# Patient Record
Sex: MALE | Race: WHITE | NOT HISPANIC OR LATINO | Employment: FULL TIME | URBAN - METROPOLITAN AREA
[De-identification: names, ages, dates, MRNs, and addresses within clinical notes are randomized per-mention and may not be internally consistent; named-entity substitution may affect disease eponyms.]

---

## 2017-01-12 ENCOUNTER — CLINICAL SUPPORT (OUTPATIENT)
Dept: CARDIOLOGY | Facility: CLINIC | Age: 72
End: 2017-01-12
Payer: MEDICARE

## 2017-01-12 DIAGNOSIS — Z95.810 ICD (IMPLANTABLE CARDIOVERTER-DEFIBRILLATOR) IN PLACE: ICD-10-CM

## 2017-01-12 DIAGNOSIS — I25.5 CARDIOMYOPATHY, ISCHEMIC: Chronic | ICD-10-CM

## 2017-01-12 PROCEDURE — 93283 PRGRMG EVAL IMPLANTABLE DFB: CPT | Mod: S$GLB,,, | Performed by: INTERNAL MEDICINE

## 2017-01-31 ENCOUNTER — OFFICE VISIT (OUTPATIENT)
Dept: CARDIOLOGY | Facility: CLINIC | Age: 72
End: 2017-01-31
Payer: MEDICARE

## 2017-01-31 ENCOUNTER — TELEPHONE (OUTPATIENT)
Dept: CARDIOLOGY | Facility: CLINIC | Age: 72
End: 2017-01-31

## 2017-01-31 ENCOUNTER — LAB VISIT (OUTPATIENT)
Dept: LAB | Facility: HOSPITAL | Age: 72
End: 2017-01-31
Attending: INTERNAL MEDICINE
Payer: MEDICARE

## 2017-01-31 VITALS
RESPIRATION RATE: 16 BRPM | DIASTOLIC BLOOD PRESSURE: 68 MMHG | BODY MASS INDEX: 30.44 KG/M2 | SYSTOLIC BLOOD PRESSURE: 112 MMHG | HEART RATE: 78 BPM | WEIGHT: 189.38 LBS | HEIGHT: 66 IN

## 2017-01-31 DIAGNOSIS — Z79.01 CHRONIC ANTICOAGULATION: ICD-10-CM

## 2017-01-31 DIAGNOSIS — Z45.02 ENCOUNTER FOR SERVICING OF IMPLANTABLE CARDIOVERTER-DEFIBRILLATOR (ICD) AT END OF BATTERY LIFE: ICD-10-CM

## 2017-01-31 DIAGNOSIS — Z92.89 HISTORY OF BLOOD TRANSFUSION: ICD-10-CM

## 2017-01-31 DIAGNOSIS — Z01.818 PRE-OP TESTING: Primary | ICD-10-CM

## 2017-01-31 DIAGNOSIS — I25.10 CORONARY ARTERY DISEASE INVOLVING NATIVE CORONARY ARTERY WITHOUT ANGINA PECTORIS, UNSPECIFIED WHETHER NATIVE OR TRANSPLANTED HEART: ICD-10-CM

## 2017-01-31 DIAGNOSIS — I25.5 CARDIOMYOPATHY, ISCHEMIC: Chronic | ICD-10-CM

## 2017-01-31 DIAGNOSIS — R06.09 DYSPNEA ON EFFORT: Primary | ICD-10-CM

## 2017-01-31 DIAGNOSIS — I10 ESSENTIAL HYPERTENSION: ICD-10-CM

## 2017-01-31 PROCEDURE — 3078F DIAST BP <80 MM HG: CPT | Mod: S$GLB,,, | Performed by: INTERNAL MEDICINE

## 2017-01-31 PROCEDURE — 3074F SYST BP LT 130 MM HG: CPT | Mod: S$GLB,,, | Performed by: INTERNAL MEDICINE

## 2017-01-31 PROCEDURE — 99499 UNLISTED E&M SERVICE: CPT | Mod: S$GLB,,, | Performed by: INTERNAL MEDICINE

## 2017-01-31 PROCEDURE — 1159F MED LIST DOCD IN RCRD: CPT | Mod: S$GLB,,, | Performed by: INTERNAL MEDICINE

## 2017-01-31 PROCEDURE — 99999 PR PBB SHADOW E&M-EST. PATIENT-LVL III: CPT | Mod: PBBFAC,,, | Performed by: INTERNAL MEDICINE

## 2017-01-31 PROCEDURE — 86803 HEPATITIS C AB TEST: CPT

## 2017-01-31 PROCEDURE — 1160F RVW MEDS BY RX/DR IN RCRD: CPT | Mod: S$GLB,,, | Performed by: INTERNAL MEDICINE

## 2017-01-31 PROCEDURE — 86705 HEP B CORE ANTIBODY IGM: CPT

## 2017-01-31 PROCEDURE — 1157F ADVNC CARE PLAN IN RCRD: CPT | Mod: S$GLB,,, | Performed by: INTERNAL MEDICINE

## 2017-01-31 PROCEDURE — 36415 COLL VENOUS BLD VENIPUNCTURE: CPT | Mod: PO

## 2017-01-31 PROCEDURE — 99214 OFFICE O/P EST MOD 30 MIN: CPT | Mod: S$GLB,,, | Performed by: INTERNAL MEDICINE

## 2017-01-31 PROCEDURE — 1126F AMNT PAIN NOTED NONE PRSNT: CPT | Mod: S$GLB,,, | Performed by: INTERNAL MEDICINE

## 2017-01-31 RX ORDER — RANOLAZINE 500 MG/1
500 TABLET, EXTENDED RELEASE ORAL 2 TIMES DAILY
Qty: 180 TABLET | Refills: 3 | Status: SHIPPED | OUTPATIENT
Start: 2017-01-31 | End: 2018-03-28 | Stop reason: SDUPTHER

## 2017-01-31 RX ORDER — CARVEDILOL 6.25 MG/1
6.25 TABLET ORAL 2 TIMES DAILY
Qty: 180 TABLET | Refills: 3 | Status: SHIPPED | OUTPATIENT
Start: 2017-01-31 | End: 2018-04-03 | Stop reason: SDUPTHER

## 2017-01-31 RX ORDER — PANTOPRAZOLE SODIUM 40 MG/1
40 TABLET, DELAYED RELEASE ORAL DAILY
Qty: 90 TABLET | Refills: 4 | Status: SHIPPED | OUTPATIENT
Start: 2017-01-31 | End: 2017-11-01 | Stop reason: SDUPTHER

## 2017-01-31 RX ORDER — POTASSIUM CHLORIDE 750 MG/1
10 TABLET, EXTENDED RELEASE ORAL DAILY
Qty: 90 TABLET | Refills: 3 | Status: SHIPPED | OUTPATIENT
Start: 2017-01-31 | End: 2018-03-13 | Stop reason: SDUPTHER

## 2017-01-31 RX ORDER — FUROSEMIDE 40 MG/1
40 TABLET ORAL DAILY
Qty: 90 TABLET | Refills: 3 | Status: SHIPPED | OUTPATIENT
Start: 2017-01-31 | End: 2018-04-27 | Stop reason: SDUPTHER

## 2017-01-31 RX ORDER — ATORVASTATIN CALCIUM 20 MG/1
20 TABLET, FILM COATED ORAL DAILY
Qty: 90 TABLET | Refills: 3 | Status: SHIPPED | OUTPATIENT
Start: 2017-01-31 | End: 2017-05-10 | Stop reason: SDUPTHER

## 2017-01-31 RX ORDER — CLOPIDOGREL BISULFATE 75 MG/1
75 TABLET ORAL NIGHTLY
Qty: 90 TABLET | Refills: 3 | Status: SHIPPED | OUTPATIENT
Start: 2017-01-31 | End: 2018-02-20 | Stop reason: SDUPTHER

## 2017-01-31 RX ORDER — EZETIMIBE 10 MG/1
10 TABLET ORAL DAILY
Qty: 90 TABLET | Refills: 3 | Status: SHIPPED | OUTPATIENT
Start: 2017-01-31 | End: 2017-02-09 | Stop reason: DRUGHIGH

## 2017-01-31 RX ORDER — ENALAPRIL MALEATE 10 MG/1
5 TABLET ORAL 2 TIMES DAILY
Qty: 90 TABLET | Refills: 3 | Status: SHIPPED | OUTPATIENT
Start: 2017-01-31 | End: 2017-02-09 | Stop reason: DRUGHIGH

## 2017-01-31 NOTE — PROGRESS NOTES
Subjective:    Patient ID:  Tristen Blanc is a 71 y.o. male who presents for follow-up of Follow-up (6 month )      HPI Comments: Had bad sinus infection since Thanksgiving.      Review of Systems   All other systems reviewed and are negative.       Objective:    Physical Exam   Constitutional: He is oriented to person, place, and time. He appears well-developed and well-nourished.   HENT:   Head: Normocephalic and atraumatic.   Eyes: Conjunctivae and EOM are normal. Pupils are equal, round, and reactive to light. Right eye exhibits no discharge. Left eye exhibits no discharge. No scleral icterus.   Neck: Normal range of motion. Neck supple. No JVD present. No tracheal deviation present. No thyromegaly present.   Cardiovascular: Normal rate, regular rhythm and intact distal pulses.  Exam reveals no gallop and no friction rub.    Murmur heard.  Pulses:       Carotid pulses are 2+ on the right side, and 2+ on the left side.       Dorsalis pedis pulses are 2+ on the right side, and 2+ on the left side.        Posterior tibial pulses are 2+ on the right side, and 2+ on the left side.   Pulmonary/Chest: Effort normal and breath sounds normal. No stridor. No respiratory distress. He has no wheezes. He has no rales. He exhibits no tenderness.   Abdominal: Soft. Bowel sounds are normal. He exhibits no distension. There is no tenderness. There is no rebound and no guarding.   Musculoskeletal: Normal range of motion. He exhibits no edema or tenderness.   Lymphadenopathy:     He has no cervical adenopathy.   Neurological: He is alert and oriented to person, place, and time.   Skin: Skin is warm and dry. No rash noted. No erythema. No pallor.   Psychiatric: He has a normal mood and affect. His behavior is normal. Judgment and thought content normal.         Assessment:       1. Dyspnea on effort    2. Essential hypertension    3. Cardiomyopathy, ischemic    4. Coronary artery disease involving native coronary artery without  angina pectoris, unspecified whether native or transplanted heart         Plan:       Dyspnea on effort    Essential hypertension    Cardiomyopathy, ischemic    Coronary artery disease involving native coronary artery without angina pectoris, unspecified whether native or transplanted heart    Will refer to Dr Loza for pacer battery replacement

## 2017-02-01 DIAGNOSIS — I10 HTN (HYPERTENSION) WITH GOAL TO BE DETERMINED: ICD-10-CM

## 2017-02-01 DIAGNOSIS — R06.00 DYSPNEA, UNSPECIFIED TYPE: ICD-10-CM

## 2017-02-01 DIAGNOSIS — I20.89 ANGINA AT REST: ICD-10-CM

## 2017-02-01 DIAGNOSIS — R07.9 CHEST PAIN, UNSPECIFIED TYPE: Primary | ICD-10-CM

## 2017-02-01 DIAGNOSIS — I25.10 CORONARY ARTERY DISEASE, ANGINA PRESENCE UNSPECIFIED, UNSPECIFIED VESSEL OR LESION TYPE, UNSPECIFIED WHETHER NATIVE OR TRANSPLANTED HEART: ICD-10-CM

## 2017-02-01 DIAGNOSIS — I42.9 CARDIOMYOPATHY, UNSPECIFIED TYPE: ICD-10-CM

## 2017-02-01 LAB
HBV CORE IGM SERPL QL IA: NEGATIVE
HCV AB SERPL QL IA: NEGATIVE

## 2017-02-01 NOTE — TELEPHONE ENCOUNTER
----- Message from Vishnu Chand MD sent at 2/1/2017  2:20 PM CST -----  Negative blood test for hepatitis B abd C. A

## 2017-02-03 ENCOUNTER — LAB VISIT (OUTPATIENT)
Dept: LAB | Facility: HOSPITAL | Age: 72
End: 2017-02-03
Attending: INTERNAL MEDICINE
Payer: MEDICARE

## 2017-02-03 DIAGNOSIS — Z79.01 CHRONIC ANTICOAGULATION: ICD-10-CM

## 2017-02-03 DIAGNOSIS — Z01.818 PRE-OP TESTING: ICD-10-CM

## 2017-02-03 LAB
ANION GAP SERPL CALC-SCNC: 5 MMOL/L
APTT BLDCRRT: 26.2 SEC
BASOPHILS # BLD AUTO: 0.03 K/UL
BASOPHILS NFR BLD: 0.4 %
BUN SERPL-MCNC: 20 MG/DL
CALCIUM SERPL-MCNC: 8.9 MG/DL
CHLORIDE SERPL-SCNC: 103 MMOL/L
CO2 SERPL-SCNC: 30 MMOL/L
CREAT SERPL-MCNC: 1.1 MG/DL
DIFFERENTIAL METHOD: ABNORMAL
EOSINOPHIL # BLD AUTO: 0.5 K/UL
EOSINOPHIL NFR BLD: 6.2 %
ERYTHROCYTE [DISTWIDTH] IN BLOOD BY AUTOMATED COUNT: 13.6 %
EST. GFR  (AFRICAN AMERICAN): >60 ML/MIN/1.73 M^2
EST. GFR  (NON AFRICAN AMERICAN): >60 ML/MIN/1.73 M^2
GLUCOSE SERPL-MCNC: 114 MG/DL
HCT VFR BLD AUTO: 44 %
HGB BLD-MCNC: 14.6 G/DL
INR PPP: 1
LYMPHOCYTES # BLD AUTO: 2.2 K/UL
LYMPHOCYTES NFR BLD: 30.6 %
MCH RBC QN AUTO: 31.4 PG
MCHC RBC AUTO-ENTMCNC: 33.2 %
MCV RBC AUTO: 95 FL
MONOCYTES # BLD AUTO: 0.5 K/UL
MONOCYTES NFR BLD: 7 %
NEUTROPHILS # BLD AUTO: 4 K/UL
NEUTROPHILS NFR BLD: 55.7 %
PLATELET # BLD AUTO: 217 K/UL
PMV BLD AUTO: 9.8 FL
POTASSIUM SERPL-SCNC: 4.8 MMOL/L
PROTHROMBIN TIME: 10.7 SEC
RBC # BLD AUTO: 4.65 M/UL
SODIUM SERPL-SCNC: 138 MMOL/L
WBC # BLD AUTO: 7.25 K/UL

## 2017-02-03 PROCEDURE — 85610 PROTHROMBIN TIME: CPT | Mod: PO

## 2017-02-03 PROCEDURE — 85025 COMPLETE CBC W/AUTO DIFF WBC: CPT

## 2017-02-03 PROCEDURE — 36415 COLL VENOUS BLD VENIPUNCTURE: CPT | Mod: PO

## 2017-02-03 PROCEDURE — 80048 BASIC METABOLIC PNL TOTAL CA: CPT

## 2017-02-03 PROCEDURE — 85730 THROMBOPLASTIN TIME PARTIAL: CPT | Mod: PO

## 2017-02-10 DIAGNOSIS — Z95.810 ICD (IMPLANTABLE CARDIOVERTER-DEFIBRILLATOR) IN PLACE: Primary | ICD-10-CM

## 2017-02-10 DIAGNOSIS — I25.5 CARDIOMYOPATHY, ISCHEMIC: Primary | ICD-10-CM

## 2017-02-10 DIAGNOSIS — I25.5 CARDIOMYOPATHY, ISCHEMIC: ICD-10-CM

## 2017-02-10 DIAGNOSIS — I25.10 CORONARY ARTERY DISEASE, ANGINA PRESENCE UNSPECIFIED, UNSPECIFIED VESSEL OR LESION TYPE, UNSPECIFIED WHETHER NATIVE OR TRANSPLANTED HEART: ICD-10-CM

## 2017-02-10 DIAGNOSIS — I10 HTN (HYPERTENSION) WITH GOAL TO BE DETERMINED: ICD-10-CM

## 2017-02-10 PROBLEM — Z45.02 ENCOUNTER FOR SERVICING OF AUTOMATIC IMPLANTABLE CARDIOVERTER-DEFIBRILLATOR (AICD) AT END OF BATTERY LIFE: Status: ACTIVE | Noted: 2017-02-10

## 2017-02-20 ENCOUNTER — CLINICAL SUPPORT (OUTPATIENT)
Dept: CARDIOLOGY | Facility: CLINIC | Age: 72
End: 2017-02-20
Payer: MEDICARE

## 2017-02-20 DIAGNOSIS — I25.5 CARDIOMYOPATHY, ISCHEMIC: ICD-10-CM

## 2017-02-20 DIAGNOSIS — Z95.810 ICD (IMPLANTABLE CARDIOVERTER-DEFIBRILLATOR) IN PLACE: ICD-10-CM

## 2017-02-20 PROCEDURE — 93283 PRGRMG EVAL IMPLANTABLE DFB: CPT | Mod: S$GLB,,, | Performed by: INTERNAL MEDICINE

## 2017-03-06 ENCOUNTER — TELEPHONE (OUTPATIENT)
Dept: CARDIOLOGY | Facility: CLINIC | Age: 72
End: 2017-03-06

## 2017-03-28 ENCOUNTER — OFFICE VISIT (OUTPATIENT)
Dept: CARDIOLOGY | Facility: CLINIC | Age: 72
End: 2017-03-28
Payer: MEDICARE

## 2017-03-28 VITALS
HEART RATE: 79 BPM | RESPIRATION RATE: 18 BRPM | DIASTOLIC BLOOD PRESSURE: 68 MMHG | HEIGHT: 66 IN | WEIGHT: 191.56 LBS | SYSTOLIC BLOOD PRESSURE: 117 MMHG | BODY MASS INDEX: 30.79 KG/M2

## 2017-03-28 DIAGNOSIS — I10 ESSENTIAL HYPERTENSION: Primary | ICD-10-CM

## 2017-03-28 DIAGNOSIS — I25.810 CORONARY ARTERY DISEASE INVOLVING CORONARY BYPASS GRAFT WITHOUT ANGINA PECTORIS, UNSPECIFIED WHETHER NATIVE OR TRANSPLANTED HEART: ICD-10-CM

## 2017-03-28 DIAGNOSIS — I25.5 CARDIOMYOPATHY, ISCHEMIC: Chronic | ICD-10-CM

## 2017-03-28 DIAGNOSIS — E78.00 HYPERCHOLESTEROLEMIA: Chronic | ICD-10-CM

## 2017-03-28 PROCEDURE — 99499 UNLISTED E&M SERVICE: CPT | Mod: S$GLB,,, | Performed by: INTERNAL MEDICINE

## 2017-03-28 PROCEDURE — 99024 POSTOP FOLLOW-UP VISIT: CPT | Mod: S$GLB,,, | Performed by: INTERNAL MEDICINE

## 2017-03-28 PROCEDURE — 1159F MED LIST DOCD IN RCRD: CPT | Mod: S$GLB,,, | Performed by: INTERNAL MEDICINE

## 2017-03-28 PROCEDURE — 1126F AMNT PAIN NOTED NONE PRSNT: CPT | Mod: S$GLB,,, | Performed by: INTERNAL MEDICINE

## 2017-03-28 PROCEDURE — 3078F DIAST BP <80 MM HG: CPT | Mod: S$GLB,,, | Performed by: INTERNAL MEDICINE

## 2017-03-28 PROCEDURE — 99999 PR PBB SHADOW E&M-EST. PATIENT-LVL III: CPT | Mod: PBBFAC,,, | Performed by: INTERNAL MEDICINE

## 2017-03-28 PROCEDURE — 3074F SYST BP LT 130 MM HG: CPT | Mod: S$GLB,,, | Performed by: INTERNAL MEDICINE

## 2017-03-28 PROCEDURE — 1157F ADVNC CARE PLAN IN RCRD: CPT | Mod: S$GLB,,, | Performed by: INTERNAL MEDICINE

## 2017-03-28 PROCEDURE — 1160F RVW MEDS BY RX/DR IN RCRD: CPT | Mod: S$GLB,,, | Performed by: INTERNAL MEDICINE

## 2017-03-28 RX ORDER — MONTELUKAST SODIUM 10 MG/1
10 TABLET ORAL DAILY PRN
Qty: 90 TABLET | Refills: 3 | Status: SHIPPED | OUTPATIENT
Start: 2017-03-28

## 2017-03-28 NOTE — PROGRESS NOTES
Subjective:    Patient ID:  Tristen Blanc is a 71 y.o. male who presents for follow-up of Follow-up (defibrillator/ pacemaker check up)      HPI Comments: Allergies only complaint. No cardiac symptoms.      Review of Systems   All other systems reviewed and are negative.       Objective:    Physical Exam   Constitutional: He is oriented to person, place, and time. He appears well-developed and well-nourished.   HENT:   Head: Normocephalic and atraumatic.   Eyes: Conjunctivae and EOM are normal. Pupils are equal, round, and reactive to light. Right eye exhibits no discharge. Left eye exhibits no discharge. No scleral icterus.   Neck: Normal range of motion. Neck supple. No JVD present. No tracheal deviation present. No thyromegaly present.   Cardiovascular: Normal rate, regular rhythm and intact distal pulses.  Exam reveals no gallop and no friction rub.    Murmur heard.  Pulses:       Carotid pulses are 2+ on the right side, and 2+ on the left side.       Dorsalis pedis pulses are 2+ on the right side, and 2+ on the left side.        Posterior tibial pulses are 2+ on the right side, and 2+ on the left side.   Pulmonary/Chest: Effort normal and breath sounds normal. No stridor. No respiratory distress. He has no wheezes. He has no rales. He exhibits no tenderness.   Abdominal: Soft. Bowel sounds are normal. He exhibits no distension. There is no tenderness. There is no rebound and no guarding.   Musculoskeletal: Normal range of motion. He exhibits no edema or tenderness.   Lymphadenopathy:     He has no cervical adenopathy.   Neurological: He is alert and oriented to person, place, and time.   Skin: Skin is warm and dry. No rash noted. No erythema. No pallor.   Psychiatric: He has a normal mood and affect. His behavior is normal. Judgment and thought content normal.         Assessment:       1. Essential hypertension    2. Cardiomyopathy, ischemic    3. Coronary artery disease involving coronary bypass graft  without angina pectoris, unspecified whether native or transplanted heart    4. Hypercholesterolemia         Plan:       Essential hypertension    Cardiomyopathy, ischemic    Coronary artery disease involving coronary bypass graft without angina pectoris, unspecified whether native or transplanted heart    Hypercholesterolemia    Other orders  -     montelukast (SINGULAIR) 10 mg tablet; Take 1 tablet (10 mg total) by mouth daily as needed.  Dispense: 90 tablet; Refill: 3

## 2017-03-30 DIAGNOSIS — I20.9 CARDIAC ANGINA: ICD-10-CM

## 2017-04-04 RX ORDER — NITROGLYCERIN 0.4 MG/1
TABLET SUBLINGUAL
Qty: 25 TABLET | Refills: 0 | Status: SHIPPED | OUTPATIENT
Start: 2017-04-04 | End: 2018-08-14 | Stop reason: SDUPTHER

## 2017-05-02 RX ORDER — ATORVASTATIN CALCIUM 20 MG/1
TABLET, FILM COATED ORAL
Qty: 90 TABLET | Refills: 3 | Status: CANCELLED | OUTPATIENT
Start: 2017-05-02

## 2017-05-10 RX ORDER — ATORVASTATIN CALCIUM 20 MG/1
20 TABLET, FILM COATED ORAL DAILY
Qty: 90 TABLET | Refills: 3 | Status: SHIPPED | OUTPATIENT
Start: 2017-05-10 | End: 2018-04-27 | Stop reason: SDUPTHER

## 2017-06-05 ENCOUNTER — CLINICAL SUPPORT (OUTPATIENT)
Dept: CARDIOLOGY | Facility: CLINIC | Age: 72
End: 2017-06-05
Payer: MEDICARE

## 2017-06-05 DIAGNOSIS — I25.5 CARDIOMYOPATHY, ISCHEMIC: ICD-10-CM

## 2017-06-05 DIAGNOSIS — Z95.810 ICD (IMPLANTABLE CARDIOVERTER-DEFIBRILLATOR) IN PLACE: Primary | ICD-10-CM

## 2017-06-05 DIAGNOSIS — Z95.810 ICD (IMPLANTABLE CARDIOVERTER-DEFIBRILLATOR) IN PLACE: ICD-10-CM

## 2017-06-05 PROCEDURE — 93296 REM INTERROG EVL PM/IDS: CPT | Mod: S$GLB,,, | Performed by: INTERNAL MEDICINE

## 2017-06-05 PROCEDURE — 93295 DEV INTERROG REMOTE 1/2/MLT: CPT | Mod: S$GLB,,, | Performed by: INTERNAL MEDICINE

## 2017-07-25 ENCOUNTER — PATIENT MESSAGE (OUTPATIENT)
Dept: CARDIOLOGY | Facility: CLINIC | Age: 72
End: 2017-07-25

## 2017-07-25 ENCOUNTER — TELEPHONE (OUTPATIENT)
Dept: CARDIOLOGY | Facility: CLINIC | Age: 72
End: 2017-07-25

## 2017-07-25 NOTE — TELEPHONE ENCOUNTER
----- Message from Kelly Iraheta sent at 7/25/2017 10:45 AM CDT -----  Contact: [phone]  Patient is returning nurse's phone call.  Please call patient back at [phone].

## 2017-07-25 NOTE — TELEPHONE ENCOUNTER
S/W pt: advised him we had to move his appt w/ Dr Chand on 9/26; resched to 9/20/17 & sent him a [facility] message; pt states he has to have an AM appt; currently is sched @ 12pm on 9/20, advised pt can resched to 8:20am on 9/20; pt would like 8:20am on 9/20, resched accordingly & mailed copy of appt slip as reminder; pt agrees & verbalizes understanding.

## 2017-09-11 ENCOUNTER — CLINICAL SUPPORT (OUTPATIENT)
Dept: CARDIOLOGY | Facility: CLINIC | Age: 72
End: 2017-09-11
Payer: MEDICARE

## 2017-09-11 DIAGNOSIS — Z95.810 ICD (IMPLANTABLE CARDIOVERTER-DEFIBRILLATOR) IN PLACE: ICD-10-CM

## 2017-09-11 DIAGNOSIS — I25.5 CARDIOMYOPATHY, ISCHEMIC: ICD-10-CM

## 2017-09-11 PROCEDURE — 93296 REM INTERROG EVL PM/IDS: CPT | Mod: S$GLB,,, | Performed by: INTERNAL MEDICINE

## 2017-09-11 PROCEDURE — 93295 DEV INTERROG REMOTE 1/2/MLT: CPT | Mod: S$GLB,,, | Performed by: INTERNAL MEDICINE

## 2017-09-20 ENCOUNTER — OFFICE VISIT (OUTPATIENT)
Dept: CARDIOLOGY | Facility: CLINIC | Age: 72
End: 2017-09-20
Payer: MEDICARE

## 2017-09-20 VITALS
DIASTOLIC BLOOD PRESSURE: 70 MMHG | HEIGHT: 66 IN | HEART RATE: 73 BPM | SYSTOLIC BLOOD PRESSURE: 120 MMHG | WEIGHT: 191.38 LBS | BODY MASS INDEX: 30.76 KG/M2

## 2017-09-20 DIAGNOSIS — E78.00 HYPERCHOLESTEROLEMIA: Chronic | ICD-10-CM

## 2017-09-20 DIAGNOSIS — I25.5 CARDIOMYOPATHY, ISCHEMIC: Primary | Chronic | ICD-10-CM

## 2017-09-20 DIAGNOSIS — Z95.810 ICD (IMPLANTABLE CARDIOVERTER-DEFIBRILLATOR) IN PLACE: ICD-10-CM

## 2017-09-20 DIAGNOSIS — I25.700 CORONARY ARTERY DISEASE INVOLVING CORONARY BYPASS GRAFT OF NATIVE HEART WITH UNSTABLE ANGINA PECTORIS: ICD-10-CM

## 2017-09-20 PROCEDURE — 3074F SYST BP LT 130 MM HG: CPT | Mod: S$GLB,,, | Performed by: INTERNAL MEDICINE

## 2017-09-20 PROCEDURE — 1126F AMNT PAIN NOTED NONE PRSNT: CPT | Mod: S$GLB,,, | Performed by: INTERNAL MEDICINE

## 2017-09-20 PROCEDURE — 3008F BODY MASS INDEX DOCD: CPT | Mod: S$GLB,,, | Performed by: INTERNAL MEDICINE

## 2017-09-20 PROCEDURE — 99999 PR PBB SHADOW E&M-EST. PATIENT-LVL II: CPT | Mod: PBBFAC,,, | Performed by: INTERNAL MEDICINE

## 2017-09-20 PROCEDURE — 99214 OFFICE O/P EST MOD 30 MIN: CPT | Mod: S$GLB,,, | Performed by: INTERNAL MEDICINE

## 2017-09-20 PROCEDURE — 3078F DIAST BP <80 MM HG: CPT | Mod: S$GLB,,, | Performed by: INTERNAL MEDICINE

## 2017-09-20 PROCEDURE — 99499 UNLISTED E&M SERVICE: CPT | Mod: S$GLB,,, | Performed by: INTERNAL MEDICINE

## 2017-09-20 PROCEDURE — 1159F MED LIST DOCD IN RCRD: CPT | Mod: S$GLB,,, | Performed by: INTERNAL MEDICINE

## 2017-09-20 RX ORDER — EZETIMIBE 10 MG/1
10 TABLET ORAL DAILY
Qty: 90 TABLET | Refills: 3 | Status: SHIPPED | OUTPATIENT
Start: 2017-09-20 | End: 2018-08-14 | Stop reason: SDUPTHER

## 2017-09-20 RX ORDER — DOXYCYCLINE 100 MG/1
CAPSULE ORAL
Refills: 0 | COMMUNITY
Start: 2017-07-06 | End: 2018-04-11

## 2017-09-20 NOTE — PROGRESS NOTES
Subjective:    Patient ID:  Tristen Blanc is a 72 y.o. male who presents for follow-up of Follow-up (follow up); Hyperlipidemia; Coronary Artery Disease; and Hypertension      Doing well voices no complains at all.        Review of Systems   Constitution: Negative.   HENT: Negative.    Eyes: Negative.    Cardiovascular: Negative.    Respiratory: Negative.    Endocrine: Negative.    Skin: Negative.    Musculoskeletal: Negative.    Gastrointestinal: Negative.    Genitourinary: Negative.    Neurological: Negative.    Psychiatric/Behavioral: Negative.    Allergic/Immunologic: Negative.         Objective:    Physical Exam   Constitutional: He is oriented to person, place, and time. He appears well-developed and well-nourished.   HENT:   Head: Normocephalic and atraumatic.   Eyes: Conjunctivae and EOM are normal. Pupils are equal, round, and reactive to light. Right eye exhibits no discharge. Left eye exhibits no discharge. No scleral icterus.   Neck: Normal range of motion. Neck supple. No JVD present. No tracheal deviation present. No thyromegaly present.   Cardiovascular: Normal rate, regular rhythm and intact distal pulses.  Exam reveals no gallop and no friction rub.    Murmur heard.  Pulses:       Carotid pulses are 2+ on the right side, and 2+ on the left side.       Dorsalis pedis pulses are 2+ on the right side, and 2+ on the left side.        Posterior tibial pulses are 2+ on the right side, and 2+ on the left side.   Pulmonary/Chest: Effort normal and breath sounds normal. No stridor. No respiratory distress. He has no wheezes. He has no rales. He exhibits no tenderness.   Abdominal: Soft. Bowel sounds are normal. He exhibits no distension. There is no tenderness. There is no rebound and no guarding.   Musculoskeletal: Normal range of motion. He exhibits no edema or tenderness.   Lymphadenopathy:     He has no cervical adenopathy.   Neurological: He is alert and oriented to person, place, and time.   Skin:  Skin is warm and dry. No rash noted. No erythema. No pallor.   Psychiatric: He has a normal mood and affect. His behavior is normal. Judgment and thought content normal.         Assessment:       1. Cardiomyopathy, ischemic    2. Hypercholesterolemia    3. Coronary artery disease involving coronary bypass graft of native heart with unstable angina pectoris    4. ICD (implantable cardioverter-defibrillator) in place         Plan:       Cardiomyopathy, ischemic  -     NM Myocardial Perfusion Spect Multi Pharmacologic; Future; Expected date: 09/20/2017  -     NM Multi Pharm Stress Cardiac Component; Future    Hypercholesterolemia    Coronary artery disease involving coronary bypass graft of native heart with unstable angina pectoris  -     NM Myocardial Perfusion Spect Multi Pharmacologic; Future; Expected date: 09/20/2017  -     NM Multi Pharm Stress Cardiac Component; Future    ICD (implantable cardioverter-defibrillator) in place    Other orders  -     ezetimibe (ZETIA) 10 mg tablet; Take 1 tablet (10 mg total) by mouth once daily.  Dispense: 90 tablet; Refill: 3    Doing really well. No cardiac symptoms at his time and he is back to playing golf. RTC in 6 months.

## 2017-11-07 RX ORDER — PANTOPRAZOLE SODIUM 40 MG/1
40 TABLET, DELAYED RELEASE ORAL DAILY
Qty: 90 TABLET | Refills: 4 | Status: SHIPPED | OUTPATIENT
Start: 2017-11-07 | End: 2018-12-18 | Stop reason: SDUPTHER

## 2018-01-02 ENCOUNTER — CLINICAL SUPPORT (OUTPATIENT)
Dept: CARDIOLOGY | Facility: CLINIC | Age: 73
End: 2018-01-02
Attending: INTERNAL MEDICINE
Payer: MEDICARE

## 2018-01-02 DIAGNOSIS — Z95.810 ICD (IMPLANTABLE CARDIOVERTER-DEFIBRILLATOR) IN PLACE: ICD-10-CM

## 2018-01-02 DIAGNOSIS — I25.5 CARDIOMYOPATHY, ISCHEMIC: ICD-10-CM

## 2018-01-02 PROCEDURE — 93295 DEV INTERROG REMOTE 1/2/MLT: CPT | Mod: S$GLB,,, | Performed by: INTERNAL MEDICINE

## 2018-01-02 PROCEDURE — 93296 REM INTERROG EVL PM/IDS: CPT | Mod: S$GLB,,, | Performed by: INTERNAL MEDICINE

## 2018-02-09 DIAGNOSIS — Z95.810 ICD (IMPLANTABLE CARDIOVERTER-DEFIBRILLATOR) IN PLACE: Primary | ICD-10-CM

## 2018-02-09 DIAGNOSIS — I25.5 CARDIOMYOPATHY, ISCHEMIC: ICD-10-CM

## 2018-02-20 DIAGNOSIS — I25.10 CORONARY ARTERY DISEASE, ANGINA PRESENCE UNSPECIFIED, UNSPECIFIED VESSEL OR LESION TYPE, UNSPECIFIED WHETHER NATIVE OR TRANSPLANTED HEART: Primary | ICD-10-CM

## 2018-02-20 RX ORDER — CLOPIDOGREL BISULFATE 75 MG/1
75 TABLET ORAL NIGHTLY
Qty: 90 TABLET | Refills: 3 | Status: SHIPPED | OUTPATIENT
Start: 2018-02-20 | End: 2018-12-18 | Stop reason: SDUPTHER

## 2018-03-02 DIAGNOSIS — I10 ESSENTIAL HYPERTENSION: Primary | ICD-10-CM

## 2018-03-06 DIAGNOSIS — N18.2 CRI (CHRONIC RENAL INSUFFICIENCY), STAGE 2 (MILD): ICD-10-CM

## 2018-03-06 DIAGNOSIS — E87.6 HYPOKALEMIA: ICD-10-CM

## 2018-03-06 DIAGNOSIS — E78.2 HYPERCHOLESTEROLEMIA WITH HYPERGLYCERIDEMIA: Primary | ICD-10-CM

## 2018-03-06 RX ORDER — ENALAPRIL MALEATE 5 MG/1
5 TABLET ORAL DAILY
Qty: 90 TABLET | Refills: 3 | Status: SHIPPED | OUTPATIENT
Start: 2018-03-06 | End: 2018-04-26 | Stop reason: SDUPTHER

## 2018-03-08 ENCOUNTER — PES CALL (OUTPATIENT)
Dept: ADMINISTRATIVE | Facility: CLINIC | Age: 73
End: 2018-03-08

## 2018-03-12 ENCOUNTER — TELEPHONE (OUTPATIENT)
Dept: CARDIOLOGY | Facility: CLINIC | Age: 73
End: 2018-03-12

## 2018-03-12 NOTE — TELEPHONE ENCOUNTER
----- Message from Janes Spangler sent at 3/12/2018  7:55 AM CDT -----  Contact: same  Patient called in and stated he needs to cancel his pacemaker appt on 3/20/18 as his daughter is having her first baby.  Message has been sent to Dr. Chand to cancel his appt also, along with his nuclear test that were scheduled for tomorrow 3/13/18.  Patient call back number is [phone]

## 2018-03-12 NOTE — TELEPHONE ENCOUNTER
----- Message from Janes ANDERSON Frisard sent at 3/12/2018  7:53 AM CDT -----  Contact: same  Patient called in and stated he needs to cancel his nuclear test scheduled for tomorrow Tuesday 3/13/18 as his daughter is having her first baby.  Patient would like a call back to reschedule at [phone].  Patient will also need to reschedule 3/20/18 Dr. Chand's appt.

## 2018-03-13 DIAGNOSIS — I42.9 CARDIOMYOPATHY, UNSPECIFIED TYPE: Primary | ICD-10-CM

## 2018-03-14 RX ORDER — POTASSIUM CHLORIDE 750 MG/1
10 TABLET, EXTENDED RELEASE ORAL DAILY
Qty: 90 TABLET | Refills: 3 | Status: SHIPPED | OUTPATIENT
Start: 2018-03-14 | End: 2018-12-18 | Stop reason: SDUPTHER

## 2018-03-28 RX ORDER — RANOLAZINE 500 MG/1
500 TABLET, EXTENDED RELEASE ORAL 2 TIMES DAILY
Qty: 180 TABLET | Refills: 3 | Status: SHIPPED | OUTPATIENT
Start: 2018-03-28 | End: 2018-05-23

## 2018-03-28 NOTE — TELEPHONE ENCOUNTER
----- Message from Lisseth Tejada sent at 3/28/2018 10:11 AM CDT -----  Contact: self  Type:  RX Refill Request    Who Called:  self  Refill or New Rx:  refill  RX Name and Strength:  ranolazine (RANEXA) 500 MG Tb12;carvedilol (COREG) 6.25 MG tabletHow is the patient currently taking it? (ex. 1XDay):  2Xday  Is this a 30 day or 90 day RX:  90 and 3 refills  Preferred Pharmacy with phone number:  [facility]  Local or Mail Order:  local  Ordering Provider:  Dr Jagruti Rae Call Back Number:  [phone]  Additional Information:  Patient needs before the weekend due to out and has a stress test that he needs them in his system. Pharmacy has sent prior request.     [facility] [address] [place] [address] [address] [facility] [address] AT [facility]  [address] [address]  Phone: [phone] Fax: [phone]

## 2018-04-03 ENCOUNTER — HOSPITAL ENCOUNTER (OUTPATIENT)
Dept: RADIOLOGY | Facility: HOSPITAL | Age: 73
Discharge: HOME | End: 2018-04-03
Attending: INTERNAL MEDICINE
Payer: MEDICARE

## 2018-04-03 ENCOUNTER — CLINICAL SUPPORT (OUTPATIENT)
Dept: CARDIOLOGY | Facility: CLINIC | Age: 73
End: 2018-04-03
Attending: INTERNAL MEDICINE
Payer: MEDICARE

## 2018-04-03 DIAGNOSIS — I25.700 CORONARY ARTERY DISEASE INVOLVING CORONARY BYPASS GRAFT OF NATIVE HEART WITH UNSTABLE ANGINA PECTORIS: ICD-10-CM

## 2018-04-03 DIAGNOSIS — I25.5 CARDIOMYOPATHY, ISCHEMIC: Chronic | ICD-10-CM

## 2018-04-03 PROCEDURE — 78452 HT MUSCLE IMAGE SPECT MULT: CPT | Mod: 26,,, | Performed by: INTERNAL MEDICINE

## 2018-04-03 PROCEDURE — 93015 CV STRESS TEST SUPVJ I&R: CPT | Mod: S$GLB,,, | Performed by: INTERNAL MEDICINE

## 2018-04-04 LAB — DIASTOLIC DYSFUNCTION: NO

## 2018-04-04 RX ORDER — CARVEDILOL 6.25 MG/1
6.25 TABLET ORAL 2 TIMES DAILY
Qty: 180 TABLET | Refills: 3 | Status: SHIPPED | OUTPATIENT
Start: 2018-04-04 | End: 2018-12-18 | Stop reason: SDUPTHER

## 2018-04-11 ENCOUNTER — CLINICAL SUPPORT (OUTPATIENT)
Dept: CARDIOLOGY | Facility: CLINIC | Age: 73
End: 2018-04-11
Attending: INTERNAL MEDICINE
Payer: MEDICARE

## 2018-04-11 ENCOUNTER — OFFICE VISIT (OUTPATIENT)
Dept: CARDIOLOGY | Facility: CLINIC | Age: 73
End: 2018-04-11
Payer: MEDICARE

## 2018-04-11 ENCOUNTER — TELEPHONE (OUTPATIENT)
Dept: CARDIOLOGY | Facility: CLINIC | Age: 73
End: 2018-04-11

## 2018-04-11 VITALS — BODY MASS INDEX: 30.23 KG/M2 | HEIGHT: 66 IN | WEIGHT: 188.13 LBS

## 2018-04-11 DIAGNOSIS — Z95.810 ICD (IMPLANTABLE CARDIOVERTER-DEFIBRILLATOR) IN PLACE: ICD-10-CM

## 2018-04-11 DIAGNOSIS — I25.5 CARDIOMYOPATHY, ISCHEMIC: Primary | Chronic | ICD-10-CM

## 2018-04-11 DIAGNOSIS — I25.5 CARDIOMYOPATHY, ISCHEMIC: ICD-10-CM

## 2018-04-11 DIAGNOSIS — E78.00 HYPERCHOLESTEROLEMIA: Chronic | ICD-10-CM

## 2018-04-11 DIAGNOSIS — G47.33 OBSTRUCTIVE SLEEP APNEA SYNDROME: Chronic | ICD-10-CM

## 2018-04-11 DIAGNOSIS — I25.10 CORONARY ARTERY DISEASE INVOLVING NATIVE CORONARY ARTERY OF NATIVE HEART WITHOUT ANGINA PECTORIS: ICD-10-CM

## 2018-04-11 PROCEDURE — 93283 PRGRMG EVAL IMPLANTABLE DFB: CPT | Mod: S$GLB,,, | Performed by: INTERNAL MEDICINE

## 2018-04-11 PROCEDURE — 99214 OFFICE O/P EST MOD 30 MIN: CPT | Mod: S$GLB,,, | Performed by: INTERNAL MEDICINE

## 2018-04-11 PROCEDURE — 99499 UNLISTED E&M SERVICE: CPT | Mod: S$GLB,,, | Performed by: INTERNAL MEDICINE

## 2018-04-11 PROCEDURE — 99999 PR PBB SHADOW E&M-EST. PATIENT-LVL II: CPT | Mod: PBBFAC,,, | Performed by: INTERNAL MEDICINE

## 2018-04-11 NOTE — PROGRESS NOTES
Subjective:    Patient ID:  Tristen Blanc is a 72 y.o. male who presents for follow-up of Coronary Artery Disease (follow up ppm check); Hyperlipidemia; Hypertension; and Cardiomyopathy      Cannot do much activity cause of his sinuses preventing him to do it.        Review of Systems   Cardiovascular: Positive for dyspnea on exertion.   All other systems reviewed and are negative.       Objective:    Physical Exam   Constitutional: He is oriented to person, place, and time. He appears well-developed and well-nourished.   HENT:   Head: Normocephalic and atraumatic.   Eyes: Conjunctivae and EOM are normal. Pupils are equal, round, and reactive to light. Right eye exhibits no discharge. Left eye exhibits no discharge. No scleral icterus.   Neck: Normal range of motion. Neck supple. No JVD present. No tracheal deviation present. No thyromegaly present.   Cardiovascular: Normal rate, regular rhythm and intact distal pulses.  Exam reveals no gallop and no friction rub.    Murmur heard.  Pulses:       Carotid pulses are 2+ on the right side, and 2+ on the left side.       Dorsalis pedis pulses are 2+ on the right side, and 2+ on the left side.        Posterior tibial pulses are 2+ on the right side, and 2+ on the left side.   Pulmonary/Chest: Effort normal and breath sounds normal. No stridor. No respiratory distress. He has no wheezes. He has no rales. He exhibits no tenderness.   Abdominal: Soft. Bowel sounds are normal. He exhibits no distension. There is no tenderness. There is no rebound and no guarding.   Musculoskeletal: Normal range of motion. He exhibits no edema or tenderness.   Lymphadenopathy:     He has no cervical adenopathy.   Neurological: He is alert and oriented to person, place, and time.   Skin: Skin is warm and dry. No rash noted. No erythema. No pallor.   Psychiatric: He has a normal mood and affect. His behavior is normal. Judgment and thought content normal.         Assessment:       1.  Cardiomyopathy, ischemic    2. Hypercholesterolemia    3. ICD (implantable cardioverter-defibrillator) in place    4. Coronary artery disease involving native coronary artery of native heart without angina pectoris    5. Obstructive sleep apnea syndrome         Plan:       Cardiomyopathy, ischemic    Hypercholesterolemia    ICD (implantable cardioverter-defibrillator) in place    Coronary artery disease involving native coronary artery of native heart without angina pectoris    Obstructive sleep apnea syndrome    Having difficulty with sinuses and that prevents him from using his CPAP machine. Gets SOB on effort in humid cold days.

## 2018-04-11 NOTE — TELEPHONE ENCOUNTER
Dr. ANDERSON referred patient to see Dr. Mike   See notes from today. Repeat echo ordered at main campus  Please call patient to schedule. Dx Cardiomyopaty

## 2018-04-16 ENCOUNTER — TELEPHONE (OUTPATIENT)
Dept: CARDIOLOGY | Facility: CLINIC | Age: 73
End: 2018-04-16

## 2018-04-16 NOTE — TELEPHONE ENCOUNTER
----- Message from Xiao Vickers sent at 4/13/2018  4:51 PM CDT -----  Contact: Self [phone]  Pt is requesting a call back from the nurse to find out who he is supposed to schedule with. Patient received a call but it was unclear who called him and when he tried the extension he couldn't get through to anyone. Patient is not sure who he was referred to.    Patient asks if the office could leave a detailed message as he will be out of town at a convention and will be unable to  the phone until later.    Patient may be reached at [phone].    Thank you.  JERI

## 2018-04-17 ENCOUNTER — TELEPHONE (OUTPATIENT)
Dept: TRANSPLANT | Facility: CLINIC | Age: 73
End: 2018-04-17

## 2018-04-17 NOTE — TELEPHONE ENCOUNTER
4/13/18 Called pt left message on voicemail to call office for we can schedule him a doctor appt with one of our HF Dr.    4/16/18 Left another message on pt voicemail to call office for we can get him schedule with one of our doctors    4/17/18 Called pt left two message one on house phone and one on cell phone to call office for we can get him schedule

## 2018-04-23 ENCOUNTER — TELEPHONE (OUTPATIENT)
Dept: CARDIOLOGY | Facility: CLINIC | Age: 73
End: 2018-04-23

## 2018-04-23 DIAGNOSIS — I50.22 CHRONIC SYSTOLIC HEART FAILURE: Primary | ICD-10-CM

## 2018-04-23 NOTE — TELEPHONE ENCOUNTER
----- Message from Lisseth Tejada sent at 4/23/2018  3:12 PM CDT -----  Contact: self  Patient states he did finally get a call from [facility] main [facility]. Please call patient at [phone] if any questions. Thanks!

## 2018-04-26 DIAGNOSIS — I10 ESSENTIAL HYPERTENSION: ICD-10-CM

## 2018-04-26 RX ORDER — ENALAPRIL MALEATE 5 MG/1
5 TABLET ORAL 2 TIMES DAILY
Qty: 180 TABLET | Refills: 1 | Status: SHIPPED | OUTPATIENT
Start: 2018-04-26 | End: 2018-05-23

## 2018-04-26 NOTE — TELEPHONE ENCOUNTER
"----- Message from Christel Iraheta sent at 4/26/2018 11:09 AM CDT -----  Contact: [phone]  Patient requesting a refill on enalapril (VASOTEC) 5 MG tablet, patient stated he take it "twice daily", 90 day supply, w/ 3 refills.  Patient complains he been trying to reach a nurse for 3 days.      Patient will be using   [facility] [address] [place] [address] [address] [facility] [address] AT [facility] [address]  [address] [address]  Phone: [phone] Fax: [phone]    Please call patient at [phone].     Thanks!   "

## 2018-04-26 NOTE — TELEPHONE ENCOUNTER
Please resend if appropriate. No other rx in system that states pt takes twice daily. Please advise.

## 2018-04-26 NOTE — TELEPHONE ENCOUNTER
Pt advised that a message has been sent to provider, but prescription was incorrect and has finished taking his bottle. Pt is prescribed 5mg twice daily with 90 day supply. Sent in as once daily. Pt has study scheduled Monday and is requesting his medication be sent to pharmacy so his test will be correct. please send.

## 2018-04-26 NOTE — TELEPHONE ENCOUNTER
----- Message from Gui Mckenzie sent at 4/26/2018  8:08 AM CDT -----  Contact: Patient  Type: Needs Medical Advice    Who Called:  Patient  Pharmacy name and phone #:  [facility] [phone]  Best Call Back Number: [phone]  Additional Information: patient called stated script needs to be refill for 2x daily enalapril (VASOTEC) 5 MG tablet script was written for 1x  Daily. Patient has taken 2x daily for many years he stated schedule to take stress take on 05/02 requesting medication and a call back.stated that [facility] pharmacy has made request several times with no response.patient is requesting a call back.

## 2018-04-27 ENCOUNTER — PATIENT MESSAGE (OUTPATIENT)
Dept: CARDIOLOGY | Facility: CLINIC | Age: 73
End: 2018-04-27

## 2018-04-27 DIAGNOSIS — E78.5 HYPERLIPIDEMIA, UNSPECIFIED HYPERLIPIDEMIA TYPE: ICD-10-CM

## 2018-04-27 DIAGNOSIS — I42.9 CARDIOMYOPATHY, UNSPECIFIED TYPE: Primary | ICD-10-CM

## 2018-05-01 RX ORDER — ATORVASTATIN CALCIUM 20 MG/1
20 TABLET, FILM COATED ORAL DAILY
Qty: 90 TABLET | Refills: 3 | Status: SHIPPED | OUTPATIENT
Start: 2018-05-01 | End: 2018-12-18 | Stop reason: SDUPTHER

## 2018-05-01 RX ORDER — FUROSEMIDE 40 MG/1
40 TABLET ORAL DAILY
Qty: 90 TABLET | Refills: 3 | Status: SHIPPED | OUTPATIENT
Start: 2018-05-01 | End: 2018-12-18 | Stop reason: SDUPTHER

## 2018-05-02 ENCOUNTER — HOSPITAL ENCOUNTER (OUTPATIENT)
Dept: CARDIOLOGY | Facility: CLINIC | Age: 73
Discharge: HOME | End: 2018-05-02
Attending: INTERNAL MEDICINE
Payer: MEDICARE

## 2018-05-02 DIAGNOSIS — I25.5 CARDIOMYOPATHY, ISCHEMIC: Chronic | ICD-10-CM

## 2018-05-02 DIAGNOSIS — I25.10 CORONARY ARTERY DISEASE INVOLVING NATIVE CORONARY ARTERY OF NATIVE HEART WITHOUT ANGINA PECTORIS: ICD-10-CM

## 2018-05-02 LAB
DIASTOLIC DYSFUNCTION: YES
ESTIMATED PA SYSTOLIC PRESSURE: 26.81
MITRAL VALVE REGURGITATION: ABNORMAL
RETIRED EF AND QEF - SEE NOTES: 25 (ref 55–65)
TRICUSPID VALVE REGURGITATION: ABNORMAL

## 2018-05-02 PROCEDURE — 93306 TTE W/DOPPLER COMPLETE: CPT | Mod: S$GLB,,, | Performed by: INTERNAL MEDICINE

## 2018-05-02 PROCEDURE — 96374 THER/PROPH/DIAG INJ IV PUSH: CPT | Mod: 59,S$GLB,, | Performed by: INTERNAL MEDICINE

## 2018-05-02 NOTE — PROGRESS NOTES
Patient identified via spelling of name and date of birth. Patient denies blood transfusion reaction. Consent obtained for use of contrast. Optison 3ml IVP vorshanae Garrison. 22 gauge saline lock started in right forearm under aseptic technique. Optison 3ml IVP used as contrast for 2 d imaging. Saline lock d/fran and pressure dressing applied. Tolerated procedure well.

## 2018-05-23 ENCOUNTER — LAB VISIT (OUTPATIENT)
Dept: LAB | Facility: HOSPITAL | Age: 73
End: 2018-05-23
Attending: INTERNAL MEDICINE
Payer: MEDICARE

## 2018-05-23 ENCOUNTER — INITIAL CONSULT (OUTPATIENT)
Dept: TRANSPLANT | Facility: CLINIC | Age: 73
End: 2018-05-23
Attending: INTERNAL MEDICINE
Payer: MEDICARE

## 2018-05-23 VITALS
DIASTOLIC BLOOD PRESSURE: 64 MMHG | SYSTOLIC BLOOD PRESSURE: 129 MMHG | HEIGHT: 66 IN | HEART RATE: 86 BPM | BODY MASS INDEX: 30.65 KG/M2 | WEIGHT: 190.69 LBS

## 2018-05-23 DIAGNOSIS — E78.5 DYSLIPIDEMIA WITH ELEVATED LOW DENSITY LIPOPROTEIN (LDL) CHOLESTEROL AND ABNORMALLY LOW HIGH DENSITY LIPOPROTEIN CHOLESTEROL: ICD-10-CM

## 2018-05-23 DIAGNOSIS — I50.42 CHRONIC COMBINED SYSTOLIC AND DIASTOLIC CHF, NYHA CLASS 2: ICD-10-CM

## 2018-05-23 DIAGNOSIS — I25.708 CORONARY ARTERY DISEASE OF BYPASS GRAFT OF NATIVE HEART WITH STABLE ANGINA PECTORIS: ICD-10-CM

## 2018-05-23 DIAGNOSIS — I25.5 CARDIOMYOPATHY, ISCHEMIC: Chronic | ICD-10-CM

## 2018-05-23 DIAGNOSIS — G47.33 OSA ON CPAP: ICD-10-CM

## 2018-05-23 DIAGNOSIS — Z95.810 ICD (IMPLANTABLE CARDIOVERTER-DEFIBRILLATOR) IN PLACE: ICD-10-CM

## 2018-05-23 DIAGNOSIS — I25.118 CORONARY ARTERY DISEASE OF NATIVE ARTERY OF NATIVE HEART WITH STABLE ANGINA PECTORIS: ICD-10-CM

## 2018-05-23 DIAGNOSIS — I50.42 CHRONIC COMBINED SYSTOLIC AND DIASTOLIC CONGESTIVE HEART FAILURE: Primary | ICD-10-CM

## 2018-05-23 DIAGNOSIS — I50.22 CHRONIC SYSTOLIC HEART FAILURE: ICD-10-CM

## 2018-05-23 LAB
ALBUMIN SERPL BCP-MCNC: 4.2 G/DL
ALP SERPL-CCNC: 75 U/L
ALT SERPL W/O P-5'-P-CCNC: 18 U/L
ANION GAP SERPL CALC-SCNC: 13 MMOL/L
AST SERPL-CCNC: 21 U/L
BILIRUB SERPL-MCNC: 0.6 MG/DL
BNP SERPL-MCNC: 122 PG/ML
BUN SERPL-MCNC: 18 MG/DL
CALCIUM SERPL-MCNC: 9.3 MG/DL
CHLORIDE SERPL-SCNC: 103 MMOL/L
CO2 SERPL-SCNC: 24 MMOL/L
CREAT SERPL-MCNC: 1.1 MG/DL
EST. GFR  (AFRICAN AMERICAN): >60 ML/MIN/1.73 M^2
EST. GFR  (NON AFRICAN AMERICAN): >60 ML/MIN/1.73 M^2
GLUCOSE SERPL-MCNC: 100 MG/DL
MAGNESIUM SERPL-MCNC: 2.4 MG/DL
POTASSIUM SERPL-SCNC: 4.3 MMOL/L
PROT SERPL-MCNC: 7.2 G/DL
SODIUM SERPL-SCNC: 140 MMOL/L

## 2018-05-23 PROCEDURE — 99499 UNLISTED E&M SERVICE: CPT | Mod: S$GLB,,, | Performed by: INTERNAL MEDICINE

## 2018-05-23 PROCEDURE — 83735 ASSAY OF MAGNESIUM: CPT

## 2018-05-23 PROCEDURE — 3078F DIAST BP <80 MM HG: CPT | Mod: CPTII,S$GLB,, | Performed by: INTERNAL MEDICINE

## 2018-05-23 PROCEDURE — 83880 ASSAY OF NATRIURETIC PEPTIDE: CPT

## 2018-05-23 PROCEDURE — 3074F SYST BP LT 130 MM HG: CPT | Mod: CPTII,S$GLB,, | Performed by: INTERNAL MEDICINE

## 2018-05-23 PROCEDURE — 99999 PR PBB SHADOW E&M-EST. PATIENT-LVL III: CPT | Mod: PBBFAC,,, | Performed by: INTERNAL MEDICINE

## 2018-05-23 PROCEDURE — 36415 COLL VENOUS BLD VENIPUNCTURE: CPT

## 2018-05-23 PROCEDURE — 99204 OFFICE O/P NEW MOD 45 MIN: CPT | Mod: S$GLB,,, | Performed by: INTERNAL MEDICINE

## 2018-05-23 PROCEDURE — 80053 COMPREHEN METABOLIC PANEL: CPT

## 2018-05-23 NOTE — PATIENT INSTRUCTIONS
Until we decide on whether entresto is affordable option increase enalapril to 10 mg twice a day (2 of the 5 mg tablets)  Stop ranexa and see if angina (chest pain) remains gone--if so can stay off  Call me if entresto is or is not an affordable option and I will complete treatment plan at that time    DO NOT TAKE ENTRESTO UNTIL YOU SPEAK WITH ME AND HAVE BEEN OFF OF ENALAPRIL FOR 18 HOURS

## 2018-05-25 ENCOUNTER — TELEPHONE (OUTPATIENT)
Dept: PHARMACY | Facility: CLINIC | Age: 73
End: 2018-05-25

## 2018-05-25 NOTE — TELEPHONE ENCOUNTER
Good Afternoon.    The prior authorization for Mr. Guanaco Kirkland has been approved through 5/25/2020.    The patient has been notified and is aware of the approval and states that he will think about considering picking up the medication.    If there are any additional questions about the approval please call the pharmacy at, [phone].    Thanks.    Kandy Mora CPhT.  Patient Care Advocate   [facility]  [email or web address]

## 2018-05-29 PROBLEM — I50.42 CHRONIC COMBINED SYSTOLIC AND DIASTOLIC CHF, NYHA CLASS 2: Status: ACTIVE | Noted: 2018-05-29

## 2018-05-30 NOTE — PROGRESS NOTES
Subjective:     Patient ID:  Tristen Blanc is a 72 y.o. male who presents for evaluation of Congestive Heart Failure    HPI:  73 yo WM referred by Dr. Antunez to make contact with [facility] should advanced therapies be required for CHF due to ischemic CM.  He has had prior cardiac arrest in 1996 but sounds like occurred with acute MI.  He has ICD placed 2010 and replaced 2017 but reports that he has never had an ICD shock.  He has never required hospitalization for CHF.  Currently plays golf, exercises at [employer] every morning for 30 min with treadmill covers 1 mile/17 min or recumbant bike, stretching and floor exercises.  He has been on same meds since January 2014 and tolerating well.  He reports rare angina generally occurs when weather very cold or very hot and humid.  LANGE if rushes but otherwise not limited.  Sleeps on 2 pillows with CPAP.  No PND.  Remote cardiac arrest 6/26/96, last coronary stent and ICD placed March 2010.  ICD replaced Feb 2017.    Last nuclear stress 4/3/18 fied defect, no ischemia; last echo 5/2/18 read as LVEDD 5.7, LVESD 4.7, LVEF 25-30%, diastolic dysfunction.    He continues to smoke 1 ppd says he has tried everything including Chantix twice, hypnosis, patches, gum, etc.  He declines offer to try clinic here.    Past Medical History:   Diagnosis Date    Allergy     Arthritis     Cardiomyopathy     ischemic    CHF (congestive heart failure)     Coronary artery disease     s/p CABG    General anesthetics causing adverse effect in therapeutic use     Hyperlipidemia     Hypertension     Sleep apnea with use of continuous positive airway pressure (CPAP)        Past Surgical History:   Procedure Laterality Date    CARDIAC CATHETERIZATION  January 2011    CARDIAC DEFIBRILLATOR PLACEMENT      COLONOSCOPY      CORONARY ANGIOPLASTY  1/3/2011    Last stent was in LMCA, Cx. Had previous stentsd    CORONARY ARTERY BYPASS GRAFT  06/1996    bypass of 2 vessels    INSERT / REPLACE /  REMOVE PACEMAKER      AICD; generator change in 2/10/17    JOINT REPLACEMENT Right 1991    total hip replacement    SINUS SURGERY         Family History   Problem Relation Age of Onset    Stroke Mother     Heart attack Father     Heart attack Paternal Uncle         2 uncles  of heart attcks    Heart attack Maternal Aunt         2 aunts one  from AMI asnd the other  od complications later on.   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    aspirin (ECOTRIN) 81 MG EC tablet Take 81 mg by mouth once daily.      atorvastatin (LIPITOR) 20 MG tablet Take 1 tablet (20 mg total) by mouth once daily.    carvedilol (COREG) 6.25 MG tablet Take 1 tablet (6.25 mg total) by mouth 2 (two) times daily.    clopidogrel (PLAVIX) 75 mg tablet Take 1 tablet (75 mg total) by mouth every evening.    coenzyme Q10 200 mg capsule Take 200 mg by mouth once daily.    ezetimibe (ZETIA) 10 mg tablet Take 1 tablet (10 mg total) by mouth once daily.    fluticasone (FLONASE) 50 mcg/actuation nasal spray 1 spray by Nasal route as needed.     FOLIC ACID/MV,FE,OTHER MIN (CENTRUM ORAL) Take 1 tablet by mouth every morning.     furosemide (LASIX) 40 MG tablet Take 1 tablet (40 mg total) by mouth once daily.    montelukast (SINGULAIR) 10 mg tablet Take 1 tablet (10 mg total) by  mouth daily as needed.    nitroGLYCERIN (NITROSTAT) 0.4 MG SL tablet PLACE 1 TAB UNDER TONGUE EVERY 5 MINUTES AS NEEDED FOR CHEST PAIN (MAX 3 DOSES /15MIN) IF PAIN DOSE NOT SUBSIDE CALL 911    Ranexa 500 mg Take 1 tablet twice a day    pantoprazole (PROTONIX) 40 MG tablet Take 1 tablet (40 mg total) by mouth once daily.    potassium chloride (KLOR-CON) 10 MEQ TbSR Take 1 tablet (10 mEq total) by mouth once daily.    vardenafil (LEVITRA) 20 MG tablet Take 20 mg by mouth daily as needed.      Enalapril 5 mg Take 1 tablet by mouth 2 (two) times daily.     Review of patient's allergies indicates:  No Known Allergies    Review of Systems   Constitution: Positive for weight gain. Negative for chills, decreased appetite, diaphoresis, fever, weakness, malaise/fatigue, night sweats and weight loss.   HENT: Positive for congestion. Negative for ear discharge, ear pain, hearing loss and hoarse voice.    Eyes: Negative for photophobia and visual disturbance.   Cardiovascular: Positive for chest pain and dyspnea on exertion. Negative for irregular heartbeat, leg swelling, near-syncope, orthopnea, palpitations, paroxysmal nocturnal dyspnea and syncope.   Respiratory: Positive for sleep disturbances due to breathing (on CPAP). Negative for cough, hemoptysis, shortness of breath, sputum production and wheezing.    Endocrine: Negative for cold intolerance, heat intolerance, polydipsia and polyuria.   Hematologic/Lymphatic: Negative for bleeding problem. Does not bruise/bleed easily.   Musculoskeletal: Positive for arthritis and joint pain (right hop previiously replaced but getting much worse and approaching need for reop; knee). Negative for back pain and stiffness.   Gastrointestinal: Positive for heartburn (and reflux). Negative for abdominal pain, anorexia, change in bowel habit, dysphagia, hematochezia and melena.        Last colonoscopy approx 10 years ago   Genitourinary: Negative for dysuria, frequency and hematuria.  "  Neurological: Negative for brief paralysis, focal weakness, headaches and seizures.   Allergic/Immunologic: Positive for environmental allergies.      Objective:   Physical Exam   Constitutional: He is oriented to person, place, and time. He appears well-developed and well-nourished.   /64 (BP Location: Right arm, Patient Position: Sitting, BP Method: Large (Automatic))   Pulse 86   Ht 5' 6" (1.676 m)   Wt 86.5 kg (190 lb 11.2 oz)   BMI 30.78 kg/m²   Overweight WM in NAD, friendly and cooperative   HENT:   Head: Normocephalic and atraumatic.   Mouth/Throat: Oropharynx is clear and moist. No oropharyngeal exudate.   Eyes: Conjunctivae are normal. Right eye exhibits no discharge. Left eye exhibits no discharge. No scleral icterus.   Neck: No JVD present. No thyromegaly present.   Cardiovascular: Normal rate, regular rhythm and normal heart sounds.  Exam reveals no gallop.    No murmur heard.  Pulmonary/Chest: Effort normal and breath sounds normal.   Abdominal: Soft. Bowel sounds are normal. He exhibits no distension and no mass. There is no tenderness. There is no rebound and no guarding.   Musculoskeletal: He exhibits no edema or tenderness.   Lymphadenopathy:     He has no cervical adenopathy.   Neurological: He is alert and oriented to person, place, and time.   Skin: Skin is warm and dry.   Psychiatric: He has a normal mood and affect. His behavior is normal. Judgment and thought content normal.      Assessment:     1. Chronic combined systolic and diastolic congestive heart failure    2. Coronary artery disease of native artery of native heart with stable angina pectoris    3. Coronary artery disease of bypass graft of native heart with stable angina pectoris    4. Cardiomyopathy, ischemic    5. Chronic combined systolic and diastolic CHF, NYHA class 2    6. Dyslipidemia with elevated low density lipoprotein (LDL) cholesterol and abnormally low high density lipoprotein cholesterol    7. JERONIMO on CPAP  "   8. ICD (implantable cardioverter-defibrillator) in place      Plan:   Until we decide on whether entresto is affordable option increase enalapril to 10 mg twice a day (2 of the 5 mg tablets)  Stop ranexa and see if angina (chest pain) remains gone--if so can stay off  Call me if entresto is or is not an affordable option and I will complete treatment plan at that time    DO NOT TAKE ENTRESTO UNTIL YOU SPEAK WITH ME AND HAVE BEEN OFF OF ENALAPRIL FOR 18 HOURS    He cannot perform CPX due to hip but describes NYHA Class 2 symptoms so no need for this at this time.    Smoking cessation and diet reviewed  Exercise reviewed and try to increase aerobic exercise 45 min, 5x/week--swimming and various exercises discussed    Will follow to transition to entresto     Thank you for allowing me to see this nice man in consultation and do not hesitate to contact me if any questions arise.    Jan Alcazar MD  [phone], extension [phone]  Cell [phone]

## 2018-06-05 ENCOUNTER — TELEPHONE (OUTPATIENT)
Dept: TRANSPLANT | Facility: CLINIC | Age: 73
End: 2018-06-05

## 2018-06-05 NOTE — TELEPHONE ENCOUNTER
I left message on voicemail that I was waiting to hear from him if he was going to  and start the Entresto.  I reminded him that he would have to be off ACE--enalapril--for 36 hrs before starting the entresto.     Wait for his return call but med list for now will show entresto and not enalapril. I will alter if he calls and says that he is going to stay on the enalapril.

## 2018-06-06 ENCOUNTER — TELEPHONE (OUTPATIENT)
Dept: TRANSPLANT | Facility: CLINIC | Age: 73
End: 2018-06-06

## 2018-06-06 NOTE — TELEPHONE ENCOUNTER
On 6/4/18 he started Entresto 49-51 mg BID dose and tolerating well.  He mentioned that on the double dose of enalapril he felt better and hopefully will get same or better effect with Entresto.  Obtain BMP in 2-3 weeks and if OK plan to increase Entresto to  mg BID dose.

## 2018-06-26 ENCOUNTER — TELEPHONE (OUTPATIENT)
Dept: TRANSPLANT | Facility: CLINIC | Age: 73
End: 2018-06-26

## 2018-06-26 DIAGNOSIS — I50.22 CHRONIC SYSTOLIC CONGESTIVE HEART FAILURE: Primary | ICD-10-CM

## 2018-06-27 ENCOUNTER — LAB VISIT (OUTPATIENT)
Dept: LAB | Facility: HOSPITAL | Age: 73
End: 2018-06-27
Attending: INTERNAL MEDICINE
Payer: MEDICARE

## 2018-06-27 DIAGNOSIS — I50.22 CHRONIC SYSTOLIC CONGESTIVE HEART FAILURE: ICD-10-CM

## 2018-06-27 LAB
ANION GAP SERPL CALC-SCNC: 8 MMOL/L
BUN SERPL-MCNC: 16 MG/DL
CALCIUM SERPL-MCNC: 9.4 MG/DL
CHLORIDE SERPL-SCNC: 105 MMOL/L
CO2 SERPL-SCNC: 29 MMOL/L
CREAT SERPL-MCNC: 1 MG/DL
EST. GFR  (AFRICAN AMERICAN): >60 ML/MIN/1.73 M^2
EST. GFR  (NON AFRICAN AMERICAN): >60 ML/MIN/1.73 M^2
GLUCOSE SERPL-MCNC: 95 MG/DL
POTASSIUM SERPL-SCNC: 4.6 MMOL/L
SODIUM SERPL-SCNC: 142 MMOL/L

## 2018-06-27 PROCEDURE — 80048 BASIC METABOLIC PNL TOTAL CA: CPT

## 2018-06-27 PROCEDURE — 36415 COLL VENOUS BLD VENIPUNCTURE: CPT

## 2018-07-13 DIAGNOSIS — I50.42 CHRONIC COMBINED SYSTOLIC AND DIASTOLIC CHF, NYHA CLASS 2: Primary | ICD-10-CM

## 2018-07-13 NOTE — PROGRESS NOTES
Cardiology (Vishnu Chand MD) Dx:  Chronic systolic congestive heart emilia...   Notes recorded by Mani Alcazar Jr., MD on 7/13/2018 at 10:56 AM CDT  On 6/4/18 he started Entresto 49-51 mg BID dose and tolerating well.This lab is on this dose  Will increase Entresto to  mg BID and obtain BMP 3-4 weeks later and phone review  He sees Dr. Werner in August 2018     It will be about 6 weeks from now before he has been on  mg BID dose for 2-4 weeks  He knows to get lab around then and see me

## 2018-07-19 ENCOUNTER — CLINICAL SUPPORT (OUTPATIENT)
Dept: CARDIOLOGY | Facility: CLINIC | Age: 73
End: 2018-07-19
Attending: INTERNAL MEDICINE
Payer: MEDICARE

## 2018-07-19 DIAGNOSIS — I25.5 CARDIOMYOPATHY, ISCHEMIC: ICD-10-CM

## 2018-07-19 DIAGNOSIS — Z95.810 ICD (IMPLANTABLE CARDIOVERTER-DEFIBRILLATOR) IN PLACE: ICD-10-CM

## 2018-07-19 DIAGNOSIS — Z95.810 ICD (IMPLANTABLE CARDIOVERTER-DEFIBRILLATOR) IN PLACE: Primary | ICD-10-CM

## 2018-07-19 PROCEDURE — 93295 DEV INTERROG REMOTE 1/2/MLT: CPT | Mod: S$GLB,,, | Performed by: INTERNAL MEDICINE

## 2018-07-19 PROCEDURE — 93296 REM INTERROG EVL PM/IDS: CPT | Mod: S$GLB,,, | Performed by: INTERNAL MEDICINE

## 2018-08-14 ENCOUNTER — OFFICE VISIT (OUTPATIENT)
Dept: CARDIOLOGY | Facility: CLINIC | Age: 73
End: 2018-08-14
Payer: MEDICARE

## 2018-08-14 ENCOUNTER — LAB VISIT (OUTPATIENT)
Dept: LAB | Facility: HOSPITAL | Age: 73
End: 2018-08-14
Attending: INTERNAL MEDICINE
Payer: MEDICARE

## 2018-08-14 ENCOUNTER — TELEPHONE (OUTPATIENT)
Dept: CARDIOLOGY | Facility: CLINIC | Age: 73
End: 2018-08-14

## 2018-08-14 VITALS
HEIGHT: 66 IN | DIASTOLIC BLOOD PRESSURE: 59 MMHG | BODY MASS INDEX: 31.36 KG/M2 | HEART RATE: 81 BPM | SYSTOLIC BLOOD PRESSURE: 114 MMHG | WEIGHT: 195.13 LBS

## 2018-08-14 DIAGNOSIS — E78.5 DYSLIPIDEMIA WITH ELEVATED LOW DENSITY LIPOPROTEIN (LDL) CHOLESTEROL AND ABNORMALLY LOW HIGH DENSITY LIPOPROTEIN CHOLESTEROL: ICD-10-CM

## 2018-08-14 DIAGNOSIS — I25.700 CORONARY ARTERY DISEASE INVOLVING CORONARY BYPASS GRAFT OF NATIVE HEART WITH UNSTABLE ANGINA PECTORIS: ICD-10-CM

## 2018-08-14 DIAGNOSIS — G47.33 OSA ON CPAP: ICD-10-CM

## 2018-08-14 DIAGNOSIS — I20.9 CARDIAC ANGINA: ICD-10-CM

## 2018-08-14 DIAGNOSIS — R79.89 LOW TESTOSTERONE IN MALE: ICD-10-CM

## 2018-08-14 DIAGNOSIS — I25.5 CARDIOMYOPATHY, ISCHEMIC: Primary | Chronic | ICD-10-CM

## 2018-08-14 PROCEDURE — 84402 ASSAY OF FREE TESTOSTERONE: CPT

## 2018-08-14 PROCEDURE — 3078F DIAST BP <80 MM HG: CPT | Mod: CPTII,S$GLB,, | Performed by: INTERNAL MEDICINE

## 2018-08-14 PROCEDURE — 99214 OFFICE O/P EST MOD 30 MIN: CPT | Mod: S$GLB,,, | Performed by: INTERNAL MEDICINE

## 2018-08-14 PROCEDURE — 99499 UNLISTED E&M SERVICE: CPT | Mod: HCNC,S$GLB,, | Performed by: INTERNAL MEDICINE

## 2018-08-14 PROCEDURE — 99999 PR PBB SHADOW E&M-EST. PATIENT-LVL III: CPT | Mod: PBBFAC,,, | Performed by: INTERNAL MEDICINE

## 2018-08-14 PROCEDURE — 3074F SYST BP LT 130 MM HG: CPT | Mod: CPTII,S$GLB,, | Performed by: INTERNAL MEDICINE

## 2018-08-14 PROCEDURE — 36415 COLL VENOUS BLD VENIPUNCTURE: CPT | Mod: PO

## 2018-08-14 RX ORDER — NITROGLYCERIN 0.4 MG/1
0.4 TABLET SUBLINGUAL EVERY 5 MIN PRN
Qty: 25 TABLET | Refills: 6 | Status: SHIPPED | OUTPATIENT
Start: 2018-08-14 | End: 2020-02-11

## 2018-08-14 RX ORDER — EZETIMIBE 10 MG/1
10 TABLET ORAL DAILY
Qty: 90 TABLET | Refills: 3 | Status: SHIPPED | OUTPATIENT
Start: 2018-08-14 | End: 2019-08-16

## 2018-08-14 RX ORDER — NITROGLYCERIN 0.4 MG/1
TABLET SUBLINGUAL
Qty: 450 TABLET | Refills: 6 | OUTPATIENT
Start: 2018-08-14

## 2018-08-14 NOTE — PROGRESS NOTES
Subjective:    Patient ID:  Tristen Blanc is a 72 y.o. male who presents for follow-up of Hypertension and Coronary Artery Disease      Doing well. Stable. Occasional SOB.        Review of Systems   All other systems reviewed and are negative.       Objective:    Heent is negative  Chest clear to auscultation. No rales ronchi or wheezing.  Abdomen soft non tender. No Bruits.  CNS is normal.   Ear nose and throat: stuffy nose almost permanently.  CPAP mask is difficult for him to use.        Assessment:       1. Cardiomyopathy, ischemic    2. Coronary artery disease involving coronary bypass graft of native heart with unstable angina pectoris    3. Dyslipidemia with elevated low density lipoprotein (LDL) cholesterol and abnormally low high density lipoprotein cholesterol    4. JERONIMO on CPAP         Plan:       Cardiomyopathy, ischemic    Coronary artery disease involving coronary bypass graft of native heart with unstable angina pectoris    Dyslipidemia with elevated low density lipoprotein (LDL) cholesterol and abnormally low high density lipoprotein cholesterol    JERONIMO on CPAP    Cardiac angina  -     nitroGLYCERIN (NITROSTAT) 0.4 MG SL tablet; Place 1 tablet (0.4 mg total) under the tongue every 5 (five) minutes as needed for Chest pain.  Dispense: 25 tablet; Refill: 6    Other orders  -     ezetimibe (ZETIA) 10 mg tablet; Take 1 tablet (10 mg total) by mouth once daily.  Dispense: 90 tablet; Refill: 3      RTC in December. Stable cardiac wise.

## 2018-08-17 LAB — TESTOST FREE SERPL-MCNC: 8.7 PG/ML

## 2018-08-21 ENCOUNTER — PATIENT MESSAGE (OUTPATIENT)
Dept: ADMINISTRATIVE | Facility: OTHER | Age: 73
End: 2018-08-21

## 2018-09-06 ENCOUNTER — PES CALL (OUTPATIENT)
Dept: ADMINISTRATIVE | Facility: CLINIC | Age: 73
End: 2018-09-06

## 2018-10-23 ENCOUNTER — CLINICAL SUPPORT (OUTPATIENT)
Dept: CARDIOLOGY | Facility: CLINIC | Age: 73
End: 2018-10-23
Attending: INTERNAL MEDICINE
Payer: MEDICARE

## 2018-10-23 DIAGNOSIS — Z95.810 ICD (IMPLANTABLE CARDIOVERTER-DEFIBRILLATOR) IN PLACE: ICD-10-CM

## 2018-10-23 DIAGNOSIS — I25.5 CARDIOMYOPATHY, ISCHEMIC: ICD-10-CM

## 2018-10-31 ENCOUNTER — PES CALL (OUTPATIENT)
Dept: ADMINISTRATIVE | Facility: CLINIC | Age: 73
End: 2018-10-31

## 2018-12-18 ENCOUNTER — OFFICE VISIT (OUTPATIENT)
Dept: CARDIOLOGY | Facility: CLINIC | Age: 73
End: 2018-12-18
Payer: MEDICARE

## 2018-12-18 VITALS
HEART RATE: 90 BPM | HEIGHT: 66 IN | DIASTOLIC BLOOD PRESSURE: 60 MMHG | WEIGHT: 194.69 LBS | BODY MASS INDEX: 31.29 KG/M2 | SYSTOLIC BLOOD PRESSURE: 105 MMHG

## 2018-12-18 DIAGNOSIS — I25.10 CORONARY ARTERY DISEASE, ANGINA PRESENCE UNSPECIFIED, UNSPECIFIED VESSEL OR LESION TYPE, UNSPECIFIED WHETHER NATIVE OR TRANSPLANTED HEART: ICD-10-CM

## 2018-12-18 DIAGNOSIS — Z95.810 ICD (IMPLANTABLE CARDIOVERTER-DEFIBRILLATOR) IN PLACE: Primary | ICD-10-CM

## 2018-12-18 DIAGNOSIS — E78.5 HYPERLIPIDEMIA, UNSPECIFIED HYPERLIPIDEMIA TYPE: ICD-10-CM

## 2018-12-18 DIAGNOSIS — I25.118 CORONARY ARTERY DISEASE OF NATIVE ARTERY OF NATIVE HEART WITH STABLE ANGINA PECTORIS: ICD-10-CM

## 2018-12-18 DIAGNOSIS — E78.5 DYSLIPIDEMIA WITH ELEVATED LOW DENSITY LIPOPROTEIN (LDL) CHOLESTEROL AND ABNORMALLY LOW HIGH DENSITY LIPOPROTEIN CHOLESTEROL: ICD-10-CM

## 2018-12-18 DIAGNOSIS — I42.9 CARDIOMYOPATHY, UNSPECIFIED TYPE: ICD-10-CM

## 2018-12-18 DIAGNOSIS — E87.5 HYPERKALEMIA: Chronic | ICD-10-CM

## 2018-12-18 PROCEDURE — 3074F SYST BP LT 130 MM HG: CPT | Mod: CPTII,HCNC,S$GLB, | Performed by: INTERNAL MEDICINE

## 2018-12-18 PROCEDURE — 3078F DIAST BP <80 MM HG: CPT | Mod: CPTII,HCNC,S$GLB, | Performed by: INTERNAL MEDICINE

## 2018-12-18 PROCEDURE — 99214 OFFICE O/P EST MOD 30 MIN: CPT | Mod: HCNC,S$GLB,, | Performed by: INTERNAL MEDICINE

## 2018-12-18 PROCEDURE — 99999 PR PBB SHADOW E&M-EST. PATIENT-LVL II: CPT | Mod: PBBFAC,HCNC,, | Performed by: INTERNAL MEDICINE

## 2018-12-18 PROCEDURE — 1101F PT FALLS ASSESS-DOCD LE1/YR: CPT | Mod: CPTII,HCNC,S$GLB, | Performed by: INTERNAL MEDICINE

## 2018-12-18 RX ORDER — CLOPIDOGREL BISULFATE 75 MG/1
75 TABLET ORAL NIGHTLY
Qty: 90 TABLET | Refills: 3 | Status: SHIPPED | OUTPATIENT
Start: 2018-12-18 | End: 2020-03-09

## 2018-12-18 RX ORDER — CARVEDILOL 6.25 MG/1
6.25 TABLET ORAL 2 TIMES DAILY
Qty: 180 TABLET | Refills: 3 | Status: SHIPPED | OUTPATIENT
Start: 2018-12-18 | End: 2019-08-16

## 2018-12-18 RX ORDER — FUROSEMIDE 40 MG/1
40 TABLET ORAL DAILY
Qty: 90 TABLET | Refills: 3 | Status: SHIPPED | OUTPATIENT
Start: 2018-12-18 | End: 2019-04-28 | Stop reason: SDUPTHER

## 2018-12-18 RX ORDER — POTASSIUM CHLORIDE 750 MG/1
10 TABLET, EXTENDED RELEASE ORAL DAILY
Qty: 90 TABLET | Refills: 3 | Status: SHIPPED | OUTPATIENT
Start: 2018-12-18 | End: 2020-03-09

## 2018-12-18 RX ORDER — ATORVASTATIN CALCIUM 20 MG/1
20 TABLET, FILM COATED ORAL DAILY
Qty: 90 TABLET | Refills: 3 | Status: SHIPPED | OUTPATIENT
Start: 2018-12-18 | End: 2019-04-28 | Stop reason: SDUPTHER

## 2018-12-18 RX ORDER — PANTOPRAZOLE SODIUM 40 MG/1
40 TABLET, DELAYED RELEASE ORAL DAILY
Qty: 90 TABLET | Refills: 4 | Status: SHIPPED | OUTPATIENT
Start: 2018-12-18 | End: 2020-01-28

## 2018-12-18 NOTE — PROGRESS NOTES
Subjective:    Patient ID:  Tristen Blanc is a 73 y.o. male who presents for follow-up of Cardiomyopathy (follow up); Hypertension; Coronary Artery Disease; and Hyperlipidemia      Doing well. Having hip problems. No cardiac symptoms.No SOB. Sinuses are also better.        Review of Systems   All other systems reviewed and are negative.       Objective:      Physical Exam      Assessment:       1. ICD (implantable cardioverter-defibrillator) in place    2. Dyslipidemia with elevated low density lipoprotein (LDL) cholesterol and abnormally low high density lipoprotein cholesterol    3. Coronary artery disease of native artery of native heart with stable angina pectoris    4. Hyperkalemia         Plan:       ICD (implantable cardioverter-defibrillator) in place    Dyslipidemia with elevated low density lipoprotein (LDL) cholesterol and abnormally low high density lipoprotein cholesterol    Coronary artery disease of native artery of native heart with stable angina pectoris    Hyperkalemia    Doing well. Refills given.RTC In 6 months.

## 2019-01-28 ENCOUNTER — CLINICAL SUPPORT (OUTPATIENT)
Dept: CARDIOLOGY | Facility: CLINIC | Age: 74
End: 2019-01-28
Attending: INTERNAL MEDICINE
Payer: MEDICARE

## 2019-01-28 ENCOUNTER — PES CALL (OUTPATIENT)
Dept: ADMINISTRATIVE | Facility: CLINIC | Age: 74
End: 2019-01-28

## 2019-01-28 DIAGNOSIS — Z95.810 ICD (IMPLANTABLE CARDIOVERTER-DEFIBRILLATOR) IN PLACE: ICD-10-CM

## 2019-01-28 DIAGNOSIS — I50.42 CHRONIC COMBINED SYSTOLIC AND DIASTOLIC CHF, NYHA CLASS 2: ICD-10-CM

## 2019-01-28 DIAGNOSIS — I25.5 CARDIOMYOPATHY, ISCHEMIC: ICD-10-CM

## 2019-01-28 PROCEDURE — 93296 REM INTERROG EVL PM/IDS: CPT | Mod: HCNC,S$GLB,, | Performed by: INTERNAL MEDICINE

## 2019-01-28 PROCEDURE — 93295 CARDIAC DEVICE CHECK CHECK - REMOTE: ICD-10-PCS | Mod: HCNC,S$GLB,, | Performed by: INTERNAL MEDICINE

## 2019-01-28 PROCEDURE — 93295 DEV INTERROG REMOTE 1/2/MLT: CPT | Mod: HCNC,S$GLB,, | Performed by: INTERNAL MEDICINE

## 2019-01-28 PROCEDURE — 93296 CARDIAC DEVICE CHECK CHECK - REMOTE: ICD-10-PCS | Mod: HCNC,S$GLB,, | Performed by: INTERNAL MEDICINE

## 2019-01-28 RX ORDER — SACUBITRIL AND VALSARTAN 97; 103 MG/1; MG/1
TABLET, FILM COATED ORAL
Qty: 60 TABLET | Refills: 0 | Status: SHIPPED | OUTPATIENT
Start: 2019-01-28 | End: 2019-02-28 | Stop reason: SDUPTHER

## 2019-01-28 NOTE — TELEPHONE ENCOUNTER
He needs to obtain BMP and return for visit with [facility] if we are prescribing therapy.  Otherwise he should obtain from Dr. Antunez.

## 2019-01-30 ENCOUNTER — LAB VISIT (OUTPATIENT)
Dept: LAB | Facility: HOSPITAL | Age: 74
End: 2019-01-30
Attending: INTERNAL MEDICINE
Payer: MEDICARE

## 2019-01-30 DIAGNOSIS — I50.42 CHRONIC COMBINED SYSTOLIC AND DIASTOLIC CONGESTIVE HEART FAILURE: ICD-10-CM

## 2019-01-30 LAB
ANION GAP SERPL CALC-SCNC: 9 MMOL/L
BUN SERPL-MCNC: 23 MG/DL
CALCIUM SERPL-MCNC: 9.8 MG/DL
CHLORIDE SERPL-SCNC: 101 MMOL/L
CO2 SERPL-SCNC: 29 MMOL/L
CREAT SERPL-MCNC: 1 MG/DL
EST. GFR  (AFRICAN AMERICAN): >60 ML/MIN/1.73 M^2
EST. GFR  (NON AFRICAN AMERICAN): >60 ML/MIN/1.73 M^2
GLUCOSE SERPL-MCNC: 106 MG/DL
POTASSIUM SERPL-SCNC: 4.6 MMOL/L
SODIUM SERPL-SCNC: 139 MMOL/L

## 2019-01-30 PROCEDURE — 80048 BASIC METABOLIC PNL TOTAL CA: CPT | Mod: HCNC

## 2019-01-30 PROCEDURE — 36415 COLL VENOUS BLD VENIPUNCTURE: CPT | Mod: HCNC

## 2019-02-02 LAB
AV DELAY - FIXED: 180 MSEC
BATTERY VOLTAGE (V): 3 V
ERI (V): 2.73 V
HV IMPEDANCE (OHM): 46 OHM
IMPEDANCE RA LEAD (NATIVE): 456 OHMS
IMPEDANCE RA LEAD: 437 OHMS
P/R-WAVE RA LEAD (NATIVE): 15.1 MV
P/R-WAVE RA LEAD: 1.9 MV
PV DELAY - FIXED: 150 MSEC
SVC IMPEDANCE (OHM): 59 OHM
THRESHOLD MS RA LEAD (NATIVE): 0.4 MS
THRESHOLD MS RA LEAD: 0.4 MS
THRESHOLD V RA LEAD (NATIVE): 1.25 V
THRESHOLD V RA LEAD: 1.12 V

## 2019-02-28 DIAGNOSIS — I50.42 CHRONIC COMBINED SYSTOLIC AND DIASTOLIC CHF, NYHA CLASS 2: ICD-10-CM

## 2019-03-01 RX ORDER — SACUBITRIL AND VALSARTAN 97; 103 MG/1; MG/1
TABLET, FILM COATED ORAL
Qty: 60 TABLET | Refills: 3 | Status: SHIPPED | OUTPATIENT
Start: 2019-03-01 | End: 2019-06-03 | Stop reason: SDUPTHER

## 2019-03-17 LAB
AV DELAY - FIXED: 180 MSEC
BATTERY VOLTAGE (V): 3.01 V
CHARGE TIME (SEC): 3.7 SEC
ERI (V): 2.73 V
HV IMPEDANCE (OHM): 48 OHM
IMPEDANCE RA LEAD (NATIVE): 456 OHMS
IMPEDANCE RA LEAD: 437 OHMS
P/R-WAVE RA LEAD (NATIVE): 15.6 MV
P/R-WAVE RA LEAD: 2 MV
PV DELAY - FIXED: 150 MSEC
SVC IMPEDANCE (OHM): 59 OHM
THRESHOLD MS RA LEAD (NATIVE): 0.4 MS
THRESHOLD MS RA LEAD: 0.4 MS
THRESHOLD V RA LEAD (NATIVE): 0.75 V
THRESHOLD V RA LEAD: 1.12 V

## 2019-03-18 ENCOUNTER — CLINICAL SUPPORT (OUTPATIENT)
Dept: CARDIOLOGY | Facility: CLINIC | Age: 74
End: 2019-03-18
Attending: INTERNAL MEDICINE
Payer: MEDICARE

## 2019-03-18 DIAGNOSIS — I25.5 CARDIOMYOPATHY, ISCHEMIC: ICD-10-CM

## 2019-03-18 DIAGNOSIS — Z95.810 ICD (IMPLANTABLE CARDIOVERTER-DEFIBRILLATOR) IN PLACE: ICD-10-CM

## 2019-03-29 ENCOUNTER — CLINICAL SUPPORT (OUTPATIENT)
Dept: CARDIOLOGY | Facility: CLINIC | Age: 74
End: 2019-03-29
Attending: INTERNAL MEDICINE
Payer: MEDICARE

## 2019-03-29 DIAGNOSIS — I25.5 CARDIOMYOPATHY, ISCHEMIC: ICD-10-CM

## 2019-03-29 DIAGNOSIS — Z95.810 ICD (IMPLANTABLE CARDIOVERTER-DEFIBRILLATOR) IN PLACE: ICD-10-CM

## 2019-03-29 DIAGNOSIS — Z95.810 ICD (IMPLANTABLE CARDIOVERTER-DEFIBRILLATOR) IN PLACE: Primary | ICD-10-CM

## 2019-04-28 DIAGNOSIS — E78.5 HYPERLIPIDEMIA, UNSPECIFIED HYPERLIPIDEMIA TYPE: ICD-10-CM

## 2019-04-28 DIAGNOSIS — I42.9 CARDIOMYOPATHY, UNSPECIFIED TYPE: ICD-10-CM

## 2019-04-29 ENCOUNTER — CLINICAL SUPPORT (OUTPATIENT)
Dept: CARDIOLOGY | Facility: CLINIC | Age: 74
End: 2019-04-29
Attending: INTERNAL MEDICINE
Payer: MEDICARE

## 2019-04-29 DIAGNOSIS — I25.5 CARDIOMYOPATHY, ISCHEMIC: ICD-10-CM

## 2019-04-29 DIAGNOSIS — Z95.810 ICD (IMPLANTABLE CARDIOVERTER-DEFIBRILLATOR) IN PLACE: ICD-10-CM

## 2019-04-30 ENCOUNTER — CLINICAL SUPPORT (OUTPATIENT)
Dept: CARDIOLOGY | Facility: CLINIC | Age: 74
End: 2019-04-30
Attending: INTERNAL MEDICINE
Payer: MEDICARE

## 2019-04-30 DIAGNOSIS — Z95.810 ICD (IMPLANTABLE CARDIOVERTER-DEFIBRILLATOR) IN PLACE: ICD-10-CM

## 2019-04-30 DIAGNOSIS — I25.5 CARDIOMYOPATHY, ISCHEMIC: ICD-10-CM

## 2019-04-30 RX ORDER — ATORVASTATIN CALCIUM 20 MG/1
TABLET, FILM COATED ORAL
Qty: 90 TABLET | Refills: 3 | Status: SHIPPED | OUTPATIENT
Start: 2019-04-30 | End: 2019-08-16 | Stop reason: SDUPTHER

## 2019-04-30 RX ORDER — FUROSEMIDE 40 MG/1
TABLET ORAL
Qty: 90 TABLET | Refills: 3 | Status: SHIPPED | OUTPATIENT
Start: 2019-04-30 | End: 2020-04-27

## 2019-05-28 ENCOUNTER — CLINICAL SUPPORT (OUTPATIENT)
Dept: CARDIOLOGY | Facility: CLINIC | Age: 74
End: 2019-05-28
Attending: INTERNAL MEDICINE
Payer: MEDICARE

## 2019-05-28 DIAGNOSIS — I25.5 CARDIOMYOPATHY, ISCHEMIC: ICD-10-CM

## 2019-05-28 DIAGNOSIS — Z95.810 ICD (IMPLANTABLE CARDIOVERTER-DEFIBRILLATOR) IN PLACE: ICD-10-CM

## 2019-06-03 DIAGNOSIS — I50.42 CHRONIC COMBINED SYSTOLIC AND DIASTOLIC CHF, NYHA CLASS 2: ICD-10-CM

## 2019-06-05 RX ORDER — SACUBITRIL AND VALSARTAN 97; 103 MG/1; MG/1
TABLET, FILM COATED ORAL
Qty: 60 TABLET | Refills: 5 | Status: SHIPPED | OUTPATIENT
Start: 2019-06-05 | End: 2019-07-25 | Stop reason: SDUPTHER

## 2019-06-25 ENCOUNTER — OFFICE VISIT (OUTPATIENT)
Dept: CARDIOLOGY | Facility: CLINIC | Age: 74
End: 2019-06-25
Payer: MEDICARE

## 2019-06-25 VITALS
WEIGHT: 197.75 LBS | BODY MASS INDEX: 31.78 KG/M2 | SYSTOLIC BLOOD PRESSURE: 116 MMHG | HEART RATE: 89 BPM | DIASTOLIC BLOOD PRESSURE: 60 MMHG | HEIGHT: 66 IN

## 2019-06-25 DIAGNOSIS — I10 ESSENTIAL HYPERTENSION: ICD-10-CM

## 2019-06-25 DIAGNOSIS — I25.5 CARDIOMYOPATHY, ISCHEMIC: Primary | Chronic | ICD-10-CM

## 2019-06-25 DIAGNOSIS — G47.33 OSA ON CPAP: ICD-10-CM

## 2019-06-25 PROCEDURE — 1100F PR PT FALLS ASSESS DOC 2+ FALLS/FALL W/INJURY/YR: ICD-10-PCS | Mod: HCNC,CPTII,S$GLB, | Performed by: INTERNAL MEDICINE

## 2019-06-25 PROCEDURE — 3288F PR FALLS RISK ASSESSMENT DOCUMENTED: ICD-10-PCS | Mod: HCNC,CPTII,S$GLB, | Performed by: INTERNAL MEDICINE

## 2019-06-25 PROCEDURE — 3288F FALL RISK ASSESSMENT DOCD: CPT | Mod: HCNC,CPTII,S$GLB, | Performed by: INTERNAL MEDICINE

## 2019-06-25 PROCEDURE — 99214 OFFICE O/P EST MOD 30 MIN: CPT | Mod: HCNC,S$GLB,, | Performed by: INTERNAL MEDICINE

## 2019-06-25 PROCEDURE — 1100F PTFALLS ASSESS-DOCD GE2>/YR: CPT | Mod: HCNC,CPTII,S$GLB, | Performed by: INTERNAL MEDICINE

## 2019-06-25 PROCEDURE — 3074F SYST BP LT 130 MM HG: CPT | Mod: HCNC,CPTII,S$GLB, | Performed by: INTERNAL MEDICINE

## 2019-06-25 PROCEDURE — 3074F PR MOST RECENT SYSTOLIC BLOOD PRESSURE < 130 MM HG: ICD-10-PCS | Mod: HCNC,CPTII,S$GLB, | Performed by: INTERNAL MEDICINE

## 2019-06-25 PROCEDURE — 99999 PR PBB SHADOW E&M-EST. PATIENT-LVL III: ICD-10-PCS | Mod: PBBFAC,HCNC,, | Performed by: INTERNAL MEDICINE

## 2019-06-25 PROCEDURE — 99499 UNLISTED E&M SERVICE: CPT | Mod: HCNC,S$GLB,, | Performed by: INTERNAL MEDICINE

## 2019-06-25 PROCEDURE — 99999 PR PBB SHADOW E&M-EST. PATIENT-LVL III: CPT | Mod: PBBFAC,HCNC,, | Performed by: INTERNAL MEDICINE

## 2019-06-25 PROCEDURE — 99499 RISK ADDL DX/OHS AUDIT: ICD-10-PCS | Mod: HCNC,S$GLB,, | Performed by: INTERNAL MEDICINE

## 2019-06-25 PROCEDURE — 3078F PR MOST RECENT DIASTOLIC BLOOD PRESSURE < 80 MM HG: ICD-10-PCS | Mod: HCNC,CPTII,S$GLB, | Performed by: INTERNAL MEDICINE

## 2019-06-25 PROCEDURE — 99214 PR OFFICE/OUTPT VISIT, EST, LEVL IV, 30-39 MIN: ICD-10-PCS | Mod: HCNC,S$GLB,, | Performed by: INTERNAL MEDICINE

## 2019-06-25 PROCEDURE — 3078F DIAST BP <80 MM HG: CPT | Mod: HCNC,CPTII,S$GLB, | Performed by: INTERNAL MEDICINE

## 2019-06-25 NOTE — PROGRESS NOTES
Subjective:    Patient ID:  Tristen Blanc is a 73 y.o. male who presents for follow-up of Cardiomyopathy      Doing well.       Review of Systems   HENT: Positive for congestion.    All other systems reviewed and are negative.       Objective:      Physical Exam   Constitutional: He is oriented to person, place, and time. He appears well-developed and well-nourished.   HENT:   Head: Normocephalic and atraumatic.   Eyes: Pupils are equal, round, and reactive to light. Conjunctivae and EOM are normal. Right eye exhibits no discharge. Left eye exhibits no discharge. No scleral icterus.   Neck: Normal range of motion. Neck supple. No JVD present. No tracheal deviation present. No thyromegaly present.   Cardiovascular: Normal rate, regular rhythm and intact distal pulses. Exam reveals no gallop and no friction rub.   Murmur heard.  Pulses:       Carotid pulses are 2+ on the right side, and 2+ on the left side.       Dorsalis pedis pulses are 2+ on the right side, and 2+ on the left side.        Posterior tibial pulses are 2+ on the right side, and 2+ on the left side.   Pulmonary/Chest: Effort normal and breath sounds normal. No stridor. No respiratory distress. He has no wheezes. He has no rales. He exhibits no tenderness.   Abdominal: Soft. Bowel sounds are normal. He exhibits no distension. There is no tenderness. There is no rebound and no guarding.   Musculoskeletal: Normal range of motion. He exhibits no edema or tenderness.   Lymphadenopathy:     He has no cervical adenopathy.   Neurological: He is alert and oriented to person, place, and time.   Skin: Skin is warm and dry. No rash noted. No erythema. No pallor.   Psychiatric: He has a normal mood and affect. His behavior is normal. Judgment and thought content normal.         Assessment:       1. Cardiomyopathy, ischemic    2. Essential hypertension    3. JERONIMO on CPAP         Plan:       Cardiomyopathy, ischemic    Essential hypertension    JERONIMO on CPAP    Doing  well cardiac wise. Sinueses is recurrent.

## 2019-06-27 ENCOUNTER — CLINICAL SUPPORT (OUTPATIENT)
Dept: CARDIOLOGY | Facility: CLINIC | Age: 74
End: 2019-06-27
Attending: INTERNAL MEDICINE
Payer: MEDICARE

## 2019-06-27 DIAGNOSIS — I25.5 CARDIOMYOPATHY, ISCHEMIC: ICD-10-CM

## 2019-06-27 DIAGNOSIS — Z95.810 ICD (IMPLANTABLE CARDIOVERTER-DEFIBRILLATOR) IN PLACE: ICD-10-CM

## 2019-07-02 ENCOUNTER — PES CALL (OUTPATIENT)
Dept: ADMINISTRATIVE | Facility: CLINIC | Age: 74
End: 2019-07-02

## 2019-07-17 ENCOUNTER — OFFICE VISIT (OUTPATIENT)
Dept: SLEEP MEDICINE | Facility: CLINIC | Age: 74
End: 2019-07-17
Payer: MEDICARE

## 2019-07-17 VITALS
SYSTOLIC BLOOD PRESSURE: 104 MMHG | WEIGHT: 199.31 LBS | HEART RATE: 71 BPM | DIASTOLIC BLOOD PRESSURE: 61 MMHG | BODY MASS INDEX: 32.03 KG/M2 | HEIGHT: 66 IN

## 2019-07-17 DIAGNOSIS — G47.33 OSA (OBSTRUCTIVE SLEEP APNEA): Primary | ICD-10-CM

## 2019-07-17 PROCEDURE — 1101F PT FALLS ASSESS-DOCD LE1/YR: CPT | Mod: HCNC,CPTII,S$GLB, | Performed by: PSYCHIATRY & NEUROLOGY

## 2019-07-17 PROCEDURE — 99999 PR PBB SHADOW E&M-EST. PATIENT-LVL III: CPT | Mod: PBBFAC,HCNC,, | Performed by: PSYCHIATRY & NEUROLOGY

## 2019-07-17 PROCEDURE — 3078F PR MOST RECENT DIASTOLIC BLOOD PRESSURE < 80 MM HG: ICD-10-PCS | Mod: HCNC,CPTII,S$GLB, | Performed by: PSYCHIATRY & NEUROLOGY

## 2019-07-17 PROCEDURE — 3078F DIAST BP <80 MM HG: CPT | Mod: HCNC,CPTII,S$GLB, | Performed by: PSYCHIATRY & NEUROLOGY

## 2019-07-17 PROCEDURE — 3074F SYST BP LT 130 MM HG: CPT | Mod: HCNC,CPTII,S$GLB, | Performed by: PSYCHIATRY & NEUROLOGY

## 2019-07-17 PROCEDURE — 3074F PR MOST RECENT SYSTOLIC BLOOD PRESSURE < 130 MM HG: ICD-10-PCS | Mod: HCNC,CPTII,S$GLB, | Performed by: PSYCHIATRY & NEUROLOGY

## 2019-07-17 PROCEDURE — 99204 PR OFFICE/OUTPT VISIT, NEW, LEVL IV, 45-59 MIN: ICD-10-PCS | Mod: HCNC,S$GLB,, | Performed by: PSYCHIATRY & NEUROLOGY

## 2019-07-17 PROCEDURE — 99204 OFFICE O/P NEW MOD 45 MIN: CPT | Mod: HCNC,S$GLB,, | Performed by: PSYCHIATRY & NEUROLOGY

## 2019-07-17 PROCEDURE — 99999 PR PBB SHADOW E&M-EST. PATIENT-LVL III: ICD-10-PCS | Mod: PBBFAC,HCNC,, | Performed by: PSYCHIATRY & NEUROLOGY

## 2019-07-17 PROCEDURE — 1101F PR PT FALLS ASSESS DOC 0-1 FALLS W/OUT INJ PAST YR: ICD-10-PCS | Mod: HCNC,CPTII,S$GLB, | Performed by: PSYCHIATRY & NEUROLOGY

## 2019-07-17 NOTE — LETTER
July 17, 2019      Vishnu Chand MD  [address] [facility] [address] [place] [address]           [facility]  [address] [facility] [address]  Phone: [phone]  Fax: [phone]          Patient: Tristen Blanc   MR Number: [id number]   YOB: 1945   Date of Visit: 7/17/2019       Dear Dr. Vishnu Chand:    Thank you for referring Tristen Blanc to me for evaluation. Attached you will find relevant portions of my assessment and plan of care.    If you have questions, please do not hesitate to call me. I look forward to following Tristen Blanc along with you.    Sincerely,    Lizette Mendez MD    Enclosure  CC:  No Recipients    If you would like to receive this communication electronically, please contact [email or web address] or [phone] to request more information on [facility] access.    For providers and/or their staff who would like to refer a patient to [facility], please contact us through our one-stop-shop provider referral line, [facility], at [phone].    If you feel you have received this communication in error or would no longer like to receive these types of communications, please e-mail [email or web address]

## 2019-07-17 NOTE — PATIENT INSTRUCTIONS
DME [facility]:  [phone] - [place].    ___________________________________________      Here is the full list of Medicare rules:    You have 90 days to meet CPAP compliance:  ( which means 30 consecutive  days where you have at least 4 hours every night).  ----------------------------------  30 days to like or dislike the mask - can not be swapped after 30 days since picking up the mask.   If you do not like your mask, plese call ASAP D[facility]:  [phone] - [place]:  or [phone] (ext [phone])- [place]    I (or one of our nurse practitioners) will see you after 30-60 days  (Medicare requirement to be seen no earlier than 31 days and no later than 90 days from the day of CPAP machine ).    Please bring bring your machine, power cord and all supplies to your next appointment.  At that point it will be too late to swap the mask.

## 2019-07-17 NOTE — PROGRESS NOTES
Tristen Blanc  was seen at the request of  Vishnu Chand for sleep evaluation.    07/17/2019 INITIAL HISTORY OF PRESENT ILLNESS:  Tristen Blanc is a 73 y.o. male is here to be evaluated for a sleep disorder.       CHIEF COMPLAINT:      The patient's complaints include excessive daytime sleepiness, excessive daytime fatigue, snoring,  witnessed breathing pauses,  gasping for air in sleep and interrupted sleep .    He was diagnosed with moderate -severe JERONIMO post Jr storm. His study was ordered by his [facility] Cardiologist -> Done in [employer] [facility].    Benefiting from CPAP use in terms of sleep continuity and daytime sleepiness.   His machine was due for replacement.     840 hrs on his FP old machine and machine set at 15 cm    AHI0.5.    Using So claen    His machine is old, malfunctioning, needs replacement.  Denied break through snoring.    ++dry mouth    No flowsheet data found.    No flowsheet data found.  No flowsheet data found.      SLEEP ROUTINE AND LIFESTYLE 07/17/2019 :  No flowsheet data found.    No flowsheet data found.       Denies symptoms concerning for parasomnia          Social History:      Occupation:retired  Bed partner: his wife  Exercise routine:        PREVIOUS SLEEP STUDIES:     PSG ? [employer] (scanned in): Significant Obstructive sleep apnea (JERONIMO) with AHI (apnea hypopnea Index) of 26 and SaO2 of 81 (weight  192 lbs).      CPAP titration in ? showed effective control of respiratory events at  15 cm H2O including supine REM sleep.      DME:       PAST MEDICAL HISTORY:    Active Ambulatory Problems     Diagnosis Date Noted    Cardiac angina 04/23/2012    CAD (coronary artery disease) of artery bypass graft 04/23/2012    Cardiomyopathy, ischemic 04/23/2012    Dyslipidemia with elevated low density lipoprotein (LDL) cholesterol and abnormally low high density lipoprotein cholesterol 04/23/2012    Episode of dizziness 05/03/2012    JERONIMO on CPAP 06/18/2012     Nasal congestion 2012    GERD (gastroesophageal reflux disease) 2012    Hyperkalemia 2012    HBP (high blood pressure) 2012    Coronary artery disease of native artery of native heart with stable angina pectoris 2012    Pain of great toe 2012    Acid reflux 2013    ICD (implantable cardioverter-defibrillator) in place 2013    Chest pain at rest 2013    Chest pain 2013    Dyspnea on effort 2014    Cough 2014    Knee pain 2014    Encounter for servicing of automatic implantable cardioverter-defibrillator (AICD) at end of battery life 02/10/2017    Chronic combined systolic and diastolic CHF, NYHA class 2 2018     Resolved Ambulatory Problems     Diagnosis Date Noted    No Resolved Ambulatory Problems     Past Medical History:   Diagnosis Date    Allergy     Arthritis     Cardiomyopathy     CHF (congestive heart failure)     Coronary artery disease     General anesthetics causing adverse effect in therapeutic use     Hyperlipidemia     Hypertension     Sleep apnea with use of continuous positive airway pressure (CPAP)                 PAST SURGICAL HISTORY:    Past Surgical History:   Procedure Laterality Date    CARDIAC CATHETERIZATION  2011    CARDIAC DEFIBRILLATOR PLACEMENT      COLONOSCOPY      CORONARY ANGIOPLASTY  1/3/2011    Last stent was in LMCA, Cx. Had previous stentsd    CORONARY ARTERY BYPASS GRAFT  1996    bypass of 2 vessels    INSERT / REPLACE / REMOVE PACEMAKER      AICD; generator change in 2/10/17    JOINT REPLACEMENT Right 1991    total hip replacement    REPLACEMENT-GENERATOR-ICD N/A 2/10/2017    Performed [facility] CATH    SINUS SURGERY           FAMILY HISTORY:                Family History   Problem Relation Age of Onset    Stroke Mother     Heart attack Father     Heart attack Paternal Uncle         2 uncles  of heart attcks     Heart attack Maternal Aunt         2 aunts one  from AMI asnd the other  od complications later on.    Heart attack Paternal Aunt         3 out of 4  at late age of AMI       SOCIAL HISTORY:          Tobacco:   Social History     Tobacco Use   Smoking Status Current Every Day Smoker    Packs/day: 1.00    Types: Cigarettes   Smokeless Tobacco Never Used   Tobacco Comment    SAYS TRIED EVERYTHING INCLUDING HYPNOSIOS AND DRUGS; NOT INTERESTED IN SMOKING PROGRAM HERE       alcohol use:    Social History     Substance and Sexual Activity   Alcohol Use Yes    Comment: social; rare                   ALLERGIES:  Review of patient's allergies indicates:  No Known Allergies    CURRENT MEDICATIONS:    Current Outpatient Medications   Medication Sig Dispense Refill    ANTIOX #11/OM3/DHA/EPA/LUT/MADELINE (OCUVITE ADULT 50+ ORAL) Take by mouth once daily.      aspirin (ECOTRIN) 81 MG EC tablet Take 81 mg by mouth once daily.        atorvastatin (LIPITOR) 20 MG tablet TAKE 1 TABLET BY MOUTH EVERY DAY 90 tablet 3    carvedilol (COREG) 6.25 MG tablet Take 1 tablet (6.25 mg total) by mouth 2 (two) times daily. 180 tablet 3    clopidogrel (PLAVIX) 75 mg tablet Take 1 tablet (75 mg total) by mouth every evening. 90 tablet 3    coenzyme Q10 200 mg capsule Take 200 mg by mouth once daily.      ENTRESTO  mg per tablet TAKE 1 TABLET BY MOUTH TWICE DAILY 60 tablet 5    ezetimibe (ZETIA) 10 mg tablet Take 1 tablet (10 mg total) by mouth once daily. 90 tablet 3    fluticasone (FLONASE) 50 mcg/actuation nasal spray 1 spray by Nasal route as needed.   6    FOLIC ACID/MV,FE,OTHER MIN (CENTRUM ORAL) Take 1 tablet by mouth every morning.       furosemide (LASIX) 40 MG tablet TAKE 1 TABLET BY MOUTH DAILY 90 tablet 3    montelukast (SINGULAIR) 10 mg tablet Take 1 tablet (10 mg total) by mouth daily as needed. 90 tablet 3    nitroGLYCERIN (NITROSTAT) 0.4 MG SL tablet Place 1 tablet (0.4 mg total) under the tongue  "every 5 (five) minutes as needed for Chest pain. 25 tablet 6    pantoprazole (PROTONIX) 40 MG tablet Take 1 tablet (40 mg total) by mouth once daily. 90 tablet 4    potassium chloride (KLOR-CON) 10 MEQ TbSR Take 1 tablet (10 mEq total) by mouth once daily. 90 tablet 3    vardenafil (LEVITRA) 20 MG tablet Take 20 mg by mouth daily as needed.         No current facility-administered medications for this visit.                           PHYSICAL EXAM:  There were no vitals taken for this visit.  GENERAL: Normal development, well groomed.  HEENT:   HEENT:  Conjunctivae are non-erythematous; Pupils equal, round, and reactive to light; Nose is symmetrical; Nasal mucosa is pink and moist; Septum is mildly deviated to the left; Inferior turbinates are hypertrophied; Nasal airflow is normal; Posterior pharynx is pink; Modified Mallampati:III-IV; Posterior palate is low; Tonsils not visualized; Uvula is wide and elongated;Tongue is enlarged; Dentition is fair; No TMJ tenderness; Jaw opening and protrusion without click and without discomfort.  NECK: Supple. Neck circumference is 16.5 inches. No thyromegaly. No palpable nodes.     SKIN: On face and neck: No abrasions, no rashes, no lesions.  No subcutaneous nodules are palpable.  RESPIRATORY: Chest is clear to auscultation.  Normal chest expansion and non-labored breathing at rest.  CARDIOVASCULAR: Normal S1, S2.  No murmurs, gallops or rubs. No carotid bruits bilaterally.  No edema. No clubbing. No cyanosis.    NEURO: Oriented to time, place and person. Normal attention span and concentration. Gait normal.    PSYCH: Affect is full. Mood is normal  MUSCULOSKELETAL: Moves 4 extremities. Gait normal.         Using [facility]:       ASSESSMENT:    1. Sleep Apnea NEC. The patient symptomatically has  excessive daytime sleepiness, snoring,  witnessed breathing pauses, excessive daytime fatigue, gasping for air in sleep, interrupted sleep and nocturia  with exam findings of "a " crowded oral airway and elevated body mass index. The patient has medical co-morbidities of CAD and elevated blood pressure,  which can be worsened by JERONIMO. This warrants further investigation for possible obstructive sleep apnea.          PLAN:      Treatment: prescription  for CPAP 14-18 cm with the mask of a patient's choice was given to the patient.    Education: During our discussion today, we talked about the etiology of obstructive sleep apnea as well as the potential ramifications of untreated sleep apnea, which could include daytime sleepiness, hypertension, heart disease and/or stroke.      We discussed potential treatment options, which could include weight loss, body positioning, continuous positive airway pressure (CPAP), or referral for surgical consideration. The patient preferred CPAP option.     Discussed purpose of PAP therapy, health benefits of CPAP, as well as the potential ramifications of untreated sleep apnea, which could include daytime sleepiness, hypertension, heart disease and/or stroke. An AHI of 15 is associated with increased risk CVD.     The patient should avoid ETOH and sedatives at night, as it tends to aggravate JERONIMO. Regular replacement of CPAP mask, tubing and filter was recommended.    Precautions: The patient was advised to abstain from driving should he feel sleepy or drowsy.    Follow up: MD/NP after 7-8 weeks month of PAP use.     Thank you for allowing me the opportunity to participate in the care of your patient.

## 2019-07-19 ENCOUNTER — PES CALL (OUTPATIENT)
Dept: ADMINISTRATIVE | Facility: CLINIC | Age: 74
End: 2019-07-19

## 2019-07-25 ENCOUNTER — TELEPHONE (OUTPATIENT)
Dept: TRANSPLANT | Facility: CLINIC | Age: 74
End: 2019-07-25

## 2019-07-25 DIAGNOSIS — I50.22 CHRONIC SYSTOLIC HEART FAILURE: Primary | ICD-10-CM

## 2019-07-25 DIAGNOSIS — I50.42 CHRONIC COMBINED SYSTOLIC AND DIASTOLIC CHF, NYHA CLASS 2: ICD-10-CM

## 2019-07-25 NOTE — TELEPHONE ENCOUNTER
1240 pm: Received notification a few days ago that pts Entresto 97/103 needed a P.A. Renewal.   When reviewing chart, also noted pt pt has not seen anyone In the HTS clinic since 5/23/18 and that was Dr Goldberg.  Left message for pt to call me.  3:50 pm:  Spoke with pt and explained reason for call.  Pt told me he is taking Entresto 97/103 (1) tablet twice daily. Said the cost went from $49 to up in the hundreds and his pharmacist was able to get him a 30 day sample. I told pt I would send a new Rx to [facility] here on [address], because they have dedicated people who can pursue this PA. Explained to pt he would be able to pick it up or have it mailed to him and could then transfer the prescription locally. Pt pleased with this plan.   I also explained to pt he has not been seen here in over a year and that its necessary; pt in agreement. Will have  MA call pt and schedule visit.    4:30 pm:  Phone in Rx to [facility] of [facility] ( see med record) and for 1 refill.  Alerting him that this will require a RE-P.A.

## 2019-07-25 NOTE — TELEPHONE ENCOUNTER
3;50 pm:  See my previous note  Returned call to pt at phone number he provided.  NA, LVM of same context asking he return my call.     ----- Message from Cecily Obrien sent at 7/25/2019  1:46 PM CDT -----  Contact: pt  Ronn Montanez,    Pt is returning your call and can be reached at [phone].    Thank you

## 2019-07-29 ENCOUNTER — CLINICAL SUPPORT (OUTPATIENT)
Dept: CARDIOLOGY | Facility: CLINIC | Age: 74
End: 2019-07-29
Attending: INTERNAL MEDICINE
Payer: MEDICARE

## 2019-07-29 DIAGNOSIS — Z95.810 ICD (IMPLANTABLE CARDIOVERTER-DEFIBRILLATOR) IN PLACE: ICD-10-CM

## 2019-07-29 DIAGNOSIS — I25.5 CARDIOMYOPATHY, ISCHEMIC: ICD-10-CM

## 2019-07-31 DIAGNOSIS — I25.5 CARDIOMYOPATHY, ISCHEMIC: ICD-10-CM

## 2019-07-31 DIAGNOSIS — Z95.810 ICD (IMPLANTABLE CARDIOVERTER-DEFIBRILLATOR) IN PLACE: Primary | ICD-10-CM

## 2019-08-16 ENCOUNTER — LAB VISIT (OUTPATIENT)
Dept: LAB | Facility: HOSPITAL | Age: 74
End: 2019-08-16
Attending: INTERNAL MEDICINE
Payer: MEDICARE

## 2019-08-16 ENCOUNTER — OFFICE VISIT (OUTPATIENT)
Dept: TRANSPLANT | Facility: CLINIC | Age: 74
End: 2019-08-16
Attending: INTERNAL MEDICINE
Payer: MEDICARE

## 2019-08-16 VITALS
SYSTOLIC BLOOD PRESSURE: 99 MMHG | WEIGHT: 196.88 LBS | BODY MASS INDEX: 31.64 KG/M2 | DIASTOLIC BLOOD PRESSURE: 50 MMHG | HEART RATE: 80 BPM | HEIGHT: 66 IN

## 2019-08-16 DIAGNOSIS — I50.22 CHRONIC SYSTOLIC HEART FAILURE: ICD-10-CM

## 2019-08-16 DIAGNOSIS — I50.42 CHRONIC COMBINED SYSTOLIC AND DIASTOLIC CHF, NYHA CLASS 2: Primary | ICD-10-CM

## 2019-08-16 DIAGNOSIS — G47.33 OSA ON CPAP: ICD-10-CM

## 2019-08-16 DIAGNOSIS — E78.5 HYPERLIPIDEMIA, UNSPECIFIED HYPERLIPIDEMIA TYPE: ICD-10-CM

## 2019-08-16 DIAGNOSIS — Z45.02 ENCOUNTER FOR SERVICING OF AUTOMATIC IMPLANTABLE CARDIOVERTER-DEFIBRILLATOR (AICD) AT END OF BATTERY LIFE: ICD-10-CM

## 2019-08-16 DIAGNOSIS — I10 ESSENTIAL HYPERTENSION: ICD-10-CM

## 2019-08-16 DIAGNOSIS — I25.118 CORONARY ARTERY DISEASE OF NATIVE ARTERY OF NATIVE HEART WITH STABLE ANGINA PECTORIS: ICD-10-CM

## 2019-08-16 DIAGNOSIS — E78.5 DYSLIPIDEMIA WITH ELEVATED LOW DENSITY LIPOPROTEIN (LDL) CHOLESTEROL AND ABNORMALLY LOW HIGH DENSITY LIPOPROTEIN CHOLESTEROL: ICD-10-CM

## 2019-08-16 DIAGNOSIS — I25.5 CARDIOMYOPATHY, ISCHEMIC: Chronic | ICD-10-CM

## 2019-08-16 LAB
ALBUMIN SERPL BCP-MCNC: 4.1 G/DL (ref 3.5–5.2)
ALP SERPL-CCNC: 73 U/L (ref 55–135)
ALT SERPL W/O P-5'-P-CCNC: 24 U/L (ref 10–44)
ANION GAP SERPL CALC-SCNC: 9 MMOL/L (ref 8–16)
AST SERPL-CCNC: 22 U/L (ref 10–40)
BILIRUB SERPL-MCNC: 0.7 MG/DL (ref 0.1–1)
BNP SERPL-MCNC: 94 PG/ML (ref 0–99)
BUN SERPL-MCNC: 22 MG/DL (ref 8–23)
CALCIUM SERPL-MCNC: 9.1 MG/DL (ref 8.7–10.5)
CHLORIDE SERPL-SCNC: 105 MMOL/L (ref 95–110)
CO2 SERPL-SCNC: 26 MMOL/L (ref 23–29)
CREAT SERPL-MCNC: 1 MG/DL (ref 0.5–1.4)
EST. GFR  (AFRICAN AMERICAN): >60 ML/MIN/1.73 M^2
EST. GFR  (NON AFRICAN AMERICAN): >60 ML/MIN/1.73 M^2
GLUCOSE SERPL-MCNC: 116 MG/DL (ref 70–110)
MAGNESIUM SERPL-MCNC: 2.2 MG/DL (ref 1.6–2.6)
POTASSIUM SERPL-SCNC: 4 MMOL/L (ref 3.5–5.1)
PROT SERPL-MCNC: 6.8 G/DL (ref 6–8.4)
SODIUM SERPL-SCNC: 140 MMOL/L (ref 136–145)

## 2019-08-16 PROCEDURE — 99499 RISK ADDL DX/OHS AUDIT: ICD-10-PCS | Mod: HCNC,S$GLB,, | Performed by: INTERNAL MEDICINE

## 2019-08-16 PROCEDURE — 1101F PR PT FALLS ASSESS DOC 0-1 FALLS W/OUT INJ PAST YR: ICD-10-PCS | Mod: HCNC,CPTII,S$GLB, | Performed by: INTERNAL MEDICINE

## 2019-08-16 PROCEDURE — 3078F DIAST BP <80 MM HG: CPT | Mod: HCNC,CPTII,S$GLB, | Performed by: INTERNAL MEDICINE

## 2019-08-16 PROCEDURE — 3078F PR MOST RECENT DIASTOLIC BLOOD PRESSURE < 80 MM HG: ICD-10-PCS | Mod: HCNC,CPTII,S$GLB, | Performed by: INTERNAL MEDICINE

## 2019-08-16 PROCEDURE — 36415 COLL VENOUS BLD VENIPUNCTURE: CPT | Mod: HCNC

## 2019-08-16 PROCEDURE — 80053 COMPREHEN METABOLIC PANEL: CPT | Mod: HCNC

## 2019-08-16 PROCEDURE — 99499 UNLISTED E&M SERVICE: CPT | Mod: HCNC,S$GLB,, | Performed by: INTERNAL MEDICINE

## 2019-08-16 PROCEDURE — 99214 OFFICE O/P EST MOD 30 MIN: CPT | Mod: HCNC,S$GLB,, | Performed by: INTERNAL MEDICINE

## 2019-08-16 PROCEDURE — 1101F PT FALLS ASSESS-DOCD LE1/YR: CPT | Mod: HCNC,CPTII,S$GLB, | Performed by: INTERNAL MEDICINE

## 2019-08-16 PROCEDURE — 99999 PR PBB SHADOW E&M-EST. PATIENT-LVL III: CPT | Mod: PBBFAC,HCNC,, | Performed by: INTERNAL MEDICINE

## 2019-08-16 PROCEDURE — 83735 ASSAY OF MAGNESIUM: CPT | Mod: HCNC

## 2019-08-16 PROCEDURE — 3074F PR MOST RECENT SYSTOLIC BLOOD PRESSURE < 130 MM HG: ICD-10-PCS | Mod: HCNC,CPTII,S$GLB, | Performed by: INTERNAL MEDICINE

## 2019-08-16 PROCEDURE — 99214 PR OFFICE/OUTPT VISIT, EST, LEVL IV, 30-39 MIN: ICD-10-PCS | Mod: HCNC,S$GLB,, | Performed by: INTERNAL MEDICINE

## 2019-08-16 PROCEDURE — 3074F SYST BP LT 130 MM HG: CPT | Mod: HCNC,CPTII,S$GLB, | Performed by: INTERNAL MEDICINE

## 2019-08-16 PROCEDURE — 99999 PR PBB SHADOW E&M-EST. PATIENT-LVL III: ICD-10-PCS | Mod: PBBFAC,HCNC,, | Performed by: INTERNAL MEDICINE

## 2019-08-16 PROCEDURE — 83880 ASSAY OF NATRIURETIC PEPTIDE: CPT | Mod: HCNC

## 2019-08-16 RX ORDER — METOPROLOL SUCCINATE 100 MG/1
100 TABLET, EXTENDED RELEASE ORAL DAILY
Qty: 90 TABLET | Refills: 5 | Status: SHIPPED | OUTPATIENT
Start: 2019-08-16 | End: 2020-08-17

## 2019-08-16 RX ORDER — FLUOROURACIL 50 MG/G
CREAM TOPICAL
Refills: 1 | COMMUNITY
Start: 2019-08-03 | End: 2022-09-28

## 2019-08-16 RX ORDER — ERGOCALCIFEROL 1.25 MG/1
2000 CAPSULE ORAL
COMMUNITY
End: 2023-07-28

## 2019-08-16 RX ORDER — ATORVASTATIN CALCIUM 80 MG/1
80 TABLET, FILM COATED ORAL NIGHTLY
Qty: 90 TABLET | Refills: 3 | Status: SHIPPED | OUTPATIENT
Start: 2019-08-16 | End: 2020-08-28

## 2019-08-16 RX ORDER — CALCIPOTRIENE 50 UG/G
CREAM TOPICAL
Refills: 1 | COMMUNITY
Start: 2019-08-05 | End: 2022-09-28

## 2019-08-16 NOTE — PROGRESS NOTES
Subjective:     Patient ID:  Tristen Blanc is a 73 y.o. male who presents for follow-up of Congestive Heart Failure; Cardiomyopathy (ISCHEMIC); and Hyperlipidemia    HPI:    72 yo WM referred by Dr. Antunez to make contact with [facility] should advanced therapies be required for CHF due to ischemic CM.  He has had prior cardiac arrest in 1996 but sounds like occurred with acute MI.  He has ICD placed 2010 and replaced 2017 but reports that he has never had an ICD shock.  He has never required hospitalization for CHF.  He was seen for initial consult on 5/23/2018 at which time planned to change to Entresto.  He did not call [facility] for update of plan of care made at that visit until 7/25/19 when having trouble with Entresto expense.     He remains active with golf.  Still smoking 1 ppd.  He got CPAP machine but is having trouble sleeping with it at night and is going to get this checked.    No tightness, pressure or heaviness in chest, neck, arms, throat, jaw or back with or without exertion.  Mild LANGE; no orthopnea, PND, palpitations, pre-syncope or syncope.     Medication expenses are an issue with doughnut hole and Entresto plus many other meds--see plan below enacted in effort to help with this issue.    Review of Systems   Constitution: Positive for weight gain. Negative for chills, decreased appetite, diaphoresis, fever, malaise/fatigue, night sweats and weight loss.   HENT: Positive for congestion. Negative for ear discharge, ear pain, hearing loss and hoarse voice.    Eyes: Negative for photophobia and visual disturbance (none off ranexa).   Cardiovascular: Positive for chest pain (none lately) and dyspnea on exertion (mild). Negative for irregular heartbeat, leg swelling, near-syncope, orthopnea, palpitations, paroxysmal nocturnal dyspnea and syncope.   Respiratory: Negative for cough, hemoptysis, shortness of breath, sleep disturbances due to breathing (on CPAP--see HPI), sputum production and wheezing.   "  Endocrine: Negative for cold intolerance, heat intolerance, polydipsia and polyuria.   Hematologic/Lymphatic: Negative for bleeding problem. Does not bruise/bleed easily.   Musculoskeletal: Positive for arthritis and joint pain (knees, hip). Negative for back pain and stiffness.   Gastrointestinal: Positive for heartburn (and reflux). Negative for abdominal pain, anorexia, change in bowel habit, dysphagia, hematochezia and melena.        Due for surveillance colonoscopy vs DNA stool screen--defer to primary   Genitourinary: Negative for dysuria, frequency and hematuria.   Neurological: Negative for brief paralysis, focal weakness, headaches, seizures and weakness.   Allergic/Immunologic: Positive for environmental allergies.     Objective:   Physical Exam   Constitutional: He is oriented to person, place, and time. He appears well-developed and well-nourished.   BP (!) 99/50 (BP Location: Right arm, Patient Position: Sitting, BP Method: Medium (Automatic))   Pulse 80   Ht 5' 6" (1.676 m)   Wt 89.3 kg (196 lb 13.9 oz)   BMI 31.78 kg/m²   Last visit wt 86.5 kg (190 lb 11.2 oz)   Overweight WM in NAD, friendly and cooperative   HENT:   Head: Normocephalic and atraumatic.   Mouth/Throat: Oropharynx is clear and moist. No oropharyngeal exudate.   Eyes: Conjunctivae are normal. Right eye exhibits no discharge. Left eye exhibits no discharge. No scleral icterus.   Neck: No JVD present. No thyromegaly present.   Cardiovascular: Normal rate, regular rhythm and normal heart sounds. Exam reveals no gallop.   No murmur heard.  Pulmonary/Chest: Effort normal and breath sounds normal.   Abdominal: Soft. Bowel sounds are normal. He exhibits no distension and no mass. There is no tenderness. There is no rebound and no guarding.   Musculoskeletal: He exhibits no edema or tenderness.   Neurological: He is alert and oriented to person, place, and time.   Skin: Skin is warm and dry.   Psychiatric: He has a normal mood and affect. " His behavior is normal. Judgment and thought content normal.      Lab Results   Component Value Date    BNP 94 08/16/2019     08/16/2019    K 4.0 08/16/2019    MG 2.2 08/16/2019     08/16/2019    CO2 26 08/16/2019    BUN 22 08/16/2019    CREATININE 1.0 08/16/2019     (H) 08/16/2019    AST 22 08/16/2019    ALT 24 08/16/2019    ALBUMIN 4.1 08/16/2019    PROT 6.8 08/16/2019    BILITOT 0.7 08/16/2019    CHOL 146 08/04/2016    HDL 34 (L) 08/04/2016    LDLCALC 80.4 08/04/2016    TRIG 158 (H) 08/04/2016     Assessment:     1. Chronic combined systolic and diastolic CHF, NYHA class 2    2. Cardiomyopathy, ischemic    3. Coronary artery disease of native artery of native heart with stable angina pectoris    4. Dyslipidemia with elevated low density lipoprotein (LDL) cholesterol and abnormally low high density lipoprotein cholesterol    5. Essential hypertension    6. JERONIMO on CPAP      Plan:   Stop smoking--he report chantix and meds failed and is not interested in other assistance at this time such as smoking cessation clinic  Weight loss  Exercise program  To lessen medication expense will stop  ezetimibe and increase atorvastatin to 80 mg bedtime; lipids are from 2016 but high intensity statin with CAD is fine regardless of level of LDL thus initiate this plan and repeat lipids in 6 months.  Ordered updated lipids in system for now but this is optional from my perspective.  Change carvedilol to metoprolol succinate 100 mg once a day (goal is to get pulse below 70 at rest without causing symptomatic hypotension.  If HR > 70 in 2 weeks call me to change to 200 mg dose  RTC 6 months with lipids, CMP

## 2019-08-16 NOTE — PATIENT INSTRUCTIONS
To lessen medication expenses:  Stop  ezetimibe and increase atorvastatin to 80 mg bedtime  Change carvedilol to metoprolol succinate 100 mg once a day (goal is to get pulse below 70 at rest and if not achieved 2 weeks later call me to change to 200 mg dose)

## 2019-09-06 ENCOUNTER — OFFICE VISIT (OUTPATIENT)
Dept: SLEEP MEDICINE | Facility: CLINIC | Age: 74
End: 2019-09-06
Payer: MEDICARE

## 2019-09-06 VITALS
BODY MASS INDEX: 31.28 KG/M2 | DIASTOLIC BLOOD PRESSURE: 57 MMHG | HEIGHT: 66 IN | HEART RATE: 88 BPM | WEIGHT: 194.63 LBS | SYSTOLIC BLOOD PRESSURE: 115 MMHG

## 2019-09-06 DIAGNOSIS — I25.5 CARDIOMYOPATHY, ISCHEMIC: Chronic | ICD-10-CM

## 2019-09-06 DIAGNOSIS — G47.33 OBSTRUCTIVE SLEEP APNEA: Primary | ICD-10-CM

## 2019-09-06 DIAGNOSIS — Z95.810 ICD (IMPLANTABLE CARDIOVERTER-DEFIBRILLATOR) IN PLACE: ICD-10-CM

## 2019-09-06 PROCEDURE — 99214 PR OFFICE/OUTPT VISIT, EST, LEVL IV, 30-39 MIN: ICD-10-PCS | Mod: HCNC,S$GLB,, | Performed by: NURSE PRACTITIONER

## 2019-09-06 PROCEDURE — 1101F PR PT FALLS ASSESS DOC 0-1 FALLS W/OUT INJ PAST YR: ICD-10-PCS | Mod: HCNC,CPTII,S$GLB, | Performed by: NURSE PRACTITIONER

## 2019-09-06 PROCEDURE — 1101F PT FALLS ASSESS-DOCD LE1/YR: CPT | Mod: HCNC,CPTII,S$GLB, | Performed by: NURSE PRACTITIONER

## 2019-09-06 PROCEDURE — 99999 PR PBB SHADOW E&M-EST. PATIENT-LVL IV: ICD-10-PCS | Mod: PBBFAC,HCNC,, | Performed by: NURSE PRACTITIONER

## 2019-09-06 PROCEDURE — 99999 PR PBB SHADOW E&M-EST. PATIENT-LVL IV: CPT | Mod: PBBFAC,HCNC,, | Performed by: NURSE PRACTITIONER

## 2019-09-06 PROCEDURE — 3074F SYST BP LT 130 MM HG: CPT | Mod: HCNC,CPTII,S$GLB, | Performed by: NURSE PRACTITIONER

## 2019-09-06 PROCEDURE — 3078F DIAST BP <80 MM HG: CPT | Mod: HCNC,CPTII,S$GLB, | Performed by: NURSE PRACTITIONER

## 2019-09-06 PROCEDURE — 3078F PR MOST RECENT DIASTOLIC BLOOD PRESSURE < 80 MM HG: ICD-10-PCS | Mod: HCNC,CPTII,S$GLB, | Performed by: NURSE PRACTITIONER

## 2019-09-06 PROCEDURE — 99214 OFFICE O/P EST MOD 30 MIN: CPT | Mod: HCNC,S$GLB,, | Performed by: NURSE PRACTITIONER

## 2019-09-06 PROCEDURE — 3074F PR MOST RECENT SYSTOLIC BLOOD PRESSURE < 130 MM HG: ICD-10-PCS | Mod: HCNC,CPTII,S$GLB, | Performed by: NURSE PRACTITIONER

## 2019-09-06 NOTE — PROGRESS NOTES
Tristen Blanc  was seen as f/u for mgt of JERONIMO, last seen MD 7/17/19    Since seen he got new apap machine 7/29/19 (14-18cm)  and is using it regularly.  Benefiting from CPAP use in terms of sleep continuity and daytime sleepiness. Having arousal when air surges, thinks it could be as high as 20cm. Using soclean. Continues to ramp initially. Using F30 mask. Inc'd oral drying. Sleeps better nights he is very tired and Flonase bedtime. Already dose flush. 5# loss. Sees cards. Has ICD interrogation kassidyien on nightstand so keeps machine lower than chest on a stool  Encore:  DATE RANGE: 8/7/2019 - 9/5/2019   4% leak, 90% tile 15.8cm  Compliance Summary  Days with Device Usage: 24 days  Percentage of Days >=4 Hours: 36.7%  Average Usage (Days Used): 3 hrs. 58 mins. 57 secs.  Average Usage (All Days): 3 hrs. 11 mins. 9 secs.  Apnea Indices  Average AHI: 5.1  Consolidated nights rev'd, 90% tile 15.8-16.5cm      HISTORY  07/17/2019 INITIAL HISTORY OF PRESENT ILLNESS:  Tristen Blanc is a 73 y.o. male is here to be evaluated for a sleep disorder.       CHIEF COMPLAINT:      The patient's complaints include excessive daytime sleepiness, excessive daytime fatigue, snoring,  witnessed breathing pauses,  gasping for air in sleep and interrupted sleep .    He was diagnosed with moderate -severe JERONIMO post Jr storm. His study was ordered by his [facility] Cardiologist -> Done in [employer].    Benefiting from CPAP use in terms of sleep continuity and daytime sleepiness.   His machine was due for replacement.     840 hrs on his FP old machine and machine set at 15 cm    AHI0.5.    Using So claen    His machine is old, malfunctioning, needs replacement.  Denied break through snoring.    ++dry mouth               PREVIOUS SLEEP STUDIES:     PSG ? [employer] (scanned in): Significant Obstructive sleep apnea (JERONIMO) with AHI (apnea hypopnea Index) of 26 and SaO2 of 81 (weight  192 lbs).    CPAP titration in ? showed  "effective control of respiratory events at  15 cm H2O including supine REM sleep.    5/2018 Echo EF 25-30%    PHYSICAL EXAM:  BP (!) 115/57   Pulse 88   Ht 5' 6" (1.676 m)   Wt 88.3 kg (194 lb 9.6 oz)   BMI 31.41 kg/m²   GENERAL: Normal development, well groomed, obese      ASSESSMENT:    1. JERONIMO, dx'd years ago, long time PAP user. Got new machine recently, benefiting in terms of resolution of snoring and consolidated sleep. Having oral drying despite higher humidity and indication of pressure intolerance  He has  medical co-morbidities of CAD, ischemic cardiomyopathy, ICD intact, obesity,  which can be worsened by JERONIMO          PLAN:    ADJUST today apap 14-16.5cm. Continue consistent qhs, disussed medicare use guidelines. TH [employer] supplies, get climate control hose today    Discussed etiology of JERONIMO and potential ramifications of untreated JERONIMO, including heart disease, hypertension, cognitive difficulties, stroke, and diabetes.    See cards as advised re: CAD/cardiomyopathy/continue meds and encouraged continued weight loss efforts for potential improvement of JERONIMO and overall health benefits  rtc 5 wks adherence      "

## 2019-09-15 ENCOUNTER — CLINICAL SUPPORT (OUTPATIENT)
Dept: CARDIOLOGY | Facility: CLINIC | Age: 74
End: 2019-09-15
Payer: MEDICARE

## 2019-09-15 PROCEDURE — 93297 CARDIAC DEVICE CHECK - REMOTE: ICD-10-PCS | Mod: ,,, | Performed by: INTERNAL MEDICINE

## 2019-09-15 PROCEDURE — 93299 CARDIAC DEVICE CHECK - REMOTE: ICD-10-PCS | Mod: ,,, | Performed by: INTERNAL MEDICINE

## 2019-09-15 PROCEDURE — 93297 REM INTERROG DEV EVAL ICPMS: CPT | Mod: ,,, | Performed by: INTERNAL MEDICINE

## 2019-09-15 PROCEDURE — 93299 CARDIAC DEVICE CHECK - REMOTE: CPT | Mod: ,,, | Performed by: INTERNAL MEDICINE

## 2019-09-26 ENCOUNTER — CLINICAL SUPPORT (OUTPATIENT)
Dept: CARDIOLOGY | Facility: CLINIC | Age: 74
End: 2019-09-26
Attending: INTERNAL MEDICINE
Payer: MEDICARE

## 2019-09-26 DIAGNOSIS — Z95.810 ICD (IMPLANTABLE CARDIOVERTER-DEFIBRILLATOR) IN PLACE: ICD-10-CM

## 2019-09-26 DIAGNOSIS — I25.5 CARDIOMYOPATHY, ISCHEMIC: ICD-10-CM

## 2019-10-25 ENCOUNTER — OFFICE VISIT (OUTPATIENT)
Dept: SLEEP MEDICINE | Facility: CLINIC | Age: 74
End: 2019-10-25
Payer: MEDICARE

## 2019-10-25 VITALS
HEIGHT: 66 IN | BODY MASS INDEX: 31.45 KG/M2 | HEART RATE: 87 BPM | DIASTOLIC BLOOD PRESSURE: 65 MMHG | SYSTOLIC BLOOD PRESSURE: 120 MMHG | WEIGHT: 195.69 LBS

## 2019-10-25 DIAGNOSIS — G47.33 OBSTRUCTIVE SLEEP APNEA: Primary | ICD-10-CM

## 2019-10-25 PROCEDURE — 3078F DIAST BP <80 MM HG: CPT | Mod: HCNC,CPTII,S$GLB, | Performed by: NURSE PRACTITIONER

## 2019-10-25 PROCEDURE — 1101F PR PT FALLS ASSESS DOC 0-1 FALLS W/OUT INJ PAST YR: ICD-10-PCS | Mod: HCNC,CPTII,S$GLB, | Performed by: NURSE PRACTITIONER

## 2019-10-25 PROCEDURE — 99213 PR OFFICE/OUTPT VISIT, EST, LEVL III, 20-29 MIN: ICD-10-PCS | Mod: HCNC,S$GLB,, | Performed by: NURSE PRACTITIONER

## 2019-10-25 PROCEDURE — 99999 PR PBB SHADOW E&M-EST. PATIENT-LVL III: CPT | Mod: PBBFAC,HCNC,, | Performed by: NURSE PRACTITIONER

## 2019-10-25 PROCEDURE — 99213 OFFICE O/P EST LOW 20 MIN: CPT | Mod: HCNC,S$GLB,, | Performed by: NURSE PRACTITIONER

## 2019-10-25 PROCEDURE — 99999 PR PBB SHADOW E&M-EST. PATIENT-LVL III: ICD-10-PCS | Mod: PBBFAC,HCNC,, | Performed by: NURSE PRACTITIONER

## 2019-10-25 PROCEDURE — 3074F PR MOST RECENT SYSTOLIC BLOOD PRESSURE < 130 MM HG: ICD-10-PCS | Mod: HCNC,CPTII,S$GLB, | Performed by: NURSE PRACTITIONER

## 2019-10-25 PROCEDURE — 3078F PR MOST RECENT DIASTOLIC BLOOD PRESSURE < 80 MM HG: ICD-10-PCS | Mod: HCNC,CPTII,S$GLB, | Performed by: NURSE PRACTITIONER

## 2019-10-25 PROCEDURE — 1101F PT FALLS ASSESS-DOCD LE1/YR: CPT | Mod: HCNC,CPTII,S$GLB, | Performed by: NURSE PRACTITIONER

## 2019-10-25 PROCEDURE — 3074F SYST BP LT 130 MM HG: CPT | Mod: HCNC,CPTII,S$GLB, | Performed by: NURSE PRACTITIONER

## 2019-10-25 NOTE — PROGRESS NOTES
Tristen Blanc  was seen as f/u for mgt of JERONIMO, last seen 9/6/19    Since seen he continues to use qhs apap 14-16.5cm  Benefiting from CPAP use in terms of sleep continuity and daytime sleepiness but its still disrupted. Never had problems with air surge when he used cpap 15cm with old machine. Having arousal when air surges and he removes his mask. Moves machine more level with chest just past 3 days.   Encore:  DATE RANGE: 9/25/2019 - 10/24/2019   0 %leak, 90%tile 16.3cm  Compliance Summary  Days with Device Usage: 20 days  Percentage of Days >=4 Hours: 33.3%  Average Usage (Days Used): 3 hrs. 55 mins. 11 secs.  Average Usage (All Days): 2 hrs. 36 mins. 47 secs.  Apnea Indices  Average AHI: 6.2      HISTORY  07/17/2019 INITIAL HISTORY OF PRESENT ILLNESS:  Tristen Blanc is a 74 y.o. male is here to be evaluated for a sleep disorder.       CHIEF COMPLAINT:      The patient's complaints include excessive daytime sleepiness, excessive daytime fatigue, snoring,  witnessed breathing pauses,  gasping for air in sleep and interrupted sleep .    He was diagnosed with moderate -severe JERONIMO post Jr storm. His study was ordered by his [facility] Cardiologist -> Done in [employer].    Benefiting from CPAP use in terms of sleep continuity and daytime sleepiness.   His machine was due for replacement.     840 hrs on his FP old machine and machine set at 15 cm    AHI0.5.    Using So claen    His machine is old, malfunctioning, needs replacement.  Denied break through snoring.    ++dry mouth    9/6/19  Since seen he got new apap machine 7/29/19 (14-18cm)  and is using it regularly.  Benefiting from CPAP use in terms of sleep continuity and daytime sleepiness. Having arousal when air surges, thinks it could be as high as 20cm. Using soclean. Continues to ramp initially. Using F30 mask. Inc'd oral drying. Sleeps better nights he is very tired and Flonase bedtime. Already dose sinus flush. 5# loss. Sees cards. Has  ICD interrogation machedwin on nightstand so keeps machine lower than chest on a stool  Encore:  DATE RANGE: 8/7/2019 - 9/5/2019   4% leak, 90% tile 15.8cm  Compliance Summary  Days with Device Usage: 24 days  Percentage of Days >=4 Hours: 36.7%  Average Usage (Days Used): 3 hrs. 58 mins. 57 secs.  Average Usage (All Days): 3 hrs. 11 mins. 9 secs.  Apnea Indices  Average AHI: 5.1  Consolidated nights rev'd, 90% tile 15.8-16.5cm       PREVIOUS SLEEP STUDIES:     PSG ? [employer] (scanned in): Significant Obstructive sleep apnea (JERONIMO) with AHI (apnea hypopnea Index) of 26 and SaO2 of 81 (weight  192 lbs).    CPAP titration in ? showed effective control of respiratory events at  15 cm H2O including supine REM sleep.    5/2018 Echo EF 25-30%    PHYSICAL EXAM:  There were no vitals taken for this visit.  GENERAL: Normal development, well groomed, obese      ASSESSMENT:    1. JERONIMO, dx'd years ago, long time PAP user. Got new machine recently, benefiting in terms of resolution of snoring and consolidated sleep. Having oral drying despite higher humidity and indication of pressure intolerance 10/25/19: Still having oral drying and having pressure intolerance, requiring high cpap  Therapies and having residual elevated ahi.   He has  medical co-morbidities of CAD, ischemic cardiomyopathy, ICD intact, obesity,  which can be worsened by JERONIMO          PLAN:  Given pressure intolerance and he's w/i 90d of using new apap machien having residual elevated AHI , switch to autobipap machine max IPAP 20cm, min EPAP 10cm and PS 2-8cm. NEEDS climate control hose    Discussed etiology of JERONIMO and potential ramifications of untreated JERONIMO, including heart disease, hypertension, cognitive difficulties, stroke, and diabetes.    See cards as advised re: CAD/cardiomyopathy/continue meds and encouraged continued weight loss efforts for potential improvement of JERONIMO and overall health benefits  rtc 4-5 wks AFTER bipap

## 2019-10-26 ENCOUNTER — CLINICAL SUPPORT (OUTPATIENT)
Dept: CARDIOLOGY | Facility: CLINIC | Age: 74
End: 2019-10-26
Payer: MEDICARE

## 2019-10-26 PROCEDURE — 93295 CARDIAC DEVICE CHECK - REMOTE: ICD-10-PCS | Mod: HCNC,S$GLB,, | Performed by: INTERNAL MEDICINE

## 2019-10-26 PROCEDURE — 93296 REM INTERROG EVL PM/IDS: CPT | Mod: HCNC,S$GLB,, | Performed by: INTERNAL MEDICINE

## 2019-10-26 PROCEDURE — 93295 DEV INTERROG REMOTE 1/2/MLT: CPT | Mod: HCNC,S$GLB,, | Performed by: INTERNAL MEDICINE

## 2019-10-26 PROCEDURE — 93296 CARDIAC DEVICE CHECK - REMOTE: ICD-10-PCS | Mod: HCNC,S$GLB,, | Performed by: INTERNAL MEDICINE

## 2019-11-14 ENCOUNTER — CLINICAL SUPPORT (OUTPATIENT)
Dept: CARDIOLOGY | Facility: CLINIC | Age: 74
End: 2019-11-14
Payer: MEDICARE

## 2019-12-03 ENCOUNTER — OFFICE VISIT (OUTPATIENT)
Dept: CARDIOLOGY | Facility: CLINIC | Age: 74
End: 2019-12-03
Payer: MEDICARE

## 2019-12-03 VITALS
HEIGHT: 66 IN | DIASTOLIC BLOOD PRESSURE: 56 MMHG | OXYGEN SATURATION: 97 % | WEIGHT: 196.44 LBS | BODY MASS INDEX: 31.57 KG/M2 | HEART RATE: 87 BPM | SYSTOLIC BLOOD PRESSURE: 106 MMHG

## 2019-12-03 DIAGNOSIS — Z95.810 ICD (IMPLANTABLE CARDIOVERTER-DEFIBRILLATOR) IN PLACE: ICD-10-CM

## 2019-12-03 DIAGNOSIS — I10 ESSENTIAL HYPERTENSION: ICD-10-CM

## 2019-12-03 DIAGNOSIS — I25.118 CORONARY ARTERY DISEASE OF NATIVE ARTERY OF NATIVE HEART WITH STABLE ANGINA PECTORIS: ICD-10-CM

## 2019-12-03 DIAGNOSIS — E78.5 DYSLIPIDEMIA WITH ELEVATED LOW DENSITY LIPOPROTEIN (LDL) CHOLESTEROL AND ABNORMALLY LOW HIGH DENSITY LIPOPROTEIN CHOLESTEROL: Primary | ICD-10-CM

## 2019-12-03 PROCEDURE — 3078F PR MOST RECENT DIASTOLIC BLOOD PRESSURE < 80 MM HG: ICD-10-PCS | Mod: HCNC,CPTII,S$GLB, | Performed by: INTERNAL MEDICINE

## 2019-12-03 PROCEDURE — 1159F PR MEDICATION LIST DOCUMENTED IN MEDICAL RECORD: ICD-10-PCS | Mod: HCNC,S$GLB,, | Performed by: INTERNAL MEDICINE

## 2019-12-03 PROCEDURE — 99499 UNLISTED E&M SERVICE: CPT | Mod: HCNC,S$GLB,, | Performed by: INTERNAL MEDICINE

## 2019-12-03 PROCEDURE — 3074F SYST BP LT 130 MM HG: CPT | Mod: HCNC,CPTII,S$GLB, | Performed by: INTERNAL MEDICINE

## 2019-12-03 PROCEDURE — 3078F DIAST BP <80 MM HG: CPT | Mod: HCNC,CPTII,S$GLB, | Performed by: INTERNAL MEDICINE

## 2019-12-03 PROCEDURE — 3074F PR MOST RECENT SYSTOLIC BLOOD PRESSURE < 130 MM HG: ICD-10-PCS | Mod: HCNC,CPTII,S$GLB, | Performed by: INTERNAL MEDICINE

## 2019-12-03 PROCEDURE — 99214 PR OFFICE/OUTPT VISIT, EST, LEVL IV, 30-39 MIN: ICD-10-PCS | Mod: HCNC,S$GLB,, | Performed by: INTERNAL MEDICINE

## 2019-12-03 PROCEDURE — 1126F PR PAIN SEVERITY QUANTIFIED, NO PAIN PRESENT: ICD-10-PCS | Mod: HCNC,S$GLB,, | Performed by: INTERNAL MEDICINE

## 2019-12-03 PROCEDURE — 1126F AMNT PAIN NOTED NONE PRSNT: CPT | Mod: HCNC,S$GLB,, | Performed by: INTERNAL MEDICINE

## 2019-12-03 PROCEDURE — 99999 PR PBB SHADOW E&M-EST. PATIENT-LVL IV: CPT | Mod: PBBFAC,HCNC,, | Performed by: INTERNAL MEDICINE

## 2019-12-03 PROCEDURE — 1101F PR PT FALLS ASSESS DOC 0-1 FALLS W/OUT INJ PAST YR: ICD-10-PCS | Mod: HCNC,CPTII,S$GLB, | Performed by: INTERNAL MEDICINE

## 2019-12-03 PROCEDURE — 99214 OFFICE O/P EST MOD 30 MIN: CPT | Mod: HCNC,S$GLB,, | Performed by: INTERNAL MEDICINE

## 2019-12-03 PROCEDURE — 1101F PT FALLS ASSESS-DOCD LE1/YR: CPT | Mod: HCNC,CPTII,S$GLB, | Performed by: INTERNAL MEDICINE

## 2019-12-03 PROCEDURE — 1159F MED LIST DOCD IN RCRD: CPT | Mod: HCNC,S$GLB,, | Performed by: INTERNAL MEDICINE

## 2019-12-03 PROCEDURE — 99499 RISK ADDL DX/OHS AUDIT: ICD-10-PCS | Mod: HCNC,S$GLB,, | Performed by: INTERNAL MEDICINE

## 2019-12-03 PROCEDURE — 99999 PR PBB SHADOW E&M-EST. PATIENT-LVL IV: ICD-10-PCS | Mod: PBBFAC,HCNC,, | Performed by: INTERNAL MEDICINE

## 2019-12-03 NOTE — PROGRESS NOTES
Subjective:    Patient ID:  Tristen Blanc is a 74 y.o. male who presents for follow-up of Cardiomyopathy      Doing well gained weight and difficult to loose it. SOB in cool humid weather, denies CP. No swelling.       Review of Systems   Cardiovascular: Positive for dyspnea on exertion.   All other systems reviewed and are negative.       Objective:      Physical Exam   Constitutional: He is oriented to person, place, and time. He appears well-developed and well-nourished.   HENT:   Head: Normocephalic and atraumatic.   Eyes: Pupils are equal, round, and reactive to light. Conjunctivae and EOM are normal. Right eye exhibits no discharge. Left eye exhibits no discharge. No scleral icterus.   Neck: Normal range of motion. Neck supple. No JVD present. No tracheal deviation present. No thyromegaly present.   Cardiovascular: Normal rate, regular rhythm and intact distal pulses. Exam reveals no gallop and no friction rub.   Murmur heard.  Pulses:       Carotid pulses are 2+ on the right side, and 2+ on the left side.       Dorsalis pedis pulses are 2+ on the right side, and 2+ on the left side.        Posterior tibial pulses are 2+ on the right side, and 2+ on the left side.   Pulmonary/Chest: Effort normal and breath sounds normal. No stridor. No respiratory distress. He has no wheezes. He has no rales. He exhibits no tenderness.   Abdominal: Soft. Bowel sounds are normal. He exhibits no distension. There is no tenderness. There is no rebound and no guarding.   Musculoskeletal: Normal range of motion. He exhibits no edema or tenderness.   Lymphadenopathy:     He has no cervical adenopathy.   Neurological: He is alert and oriented to person, place, and time.   Skin: Skin is warm and dry. No rash noted. No erythema. No pallor.   Psychiatric: He has a normal mood and affect. His behavior is normal. Judgment and thought content normal.         Assessment:       1. Dyslipidemia with elevated low density lipoprotein (LDL)  cholesterol and abnormally low high density lipoprotein cholesterol    2. Essential hypertension    3. Coronary artery disease of native artery of native heart with stable angina pectoris    4. ICD (implantable cardioverter-defibrillator) in place         Plan:       Dyslipidemia with elevated low density lipoprotein (LDL) cholesterol and abnormally low high density lipoprotein cholesterol    Essential hypertension    Coronary artery disease of native artery of native heart with stable angina pectoris    ICD (implantable cardioverter-defibrillator) in place    Blood work orders are in the computer and will give him appointment, Plan to increase cholesterol medication or switch if LDL is elevated.

## 2019-12-04 ENCOUNTER — LAB VISIT (OUTPATIENT)
Dept: LAB | Facility: HOSPITAL | Age: 74
End: 2019-12-04
Attending: INTERNAL MEDICINE
Payer: MEDICARE

## 2019-12-04 DIAGNOSIS — I25.118 CORONARY ARTERY DISEASE OF NATIVE ARTERY OF NATIVE HEART WITH STABLE ANGINA PECTORIS: ICD-10-CM

## 2019-12-04 DIAGNOSIS — I50.42 CHRONIC COMBINED SYSTOLIC AND DIASTOLIC CHF, NYHA CLASS 2: ICD-10-CM

## 2019-12-04 DIAGNOSIS — E78.5 DYSLIPIDEMIA WITH ELEVATED LOW DENSITY LIPOPROTEIN (LDL) CHOLESTEROL AND ABNORMALLY LOW HIGH DENSITY LIPOPROTEIN CHOLESTEROL: ICD-10-CM

## 2019-12-04 DIAGNOSIS — I10 ESSENTIAL HYPERTENSION: ICD-10-CM

## 2019-12-04 LAB
ALBUMIN SERPL BCP-MCNC: 3.7 G/DL (ref 3.5–5.2)
ALP SERPL-CCNC: 88 U/L (ref 55–135)
ALT SERPL W/O P-5'-P-CCNC: 35 U/L (ref 10–44)
ANION GAP SERPL CALC-SCNC: 9 MMOL/L (ref 8–16)
AST SERPL-CCNC: 29 U/L (ref 10–40)
BILIRUB SERPL-MCNC: 0.5 MG/DL (ref 0.1–1)
BUN SERPL-MCNC: 19 MG/DL (ref 8–23)
CALCIUM SERPL-MCNC: 9.4 MG/DL (ref 8.7–10.5)
CHLORIDE SERPL-SCNC: 104 MMOL/L (ref 95–110)
CHOLEST SERPL-MCNC: 109 MG/DL (ref 120–199)
CHOLEST/HDLC SERPL: 4.4 {RATIO} (ref 2–5)
CO2 SERPL-SCNC: 29 MMOL/L (ref 23–29)
CREAT SERPL-MCNC: 1.1 MG/DL (ref 0.5–1.4)
EST. GFR  (AFRICAN AMERICAN): >60 ML/MIN/1.73 M^2
EST. GFR  (NON AFRICAN AMERICAN): >60 ML/MIN/1.73 M^2
GLUCOSE SERPL-MCNC: 122 MG/DL (ref 70–110)
HDLC SERPL-MCNC: 25 MG/DL (ref 40–75)
HDLC SERPL: 22.9 % (ref 20–50)
LDLC SERPL CALC-MCNC: 68.8 MG/DL (ref 63–159)
NONHDLC SERPL-MCNC: 84 MG/DL
POTASSIUM SERPL-SCNC: 4.4 MMOL/L (ref 3.5–5.1)
PROT SERPL-MCNC: 6.9 G/DL (ref 6–8.4)
SODIUM SERPL-SCNC: 142 MMOL/L (ref 136–145)
TRIGL SERPL-MCNC: 76 MG/DL (ref 30–150)

## 2019-12-04 PROCEDURE — 80061 LIPID PANEL: CPT | Mod: HCNC

## 2019-12-04 PROCEDURE — 36415 COLL VENOUS BLD VENIPUNCTURE: CPT | Mod: HCNC

## 2019-12-04 PROCEDURE — 80053 COMPREHEN METABOLIC PANEL: CPT | Mod: HCNC

## 2019-12-05 ENCOUNTER — PES CALL (OUTPATIENT)
Dept: ADMINISTRATIVE | Facility: CLINIC | Age: 74
End: 2019-12-05

## 2019-12-14 ENCOUNTER — CLINICAL SUPPORT (OUTPATIENT)
Dept: CARDIOLOGY | Facility: CLINIC | Age: 74
End: 2019-12-14
Payer: MEDICARE

## 2019-12-14 PROCEDURE — 93297 CARDIAC DEVICE CHECK - REMOTE: ICD-10-PCS | Mod: HCNC,S$GLB,, | Performed by: INTERNAL MEDICINE

## 2019-12-14 PROCEDURE — 93297 REM INTERROG DEV EVAL ICPMS: CPT | Mod: HCNC,S$GLB,, | Performed by: INTERNAL MEDICINE

## 2019-12-14 PROCEDURE — 93299 CARDIAC DEVICE CHECK - REMOTE: ICD-10-PCS | Mod: HCNC,S$GLB,, | Performed by: INTERNAL MEDICINE

## 2019-12-14 PROCEDURE — 93299 CARDIAC DEVICE CHECK - REMOTE: CPT | Mod: HCNC,S$GLB,, | Performed by: INTERNAL MEDICINE

## 2019-12-27 ENCOUNTER — OFFICE VISIT (OUTPATIENT)
Dept: SLEEP MEDICINE | Facility: CLINIC | Age: 74
End: 2019-12-27
Payer: MEDICARE

## 2019-12-27 VITALS
HEIGHT: 66 IN | BODY MASS INDEX: 32.56 KG/M2 | DIASTOLIC BLOOD PRESSURE: 70 MMHG | WEIGHT: 202.63 LBS | SYSTOLIC BLOOD PRESSURE: 124 MMHG | HEART RATE: 85 BPM

## 2019-12-27 DIAGNOSIS — G47.33 OSA ON CPAP: Primary | ICD-10-CM

## 2019-12-27 DIAGNOSIS — I25.5 CARDIOMYOPATHY, ISCHEMIC: Chronic | ICD-10-CM

## 2019-12-27 PROCEDURE — 1126F PR PAIN SEVERITY QUANTIFIED, NO PAIN PRESENT: ICD-10-PCS | Mod: HCNC,S$GLB,, | Performed by: NURSE PRACTITIONER

## 2019-12-27 PROCEDURE — 1159F PR MEDICATION LIST DOCUMENTED IN MEDICAL RECORD: ICD-10-PCS | Mod: HCNC,S$GLB,, | Performed by: NURSE PRACTITIONER

## 2019-12-27 PROCEDURE — 3074F SYST BP LT 130 MM HG: CPT | Mod: HCNC,CPTII,S$GLB, | Performed by: NURSE PRACTITIONER

## 2019-12-27 PROCEDURE — 99214 PR OFFICE/OUTPT VISIT, EST, LEVL IV, 30-39 MIN: ICD-10-PCS | Mod: HCNC,S$GLB,, | Performed by: NURSE PRACTITIONER

## 2019-12-27 PROCEDURE — 1101F PR PT FALLS ASSESS DOC 0-1 FALLS W/OUT INJ PAST YR: ICD-10-PCS | Mod: HCNC,CPTII,S$GLB, | Performed by: NURSE PRACTITIONER

## 2019-12-27 PROCEDURE — 1159F MED LIST DOCD IN RCRD: CPT | Mod: HCNC,S$GLB,, | Performed by: NURSE PRACTITIONER

## 2019-12-27 PROCEDURE — 99999 PR PBB SHADOW E&M-EST. PATIENT-LVL IV: ICD-10-PCS | Mod: PBBFAC,HCNC,, | Performed by: NURSE PRACTITIONER

## 2019-12-27 PROCEDURE — 3078F DIAST BP <80 MM HG: CPT | Mod: HCNC,CPTII,S$GLB, | Performed by: NURSE PRACTITIONER

## 2019-12-27 PROCEDURE — 3074F PR MOST RECENT SYSTOLIC BLOOD PRESSURE < 130 MM HG: ICD-10-PCS | Mod: HCNC,CPTII,S$GLB, | Performed by: NURSE PRACTITIONER

## 2019-12-27 PROCEDURE — 1101F PT FALLS ASSESS-DOCD LE1/YR: CPT | Mod: HCNC,CPTII,S$GLB, | Performed by: NURSE PRACTITIONER

## 2019-12-27 PROCEDURE — 99999 PR PBB SHADOW E&M-EST. PATIENT-LVL IV: CPT | Mod: PBBFAC,HCNC,, | Performed by: NURSE PRACTITIONER

## 2019-12-27 PROCEDURE — 99214 OFFICE O/P EST MOD 30 MIN: CPT | Mod: HCNC,S$GLB,, | Performed by: NURSE PRACTITIONER

## 2019-12-27 PROCEDURE — 1126F AMNT PAIN NOTED NONE PRSNT: CPT | Mod: HCNC,S$GLB,, | Performed by: NURSE PRACTITIONER

## 2019-12-27 PROCEDURE — 3078F PR MOST RECENT DIASTOLIC BLOOD PRESSURE < 80 MM HG: ICD-10-PCS | Mod: HCNC,CPTII,S$GLB, | Performed by: NURSE PRACTITIONER
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
## 2020-01-22 ENCOUNTER — PES CALL (OUTPATIENT)
Dept: ADMINISTRATIVE | Facility: CLINIC | Age: 75
End: 2020-01-22

## 2020-01-28 RX ORDER — PANTOPRAZOLE SODIUM 40 MG/1
TABLET, DELAYED RELEASE ORAL
Qty: 90 TABLET | Refills: 3 | Status: SHIPPED | OUTPATIENT
Start: 2020-01-28 | End: 2021-08-23

## 2020-02-07 DIAGNOSIS — I20.9 CARDIAC ANGINA: ICD-10-CM

## 2020-02-07 DIAGNOSIS — I50.42 CHRONIC COMBINED SYSTOLIC AND DIASTOLIC CHF, NYHA CLASS 2: ICD-10-CM

## 2020-02-08 DIAGNOSIS — I50.42 CHRONIC COMBINED SYSTOLIC AND DIASTOLIC CHF, NYHA CLASS 2: ICD-10-CM

## 2020-02-10 RX ORDER — SACUBITRIL AND VALSARTAN 97; 103 MG/1; MG/1
TABLET, FILM COATED ORAL
Qty: 60 TABLET | Refills: 5 | OUTPATIENT
Start: 2020-02-10

## 2020-02-10 RX ORDER — SACUBITRIL AND VALSARTAN 97; 103 MG/1; MG/1
TABLET, FILM COATED ORAL
Qty: 60 TABLET | Refills: 5 | Status: SHIPPED | OUTPATIENT
Start: 2020-02-10 | End: 2020-08-14 | Stop reason: SDUPTHER

## 2020-02-11 RX ORDER — NITROGLYCERIN 0.4 MG/1
TABLET SUBLINGUAL
Qty: 25 TABLET | Refills: 6 | Status: SHIPPED | OUTPATIENT
Start: 2020-02-11 | End: 2023-11-13 | Stop reason: SDUPTHER

## 2020-03-03 ENCOUNTER — PES CALL (OUTPATIENT)
Dept: ADMINISTRATIVE | Facility: CLINIC | Age: 75
End: 2020-03-03

## 2020-03-05 DIAGNOSIS — I25.10 CORONARY ARTERY DISEASE, ANGINA PRESENCE UNSPECIFIED, UNSPECIFIED VESSEL OR LESION TYPE, UNSPECIFIED WHETHER NATIVE OR TRANSPLANTED HEART: ICD-10-CM

## 2020-03-09 DIAGNOSIS — I42.9 CARDIOMYOPATHY, UNSPECIFIED TYPE: ICD-10-CM

## 2020-03-09 RX ORDER — CLOPIDOGREL BISULFATE 75 MG/1
TABLET ORAL
Qty: 90 TABLET | Refills: 3 | Status: SHIPPED | OUTPATIENT
Start: 2020-03-09 | End: 2021-03-25 | Stop reason: SDUPTHER

## 2020-03-09 RX ORDER — POTASSIUM CHLORIDE 750 MG/1
TABLET, EXTENDED RELEASE ORAL
Qty: 90 TABLET | Refills: 3 | Status: SHIPPED | OUTPATIENT
Start: 2020-03-09 | End: 2021-09-20

## 2020-04-02 ENCOUNTER — PATIENT MESSAGE (OUTPATIENT)
Dept: TRANSPLANT | Facility: CLINIC | Age: 75
End: 2020-04-02

## 2020-04-02 DIAGNOSIS — I25.5 CARDIOMYOPATHY, ISCHEMIC: Primary | Chronic | ICD-10-CM

## 2020-04-26 DIAGNOSIS — I42.9 CARDIOMYOPATHY, UNSPECIFIED TYPE: ICD-10-CM

## 2020-04-27 RX ORDER — FUROSEMIDE 40 MG/1
TABLET ORAL
Qty: 90 TABLET | Refills: 3 | Status: SHIPPED | OUTPATIENT
Start: 2020-04-27 | End: 2021-05-10 | Stop reason: SDUPTHER

## 2020-05-08 ENCOUNTER — TELEPHONE (OUTPATIENT)
Dept: CARDIOLOGY | Facility: CLINIC | Age: 75
End: 2020-05-08

## 2020-05-13 ENCOUNTER — CLINICAL SUPPORT (OUTPATIENT)
Dept: CARDIOLOGY | Facility: CLINIC | Age: 75
End: 2020-05-13
Payer: MEDICARE

## 2020-05-13 DIAGNOSIS — Z95.810 PRESENCE OF AUTOMATIC (IMPLANTABLE) CARDIAC DEFIBRILLATOR: ICD-10-CM

## 2020-05-13 PROCEDURE — 93297 CARDIAC DEVICE CHECK - REMOTE: ICD-10-PCS | Mod: HCNC,S$GLB,, | Performed by: INTERNAL MEDICINE

## 2020-05-13 PROCEDURE — 93297 REM INTERROG DEV EVAL ICPMS: CPT | Mod: HCNC,S$GLB,, | Performed by: INTERNAL MEDICINE

## 2020-05-18 ENCOUNTER — TELEPHONE (OUTPATIENT)
Dept: CARDIOLOGY | Facility: CLINIC | Age: 75
End: 2020-05-18

## 2020-06-17 ENCOUNTER — CLINICAL SUPPORT (OUTPATIENT)
Dept: CARDIOLOGY | Facility: CLINIC | Age: 75
End: 2020-06-17
Payer: MEDICARE

## 2020-06-29 ENCOUNTER — LAB VISIT (OUTPATIENT)
Dept: LAB | Facility: HOSPITAL | Age: 75
End: 2020-06-29
Attending: INTERNAL MEDICINE
Payer: MEDICARE

## 2020-06-29 ENCOUNTER — OFFICE VISIT (OUTPATIENT)
Dept: TRANSPLANT | Facility: CLINIC | Age: 75
End: 2020-06-29
Attending: INTERNAL MEDICINE
Payer: MEDICARE

## 2020-06-29 VITALS
WEIGHT: 186.5 LBS | HEART RATE: 63 BPM | DIASTOLIC BLOOD PRESSURE: 60 MMHG | HEIGHT: 66 IN | SYSTOLIC BLOOD PRESSURE: 124 MMHG | BODY MASS INDEX: 29.97 KG/M2

## 2020-06-29 DIAGNOSIS — E78.5 DYSLIPIDEMIA WITH ELEVATED LOW DENSITY LIPOPROTEIN (LDL) CHOLESTEROL AND ABNORMALLY LOW HIGH DENSITY LIPOPROTEIN CHOLESTEROL: ICD-10-CM

## 2020-06-29 DIAGNOSIS — I25.5 CARDIOMYOPATHY, ISCHEMIC: Chronic | ICD-10-CM

## 2020-06-29 DIAGNOSIS — Z95.810 ICD (IMPLANTABLE CARDIOVERTER-DEFIBRILLATOR) IN PLACE: ICD-10-CM

## 2020-06-29 DIAGNOSIS — I25.118 CORONARY ARTERY DISEASE OF NATIVE ARTERY OF NATIVE HEART WITH STABLE ANGINA PECTORIS: ICD-10-CM

## 2020-06-29 DIAGNOSIS — E78.5 HYPERLIPIDEMIA, UNSPECIFIED HYPERLIPIDEMIA TYPE: ICD-10-CM

## 2020-06-29 DIAGNOSIS — I50.42 CHRONIC COMBINED SYSTOLIC AND DIASTOLIC CHF, NYHA CLASS 2: Primary | ICD-10-CM

## 2020-06-29 DIAGNOSIS — I10 ESSENTIAL HYPERTENSION: ICD-10-CM

## 2020-06-29 DIAGNOSIS — Z72.0 TOBACCO ABUSE: ICD-10-CM

## 2020-06-29 LAB
ALBUMIN SERPL BCP-MCNC: 4 G/DL (ref 3.5–5.2)
ALP SERPL-CCNC: 85 U/L (ref 55–135)
ALT SERPL W/O P-5'-P-CCNC: 26 U/L (ref 10–44)
ANION GAP SERPL CALC-SCNC: 6 MMOL/L (ref 8–16)
AST SERPL-CCNC: 23 U/L (ref 10–40)
BILIRUB SERPL-MCNC: 0.6 MG/DL (ref 0.1–1)
BUN SERPL-MCNC: 17 MG/DL (ref 8–23)
CALCIUM SERPL-MCNC: 8.7 MG/DL (ref 8.7–10.5)
CHLORIDE SERPL-SCNC: 108 MMOL/L (ref 95–110)
CHOLEST SERPL-MCNC: 107 MG/DL (ref 120–199)
CHOLEST/HDLC SERPL: 3.6 {RATIO} (ref 2–5)
CO2 SERPL-SCNC: 28 MMOL/L (ref 23–29)
CREAT SERPL-MCNC: 1 MG/DL (ref 0.5–1.4)
EST. GFR  (AFRICAN AMERICAN): >60 ML/MIN/1.73 M^2
EST. GFR  (NON AFRICAN AMERICAN): >60 ML/MIN/1.73 M^2
GLUCOSE SERPL-MCNC: 109 MG/DL (ref 70–110)
HDLC SERPL-MCNC: 30 MG/DL (ref 40–75)
HDLC SERPL: 28 % (ref 20–50)
LDLC SERPL CALC-MCNC: 59.6 MG/DL (ref 63–159)
NONHDLC SERPL-MCNC: 77 MG/DL
POTASSIUM SERPL-SCNC: 4.8 MMOL/L (ref 3.5–5.1)
PROT SERPL-MCNC: 6.5 G/DL (ref 6–8.4)
SODIUM SERPL-SCNC: 142 MMOL/L (ref 136–145)
TRIGL SERPL-MCNC: 87 MG/DL (ref 30–150)

## 2020-06-29 PROCEDURE — 99999 PR PBB SHADOW E&M-EST. PATIENT-LVL V: ICD-10-PCS | Mod: PBBFAC,HCNC,, | Performed by: INTERNAL MEDICINE

## 2020-06-29 PROCEDURE — 99499 UNLISTED E&M SERVICE: CPT | Mod: HCNC,S$GLB,, | Performed by: INTERNAL MEDICINE

## 2020-06-29 PROCEDURE — 3074F PR MOST RECENT SYSTOLIC BLOOD PRESSURE < 130 MM HG: ICD-10-PCS | Mod: HCNC,CPTII,S$GLB, | Performed by: INTERNAL MEDICINE

## 2020-06-29 PROCEDURE — 3074F SYST BP LT 130 MM HG: CPT | Mod: HCNC,CPTII,S$GLB, | Performed by: INTERNAL MEDICINE

## 2020-06-29 PROCEDURE — 80053 COMPREHEN METABOLIC PANEL: CPT | Mod: HCNC

## 2020-06-29 PROCEDURE — 1101F PR PT FALLS ASSESS DOC 0-1 FALLS W/OUT INJ PAST YR: ICD-10-PCS | Mod: HCNC,CPTII,S$GLB, | Performed by: INTERNAL MEDICINE

## 2020-06-29 PROCEDURE — 80061 LIPID PANEL: CPT | Mod: HCNC

## 2020-06-29 PROCEDURE — 1125F PR PAIN SEVERITY QUANTIFIED, PAIN PRESENT: ICD-10-PCS | Mod: HCNC,S$GLB,, | Performed by: INTERNAL MEDICINE

## 2020-06-29 PROCEDURE — 3008F BODY MASS INDEX DOCD: CPT | Mod: HCNC,CPTII,S$GLB, | Performed by: INTERNAL MEDICINE

## 2020-06-29 PROCEDURE — 1101F PT FALLS ASSESS-DOCD LE1/YR: CPT | Mod: HCNC,CPTII,S$GLB, | Performed by: INTERNAL MEDICINE

## 2020-06-29 PROCEDURE — 1159F PR MEDICATION LIST DOCUMENTED IN MEDICAL RECORD: ICD-10-PCS | Mod: HCNC,S$GLB,, | Performed by: INTERNAL MEDICINE

## 2020-06-29 PROCEDURE — 99999 PR PBB SHADOW E&M-EST. PATIENT-LVL V: CPT | Mod: PBBFAC,HCNC,, | Performed by: INTERNAL MEDICINE

## 2020-06-29 PROCEDURE — 99214 PR OFFICE/OUTPT VISIT, EST, LEVL IV, 30-39 MIN: ICD-10-PCS | Mod: HCNC,S$GLB,, | Performed by: INTERNAL MEDICINE

## 2020-06-29 PROCEDURE — 99214 OFFICE O/P EST MOD 30 MIN: CPT | Mod: HCNC,S$GLB,, | Performed by: INTERNAL MEDICINE

## 2020-06-29 PROCEDURE — 3008F PR BODY MASS INDEX (BMI) DOCUMENTED: ICD-10-PCS | Mod: HCNC,CPTII,S$GLB, | Performed by: INTERNAL MEDICINE

## 2020-06-29 PROCEDURE — 36415 COLL VENOUS BLD VENIPUNCTURE: CPT | Mod: HCNC

## 2020-06-29 PROCEDURE — 3078F DIAST BP <80 MM HG: CPT | Mod: HCNC,CPTII,S$GLB, | Performed by: INTERNAL MEDICINE

## 2020-06-29 PROCEDURE — 99499 RISK ADDL DX/OHS AUDIT: ICD-10-PCS | Mod: HCNC,S$GLB,, | Performed by: INTERNAL MEDICINE

## 2020-06-29 PROCEDURE — 3078F PR MOST RECENT DIASTOLIC BLOOD PRESSURE < 80 MM HG: ICD-10-PCS | Mod: HCNC,CPTII,S$GLB, | Performed by: INTERNAL MEDICINE

## 2020-06-29 PROCEDURE — 1159F MED LIST DOCD IN RCRD: CPT | Mod: HCNC,S$GLB,, | Performed by: INTERNAL MEDICINE

## 2020-06-29 PROCEDURE — 1125F AMNT PAIN NOTED PAIN PRSNT: CPT | Mod: HCNC,S$GLB,, | Performed by: INTERNAL MEDICINE

## 2020-06-29 RX ORDER — CEPHALEXIN 500 MG/1
500 CAPSULE ORAL
COMMUNITY
Start: 2014-06-02 | End: 2022-09-28
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
## 2020-07-07 ENCOUNTER — CLINICAL SUPPORT (OUTPATIENT)
Dept: CARDIOLOGY | Facility: CLINIC | Age: 75
End: 2020-07-07
Attending: INTERNAL MEDICINE
Payer: MEDICARE

## 2020-07-07 ENCOUNTER — DOCUMENTATION ONLY (OUTPATIENT)
Dept: CARDIOLOGY | Facility: CLINIC | Age: 75
End: 2020-07-07

## 2020-07-07 DIAGNOSIS — Z95.810 ICD (IMPLANTABLE CARDIOVERTER-DEFIBRILLATOR) IN PLACE: ICD-10-CM

## 2020-07-07 DIAGNOSIS — I25.5 CARDIOMYOPATHY, ISCHEMIC: ICD-10-CM

## 2020-07-07 NOTE — PROGRESS NOTES
Pt stated that he is going to be using Dr. Alcazar on the [facility].  Messaged his office to see what needs to be done to have his clinic checks over there.

## 2020-07-08 ENCOUNTER — DOCUMENTATION ONLY (OUTPATIENT)
Dept: CARDIOLOGY | Facility: CLINIC | Age: 75
End: 2020-07-08

## 2020-07-08 NOTE — PROGRESS NOTES
Sent message to Dr. Cortés's staff to see if pt can see him or for a referral on the [facility]

## 2020-07-15 ENCOUNTER — HOSPITAL ENCOUNTER (OUTPATIENT)
Dept: CARDIOLOGY | Facility: HOSPITAL | Age: 75
Discharge: HOME | End: 2020-07-15
Attending: INTERNAL MEDICINE
Payer: MEDICARE

## 2020-07-15 VITALS
HEIGHT: 66 IN | BODY MASS INDEX: 29.89 KG/M2 | SYSTOLIC BLOOD PRESSURE: 124 MMHG | HEART RATE: 65 BPM | DIASTOLIC BLOOD PRESSURE: 60 MMHG | WEIGHT: 186 LBS

## 2020-07-15 DIAGNOSIS — I25.5 CARDIOMYOPATHY, ISCHEMIC: ICD-10-CM

## 2020-07-15 DIAGNOSIS — I25.118 CORONARY ARTERY DISEASE OF NATIVE ARTERY OF NATIVE HEART WITH STABLE ANGINA PECTORIS: ICD-10-CM

## 2020-07-15 DIAGNOSIS — I50.42 CHRONIC COMBINED SYSTOLIC AND DIASTOLIC CHF, NYHA CLASS 2: ICD-10-CM

## 2020-07-15 LAB
ASCENDING AORTA: 3.53 CM
AV INDEX (PROSTH): 0.64
AV MEAN GRADIENT: 3 MMHG
AV PEAK GRADIENT: 6 MMHG
AV VALVE AREA: 2.27 CM2
AV VELOCITY RATIO: 0.64
BSA FOR ECHO PROCEDURE: 1.98 M2
CV ECHO LV RWT: 0.31 CM
DOP CALC AO PEAK VEL: 1.22 M/S
DOP CALC AO VTI: 25.63 CM
DOP CALC LVOT AREA: 3.5 CM2
DOP CALC LVOT DIAMETER: 2.12 CM
DOP CALC LVOT PEAK VEL: 0.78 M/S
DOP CALC LVOT STROKE VOLUME: 58.21 CM3
DOP CALCLVOT PEAK VEL VTI: 16.5 CM
E WAVE DECELERATION TIME: 200 MSEC
E/A RATIO: 0.94
E/E' RATIO: 10.33 M/S
ECHO LV POSTERIOR WALL: 0.92 CM (ref 0.6–1.1)
FRACTIONAL SHORTENING: 13 % (ref 28–44)
INTERVENTRICULAR SEPTUM: 0.65 CM (ref 0.6–1.1)
IVRT: 111.32 MSEC
LA MAJOR: 5.07 CM
LA MINOR: 5.04 CM
LA WIDTH: 3.97 CM
LEFT ATRIUM SIZE: 4.68 CM
LEFT ATRIUM VOLUME INDEX MOD: 35.6 ML/M2
LEFT ATRIUM VOLUME INDEX: 41.2 ML/M2
LEFT ATRIUM VOLUME MOD: 69 CM3
LEFT ATRIUM VOLUME: 79.83 CM3
LEFT INTERNAL DIMENSION IN SYSTOLE: 5.15 CM (ref 2.1–4)
LEFT VENTRICLE DIASTOLIC VOLUME INDEX: 89.8 ML/M2
LEFT VENTRICLE DIASTOLIC VOLUME: 174.15 ML
LEFT VENTRICLE MASS INDEX: 91 G/M2
LEFT VENTRICLE SYSTOLIC VOLUME INDEX: 65.3 ML/M2
LEFT VENTRICLE SYSTOLIC VOLUME: 126.58 ML
LEFT VENTRICULAR INTERNAL DIMENSION IN DIASTOLE: 5.91 CM (ref 3.5–6)
LEFT VENTRICULAR MASS: 177.05 G
LV LATERAL E/E' RATIO: 8.86 M/S
LV SEPTAL E/E' RATIO: 12.4 M/S
MV PEAK A VEL: 0.66 M/S
MV PEAK E VEL: 0.62 M/S
MV STENOSIS PRESSURE HALF TIME: 45.54 MS
MV VALVE AREA P 1/2 METHOD: 4.83 CM2
PISA TR MAX VEL: 2.44 M/S
PULM VEIN S/D RATIO: 0.58
PV PEAK D VEL: 0.67 M/S
PV PEAK S VEL: 0.39 M/S
RA MAJOR: 4.1 CM
RA PRESSURE: 3 MMHG
RA WIDTH: 3.39 CM
RIGHT VENTRICULAR END-DIASTOLIC DIMENSION: 3.68 CM
RV TISSUE DOPPLER FREE WALL SYSTOLIC VELOCITY 1 (APICAL 4 CHAMBER VIEW): 7.94 CM/S
SINUS: 3.46 CM
STJ: 3 CM
TDI LATERAL: 0.07 M/S
TDI SEPTAL: 0.05 M/S
TDI: 0.06 M/S
TR MAX PG: 24 MMHG
TRICUSPID ANNULAR PLANE SYSTOLIC EXCURSION: 2.02 CM
TV REST PULMONARY ARTERY PRESSURE: 27 MMHG

## 2020-07-15 PROCEDURE — 63600175 PHARM REV CODE 636 W HCPCS: Mod: HCNC | Performed by: INTERNAL MEDICINE

## 2020-07-15 PROCEDURE — 93306 ECHO (CUPID ONLY): ICD-10-PCS | Mod: 26,HCNC,, | Performed by: INTERNAL MEDICINE

## 2020-07-15 PROCEDURE — C8929 TTE W OR WO FOL WCON,DOPPLER: HCPCS | Mod: HCNC

## 2020-07-15 PROCEDURE — 93306 TTE W/DOPPLER COMPLETE: CPT | Mod: 26,HCNC,, | Performed by: INTERNAL MEDICINE

## 2020-07-15 RX ADMIN — HUMAN ALBUMIN MICROSPHERES AND PERFLUTREN 0.66 MG: 10; .22 INJECTION, SOLUTION INTRAVENOUS at 09:07

## 2020-07-19 DIAGNOSIS — I50.42 CHRONIC COMBINED SYSTOLIC AND DIASTOLIC CHF, NYHA CLASS 2: Primary | ICD-10-CM

## 2020-07-19 DIAGNOSIS — I25.5 CARDIOMYOPATHY, ISCHEMIC: Chronic | ICD-10-CM

## 2020-07-19 DIAGNOSIS — I25.118 CORONARY ARTERY DISEASE OF NATIVE ARTERY OF NATIVE HEART WITH STABLE ANGINA PECTORIS: ICD-10-CM

## 2020-07-19 RX ORDER — SPIRONOLACTONE 25 MG/1
25 TABLET ORAL DAILY
Qty: 90 TABLET | Refills: 3 | Status: SHIPPED | OUTPATIENT
Start: 2020-07-19 | End: 2020-10-13 | Stop reason: SDUPTHER

## 2020-07-19 NOTE — Clinical Note
I don't know when [employer] will get in the meds for him but first BMP is 3 days after he starts and next one 2 weeks later.  Please call and schedule with him [facility] lab

## 2020-07-22 ENCOUNTER — CLINICAL SUPPORT (OUTPATIENT)
Dept: CARDIOLOGY | Facility: CLINIC | Age: 75
End: 2020-07-22
Payer: MEDICARE

## 2020-07-22 DIAGNOSIS — Z95.810 PRESENCE OF AUTOMATIC (IMPLANTABLE) CARDIAC DEFIBRILLATOR: ICD-10-CM

## 2020-07-22 PROCEDURE — 93297 CARDIAC DEVICE CHECK - REMOTE: ICD-10-PCS | Mod: HCNC,S$GLB,, | Performed by: INTERNAL MEDICINE

## 2020-07-22 PROCEDURE — 93297 REM INTERROG DEV EVAL ICPMS: CPT | Mod: HCNC,S$GLB,, | Performed by: INTERNAL MEDICINE

## 2020-07-27 ENCOUNTER — TELEPHONE (OUTPATIENT)
Dept: ELECTROPHYSIOLOGY | Facility: CLINIC | Age: 75
End: 2020-07-27

## 2020-07-27 ENCOUNTER — LAB VISIT (OUTPATIENT)
Dept: PRIMARY CARE CLINIC | Facility: OTHER | Age: 75
End: 2020-07-27
Attending: INTERNAL MEDICINE
Payer: MEDICARE

## 2020-07-27 DIAGNOSIS — I49.8 OTHER SPECIFIED CARDIAC ARRHYTHMIAS: Primary | ICD-10-CM

## 2020-07-27 DIAGNOSIS — Z11.59 SCREENING FOR VIRAL DISEASE: ICD-10-CM

## 2020-07-27 PROCEDURE — U0003 INFECTIOUS AGENT DETECTION BY NUCLEIC ACID (DNA OR RNA); SEVERE ACUTE RESPIRATORY SYNDROME CORONAVIRUS 2 (SARS-COV-2) (CORONAVIRUS DISEASE [COVID-19]), AMPLIFIED PROBE TECHNIQUE, MAKING USE OF HIGH THROUGHPUT TECHNOLOGIES AS DESCRIBED BY CMS-2020-01-R: HCPCS | Mod: HCNC

## 2020-07-27 NOTE — TELEPHONE ENCOUNTER
Moni regarding upcoming appointment with  this Friday 07/31. Pt stated in the appointment note that his doctor is retired .

## 2020-07-28 ENCOUNTER — LAB VISIT (OUTPATIENT)
Dept: LAB | Facility: HOSPITAL | Age: 75
End: 2020-07-28
Attending: INTERNAL MEDICINE
Payer: MEDICARE

## 2020-07-28 DIAGNOSIS — I50.42 CHRONIC COMBINED SYSTOLIC AND DIASTOLIC CHF, NYHA CLASS 2: ICD-10-CM

## 2020-07-28 DIAGNOSIS — I25.5 CARDIOMYOPATHY, ISCHEMIC: Chronic | ICD-10-CM

## 2020-07-28 DIAGNOSIS — I25.118 CORONARY ARTERY DISEASE OF NATIVE ARTERY OF NATIVE HEART WITH STABLE ANGINA PECTORIS: ICD-10-CM

## 2020-07-28 LAB
ANION GAP SERPL CALC-SCNC: 8 MMOL/L (ref 8–16)
BUN SERPL-MCNC: 15 MG/DL (ref 8–23)
CALCIUM SERPL-MCNC: 8.6 MG/DL (ref 8.7–10.5)
CHLORIDE SERPL-SCNC: 108 MMOL/L (ref 95–110)
CO2 SERPL-SCNC: 25 MMOL/L (ref 23–29)
CREAT SERPL-MCNC: 0.9 MG/DL (ref 0.5–1.4)
EST. GFR  (AFRICAN AMERICAN): >60 ML/MIN/1.73 M^2
EST. GFR  (NON AFRICAN AMERICAN): >60 ML/MIN/1.73 M^2
GLUCOSE SERPL-MCNC: 89 MG/DL (ref 70–110)
POTASSIUM SERPL-SCNC: 4.2 MMOL/L (ref 3.5–5.1)
SODIUM SERPL-SCNC: 141 MMOL/L (ref 136–145)

## 2020-07-28 PROCEDURE — 36415 COLL VENOUS BLD VENIPUNCTURE: CPT | Mod: HCNC

## 2020-07-28 PROCEDURE — 80048 BASIC METABOLIC PNL TOTAL CA: CPT | Mod: HCNC

## 2020-07-28 NOTE — TELEPHONE ENCOUNTER
LVM for informing him of normal potassium and need to get labs as scheduled on 8/11 @ 8:30. Left call back number for any questions.

## 2020-07-30 LAB — SARS-COV-2 RNA RESP QL NAA+PROBE: NEGATIVE

## 2020-07-31 ENCOUNTER — INITIAL CONSULT (OUTPATIENT)
Dept: CARDIOLOGY | Facility: CLINIC | Age: 75
End: 2020-07-31
Payer: MEDICARE

## 2020-07-31 VITALS
SYSTOLIC BLOOD PRESSURE: 96 MMHG | BODY MASS INDEX: 29.58 KG/M2 | DIASTOLIC BLOOD PRESSURE: 58 MMHG | WEIGHT: 184.06 LBS | HEIGHT: 66 IN | HEART RATE: 75 BPM

## 2020-07-31 DIAGNOSIS — I50.42 CHRONIC COMBINED SYSTOLIC AND DIASTOLIC CHF, NYHA CLASS 2: ICD-10-CM

## 2020-07-31 DIAGNOSIS — G47.33 OSA ON CPAP: ICD-10-CM

## 2020-07-31 DIAGNOSIS — Z95.810 ICD (IMPLANTABLE CARDIOVERTER-DEFIBRILLATOR) IN PLACE: Primary | ICD-10-CM

## 2020-07-31 DIAGNOSIS — I25.5 CARDIOMYOPATHY, ISCHEMIC: Chronic | ICD-10-CM

## 2020-07-31 DIAGNOSIS — I49.8 OTHER SPECIFIED CARDIAC ARRHYTHMIAS: ICD-10-CM

## 2020-07-31 PROCEDURE — 93005 ELECTROCARDIOGRAM TRACING: CPT | Mod: HCNC,S$GLB,, | Performed by: INTERNAL MEDICINE

## 2020-07-31 PROCEDURE — 99999 PR PBB SHADOW E&M-EST. PATIENT-LVL III: ICD-10-PCS | Mod: PBBFAC,HCNC,, | Performed by: INTERNAL MEDICINE

## 2020-07-31 PROCEDURE — 99204 OFFICE O/P NEW MOD 45 MIN: CPT | Mod: HCNC,S$GLB,, | Performed by: INTERNAL MEDICINE

## 2020-07-31 PROCEDURE — 1159F PR MEDICATION LIST DOCUMENTED IN MEDICAL RECORD: ICD-10-PCS | Mod: HCNC,S$GLB,, | Performed by: INTERNAL MEDICINE

## 2020-07-31 PROCEDURE — 1101F PR PT FALLS ASSESS DOC 0-1 FALLS W/OUT INJ PAST YR: ICD-10-PCS | Mod: HCNC,CPTII,S$GLB, | Performed by: INTERNAL MEDICINE

## 2020-07-31 PROCEDURE — 1159F MED LIST DOCD IN RCRD: CPT | Mod: HCNC,S$GLB,, | Performed by: INTERNAL MEDICINE

## 2020-07-31 PROCEDURE — 3074F SYST BP LT 130 MM HG: CPT | Mod: HCNC,CPTII,S$GLB, | Performed by: INTERNAL MEDICINE

## 2020-07-31 PROCEDURE — 93005 RHYTHM STRIP: ICD-10-PCS | Mod: HCNC,S$GLB,, | Performed by: INTERNAL MEDICINE

## 2020-07-31 PROCEDURE — 3078F DIAST BP <80 MM HG: CPT | Mod: HCNC,CPTII,S$GLB, | Performed by: INTERNAL MEDICINE

## 2020-07-31 PROCEDURE — 1126F PR PAIN SEVERITY QUANTIFIED, NO PAIN PRESENT: ICD-10-PCS | Mod: HCNC,S$GLB,, | Performed by: INTERNAL MEDICINE

## 2020-07-31 PROCEDURE — 93010 RHYTHM STRIP: ICD-10-PCS | Mod: HCNC,S$GLB,, | Performed by: INTERNAL MEDICINE

## 2020-07-31 PROCEDURE — 99204 PR OFFICE/OUTPT VISIT, NEW, LEVL IV, 45-59 MIN: ICD-10-PCS | Mod: HCNC,S$GLB,, | Performed by: INTERNAL MEDICINE

## 2020-07-31 PROCEDURE — 99499 RISK ADDL DX/OHS AUDIT: ICD-10-PCS | Mod: HCNC,S$GLB,, | Performed by: INTERNAL MEDICINE

## 2020-07-31 PROCEDURE — 3078F PR MOST RECENT DIASTOLIC BLOOD PRESSURE < 80 MM HG: ICD-10-PCS | Mod: HCNC,CPTII,S$GLB, | Performed by: INTERNAL MEDICINE

## 2020-07-31 PROCEDURE — 3008F BODY MASS INDEX DOCD: CPT | Mod: HCNC,CPTII,S$GLB, | Performed by: INTERNAL MEDICINE

## 2020-07-31 PROCEDURE — 99499 UNLISTED E&M SERVICE: CPT | Mod: HCNC,S$GLB,, | Performed by: INTERNAL MEDICINE

## 2020-07-31 PROCEDURE — 1126F AMNT PAIN NOTED NONE PRSNT: CPT | Mod: HCNC,S$GLB,, | Performed by: INTERNAL MEDICINE

## 2020-07-31 PROCEDURE — 1101F PT FALLS ASSESS-DOCD LE1/YR: CPT | Mod: HCNC,CPTII,S$GLB, | Performed by: INTERNAL MEDICINE

## 2020-07-31 PROCEDURE — 99999 PR PBB SHADOW E&M-EST. PATIENT-LVL III: CPT | Mod: PBBFAC,HCNC,, | Performed by: INTERNAL MEDICINE

## 2020-07-31 PROCEDURE — 3074F PR MOST RECENT SYSTOLIC BLOOD PRESSURE < 130 MM HG: ICD-10-PCS | Mod: HCNC,CPTII,S$GLB, | Performed by: INTERNAL MEDICINE

## 2020-07-31 PROCEDURE — 3008F PR BODY MASS INDEX (BMI) DOCUMENTED: ICD-10-PCS | Mod: HCNC,CPTII,S$GLB, | Performed by: INTERNAL MEDICINE

## 2020-07-31 PROCEDURE — 93010 ELECTROCARDIOGRAM REPORT: CPT | Mod: HCNC,S$GLB,, | Performed by: INTERNAL MEDICINE

## 2020-07-31 NOTE — PROGRESS NOTES
Subjective:    Patient ID:  Tristen Blanc is a 74 y.o. male who presents for evaluation of ICD check    Primary Cardiologist: Jan Alcazar MD    HPI  I had the pleasure of seeing Mr. Blanc in our electrophysiology clinic in consultation for ICD care. As you are aware he is a pleasant 74 year-old man with chronic ischemic cardiomyopathy with acute MI in 1996 complicated by cardiac arrest with persistent systolic heart failure with a LVEF of 20-25% and dual chamber ICD implantation in 2010 with a generator change in 2017 by Dr. Loza. He presents to establish care of his ICD. He reports no history of ICD shocks. Recent remote interrogation notes stable lead parameters (auto-capture is ON for both), 46% RA and 0% RV pacing and no arrhythmias. Estimated battery longevity of 7 years.    Review of ECGs in Epic note sinus rhythm with IVCD of 140ms.      Review of Systems   Constitution: Negative for fever and malaise/fatigue.   HENT: Negative for congestion and sore throat.    Eyes: Negative for blurred vision and visual disturbance.   Cardiovascular: Negative for chest pain, dyspnea on exertion, irregular heartbeat, near-syncope, palpitations and syncope.   Respiratory: Negative for cough and shortness of breath.    Hematologic/Lymphatic: Negative for bleeding problem. Does not bruise/bleed easily.   Skin: Negative.    Musculoskeletal: Negative.    Gastrointestinal: Negative for bloating, abdominal pain, hematochezia and melena.   Neurological: Negative for focal weakness and weakness.   Psychiatric/Behavioral: Negative.         Objective:    Physical Exam   Constitutional: He is oriented to person, place, and time. He appears well-developed and well-nourished. No distress.   HENT:   Head: Normocephalic and atraumatic.   Eyes: Conjunctivae are normal. Right eye exhibits no discharge. Left eye exhibits no discharge.   Neck: Neck supple.   Cardiovascular: Normal rate and regular rhythm. Exam reveals no gallop and  no friction rub.   No murmur heard.  Pulmonary/Chest: Effort normal and breath sounds normal. No respiratory distress. He has no wheezes. He has no rales.   Abdominal: Soft. Bowel sounds are normal. He exhibits no distension. There is no abdominal tenderness.   Musculoskeletal:         General: No edema.   Neurological: He is alert and oriented to person, place, and time.   Skin: Skin is warm and dry. He is not diaphoretic.   Psychiatric: He has a normal mood and affect. His behavior is normal. Judgment and thought content normal.   Vitals reviewed.        Assessment:       1. ICD (implantable cardioverter-defibrillator) in place    2. Chronic combined systolic and diastolic CHF, NYHA class 2    3. Cardiomyopathy, ischemic    4. JERONIMO on CPAP    5. Other specified cardiac arrhythmias         Plan:       In summary, Mr. Blanc is a pleasant 74 year-old man with chronic ischemic cardiomyopathy with acute MI in 1996 complicated by cardiac arrest with persistent systolic heart failure with a LVEF of 20-25% and dual chamber ICD implantation in 2010 with a generator change in 2017 by Dr. Loza. Device is working normally per recent remote interrogation. Will enroll in our device clinic. RTC in 1 year, sooner if needed.    Thank you for allowing me to participate in the care of this patient. Please do not hesitate to call me with any questions or concerns.    Salazar Tucker MD, PhD  Cardiac Electrophysiology

## 2020-08-11 ENCOUNTER — LAB VISIT (OUTPATIENT)
Dept: LAB | Facility: HOSPITAL | Age: 75
End: 2020-08-11
Attending: INTERNAL MEDICINE
Payer: MEDICARE

## 2020-08-11 DIAGNOSIS — I50.42 CHRONIC COMBINED SYSTOLIC AND DIASTOLIC CHF, NYHA CLASS 2: ICD-10-CM

## 2020-08-11 DIAGNOSIS — I25.5 CARDIOMYOPATHY, ISCHEMIC: Chronic | ICD-10-CM

## 2020-08-11 DIAGNOSIS — I25.118 CORONARY ARTERY DISEASE OF NATIVE ARTERY OF NATIVE HEART WITH STABLE ANGINA PECTORIS: ICD-10-CM

## 2020-08-11 LAB
ANION GAP SERPL CALC-SCNC: 8 MMOL/L (ref 8–16)
BUN SERPL-MCNC: 19 MG/DL (ref 8–23)
CALCIUM SERPL-MCNC: 9 MG/DL (ref 8.7–10.5)
CHLORIDE SERPL-SCNC: 103 MMOL/L (ref 95–110)
CO2 SERPL-SCNC: 27 MMOL/L (ref 23–29)
CREAT SERPL-MCNC: 1 MG/DL (ref 0.5–1.4)
EST. GFR  (AFRICAN AMERICAN): >60 ML/MIN/1.73 M^2
EST. GFR  (NON AFRICAN AMERICAN): >60 ML/MIN/1.73 M^2
GLUCOSE SERPL-MCNC: 133 MG/DL (ref 70–110)
POTASSIUM SERPL-SCNC: 4.4 MMOL/L (ref 3.5–5.1)
SODIUM SERPL-SCNC: 138 MMOL/L (ref 136–145)

## 2020-08-11 PROCEDURE — 36415 COLL VENOUS BLD VENIPUNCTURE: CPT | Mod: HCNC

## 2020-08-11 PROCEDURE — 80048 BASIC METABOLIC PNL TOTAL CA: CPT | Mod: HCNC

## 2020-08-14 DIAGNOSIS — I50.42 CHRONIC COMBINED SYSTOLIC AND DIASTOLIC CHF, NYHA CLASS 2: ICD-10-CM

## 2020-08-14 RX ORDER — SACUBITRIL AND VALSARTAN 97; 103 MG/1; MG/1
1 TABLET, FILM COATED ORAL 2 TIMES DAILY
Qty: 60 TABLET | Refills: 5 | Status: SHIPPED | OUTPATIENT
Start: 2020-08-14 | End: 2021-02-08 | Stop reason: SDUPTHER

## 2020-08-14 NOTE — TELEPHONE ENCOUNTER
----- Message from Saskia Snow MA sent at 8/14/2020  3:16 PM CDT -----  The patient need refill oh his medication Entresto   mg please call  [facility] at [phone] Thank you

## 2020-10-13 DIAGNOSIS — I25.5 CARDIOMYOPATHY, ISCHEMIC: Chronic | ICD-10-CM

## 2020-10-13 DIAGNOSIS — I50.42 CHRONIC COMBINED SYSTOLIC AND DIASTOLIC CHF, NYHA CLASS 2: ICD-10-CM

## 2020-10-13 DIAGNOSIS — I25.118 CORONARY ARTERY DISEASE OF NATIVE ARTERY OF NATIVE HEART WITH STABLE ANGINA PECTORIS: ICD-10-CM

## 2020-10-13 NOTE — TELEPHONE ENCOUNTER
----- Message from Cecily Obrien sent at 10/13/2020 11:41 AM CDT -----  Regarding: refill  Requesting an RX refill or new RX.  Is this a refill or new RX: refill  RX name and strength: spironolactone (ALDACTONE) 25 MG tablet  Is this a 30 day or 90 day RX:  90  Pharmacy name and phone #    [id number] - [place] - [address] AT [facility] [place] [place] [address] [phone] (Phone)  [phone] (Fax)    Thank you

## 2020-10-14 RX ORDER — SPIRONOLACTONE 25 MG/1
25 TABLET ORAL DAILY
Qty: 90 TABLET | Refills: 3 | Status: SHIPPED | OUTPATIENT
Start: 2020-10-14 | End: 2021-10-20

## 2020-11-06 ENCOUNTER — PES CALL (OUTPATIENT)
Dept: ADMINISTRATIVE | Facility: CLINIC | Age: 75
End: 2020-11-06

## 2020-11-13 ENCOUNTER — IMMUNIZATION (OUTPATIENT)
Dept: PHARMACY | Facility: CLINIC | Age: 75
End: 2020-11-13
Payer: MEDICARE

## 2020-11-22 ENCOUNTER — CLINICAL SUPPORT (OUTPATIENT)
Dept: CARDIOLOGY | Facility: HOSPITAL | Age: 75
End: 2020-11-22
Payer: MEDICARE

## 2020-11-22 DIAGNOSIS — Z95.810 PRESENCE OF AUTOMATIC (IMPLANTABLE) CARDIAC DEFIBRILLATOR: ICD-10-CM

## 2020-11-22 PROCEDURE — 93295 CARDIAC DEVICE CHECK - REMOTE: ICD-10-PCS | Mod: HCNC,,, | Performed by: INTERNAL MEDICINE

## 2020-11-22 PROCEDURE — 93296 REM INTERROG EVL PM/IDS: CPT | Mod: HCNC | Performed by: INTERNAL MEDICINE

## 2020-11-22 PROCEDURE — 93295 DEV INTERROG REMOTE 1/2/MLT: CPT | Mod: HCNC,,, | Performed by: INTERNAL MEDICINE

## 2020-12-03 ENCOUNTER — OFFICE VISIT (OUTPATIENT)
Dept: TRANSPLANT | Facility: CLINIC | Age: 75
End: 2020-12-03
Attending: INTERNAL MEDICINE
Payer: MEDICARE

## 2020-12-03 ENCOUNTER — PES CALL (OUTPATIENT)
Dept: ADMINISTRATIVE | Facility: CLINIC | Age: 75
End: 2020-12-03

## 2020-12-03 ENCOUNTER — LAB VISIT (OUTPATIENT)
Dept: LAB | Facility: HOSPITAL | Age: 75
End: 2020-12-03
Attending: INTERNAL MEDICINE
Payer: MEDICARE

## 2020-12-03 VITALS
SYSTOLIC BLOOD PRESSURE: 106 MMHG | BODY MASS INDEX: 30.47 KG/M2 | DIASTOLIC BLOOD PRESSURE: 51 MMHG | HEIGHT: 66 IN | HEART RATE: 66 BPM | WEIGHT: 189.63 LBS

## 2020-12-03 DIAGNOSIS — I50.42 CHRONIC COMBINED SYSTOLIC AND DIASTOLIC CHF, NYHA CLASS 2: Primary | ICD-10-CM

## 2020-12-03 DIAGNOSIS — I25.5 CARDIOMYOPATHY, ISCHEMIC: Chronic | ICD-10-CM

## 2020-12-03 DIAGNOSIS — E78.5 DYSLIPIDEMIA WITH ELEVATED LOW DENSITY LIPOPROTEIN (LDL) CHOLESTEROL AND ABNORMALLY LOW HIGH DENSITY LIPOPROTEIN CHOLESTEROL: ICD-10-CM

## 2020-12-03 DIAGNOSIS — I10 ESSENTIAL HYPERTENSION: ICD-10-CM

## 2020-12-03 DIAGNOSIS — G47.33 OSA ON CPAP: ICD-10-CM

## 2020-12-03 DIAGNOSIS — I25.118 CORONARY ARTERY DISEASE OF NATIVE ARTERY OF NATIVE HEART WITH STABLE ANGINA PECTORIS: ICD-10-CM

## 2020-12-03 DIAGNOSIS — I50.42 CHRONIC COMBINED SYSTOLIC AND DIASTOLIC CHF, NYHA CLASS 2: ICD-10-CM

## 2020-12-03 LAB
ANION GAP SERPL CALC-SCNC: 8 MMOL/L (ref 8–16)
BUN SERPL-MCNC: 17 MG/DL (ref 8–23)
CALCIUM SERPL-MCNC: 9 MG/DL (ref 8.7–10.5)
CHLORIDE SERPL-SCNC: 104 MMOL/L (ref 95–110)
CO2 SERPL-SCNC: 26 MMOL/L (ref 23–29)
CREAT SERPL-MCNC: 1.1 MG/DL (ref 0.5–1.4)
EST. GFR  (AFRICAN AMERICAN): >60 ML/MIN/1.73 M^2
EST. GFR  (NON AFRICAN AMERICAN): >60 ML/MIN/1.73 M^2
GLUCOSE SERPL-MCNC: 123 MG/DL (ref 70–110)
MAGNESIUM SERPL-MCNC: 1.9 MG/DL (ref 1.6–2.6)
POTASSIUM SERPL-SCNC: 4.4 MMOL/L (ref 3.5–5.1)
SODIUM SERPL-SCNC: 138 MMOL/L (ref 136–145)

## 2020-12-03 PROCEDURE — 80048 BASIC METABOLIC PNL TOTAL CA: CPT | Mod: HCNC

## 2020-12-03 PROCEDURE — 3078F DIAST BP <80 MM HG: CPT | Mod: HCNC,CPTII,S$GLB, | Performed by: INTERNAL MEDICINE

## 2020-12-03 PROCEDURE — 1159F MED LIST DOCD IN RCRD: CPT | Mod: HCNC,S$GLB,, | Performed by: INTERNAL MEDICINE

## 2020-12-03 PROCEDURE — 3008F BODY MASS INDEX DOCD: CPT | Mod: HCNC,CPTII,S$GLB, | Performed by: INTERNAL MEDICINE

## 2020-12-03 PROCEDURE — 1101F PR PT FALLS ASSESS DOC 0-1 FALLS W/OUT INJ PAST YR: ICD-10-PCS | Mod: HCNC,CPTII,S$GLB, | Performed by: INTERNAL MEDICINE

## 2020-12-03 PROCEDURE — 99499 RISK ADDL DX/OHS AUDIT: ICD-10-PCS | Mod: S$GLB,,, | Performed by: INTERNAL MEDICINE

## 2020-12-03 PROCEDURE — 36415 COLL VENOUS BLD VENIPUNCTURE: CPT | Mod: HCNC

## 2020-12-03 PROCEDURE — 3074F SYST BP LT 130 MM HG: CPT | Mod: HCNC,CPTII,S$GLB, | Performed by: INTERNAL MEDICINE

## 2020-12-03 PROCEDURE — 99999 PR PBB SHADOW E&M-EST. PATIENT-LVL IV: CPT | Mod: PBBFAC,HCNC,, | Performed by: INTERNAL MEDICINE

## 2020-12-03 PROCEDURE — 3288F FALL RISK ASSESSMENT DOCD: CPT | Mod: HCNC,CPTII,S$GLB, | Performed by: INTERNAL MEDICINE

## 2020-12-03 PROCEDURE — 99999 PR PBB SHADOW E&M-EST. PATIENT-LVL IV: ICD-10-PCS | Mod: PBBFAC,HCNC,, | Performed by: INTERNAL MEDICINE

## 2020-12-03 PROCEDURE — 1159F PR MEDICATION LIST DOCUMENTED IN MEDICAL RECORD: ICD-10-PCS | Mod: HCNC,S$GLB,, | Performed by: INTERNAL MEDICINE

## 2020-12-03 PROCEDURE — 99499 UNLISTED E&M SERVICE: CPT | Mod: S$GLB,,, | Performed by: INTERNAL MEDICINE

## 2020-12-03 PROCEDURE — 1101F PT FALLS ASSESS-DOCD LE1/YR: CPT | Mod: HCNC,CPTII,S$GLB, | Performed by: INTERNAL MEDICINE

## 2020-12-03 PROCEDURE — 83735 ASSAY OF MAGNESIUM: CPT | Mod: HCNC

## 2020-12-03 PROCEDURE — 99214 OFFICE O/P EST MOD 30 MIN: CPT | Mod: HCNC,S$GLB,, | Performed by: INTERNAL MEDICINE

## 2020-12-03 PROCEDURE — 1126F AMNT PAIN NOTED NONE PRSNT: CPT | Mod: HCNC,S$GLB,, | Performed by: INTERNAL MEDICINE

## 2020-12-03 PROCEDURE — 3078F PR MOST RECENT DIASTOLIC BLOOD PRESSURE < 80 MM HG: ICD-10-PCS | Mod: HCNC,CPTII,S$GLB, | Performed by: INTERNAL MEDICINE

## 2020-12-03 PROCEDURE — 1126F PR PAIN SEVERITY QUANTIFIED, NO PAIN PRESENT: ICD-10-PCS | Mod: HCNC,S$GLB,, | Performed by: INTERNAL MEDICINE

## 2020-12-03 PROCEDURE — 3288F PR FALLS RISK ASSESSMENT DOCUMENTED: ICD-10-PCS | Mod: HCNC,CPTII,S$GLB, | Performed by: INTERNAL MEDICINE

## 2020-12-03 PROCEDURE — 99214 PR OFFICE/OUTPT VISIT, EST, LEVL IV, 30-39 MIN: ICD-10-PCS | Mod: HCNC,S$GLB,, | Performed by: INTERNAL MEDICINE

## 2020-12-03 PROCEDURE — 3074F PR MOST RECENT SYSTOLIC BLOOD PRESSURE < 130 MM HG: ICD-10-PCS | Mod: HCNC,CPTII,S$GLB, | Performed by: INTERNAL MEDICINE

## 2020-12-03 PROCEDURE — 3008F PR BODY MASS INDEX (BMI) DOCUMENTED: ICD-10-PCS | Mod: HCNC,CPTII,S$GLB, | Performed by: INTERNAL MEDICINE
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
## 2020-12-07 ENCOUNTER — PES CALL (OUTPATIENT)
Dept: ADMINISTRATIVE | Facility: CLINIC | Age: 75
End: 2020-12-07

## 2021-01-14 ENCOUNTER — IMMUNIZATION (OUTPATIENT)
Dept: INTERNAL MEDICINE | Facility: CLINIC | Age: 76
End: 2021-01-14
Payer: MEDICARE

## 2021-01-14 DIAGNOSIS — Z23 NEED FOR VACCINATION: ICD-10-CM

## 2021-01-14 PROCEDURE — 91300 COVID-19, MRNA, LNP-S, PF, 30 MCG/0.3 ML DOSE VACCINE: CPT | Mod: PBBFAC | Performed by: INTERNAL MEDICINE

## 2021-02-04 ENCOUNTER — IMMUNIZATION (OUTPATIENT)
Dept: INTERNAL MEDICINE | Facility: CLINIC | Age: 76
End: 2021-02-04
Payer: MEDICARE

## 2021-02-04 DIAGNOSIS — Z23 NEED FOR VACCINATION: Primary | ICD-10-CM

## 2021-02-04 PROCEDURE — 91300 PR SARS-COV- 2 COVID-19 VACCINE, NO PRSV, 30MCG/0.3ML, IM: CPT | Mod: PBBFAC | Performed by: INTERNAL MEDICINE

## 2021-02-04 PROCEDURE — 0002A PR IMMUNIZ ADMIN, SARS-COV-2 COVID-19 VACC, 30MCG/0.3ML, 2ND DOSE: CPT | Mod: PBBFAC | Performed by: INTERNAL MEDICINE

## 2021-02-04 RX ADMIN — RNA INGREDIENT BNT-162B2 0.3 ML: 0.23 INJECTION, SUSPENSION INTRAMUSCULAR at 01:02

## 2021-02-08 DIAGNOSIS — I50.42 CHRONIC COMBINED SYSTOLIC AND DIASTOLIC CHF, NYHA CLASS 2: ICD-10-CM

## 2021-02-09 RX ORDER — SACUBITRIL AND VALSARTAN 97; 103 MG/1; MG/1
1 TABLET, FILM COATED ORAL 2 TIMES DAILY
Qty: 60 TABLET | Refills: 5 | Status: SHIPPED | OUTPATIENT
Start: 2021-02-09 | End: 2021-08-12

## 2021-02-20 ENCOUNTER — CLINICAL SUPPORT (OUTPATIENT)
Dept: CARDIOLOGY | Facility: HOSPITAL | Age: 76
End: 2021-02-20
Payer: MEDICARE

## 2021-02-20 DIAGNOSIS — Z95.810 PRESENCE OF AUTOMATIC (IMPLANTABLE) CARDIAC DEFIBRILLATOR: ICD-10-CM

## 2021-02-20 DIAGNOSIS — I25.5 ISCHEMIC CARDIOMYOPATHY: ICD-10-CM

## 2021-02-20 DIAGNOSIS — I50.42 CHRONIC COMBINED SYSTOLIC (CONGESTIVE) AND DIASTOLIC (CONGESTIVE) HEART FAILURE: ICD-10-CM

## 2021-02-20 DIAGNOSIS — I47.20 VENTRICULAR TACHYCARDIA: ICD-10-CM

## 2021-02-20 PROCEDURE — 93296 REM INTERROG EVL PM/IDS: CPT | Performed by: INTERNAL MEDICINE

## 2021-02-20 PROCEDURE — 93295 DEV INTERROG REMOTE 1/2/MLT: CPT | Mod: ,,, | Performed by: INTERNAL MEDICINE

## 2021-02-20 PROCEDURE — 93295 CARDIAC DEVICE CHECK - REMOTE: ICD-10-PCS | Mod: ,,, | Performed by: INTERNAL MEDICINE

## 2021-02-24 ENCOUNTER — PES CALL (OUTPATIENT)
Dept: ADMINISTRATIVE | Facility: CLINIC | Age: 76
End: 2021-02-24

## 2021-03-25 DIAGNOSIS — I25.10 CORONARY ARTERY DISEASE, ANGINA PRESENCE UNSPECIFIED, UNSPECIFIED VESSEL OR LESION TYPE, UNSPECIFIED WHETHER NATIVE OR TRANSPLANTED HEART: ICD-10-CM

## 2021-03-25 RX ORDER — CLOPIDOGREL BISULFATE 75 MG/1
75 TABLET ORAL NIGHTLY
Qty: 90 TABLET | Refills: 3 | Status: SHIPPED | OUTPATIENT
Start: 2021-03-25 | End: 2022-04-19 | Stop reason: SDUPTHER

## 2021-03-26 ENCOUNTER — TELEPHONE (OUTPATIENT)
Dept: CARDIOLOGY | Facility: HOSPITAL | Age: 76
End: 2021-03-26

## 2021-03-30 ENCOUNTER — TELEPHONE (OUTPATIENT)
Dept: ELECTROPHYSIOLOGY | Facility: CLINIC | Age: 76
End: 2021-03-30

## 2021-05-10 DIAGNOSIS — I42.9 CARDIOMYOPATHY, UNSPECIFIED TYPE: Primary | ICD-10-CM

## 2021-05-10 RX ORDER — FUROSEMIDE 40 MG/1
40 TABLET ORAL DAILY
Qty: 90 TABLET | Refills: 3 | Status: SHIPPED | OUTPATIENT
Start: 2021-05-10 | End: 2021-09-20 | Stop reason: SDUPTHER

## 2021-05-21 ENCOUNTER — CLINICAL SUPPORT (OUTPATIENT)
Dept: CARDIOLOGY | Facility: HOSPITAL | Age: 76
End: 2021-05-21
Payer: MEDICARE

## 2021-05-21 DIAGNOSIS — Z95.810 PRESENCE OF AUTOMATIC (IMPLANTABLE) CARDIAC DEFIBRILLATOR: ICD-10-CM

## 2021-05-21 PROCEDURE — 93296 REM INTERROG EVL PM/IDS: CPT | Performed by: INTERNAL MEDICINE

## 2021-05-21 PROCEDURE — 93295 DEV INTERROG REMOTE 1/2/MLT: CPT | Mod: ,,, | Performed by: INTERNAL MEDICINE

## 2021-05-21 PROCEDURE — 93295 CARDIAC DEVICE CHECK - REMOTE: ICD-10-PCS | Mod: ,,, | Performed by: INTERNAL MEDICINE

## 2021-07-23 DIAGNOSIS — I25.10 CORONARY ARTERY DISEASE, ANGINA PRESENCE UNSPECIFIED, UNSPECIFIED VESSEL OR LESION TYPE, UNSPECIFIED WHETHER NATIVE OR TRANSPLANTED HEART: Primary | ICD-10-CM

## 2021-08-19 ENCOUNTER — CLINICAL SUPPORT (OUTPATIENT)
Dept: CARDIOLOGY | Facility: HOSPITAL | Age: 76
End: 2021-08-19
Payer: MEDICARE

## 2021-08-19 DIAGNOSIS — Z95.810 PRESENCE OF AUTOMATIC (IMPLANTABLE) CARDIAC DEFIBRILLATOR: ICD-10-CM

## 2021-08-19 DIAGNOSIS — I25.5 ISCHEMIC CARDIOMYOPATHY: ICD-10-CM

## 2021-08-19 PROCEDURE — 93296 REM INTERROG EVL PM/IDS: CPT | Performed by: INTERNAL MEDICINE

## 2021-08-19 PROCEDURE — 93295 DEV INTERROG REMOTE 1/2/MLT: CPT | Mod: ,,, | Performed by: INTERNAL MEDICINE

## 2021-08-19 PROCEDURE — 93295 CARDIAC DEVICE CHECK - REMOTE: ICD-10-PCS | Mod: ,,, | Performed by: INTERNAL MEDICINE

## 2021-08-24 ENCOUNTER — LAB VISIT (OUTPATIENT)
Dept: LAB | Facility: HOSPITAL | Age: 76
End: 2021-08-24
Attending: INTERNAL MEDICINE
Payer: MEDICARE

## 2021-08-24 DIAGNOSIS — Z12.5 PROSTATE CANCER SCREENING: ICD-10-CM

## 2021-08-24 DIAGNOSIS — I25.118 CORONARY ARTERY DISEASE OF NATIVE ARTERY OF NATIVE HEART WITH STABLE ANGINA PECTORIS: ICD-10-CM

## 2021-08-24 DIAGNOSIS — I50.42 CHRONIC COMBINED SYSTOLIC AND DIASTOLIC CHF, NYHA CLASS 2: ICD-10-CM

## 2021-08-24 DIAGNOSIS — R41.0 DISORIENTED: ICD-10-CM

## 2021-08-24 LAB
ALBUMIN SERPL BCP-MCNC: 4 G/DL (ref 3.5–5.2)
ALP SERPL-CCNC: 80 U/L (ref 55–135)
ALT SERPL W/O P-5'-P-CCNC: 24 U/L (ref 10–44)
ANION GAP SERPL CALC-SCNC: 12 MMOL/L (ref 8–16)
AST SERPL-CCNC: 24 U/L (ref 10–40)
BASOPHILS # BLD AUTO: 0.07 K/UL (ref 0–0.2)
BASOPHILS NFR BLD: 0.8 % (ref 0–1.9)
BILIRUB SERPL-MCNC: 0.7 MG/DL (ref 0.1–1)
BUN SERPL-MCNC: 21 MG/DL (ref 8–23)
CALCIUM SERPL-MCNC: 9.5 MG/DL (ref 8.7–10.5)
CHLORIDE SERPL-SCNC: 103 MMOL/L (ref 95–110)
CHOLEST SERPL-MCNC: 136 MG/DL (ref 120–199)
CHOLEST/HDLC SERPL: 4.4 {RATIO} (ref 2–5)
CO2 SERPL-SCNC: 24 MMOL/L (ref 23–29)
COMPLEXED PSA SERPL-MCNC: 0.64 NG/ML (ref 0–4)
CREAT SERPL-MCNC: 1.1 MG/DL (ref 0.5–1.4)
DIFFERENTIAL METHOD: ABNORMAL
EOSINOPHIL # BLD AUTO: 0.5 K/UL (ref 0–0.5)
EOSINOPHIL NFR BLD: 5.7 % (ref 0–8)
ERYTHROCYTE [DISTWIDTH] IN BLOOD BY AUTOMATED COUNT: 12.5 % (ref 11.5–14.5)
EST. GFR  (AFRICAN AMERICAN): >60 ML/MIN/1.73 M^2
EST. GFR  (NON AFRICAN AMERICAN): >60 ML/MIN/1.73 M^2
GLUCOSE SERPL-MCNC: 117 MG/DL (ref 70–110)
HCT VFR BLD AUTO: 43.5 % (ref 40–54)
HDLC SERPL-MCNC: 31 MG/DL (ref 40–75)
HDLC SERPL: 22.8 % (ref 20–50)
HGB BLD-MCNC: 14.5 G/DL (ref 14–18)
IMM GRANULOCYTES # BLD AUTO: 0.02 K/UL (ref 0–0.04)
IMM GRANULOCYTES NFR BLD AUTO: 0.2 % (ref 0–0.5)
LDLC SERPL CALC-MCNC: 76.8 MG/DL (ref 63–159)
LYMPHOCYTES # BLD AUTO: 2.4 K/UL (ref 1–4.8)
LYMPHOCYTES NFR BLD: 25.9 % (ref 18–48)
MCH RBC QN AUTO: 31.7 PG (ref 27–31)
MCHC RBC AUTO-ENTMCNC: 33.3 G/DL (ref 32–36)
MCV RBC AUTO: 95 FL (ref 82–98)
MONOCYTES # BLD AUTO: 0.9 K/UL (ref 0.3–1)
MONOCYTES NFR BLD: 9.7 % (ref 4–15)
NEUTROPHILS # BLD AUTO: 5.3 K/UL (ref 1.8–7.7)
NEUTROPHILS NFR BLD: 57.7 % (ref 38–73)
NONHDLC SERPL-MCNC: 105 MG/DL
NRBC BLD-RTO: 0 /100 WBC
PLATELET # BLD AUTO: 233 K/UL (ref 150–450)
PMV BLD AUTO: 9.2 FL (ref 9.2–12.9)
POTASSIUM SERPL-SCNC: 5.1 MMOL/L (ref 3.5–5.1)
PROT SERPL-MCNC: 7 G/DL (ref 6–8.4)
RBC # BLD AUTO: 4.58 M/UL (ref 4.6–6.2)
SODIUM SERPL-SCNC: 139 MMOL/L (ref 136–145)
TRIGL SERPL-MCNC: 141 MG/DL (ref 30–150)
WBC # BLD AUTO: 9.1 K/UL (ref 3.9–12.7)

## 2021-08-24 PROCEDURE — 84153 ASSAY OF PSA TOTAL: CPT | Performed by: INTERNAL MEDICINE

## 2021-08-24 PROCEDURE — 85025 COMPLETE CBC W/AUTO DIFF WBC: CPT | Performed by: INTERNAL MEDICINE

## 2021-08-24 PROCEDURE — 80053 COMPREHEN METABOLIC PANEL: CPT | Performed by: INTERNAL MEDICINE

## 2021-08-24 PROCEDURE — 36415 COLL VENOUS BLD VENIPUNCTURE: CPT | Performed by: INTERNAL MEDICINE

## 2021-08-24 PROCEDURE — 80061 LIPID PANEL: CPT | Performed by: INTERNAL MEDICINE

## 2021-08-26 ENCOUNTER — OFFICE VISIT (OUTPATIENT)
Dept: CARDIOLOGY | Facility: CLINIC | Age: 76
End: 2021-08-26
Payer: MEDICARE

## 2021-08-26 ENCOUNTER — HOSPITAL ENCOUNTER (OUTPATIENT)
Dept: RADIOLOGY | Facility: HOSPITAL | Age: 76
Discharge: HOME | End: 2021-08-26
Attending: INTERNAL MEDICINE
Payer: MEDICARE

## 2021-08-26 VITALS
BODY MASS INDEX: 29.71 KG/M2 | RESPIRATION RATE: 20 BRPM | HEIGHT: 66 IN | WEIGHT: 184.88 LBS | DIASTOLIC BLOOD PRESSURE: 57 MMHG | SYSTOLIC BLOOD PRESSURE: 116 MMHG | HEART RATE: 68 BPM

## 2021-08-26 DIAGNOSIS — E78.49 OTHER HYPERLIPIDEMIA: ICD-10-CM

## 2021-08-26 DIAGNOSIS — R42 DIZZINESS AND GIDDINESS: ICD-10-CM

## 2021-08-26 DIAGNOSIS — Z95.810 ICD (IMPLANTABLE CARDIOVERTER-DEFIBRILLATOR) IN PLACE: ICD-10-CM

## 2021-08-26 DIAGNOSIS — I10 ESSENTIAL HYPERTENSION: ICD-10-CM

## 2021-08-26 DIAGNOSIS — I25.10 ATHEROSCLEROSIS OF NATIVE CORONARY ARTERY OF NATIVE HEART WITHOUT ANGINA PECTORIS: ICD-10-CM

## 2021-08-26 DIAGNOSIS — R42 EPISODE OF DIZZINESS: ICD-10-CM

## 2021-08-26 DIAGNOSIS — I25.5 CARDIOMYOPATHY, ISCHEMIC: Primary | Chronic | ICD-10-CM

## 2021-08-26 PROCEDURE — 99205 PR OFFICE/OUTPT VISIT, NEW, LEVL V, 60-74 MIN: ICD-10-PCS | Mod: S$GLB,,, | Performed by: INTERNAL MEDICINE

## 2021-08-26 PROCEDURE — 3288F FALL RISK ASSESSMENT DOCD: CPT | Mod: CPTII,S$GLB,, | Performed by: INTERNAL MEDICINE

## 2021-08-26 PROCEDURE — 1160F RVW MEDS BY RX/DR IN RCRD: CPT | Mod: CPTII,S$GLB,, | Performed by: INTERNAL MEDICINE

## 2021-08-26 PROCEDURE — 93000 EKG 12-LEAD: ICD-10-PCS | Mod: S$GLB,,, | Performed by: INTERNAL MEDICINE

## 2021-08-26 PROCEDURE — 3078F PR MOST RECENT DIASTOLIC BLOOD PRESSURE < 80 MM HG: ICD-10-PCS | Mod: CPTII,S$GLB,, | Performed by: INTERNAL MEDICINE

## 2021-08-26 PROCEDURE — 99205 OFFICE O/P NEW HI 60 MIN: CPT | Mod: S$GLB,,, | Performed by: INTERNAL MEDICINE

## 2021-08-26 PROCEDURE — 70450 CT HEAD/BRAIN W/O DYE: CPT | Mod: 26,,, | Performed by: RADIOLOGY

## 2021-08-26 PROCEDURE — 99999 PR PBB SHADOW E&M-EST. PATIENT-LVL V: ICD-10-PCS | Mod: PBBFAC,,, | Performed by: INTERNAL MEDICINE

## 2021-08-26 PROCEDURE — 3078F DIAST BP <80 MM HG: CPT | Mod: CPTII,S$GLB,, | Performed by: INTERNAL MEDICINE

## 2021-08-26 PROCEDURE — 3288F PR FALLS RISK ASSESSMENT DOCUMENTED: ICD-10-PCS | Mod: CPTII,S$GLB,, | Performed by: INTERNAL MEDICINE

## 2021-08-26 PROCEDURE — 3074F PR MOST RECENT SYSTOLIC BLOOD PRESSURE < 130 MM HG: ICD-10-PCS | Mod: CPTII,S$GLB,, | Performed by: INTERNAL MEDICINE

## 2021-08-26 PROCEDURE — 1101F PR PT FALLS ASSESS DOC 0-1 FALLS W/OUT INJ PAST YR: ICD-10-PCS | Mod: CPTII,S$GLB,, | Performed by: INTERNAL MEDICINE

## 2021-08-26 PROCEDURE — 1159F PR MEDICATION LIST DOCUMENTED IN MEDICAL RECORD: ICD-10-PCS | Mod: CPTII,S$GLB,, | Performed by: INTERNAL MEDICINE

## 2021-08-26 PROCEDURE — 93000 ELECTROCARDIOGRAM COMPLETE: CPT | Mod: S$GLB,,, | Performed by: INTERNAL MEDICINE

## 2021-08-26 PROCEDURE — 1126F AMNT PAIN NOTED NONE PRSNT: CPT | Mod: CPTII,S$GLB,, | Performed by: INTERNAL MEDICINE

## 2021-08-26 PROCEDURE — 1159F MED LIST DOCD IN RCRD: CPT | Mod: CPTII,S$GLB,, | Performed by: INTERNAL MEDICINE

## 2021-08-26 PROCEDURE — 1101F PT FALLS ASSESS-DOCD LE1/YR: CPT | Mod: CPTII,S$GLB,, | Performed by: INTERNAL MEDICINE

## 2021-08-26 PROCEDURE — 70450 CT HEAD/BRAIN W/O DYE: CPT | Mod: TC

## 2021-08-26 PROCEDURE — 70450 CT HEAD WITHOUT CONTRAST: ICD-10-PCS | Mod: 26,,, | Performed by: RADIOLOGY

## 2021-08-26 PROCEDURE — 3008F PR BODY MASS INDEX (BMI) DOCUMENTED: ICD-10-PCS | Mod: CPTII,S$GLB,, | Performed by: INTERNAL MEDICINE

## 2021-08-26 PROCEDURE — 99999 PR PBB SHADOW E&M-EST. PATIENT-LVL V: CPT | Mod: PBBFAC,,, | Performed by: INTERNAL MEDICINE

## 2021-08-26 PROCEDURE — 3008F BODY MASS INDEX DOCD: CPT | Mod: CPTII,S$GLB,, | Performed by: INTERNAL MEDICINE

## 2021-08-26 PROCEDURE — 1160F PR REVIEW ALL MEDS BY PRESCRIBER/CLIN PHARMACIST DOCUMENTED: ICD-10-PCS | Mod: CPTII,S$GLB,, | Performed by: INTERNAL MEDICINE

## 2021-08-26 PROCEDURE — 1126F PR PAIN SEVERITY QUANTIFIED, NO PAIN PRESENT: ICD-10-PCS | Mod: CPTII,S$GLB,, | Performed by: INTERNAL MEDICINE

## 2021-08-26 PROCEDURE — 99499 UNLISTED E&M SERVICE: CPT | Mod: S$GLB,,, | Performed by: INTERNAL MEDICINE

## 2021-08-26 PROCEDURE — 3074F SYST BP LT 130 MM HG: CPT | Mod: CPTII,S$GLB,, | Performed by: INTERNAL MEDICINE

## 2021-08-26 PROCEDURE — 99499 RISK ADDL DX/OHS AUDIT: ICD-10-PCS | Mod: S$GLB,,, | Performed by: INTERNAL MEDICINE

## 2021-09-08 ENCOUNTER — PATIENT MESSAGE (OUTPATIENT)
Dept: CARDIOLOGY | Facility: CLINIC | Age: 76
End: 2021-09-08

## 2021-09-16 ENCOUNTER — HOSPITAL ENCOUNTER (OUTPATIENT)
Dept: CARDIOLOGY | Facility: HOSPITAL | Age: 76
Discharge: HOME | End: 2021-09-16
Attending: INTERNAL MEDICINE
Payer: MEDICARE

## 2021-09-16 DIAGNOSIS — R42 DIZZINESS AND GIDDINESS: ICD-10-CM

## 2021-09-16 DIAGNOSIS — I25.10 ATHEROSCLEROSIS OF NATIVE CORONARY ARTERY OF NATIVE HEART WITHOUT ANGINA PECTORIS: ICD-10-CM

## 2021-09-16 LAB
LEFT ARM DIASTOLIC BLOOD PRESSURE: 62 MMHG
LEFT ARM SYSTOLIC BLOOD PRESSURE: 110 MMHG
LEFT CBA DIAS: 16 CM/S
LEFT CBA SYS: 76 CM/S
LEFT CCA DIST DIAS: 16 CM/S
LEFT CCA DIST SYS: 80 CM/S
LEFT CCA MID DIAS: 18 CM/S
LEFT CCA MID SYS: 86 CM/S
LEFT CCA PROX DIAS: 16 CM/S
LEFT CCA PROX SYS: 83 CM/S
LEFT ECA DIAS: 11 CM/S
LEFT ECA SYS: 102 CM/S
LEFT ICA DIST DIAS: 26 CM/S
LEFT ICA DIST SYS: 88 CM/S
LEFT ICA MID DIAS: 35 CM/S
LEFT ICA MID SYS: 86 CM/S
LEFT ICA PROX DIAS: 26 CM/S
LEFT ICA PROX SYS: 93 CM/S
LEFT VERTEBRAL DIAS: 10 CM/S
LEFT VERTEBRAL SYS: 39 CM/S
OHS CV CAROTID RIGHT ICA EDV HIGHEST: 35
OHS CV CAROTID ULTRASOUND LEFT ICA/CCA RATIO: 1.16
OHS CV CAROTID ULTRASOUND RIGHT ICA/CCA RATIO: 1.18
OHS CV PV CAROTID LEFT HIGHEST CCA: 86
OHS CV PV CAROTID LEFT HIGHEST ICA: 93
OHS CV PV CAROTID RIGHT HIGHEST CCA: 83
OHS CV PV CAROTID RIGHT HIGHEST ICA: 98
OHS CV US CAROTID LEFT HIGHEST EDV: 35
RIGHT ARM DIASTOLIC BLOOD PRESSURE: 67 MMHG
RIGHT ARM SYSTOLIC BLOOD PRESSURE: 98 MMHG
RIGHT CBA DIAS: 11 CM/S
RIGHT CBA SYS: 52 CM/S
RIGHT CCA DIST DIAS: 14 CM/S
RIGHT CCA DIST SYS: 83 CM/S
RIGHT CCA MID DIAS: 10 CM/S
RIGHT CCA MID SYS: 75 CM/S
RIGHT CCA PROX DIAS: 14 CM/S
RIGHT CCA PROX SYS: 70 CM/S
RIGHT ECA DIAS: 0 CM/S
RIGHT ECA SYS: 103 CM/S
RIGHT ICA DIST DIAS: 19 CM/S
RIGHT ICA DIST SYS: 55 CM/S
RIGHT ICA MID DIAS: 35 CM/S
RIGHT ICA MID SYS: 98 CM/S
RIGHT ICA PROX DIAS: 26 CM/S
RIGHT ICA PROX SYS: 87 CM/S
RIGHT VERTEBRAL DIAS: 13 CM/S
RIGHT VERTEBRAL SYS: 49 CM/S

## 2021-09-16 PROCEDURE — 93880 EXTRACRANIAL BILAT STUDY: CPT | Mod: 26,,, | Performed by: INTERNAL MEDICINE

## 2021-09-16 PROCEDURE — 93880 EXTRACRANIAL BILAT STUDY: CPT

## 2021-09-16 PROCEDURE — 93880 CV US DOPPLER CAROTID (CUPID ONLY): ICD-10-PCS | Mod: 26,,, | Performed by: INTERNAL MEDICINE

## 2021-09-20 ENCOUNTER — OFFICE VISIT (OUTPATIENT)
Dept: CARDIOLOGY | Facility: CLINIC | Age: 76
End: 2021-09-20
Payer: MEDICARE

## 2021-09-20 VITALS
BODY MASS INDEX: 27.67 KG/M2 | WEIGHT: 172.19 LBS | HEART RATE: 83 BPM | SYSTOLIC BLOOD PRESSURE: 105 MMHG | DIASTOLIC BLOOD PRESSURE: 62 MMHG | HEIGHT: 66 IN

## 2021-09-20 DIAGNOSIS — I42.9 CARDIOMYOPATHY, UNSPECIFIED TYPE: ICD-10-CM

## 2021-09-20 DIAGNOSIS — E78.49 OTHER HYPERLIPIDEMIA: ICD-10-CM

## 2021-09-20 DIAGNOSIS — I25.5 CARDIOMYOPATHY, ISCHEMIC: Primary | Chronic | ICD-10-CM

## 2021-09-20 DIAGNOSIS — R41.3 MEMORY CHANGE: ICD-10-CM

## 2021-09-20 DIAGNOSIS — I25.10 CORONARY ARTERY DISEASE INVOLVING NATIVE CORONARY ARTERY OF NATIVE HEART WITHOUT ANGINA PECTORIS: ICD-10-CM

## 2021-09-20 DIAGNOSIS — I10 ESSENTIAL HYPERTENSION: ICD-10-CM

## 2021-09-20 PROCEDURE — 1160F PR REVIEW ALL MEDS BY PRESCRIBER/CLIN PHARMACIST DOCUMENTED: ICD-10-PCS | Mod: CPTII,S$GLB,, | Performed by: INTERNAL MEDICINE

## 2021-09-20 PROCEDURE — 1159F PR MEDICATION LIST DOCUMENTED IN MEDICAL RECORD: ICD-10-PCS | Mod: CPTII,S$GLB,, | Performed by: INTERNAL MEDICINE

## 2021-09-20 PROCEDURE — 4010F ACE/ARB THERAPY RXD/TAKEN: CPT | Mod: CPTII,S$GLB,, | Performed by: INTERNAL MEDICINE

## 2021-09-20 PROCEDURE — 1101F PR PT FALLS ASSESS DOC 0-1 FALLS W/OUT INJ PAST YR: ICD-10-PCS | Mod: CPTII,S$GLB,, | Performed by: INTERNAL MEDICINE

## 2021-09-20 PROCEDURE — 99214 PR OFFICE/OUTPT VISIT, EST, LEVL IV, 30-39 MIN: ICD-10-PCS | Mod: S$GLB,,, | Performed by: INTERNAL MEDICINE

## 2021-09-20 PROCEDURE — 3288F PR FALLS RISK ASSESSMENT DOCUMENTED: ICD-10-PCS | Mod: CPTII,S$GLB,, | Performed by: INTERNAL MEDICINE

## 2021-09-20 PROCEDURE — 1160F RVW MEDS BY RX/DR IN RCRD: CPT | Mod: CPTII,S$GLB,, | Performed by: INTERNAL MEDICINE

## 2021-09-20 PROCEDURE — 99499 RISK ADDL DX/OHS AUDIT: ICD-10-PCS | Mod: S$GLB,,, | Performed by: INTERNAL MEDICINE

## 2021-09-20 PROCEDURE — 3078F DIAST BP <80 MM HG: CPT | Mod: CPTII,S$GLB,, | Performed by: INTERNAL MEDICINE

## 2021-09-20 PROCEDURE — 99999 PR PBB SHADOW E&M-EST. PATIENT-LVL III: ICD-10-PCS | Mod: PBBFAC,,, | Performed by: INTERNAL MEDICINE

## 2021-09-20 PROCEDURE — 1101F PT FALLS ASSESS-DOCD LE1/YR: CPT | Mod: CPTII,S$GLB,, | Performed by: INTERNAL MEDICINE

## 2021-09-20 PROCEDURE — 3288F FALL RISK ASSESSMENT DOCD: CPT | Mod: CPTII,S$GLB,, | Performed by: INTERNAL MEDICINE

## 2021-09-20 PROCEDURE — 1159F MED LIST DOCD IN RCRD: CPT | Mod: CPTII,S$GLB,, | Performed by: INTERNAL MEDICINE

## 2021-09-20 PROCEDURE — 3074F PR MOST RECENT SYSTOLIC BLOOD PRESSURE < 130 MM HG: ICD-10-PCS | Mod: CPTII,S$GLB,, | Performed by: INTERNAL MEDICINE

## 2021-09-20 PROCEDURE — 4010F PR ACE/ARB THEARPY RXD/TAKEN: ICD-10-PCS | Mod: CPTII,S$GLB,, | Performed by: INTERNAL MEDICINE

## 2021-09-20 PROCEDURE — 99999 PR PBB SHADOW E&M-EST. PATIENT-LVL III: CPT | Mod: PBBFAC,,, | Performed by: INTERNAL MEDICINE

## 2021-09-20 PROCEDURE — 99214 OFFICE O/P EST MOD 30 MIN: CPT | Mod: S$GLB,,, | Performed by: INTERNAL MEDICINE

## 2021-09-20 PROCEDURE — 3078F PR MOST RECENT DIASTOLIC BLOOD PRESSURE < 80 MM HG: ICD-10-PCS | Mod: CPTII,S$GLB,, | Performed by: INTERNAL MEDICINE

## 2021-09-20 PROCEDURE — 99499 UNLISTED E&M SERVICE: CPT | Mod: S$GLB,,, | Performed by: INTERNAL MEDICINE

## 2021-09-20 PROCEDURE — 3074F SYST BP LT 130 MM HG: CPT | Mod: CPTII,S$GLB,, | Performed by: INTERNAL MEDICINE

## 2021-09-20 RX ORDER — AMOXICILLIN AND CLAVULANATE POTASSIUM 875; 125 MG/1; MG/1
1 TABLET, FILM COATED ORAL EVERY 12 HOURS
COMMUNITY
Start: 2021-09-10 | End: 2021-10-20

## 2021-09-20 RX ORDER — FUROSEMIDE 20 MG/1
20 TABLET ORAL DAILY
Qty: 30 TABLET | Refills: 6 | Status: SHIPPED | OUTPATIENT
Start: 2021-09-20 | End: 2022-04-22 | Stop reason: SDUPTHER

## 2021-09-30 DIAGNOSIS — E78.5 HYPERLIPIDEMIA, UNSPECIFIED HYPERLIPIDEMIA TYPE: ICD-10-CM

## 2021-10-01 RX ORDER — ATORVASTATIN CALCIUM 80 MG/1
80 TABLET, FILM COATED ORAL NIGHTLY
Qty: 90 TABLET | Refills: 3 | OUTPATIENT
Start: 2021-10-01

## 2021-10-20 ENCOUNTER — OFFICE VISIT (OUTPATIENT)
Dept: NEUROLOGY | Facility: CLINIC | Age: 76
End: 2021-10-20
Payer: MEDICARE

## 2021-10-20 VITALS
BODY MASS INDEX: 27.7 KG/M2 | HEART RATE: 79 BPM | RESPIRATION RATE: 17 BRPM | SYSTOLIC BLOOD PRESSURE: 109 MMHG | TEMPERATURE: 97 F | DIASTOLIC BLOOD PRESSURE: 56 MMHG | WEIGHT: 172.38 LBS | HEIGHT: 66 IN

## 2021-10-20 DIAGNOSIS — Z72.0 TOBACCO ABUSE: ICD-10-CM

## 2021-10-20 DIAGNOSIS — G45.4 TRANSIENT GLOBAL AMNESIA: Primary | ICD-10-CM

## 2021-10-20 DIAGNOSIS — R41.3 MEMORY CHANGE: ICD-10-CM

## 2021-10-20 PROCEDURE — 4010F ACE/ARB THERAPY RXD/TAKEN: CPT | Mod: CPTII,S$GLB,, | Performed by: NURSE PRACTITIONER

## 2021-10-20 PROCEDURE — 1159F MED LIST DOCD IN RCRD: CPT | Mod: CPTII,S$GLB,, | Performed by: NURSE PRACTITIONER

## 2021-10-20 PROCEDURE — 1160F RVW MEDS BY RX/DR IN RCRD: CPT | Mod: CPTII,S$GLB,, | Performed by: NURSE PRACTITIONER

## 2021-10-20 PROCEDURE — 99215 OFFICE O/P EST HI 40 MIN: CPT | Mod: S$GLB,,, | Performed by: NURSE PRACTITIONER

## 2021-10-20 PROCEDURE — 99417 PR PROLONGED SVC, OUTPT, W/WO DIRECT PT CONTACT,  EA ADDTL 15 MIN: ICD-10-PCS | Mod: S$GLB,,, | Performed by: NURSE PRACTITIONER

## 2021-10-20 PROCEDURE — 1126F AMNT PAIN NOTED NONE PRSNT: CPT | Mod: CPTII,S$GLB,, | Performed by: NURSE PRACTITIONER

## 2021-10-20 PROCEDURE — 99999 PR PBB SHADOW E&M-EST. PATIENT-LVL V: ICD-10-PCS | Mod: PBBFAC,,, | Performed by: NURSE PRACTITIONER

## 2021-10-20 PROCEDURE — 3078F PR MOST RECENT DIASTOLIC BLOOD PRESSURE < 80 MM HG: ICD-10-PCS | Mod: CPTII,S$GLB,, | Performed by: NURSE PRACTITIONER

## 2021-10-20 PROCEDURE — 1160F PR REVIEW ALL MEDS BY PRESCRIBER/CLIN PHARMACIST DOCUMENTED: ICD-10-PCS | Mod: CPTII,S$GLB,, | Performed by: NURSE PRACTITIONER

## 2021-10-20 PROCEDURE — 99999 PR PBB SHADOW E&M-EST. PATIENT-LVL V: CPT | Mod: PBBFAC,,, | Performed by: NURSE PRACTITIONER

## 2021-10-20 PROCEDURE — 1126F PR PAIN SEVERITY QUANTIFIED, NO PAIN PRESENT: ICD-10-PCS | Mod: CPTII,S$GLB,, | Performed by: NURSE PRACTITIONER

## 2021-10-20 PROCEDURE — 99215 PR OFFICE/OUTPT VISIT, EST, LEVL V, 40-54 MIN: ICD-10-PCS | Mod: S$GLB,,, | Performed by: NURSE PRACTITIONER

## 2021-10-20 PROCEDURE — 1159F PR MEDICATION LIST DOCUMENTED IN MEDICAL RECORD: ICD-10-PCS | Mod: CPTII,S$GLB,, | Performed by: NURSE PRACTITIONER

## 2021-10-20 PROCEDURE — 3074F SYST BP LT 130 MM HG: CPT | Mod: CPTII,S$GLB,, | Performed by: NURSE PRACTITIONER

## 2021-10-20 PROCEDURE — 99417 PROLNG OP E/M EACH 15 MIN: CPT | Mod: S$GLB,,, | Performed by: NURSE PRACTITIONER

## 2021-10-20 PROCEDURE — 3078F DIAST BP <80 MM HG: CPT | Mod: CPTII,S$GLB,, | Performed by: NURSE PRACTITIONER

## 2021-10-20 PROCEDURE — 4010F PR ACE/ARB THEARPY RXD/TAKEN: ICD-10-PCS | Mod: CPTII,S$GLB,, | Performed by: NURSE PRACTITIONER

## 2021-10-20 PROCEDURE — 3074F PR MOST RECENT SYSTOLIC BLOOD PRESSURE < 130 MM HG: ICD-10-PCS | Mod: CPTII,S$GLB,, | Performed by: NURSE PRACTITIONER

## 2021-10-21 ENCOUNTER — TELEPHONE (OUTPATIENT)
Dept: NEUROLOGY | Facility: CLINIC | Age: 76
End: 2021-10-21

## 2021-10-27 ENCOUNTER — LAB VISIT (OUTPATIENT)
Dept: LAB | Facility: HOSPITAL | Age: 76
End: 2021-10-27
Attending: NURSE PRACTITIONER
Payer: MEDICARE

## 2021-10-27 DIAGNOSIS — G45.4 TRANSIENT GLOBAL AMNESIA: ICD-10-CM

## 2021-10-27 LAB
CREAT SERPL-MCNC: 1 MG/DL (ref 0.5–1.4)
EST. GFR  (AFRICAN AMERICAN): >60 ML/MIN/1.73 M^2
EST. GFR  (NON AFRICAN AMERICAN): >60 ML/MIN/1.73 M^2

## 2021-10-27 PROCEDURE — 36415 COLL VENOUS BLD VENIPUNCTURE: CPT | Performed by: NURSE PRACTITIONER

## 2021-10-27 PROCEDURE — 82565 ASSAY OF CREATININE: CPT | Performed by: NURSE PRACTITIONER

## 2021-11-03 ENCOUNTER — TELEPHONE (OUTPATIENT)
Dept: NEUROLOGY | Facility: CLINIC | Age: 76
End: 2021-11-03

## 2021-11-03 ENCOUNTER — PATIENT MESSAGE (OUTPATIENT)
Dept: NEUROLOGY | Facility: CLINIC | Age: 76
End: 2021-11-03
Payer: MEDICARE

## 2021-11-03 ENCOUNTER — HOSPITAL ENCOUNTER (OUTPATIENT)
Dept: RADIOLOGY | Facility: HOSPITAL | Age: 76
Discharge: HOME | End: 2021-11-03
Attending: NURSE PRACTITIONER
Payer: MEDICARE

## 2021-11-03 ENCOUNTER — PATIENT MESSAGE (OUTPATIENT)
Dept: NEUROSURGERY | Facility: CLINIC | Age: 76
End: 2021-11-03
Payer: MEDICARE

## 2021-11-03 DIAGNOSIS — G45.4 TRANSIENT GLOBAL AMNESIA: ICD-10-CM

## 2021-11-03 DIAGNOSIS — I67.1 ANEURYSM OF CAVERNOUS PORTION OF RIGHT INTERNAL CAROTID ARTERY: Primary | ICD-10-CM

## 2021-11-03 PROCEDURE — 25500020 PHARM REV CODE 255: Mod: PO | Performed by: NURSE PRACTITIONER

## 2021-11-03 PROCEDURE — 70498 CTA HEAD AND NECK (XPD): ICD-10-PCS | Mod: 26,,, | Performed by: RADIOLOGY

## 2021-11-03 PROCEDURE — 70496 CTA HEAD AND NECK (XPD): ICD-10-PCS | Mod: 26,,, | Performed by: RADIOLOGY

## 2021-11-03 PROCEDURE — 70496 CT ANGIOGRAPHY HEAD: CPT | Mod: 26,,, | Performed by: RADIOLOGY

## 2021-11-03 PROCEDURE — 70498 CT ANGIOGRAPHY NECK: CPT | Mod: 26,,, | Performed by: RADIOLOGY

## 2021-11-03 PROCEDURE — 70496 CT ANGIOGRAPHY HEAD: CPT | Mod: TC,PO

## 2021-11-03 RX ADMIN — IOHEXOL 100 ML: 350 INJECTION, SOLUTION INTRAVENOUS at 10:11

## 2021-11-03 NOTE — TELEPHONE ENCOUNTER
Left message for  ep to call back to discuss recent CTA results and treatment plan per Mitali Varela.

## 2021-11-03 NOTE — TELEPHONE ENCOUNTER
----- Message from DEVON Fernandez sent at 11/3/2021 12:43 PM CDT -----  CTA reports a small stroke to the left side of the brain that was not picked up on via CT scan. I would like to get MRI brain to evaluate further. Patient was to check and see if his pacer is MRI compatible. If it is, then I can order the MRI.   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 I would like for him to see [facility] for eval regarding this. Referral placed

## 2021-11-04 ENCOUNTER — PATIENT MESSAGE (OUTPATIENT)
Dept: NEUROLOGY | Facility: CLINIC | Age: 76
End: 2021-11-04
Payer: MEDICARE

## 2021-11-04 DIAGNOSIS — G45.9 TRANSIENT CEREBRAL ISCHEMIA, UNSPECIFIED TYPE: ICD-10-CM

## 2021-11-04 NOTE — TELEPHONE ENCOUNTER
----- Message from Orville Machuca sent at 11/4/2021  4:22 PM CDT -----  Regarding: ret call  Type:  Patient Returning Call    Who Called:  wife // bonnie    Who Left Message for Patient:  Olga    Does the patient know what this is regarding?:  yes    Best Call Back Number:  [phone]     Additional Information:

## 2021-11-04 NOTE — TELEPHONE ENCOUNTER
Spoke with the pt and the wife, reported the results from CTA, the need for MRI and pacemaker ID card so we can schedule MRI at [facility]

## 2021-11-10 ENCOUNTER — OFFICE VISIT (OUTPATIENT)
Dept: NEUROSURGERY | Facility: CLINIC | Age: 76
End: 2021-11-10
Payer: MEDICARE

## 2021-11-10 ENCOUNTER — TELEPHONE (OUTPATIENT)
Dept: NEUROSURGERY | Facility: CLINIC | Age: 76
End: 2021-11-10
Payer: MEDICARE

## 2021-11-10 VITALS
WEIGHT: 172.38 LBS | BODY MASS INDEX: 27.7 KG/M2 | HEIGHT: 66 IN | SYSTOLIC BLOOD PRESSURE: 98 MMHG | HEART RATE: 67 BPM | RESPIRATION RATE: 17 BRPM | DIASTOLIC BLOOD PRESSURE: 57 MMHG

## 2021-11-10 DIAGNOSIS — I67.1 ANEURYSM OF CAVERNOUS PORTION OF RIGHT INTERNAL CAROTID ARTERY: ICD-10-CM

## 2021-11-10 PROCEDURE — 99999 PR PBB SHADOW E&M-EST. PATIENT-LVL IV: CPT | Mod: PBBFAC,,, | Performed by: NEUROLOGICAL SURGERY

## 2021-11-10 PROCEDURE — 99999 PR PBB SHADOW E&M-EST. PATIENT-LVL IV: ICD-10-PCS | Mod: PBBFAC,,, | Performed by: NEUROLOGICAL SURGERY

## 2021-11-10 PROCEDURE — 4010F PR ACE/ARB THEARPY RXD/TAKEN: ICD-10-PCS | Mod: CPTII,S$GLB,, | Performed by: NEUROLOGICAL SURGERY

## 2021-11-10 PROCEDURE — 4010F ACE/ARB THERAPY RXD/TAKEN: CPT | Mod: CPTII,S$GLB,, | Performed by: NEUROLOGICAL SURGERY

## 2021-11-10 PROCEDURE — 99204 OFFICE O/P NEW MOD 45 MIN: CPT | Mod: S$GLB,,, | Performed by: NEUROLOGICAL SURGERY

## 2021-11-10 PROCEDURE — 99204 PR OFFICE/OUTPT VISIT, NEW, LEVL IV, 45-59 MIN: ICD-10-PCS | Mod: S$GLB,,, | Performed by: NEUROLOGICAL SURGERY

## 2021-11-17 ENCOUNTER — TELEPHONE (OUTPATIENT)
Dept: NEUROLOGY | Facility: CLINIC | Age: 76
End: 2021-11-17
Payer: MEDICARE

## 2021-11-17 ENCOUNTER — CLINICAL SUPPORT (OUTPATIENT)
Dept: CARDIOLOGY | Facility: HOSPITAL | Age: 76
End: 2021-11-17
Payer: MEDICARE

## 2021-11-17 DIAGNOSIS — Z95.810 PRESENCE OF AUTOMATIC (IMPLANTABLE) CARDIAC DEFIBRILLATOR: ICD-10-CM

## 2021-11-17 PROCEDURE — 93295 DEV INTERROG REMOTE 1/2/MLT: CPT | Mod: ,,, | Performed by: INTERNAL MEDICINE

## 2021-11-17 PROCEDURE — 93295 CARDIAC DEVICE CHECK - REMOTE: ICD-10-PCS | Mod: ,,, | Performed by: INTERNAL MEDICINE

## 2021-11-17 PROCEDURE — 93296 REM INTERROG EVL PM/IDS: CPT | Performed by: INTERNAL MEDICINE

## 2021-12-01 ENCOUNTER — TELEPHONE (OUTPATIENT)
Dept: NEUROLOGY | Facility: CLINIC | Age: 76
End: 2021-12-01
Payer: MEDICARE

## 2021-12-01 ENCOUNTER — OFFICE VISIT (OUTPATIENT)
Dept: NEUROLOGY | Facility: CLINIC | Age: 76
End: 2021-12-01
Payer: MEDICARE

## 2021-12-01 VITALS
RESPIRATION RATE: 18 BRPM | SYSTOLIC BLOOD PRESSURE: 104 MMHG | WEIGHT: 172.06 LBS | BODY MASS INDEX: 27.65 KG/M2 | HEIGHT: 66 IN | DIASTOLIC BLOOD PRESSURE: 62 MMHG | HEART RATE: 82 BPM

## 2021-12-01 DIAGNOSIS — R41.3 MEMORY CHANGE: Primary | ICD-10-CM

## 2021-12-01 DIAGNOSIS — Z86.73 H/O: STROKE: ICD-10-CM

## 2021-12-01 DIAGNOSIS — Z72.0 TOBACCO ABUSE: ICD-10-CM

## 2021-12-01 DIAGNOSIS — G47.33 OSA ON CPAP: ICD-10-CM

## 2021-12-01 DIAGNOSIS — I72.0 ANEURYSM OF CAROTID ARTERY: ICD-10-CM

## 2021-12-01 DIAGNOSIS — E78.49 OTHER HYPERLIPIDEMIA: ICD-10-CM

## 2021-12-01 PROCEDURE — 99999 PR PBB SHADOW E&M-EST. PATIENT-LVL V: CPT | Mod: PBBFAC,,, | Performed by: NURSE PRACTITIONER

## 2021-12-01 PROCEDURE — 4010F ACE/ARB THERAPY RXD/TAKEN: CPT | Mod: CPTII,S$GLB,, | Performed by: NURSE PRACTITIONER

## 2021-12-01 PROCEDURE — 4010F PR ACE/ARB THEARPY RXD/TAKEN: ICD-10-PCS | Mod: CPTII,S$GLB,, | Performed by: NURSE PRACTITIONER

## 2021-12-01 PROCEDURE — 99215 OFFICE O/P EST HI 40 MIN: CPT | Mod: S$GLB,,, | Performed by: NURSE PRACTITIONER

## 2021-12-01 PROCEDURE — 99999 PR PBB SHADOW E&M-EST. PATIENT-LVL V: ICD-10-PCS | Mod: PBBFAC,,, | Performed by: NURSE PRACTITIONER

## 2021-12-01 PROCEDURE — 99215 PR OFFICE/OUTPT VISIT, EST, LEVL V, 40-54 MIN: ICD-10-PCS | Mod: S$GLB,,, | Performed by: NURSE PRACTITIONER

## 2021-12-02 PROBLEM — Z86.73 H/O: STROKE: Status: ACTIVE | Noted: 2021-12-02

## 2021-12-02 PROBLEM — I72.0 ANEURYSM OF CAROTID ARTERY: Status: ACTIVE | Noted: 2021-12-02

## 2021-12-03 ENCOUNTER — OFFICE VISIT (OUTPATIENT)
Dept: SLEEP MEDICINE | Facility: CLINIC | Age: 76
End: 2021-12-03
Payer: MEDICARE

## 2021-12-03 ENCOUNTER — TELEPHONE (OUTPATIENT)
Dept: SLEEP MEDICINE | Facility: CLINIC | Age: 76
End: 2021-12-03
Payer: MEDICARE

## 2021-12-03 VITALS
SYSTOLIC BLOOD PRESSURE: 82 MMHG | HEART RATE: 66 BPM | DIASTOLIC BLOOD PRESSURE: 51 MMHG | HEIGHT: 66 IN | WEIGHT: 172.38 LBS | BODY MASS INDEX: 27.7 KG/M2

## 2021-12-03 DIAGNOSIS — I25.10 CORONARY ARTERY DISEASE INVOLVING NATIVE CORONARY ARTERY OF NATIVE HEART WITHOUT ANGINA PECTORIS: ICD-10-CM

## 2021-12-03 DIAGNOSIS — G47.33 OSA ON CPAP: ICD-10-CM

## 2021-12-03 DIAGNOSIS — G45.4 TRANSIENT GLOBAL AMNESIA: ICD-10-CM

## 2021-12-03 DIAGNOSIS — Z86.73 H/O: STROKE: Primary | ICD-10-CM

## 2021-12-03 DIAGNOSIS — I25.5 CARDIOMYOPATHY, ISCHEMIC: Chronic | ICD-10-CM

## 2021-12-03 PROCEDURE — 99999 PR PBB SHADOW E&M-EST. PATIENT-LVL IV: ICD-10-PCS | Mod: PBBFAC,,, | Performed by: NURSE PRACTITIONER

## 2021-12-03 PROCEDURE — 4010F PR ACE/ARB THEARPY RXD/TAKEN: ICD-10-PCS | Mod: CPTII,S$GLB,, | Performed by: NURSE PRACTITIONER

## 2021-12-03 PROCEDURE — 99214 OFFICE O/P EST MOD 30 MIN: CPT | Mod: S$GLB,,, | Performed by: NURSE PRACTITIONER

## 2021-12-03 PROCEDURE — 4010F ACE/ARB THERAPY RXD/TAKEN: CPT | Mod: CPTII,S$GLB,, | Performed by: NURSE PRACTITIONER

## 2021-12-03 PROCEDURE — 99999 PR PBB SHADOW E&M-EST. PATIENT-LVL IV: CPT | Mod: PBBFAC,,, | Performed by: NURSE PRACTITIONER

## 2021-12-03 PROCEDURE — 99214 PR OFFICE/OUTPT VISIT, EST, LEVL IV, 30-39 MIN: ICD-10-PCS | Mod: S$GLB,,, | Performed by: NURSE PRACTITIONER

## 2021-12-20 ENCOUNTER — OFFICE VISIT (OUTPATIENT)
Dept: CARDIOLOGY | Facility: CLINIC | Age: 76
End: 2021-12-20
Payer: MEDICARE

## 2021-12-20 VITALS
SYSTOLIC BLOOD PRESSURE: 119 MMHG | DIASTOLIC BLOOD PRESSURE: 62 MMHG | HEART RATE: 87 BPM | BODY MASS INDEX: 27.86 KG/M2 | HEIGHT: 66 IN | WEIGHT: 173.38 LBS | RESPIRATION RATE: 20 BRPM

## 2021-12-20 DIAGNOSIS — I10 PRIMARY HYPERTENSION: ICD-10-CM

## 2021-12-20 DIAGNOSIS — E78.49 OTHER HYPERLIPIDEMIA: ICD-10-CM

## 2021-12-20 DIAGNOSIS — I25.5 CARDIOMYOPATHY, ISCHEMIC: Primary | Chronic | ICD-10-CM

## 2021-12-20 DIAGNOSIS — I25.700 CORONARY ARTERY DISEASE INVOLVING CORONARY BYPASS GRAFT OF NATIVE HEART WITH UNSTABLE ANGINA PECTORIS: ICD-10-CM

## 2021-12-20 PROCEDURE — 4010F PR ACE/ARB THEARPY RXD/TAKEN: ICD-10-PCS | Mod: CPTII,S$GLB,, | Performed by: INTERNAL MEDICINE

## 2021-12-20 PROCEDURE — 99999 PR PBB SHADOW E&M-EST. PATIENT-LVL IV: CPT | Mod: PBBFAC,,, | Performed by: INTERNAL MEDICINE

## 2021-12-20 PROCEDURE — 99214 PR OFFICE/OUTPT VISIT, EST, LEVL IV, 30-39 MIN: ICD-10-PCS | Mod: S$GLB,,, | Performed by: INTERNAL MEDICINE

## 2021-12-20 PROCEDURE — 99214 OFFICE O/P EST MOD 30 MIN: CPT | Mod: S$GLB,,, | Performed by: INTERNAL MEDICINE

## 2021-12-20 PROCEDURE — 4010F ACE/ARB THERAPY RXD/TAKEN: CPT | Mod: CPTII,S$GLB,, | Performed by: INTERNAL MEDICINE

## 2021-12-20 PROCEDURE — 99999 PR PBB SHADOW E&M-EST. PATIENT-LVL IV: ICD-10-PCS | Mod: PBBFAC,,, | Performed by: INTERNAL MEDICINE

## 2021-12-27 DIAGNOSIS — I49.8 OTHER SPECIFIED CARDIAC ARRHYTHMIAS: Primary | ICD-10-CM

## 2022-02-15 ENCOUNTER — CLINICAL SUPPORT (OUTPATIENT)
Dept: CARDIOLOGY | Facility: HOSPITAL | Age: 77
End: 2022-02-15
Payer: MEDICARE

## 2022-02-15 DIAGNOSIS — I50.42 CHRONIC COMBINED SYSTOLIC (CONGESTIVE) AND DIASTOLIC (CONGESTIVE) HEART FAILURE: ICD-10-CM

## 2022-02-15 DIAGNOSIS — I47.20 VENTRICULAR TACHYCARDIA: ICD-10-CM

## 2022-02-15 DIAGNOSIS — I25.5 ISCHEMIC CARDIOMYOPATHY: ICD-10-CM

## 2022-02-15 DIAGNOSIS — Z95.810 PRESENCE OF AUTOMATIC (IMPLANTABLE) CARDIAC DEFIBRILLATOR: ICD-10-CM

## 2022-02-15 PROCEDURE — 93296 REM INTERROG EVL PM/IDS: CPT | Performed by: INTERNAL MEDICINE

## 2022-02-19 ENCOUNTER — PATIENT MESSAGE (OUTPATIENT)
Dept: CARDIOLOGY | Facility: CLINIC | Age: 77
End: 2022-02-19
Payer: MEDICARE

## 2022-02-21 DIAGNOSIS — I50.42 CHRONIC COMBINED SYSTOLIC AND DIASTOLIC CHF, NYHA CLASS 2: ICD-10-CM

## 2022-02-21 RX ORDER — SACUBITRIL AND VALSARTAN 97; 103 MG/1; MG/1
1 TABLET, FILM COATED ORAL 2 TIMES DAILY
Qty: 60 TABLET | Refills: 11 | Status: SHIPPED | OUTPATIENT
Start: 2022-02-21 | End: 2023-03-09

## 2022-03-09 ENCOUNTER — TELEPHONE (OUTPATIENT)
Dept: NEUROLOGY | Facility: CLINIC | Age: 77
End: 2022-03-09
Payer: MEDICARE

## 2022-03-09 NOTE — TELEPHONE ENCOUNTER
Call placed to patient's spouse to obtain further clarification for appointment on 3/10/22.  Patient's spouse stated that the patient is doing good and not concerned as much with his memory like at last visit;  would like to hold off on Neuropsych testing.  Patient's spouse okay with following up in six (6) months to re evaluate memory.  Patient followed up with sleep medicine and currently using old CPAP until new machine comes in.  Canceled appointment on 03/10/22.

## 2022-04-12 ENCOUNTER — PATIENT MESSAGE (OUTPATIENT)
Dept: CARDIOLOGY | Facility: CLINIC | Age: 77
End: 2022-04-12
Payer: MEDICARE

## 2022-04-12 DIAGNOSIS — I50.42 CHRONIC COMBINED SYSTOLIC AND DIASTOLIC CHF, NYHA CLASS 2: ICD-10-CM

## 2022-04-12 DIAGNOSIS — I25.5 CARDIOMYOPATHY, ISCHEMIC: Primary | ICD-10-CM

## 2022-04-12 DIAGNOSIS — I25.700 CORONARY ARTERY DISEASE INVOLVING CORONARY BYPASS GRAFT OF NATIVE HEART WITH UNSTABLE ANGINA PECTORIS: ICD-10-CM

## 2022-04-12 RX ORDER — ACETAMINOPHEN 160 MG/5ML
200 SUSPENSION, ORAL (FINAL DOSE FORM) ORAL DAILY
Qty: 90 CAPSULE | Refills: 3 | Status: SHIPPED | OUTPATIENT
Start: 2022-04-12

## 2022-04-14 ENCOUNTER — TELEPHONE (OUTPATIENT)
Dept: ELECTROPHYSIOLOGY | Facility: CLINIC | Age: 77
End: 2022-04-14
Payer: MEDICARE

## 2022-04-18 ENCOUNTER — TELEPHONE (OUTPATIENT)
Dept: ELECTROPHYSIOLOGY | Facility: CLINIC | Age: 77
End: 2022-04-18
Payer: MEDICARE

## 2022-04-19 ENCOUNTER — OFFICE VISIT (OUTPATIENT)
Dept: ELECTROPHYSIOLOGY | Facility: CLINIC | Age: 77
End: 2022-04-19
Payer: MEDICARE

## 2022-04-19 ENCOUNTER — CLINICAL SUPPORT (OUTPATIENT)
Dept: CARDIOLOGY | Facility: HOSPITAL | Age: 77
End: 2022-04-19
Attending: INTERNAL MEDICINE
Payer: MEDICARE

## 2022-04-19 ENCOUNTER — HOSPITAL ENCOUNTER (OUTPATIENT)
Dept: CARDIOLOGY | Facility: CLINIC | Age: 77
Discharge: HOME | End: 2022-04-19
Payer: MEDICARE

## 2022-04-19 VITALS
WEIGHT: 163.38 LBS | HEART RATE: 82 BPM | SYSTOLIC BLOOD PRESSURE: 108 MMHG | BODY MASS INDEX: 26.26 KG/M2 | DIASTOLIC BLOOD PRESSURE: 58 MMHG | HEIGHT: 66 IN

## 2022-04-19 DIAGNOSIS — I25.5 CARDIOMYOPATHY, ISCHEMIC: Primary | Chronic | ICD-10-CM

## 2022-04-19 DIAGNOSIS — G47.33 OSA ON CPAP: ICD-10-CM

## 2022-04-19 DIAGNOSIS — I49.8 OTHER SPECIFIED CARDIAC ARRHYTHMIAS: ICD-10-CM

## 2022-04-19 DIAGNOSIS — Z95.810 ICD (IMPLANTABLE CARDIOVERTER-DEFIBRILLATOR) IN PLACE: ICD-10-CM

## 2022-04-19 DIAGNOSIS — I25.10 CORONARY ARTERY DISEASE: ICD-10-CM

## 2022-04-19 DIAGNOSIS — I25.700 CORONARY ARTERY DISEASE INVOLVING CORONARY BYPASS GRAFT OF NATIVE HEART WITH UNSTABLE ANGINA PECTORIS: ICD-10-CM

## 2022-04-19 PROCEDURE — 93005 RHYTHM STRIP: ICD-10-PCS | Mod: S$GLB,,, | Performed by: INTERNAL MEDICINE

## 2022-04-19 PROCEDURE — 3078F PR MOST RECENT DIASTOLIC BLOOD PRESSURE < 80 MM HG: ICD-10-PCS | Mod: CPTII,S$GLB,, | Performed by: INTERNAL MEDICINE

## 2022-04-19 PROCEDURE — 1160F RVW MEDS BY RX/DR IN RCRD: CPT | Mod: CPTII,S$GLB,, | Performed by: INTERNAL MEDICINE

## 2022-04-19 PROCEDURE — 99999 PR PBB SHADOW E&M-EST. PATIENT-LVL IV: ICD-10-PCS | Mod: PBBFAC,,, | Performed by: INTERNAL MEDICINE

## 2022-04-19 PROCEDURE — 99214 PR OFFICE/OUTPT VISIT, EST, LEVL IV, 30-39 MIN: ICD-10-PCS | Mod: S$GLB,,, | Performed by: INTERNAL MEDICINE

## 2022-04-19 PROCEDURE — 93283 PRGRMG EVAL IMPLANTABLE DFB: CPT

## 2022-04-19 PROCEDURE — 1126F PR PAIN SEVERITY QUANTIFIED, NO PAIN PRESENT: ICD-10-PCS | Mod: CPTII,S$GLB,, | Performed by: INTERNAL MEDICINE

## 2022-04-19 PROCEDURE — 3078F DIAST BP <80 MM HG: CPT | Mod: CPTII,S$GLB,, | Performed by: INTERNAL MEDICINE

## 2022-04-19 PROCEDURE — 99214 OFFICE O/P EST MOD 30 MIN: CPT | Mod: S$GLB,,, | Performed by: INTERNAL MEDICINE

## 2022-04-19 PROCEDURE — 1126F AMNT PAIN NOTED NONE PRSNT: CPT | Mod: CPTII,S$GLB,, | Performed by: INTERNAL MEDICINE

## 2022-04-19 PROCEDURE — 1159F PR MEDICATION LIST DOCUMENTED IN MEDICAL RECORD: ICD-10-PCS | Mod: CPTII,S$GLB,, | Performed by: INTERNAL MEDICINE

## 2022-04-19 PROCEDURE — 1159F MED LIST DOCD IN RCRD: CPT | Mod: CPTII,S$GLB,, | Performed by: INTERNAL MEDICINE

## 2022-04-19 PROCEDURE — 93010 ELECTROCARDIOGRAM REPORT: CPT | Mod: S$GLB,,, | Performed by: INTERNAL MEDICINE

## 2022-04-19 PROCEDURE — 93283 PRGRMG EVAL IMPLANTABLE DFB: CPT | Mod: 26,,, | Performed by: INTERNAL MEDICINE

## 2022-04-19 PROCEDURE — 93283 CARDIAC DEVICE CHECK - IN CLINIC & HOSPITAL: ICD-10-PCS | Mod: 26,,, | Performed by: INTERNAL MEDICINE

## 2022-04-19 PROCEDURE — 93010 RHYTHM STRIP: ICD-10-PCS | Mod: S$GLB,,, | Performed by: INTERNAL MEDICINE

## 2022-04-19 PROCEDURE — 3074F SYST BP LT 130 MM HG: CPT | Mod: CPTII,S$GLB,, | Performed by: INTERNAL MEDICINE

## 2022-04-19 PROCEDURE — 3074F PR MOST RECENT SYSTOLIC BLOOD PRESSURE < 130 MM HG: ICD-10-PCS | Mod: CPTII,S$GLB,, | Performed by: INTERNAL MEDICINE

## 2022-04-19 PROCEDURE — 1160F PR REVIEW ALL MEDS BY PRESCRIBER/CLIN PHARMACIST DOCUMENTED: ICD-10-PCS | Mod: CPTII,S$GLB,, | Performed by: INTERNAL MEDICINE

## 2022-04-19 PROCEDURE — 93005 ELECTROCARDIOGRAM TRACING: CPT | Mod: S$GLB,,, | Performed by: INTERNAL MEDICINE

## 2022-04-19 PROCEDURE — 99999 PR PBB SHADOW E&M-EST. PATIENT-LVL IV: CPT | Mod: PBBFAC,,, | Performed by: INTERNAL MEDICINE

## 2022-04-19 RX ORDER — CLOPIDOGREL BISULFATE 75 MG/1
75 TABLET ORAL NIGHTLY
Qty: 90 TABLET | Refills: 3 | Status: SHIPPED | OUTPATIENT
Start: 2022-04-19 | End: 2023-05-08

## 2022-04-19 NOTE — PROGRESS NOTES
Subjective:    Patient ID:  Tristen Blanc is a 76 y.o. male who presents for evaluation of ICD check    Primary Cardiologist: Lonnie Heller MD    HPI  I had the pleasure of seeing Mr. Blanc in our electrophysiology clinic in consultation for ICD care. As you are aware he is a pleasant 76 year-old man with chronic ischemic cardiomyopathy with acute MI in 1996 complicated by cardiac arrest with persistent systolic heart failure with a LVEF of 20-25% and dual chamber ICD implantation in 2010 with a generator change in 2017 by Dr. Loza. He reports no history of ICD shocks. He presented to establish care of his ICD in 7/2020.     He presents today for yearly follow-up. He had surgery on his right hand yesterday to remove squamous cell cancer. In-clinic device check notes stable lead parameters with 27% RA and 0% RV pacing and no arrhythmias. Estimated battery longevity of 3.8 years.    Review of ECGs in Epic note sinus rhythm with IVCD of 126ms.      Review of Systems   Constitutional: Negative for fever and malaise/fatigue.   HENT: Negative for congestion and sore throat.    Eyes: Negative for blurred vision and visual disturbance.   Cardiovascular: Negative for chest pain, dyspnea on exertion, irregular heartbeat, near-syncope, palpitations and syncope.   Respiratory: Negative for cough and shortness of breath.    Hematologic/Lymphatic: Negative for bleeding problem. Does not bruise/bleed easily.   Skin: Negative.    Musculoskeletal: Negative.    Gastrointestinal: Negative for bloating, abdominal pain, hematochezia and melena.   Neurological: Negative for focal weakness and weakness.   Psychiatric/Behavioral: Negative.         Objective:    Physical Exam  Vitals reviewed.   Constitutional:       General: He is not in acute distress.     Appearance: He is well-developed. He is not diaphoretic.   HENT:      Head: Normocephalic and atraumatic.   Eyes:      General:         Right eye: No discharge.         Left  eye: No discharge.      Conjunctiva/sclera: Conjunctivae normal.   Cardiovascular:      Rate and Rhythm: Normal rate and regular rhythm.      Heart sounds: No murmur heard.    No friction rub. No gallop.   Pulmonary:      Effort: Pulmonary effort is normal. No respiratory distress.      Breath sounds: Normal breath sounds. No wheezing or rales.   Abdominal:      General: Bowel sounds are normal. There is no distension.      Palpations: Abdomen is soft.      Tenderness: There is no abdominal tenderness.   Musculoskeletal:      Cervical back: Neck supple.   Skin:     General: Skin is warm and dry.   Neurological:      Mental Status: He is alert and oriented to person, place, and time.   Psychiatric:         Behavior: Behavior normal.         Thought Content: Thought content normal.         Judgment: Judgment normal.           Assessment:       1. Cardiomyopathy, ischemic    2. Coronary artery disease involving coronary bypass graft of native heart with unstable angina pectoris    3. ICD (implantable cardioverter-defibrillator) in place    4. JERONIMO on CPAP         Plan:       In summary, Mr. Blanc is a pleasant 76 year-old man with chronic ischemic cardiomyopathy with acute MI in 1996 complicated by cardiac arrest with persistent systolic heart failure with a LVEF of 20-25% and dual chamber ICD implantation in 2010 with a generator change in 2017 by Dr. Loza. Device is working normally per recent remote interrogation. Continue remote monitoring. RTC in 1 year, sooner if needed.    Thank you for allowing me to participate in the care of this patient. Please do not hesitate to call me with any questions or concerns.    Salazar Tucker MD, PhD  Cardiac Electrophysiology

## 2022-04-22 DIAGNOSIS — I42.9 CARDIOMYOPATHY, UNSPECIFIED TYPE: ICD-10-CM

## 2022-04-22 RX ORDER — FUROSEMIDE 20 MG/1
20 TABLET ORAL DAILY
Qty: 30 TABLET | Refills: 6 | Status: CANCELLED | OUTPATIENT
Start: 2022-04-22

## 2022-04-22 RX ORDER — FUROSEMIDE 20 MG/1
20 TABLET ORAL DAILY
Qty: 30 TABLET | Refills: 6 | Status: SHIPPED | OUTPATIENT
Start: 2022-04-22 | End: 2022-11-22

## 2022-05-16 ENCOUNTER — CLINICAL SUPPORT (OUTPATIENT)
Dept: CARDIOLOGY | Facility: HOSPITAL | Age: 77
End: 2022-05-16
Payer: MEDICARE

## 2022-05-16 DIAGNOSIS — Z95.810 PRESENCE OF AUTOMATIC (IMPLANTABLE) CARDIAC DEFIBRILLATOR: ICD-10-CM

## 2022-05-16 PROCEDURE — 93296 REM INTERROG EVL PM/IDS: CPT | Performed by: INTERNAL MEDICINE

## 2022-05-16 PROCEDURE — 93295 CARDIAC DEVICE CHECK - REMOTE: ICD-10-PCS | Mod: ,,, | Performed by: INTERNAL MEDICINE

## 2022-05-16 PROCEDURE — 93295 DEV INTERROG REMOTE 1/2/MLT: CPT | Mod: ,,, | Performed by: INTERNAL MEDICINE

## 2022-06-08 ENCOUNTER — PATIENT MESSAGE (OUTPATIENT)
Dept: SMOKING CESSATION | Facility: CLINIC | Age: 77
End: 2022-06-08
Payer: MEDICARE

## 2022-06-17 ENCOUNTER — OFFICE VISIT (OUTPATIENT)
Dept: CARDIOLOGY | Facility: CLINIC | Age: 77
End: 2022-06-17
Payer: MEDICARE

## 2022-06-17 VITALS
BODY MASS INDEX: 25.37 KG/M2 | WEIGHT: 157.88 LBS | DIASTOLIC BLOOD PRESSURE: 50 MMHG | HEART RATE: 83 BPM | HEIGHT: 66 IN | RESPIRATION RATE: 20 BRPM | SYSTOLIC BLOOD PRESSURE: 96 MMHG

## 2022-06-17 DIAGNOSIS — I25.5 CARDIOMYOPATHY, ISCHEMIC: Chronic | ICD-10-CM

## 2022-06-17 DIAGNOSIS — E78.49 OTHER HYPERLIPIDEMIA: ICD-10-CM

## 2022-06-17 DIAGNOSIS — I25.10 CORONARY ARTERY DISEASE INVOLVING NATIVE CORONARY ARTERY OF NATIVE HEART WITHOUT ANGINA PECTORIS: ICD-10-CM

## 2022-06-17 PROCEDURE — 1126F PR PAIN SEVERITY QUANTIFIED, NO PAIN PRESENT: ICD-10-PCS | Mod: CPTII,S$GLB,, | Performed by: INTERNAL MEDICINE

## 2022-06-17 PROCEDURE — 3078F PR MOST RECENT DIASTOLIC BLOOD PRESSURE < 80 MM HG: ICD-10-PCS | Mod: CPTII,S$GLB,, | Performed by: INTERNAL MEDICINE

## 2022-06-17 PROCEDURE — 3074F SYST BP LT 130 MM HG: CPT | Mod: CPTII,S$GLB,, | Performed by: INTERNAL MEDICINE

## 2022-06-17 PROCEDURE — 1160F PR REVIEW ALL MEDS BY PRESCRIBER/CLIN PHARMACIST DOCUMENTED: ICD-10-PCS | Mod: CPTII,S$GLB,, | Performed by: INTERNAL MEDICINE

## 2022-06-17 PROCEDURE — 99999 PR PBB SHADOW E&M-EST. PATIENT-LVL IV: ICD-10-PCS | Mod: PBBFAC,,, | Performed by: INTERNAL MEDICINE

## 2022-06-17 PROCEDURE — 3288F FALL RISK ASSESSMENT DOCD: CPT | Mod: CPTII,S$GLB,, | Performed by: INTERNAL MEDICINE

## 2022-06-17 PROCEDURE — 1101F PT FALLS ASSESS-DOCD LE1/YR: CPT | Mod: CPTII,S$GLB,, | Performed by: INTERNAL MEDICINE

## 2022-06-17 PROCEDURE — 99999 PR PBB SHADOW E&M-EST. PATIENT-LVL IV: CPT | Mod: PBBFAC,,, | Performed by: INTERNAL MEDICINE

## 2022-06-17 PROCEDURE — 3288F PR FALLS RISK ASSESSMENT DOCUMENTED: ICD-10-PCS | Mod: CPTII,S$GLB,, | Performed by: INTERNAL MEDICINE

## 2022-06-17 PROCEDURE — 99214 PR OFFICE/OUTPT VISIT, EST, LEVL IV, 30-39 MIN: ICD-10-PCS | Mod: S$GLB,,, | Performed by: INTERNAL MEDICINE

## 2022-06-17 PROCEDURE — 3074F PR MOST RECENT SYSTOLIC BLOOD PRESSURE < 130 MM HG: ICD-10-PCS | Mod: CPTII,S$GLB,, | Performed by: INTERNAL MEDICINE

## 2022-06-17 PROCEDURE — 99214 OFFICE O/P EST MOD 30 MIN: CPT | Mod: S$GLB,,, | Performed by: INTERNAL MEDICINE

## 2022-06-17 PROCEDURE — 1126F AMNT PAIN NOTED NONE PRSNT: CPT | Mod: CPTII,S$GLB,, | Performed by: INTERNAL MEDICINE

## 2022-06-17 PROCEDURE — 1160F RVW MEDS BY RX/DR IN RCRD: CPT | Mod: CPTII,S$GLB,, | Performed by: INTERNAL MEDICINE

## 2022-06-17 PROCEDURE — 1159F PR MEDICATION LIST DOCUMENTED IN MEDICAL RECORD: ICD-10-PCS | Mod: CPTII,S$GLB,, | Performed by: INTERNAL MEDICINE

## 2022-06-17 PROCEDURE — 1159F MED LIST DOCD IN RCRD: CPT | Mod: CPTII,S$GLB,, | Performed by: INTERNAL MEDICINE

## 2022-06-17 PROCEDURE — 3078F DIAST BP <80 MM HG: CPT | Mod: CPTII,S$GLB,, | Performed by: INTERNAL MEDICINE

## 2022-06-17 PROCEDURE — 1101F PR PT FALLS ASSESS DOC 0-1 FALLS W/OUT INJ PAST YR: ICD-10-PCS | Mod: CPTII,S$GLB,, | Performed by: INTERNAL MEDICINE

## 2022-06-17 RX ORDER — LEVOFLOXACIN 500 MG/1
500 TABLET, FILM COATED ORAL DAILY
COMMUNITY
End: 2022-09-28

## 2022-06-17 NOTE — ASSESSMENT & PLAN NOTE
The patient has a long history of ischemic cardiomyopathy with severely depressed LVEF status post AICD. He is NYHA 1 at baseline.  He is on an aggressive medical regimen for his cardiomyopathy and has done well with this.  Will continue current regimen including metoprolol succinate 50 mg daily, Entresto  mg, and spironolactone 25 mg daily.  Patient has down well on furosemide 20x1 down from 40x1. Weight is stable. Recommend reintroducing an exercise regimen and reengaging his golf hobby.

## 2022-06-17 NOTE — ASSESSMENT & PLAN NOTE
Stable, chronic ischemic heart disease. Aspirin/clopidogrel (the patient prefers to stay on DAPT) and high dose statin with aggressive RF modification.

## 2022-06-17 NOTE — PROGRESS NOTES
Chief Complaint   Patient presents with    Cardiomyopathy     HPI:  This is a pleasant 76-year-old male with a history of anterior wall myocardial infarction in 1996 status post emergent CABG x3 at that time followed by multiple PCIs the last of which was in 2010, AICD placement 2010 with generator exchange in 2017, CVA 2021, hypertension, hyperlipidemia, and obstructive sleep apnea presenting for follow-up evaluation.    The patient was previously followed by Dr. Chand, but has since transitioned to the [facility] system due to his primary cardiologist's intermediate.  He has been followed by the Heart Transplant Team intermittently, but has never required Advanced Heart failure interventions.      From a cardiovascular perspective the patient has been in very good shape over the last decade.  He has no issues with chest pain, shortness of breath, or dyspnea on exertion.  His functional class appears to be NYHA 1.  He generally does all the activities he wants and functions at a higher level than his peers. Has not been exercising recently by choice.    He has tolerated his current regimen of aspirin, plavix, atorvastatin 80 mg, metoprolol succinate (reduced in mid summer 2021 from 100 mg to 50 mg daily), Entresto  mg, spironolactone 25 mg, and furosemide 20 mg daily.      Hasn't been checking his blood pressures at home.      PHYSICAL EXAM:  Vitals:    06/17/22 1027   BP: (!) 96/50   Pulse: 83   Resp: 20       Physical Exam  Constitutional:       Appearance: Normal appearance.   Neck:      Vascular: No carotid bruit or JVD.   Cardiovascular:      Rate and Rhythm: Normal rate and regular rhythm.      Pulses: Normal pulses.           Carotid pulses are 2+ on the right side and 2+ on the left side.       Radial pulses are 2+ on the right side and 2+ on the left side.        Dorsalis pedis pulses are 2+ on the right side and 2+ on the left side.        Posterior tibial pulses are 2+ on the right side and 2+  on the left side.      Heart sounds: S1 normal and S2 normal. No murmur heard.    No S3 sounds.   Pulmonary:      Effort: Pulmonary effort is normal.      Breath sounds: Normal breath sounds. No rales.   Feet:      Right foot:      Skin integrity: Skin integrity normal.      Left foot:      Skin integrity: Skin integrity normal.   Skin:     General: Skin is warm and dry.      Findings: No lesion.   Neurological:      Mental Status: He is alert and oriented to person, place, and time.      Motor: Motor function is intact.      Gait: Gait is intact.         LABS/CARDIAC TESTS:  August 2021 CBC and CMP are unremarkable.  LDL is 76 and HDL 31. Triglycerides are 141.   EKG August 2021 is a paced with a nonspecific intraventricular block that is old.  Anterior septal cues are present.  No ST changes.  PVC noted.  AICD interrogation June 2021 demonstrates normal function with 44% RA pacing and 0.1% RV pacing.  No tachy arrhythmias.  AICD interrogation August 2021 by me notes no evidence of tachyarrhythmias or shocks.  Patient remains a paced and V sensed 45 % of the time and a sensed/V sensed 54% of the time.  TTE July 2020 with moderate LV enlargement and a severely decreased ejection fraction at 20-25%.  Global HK with apical akinesis.  Normal RV size and function.  No significant valve disease.  IVC pressure 3. Similar to 2018 echo.  Nuclear stress test 2018 demonstrates no LVEF 23% with no evidence of ischemia.  There is a fixed defect in the anterior septum, anterior, apical, and inferoapical walls.  Carotid September 2021 demonstrates no significant stenosis.  CT head August 2021 demonstrates no acute abnormalities.  Chronic microvascular disease noted.  CTA Head September 2021 demonstrates remote left thalamic infarction. Right cavernous carotid aneuyrysm measuring 1.5cm. No significant carotid stenosis.     ASSESSMENT & PLAN:    Cardiomyopathy, ischemic  The patient has a long history of ischemic cardiomyopathy  with severely depressed LVEF status post AICD. He is NYHA 1 at baseline.  He is on an aggressive medical regimen for his cardiomyopathy and has done well with this.  Will continue current regimen including metoprolol succinate 50 mg daily, Entresto  mg, and spironolactone 25 mg daily.  Patient has down well on furosemide 20x1 down from 40x1. Weight is stable. Recommend reintroducing an exercise regimen and reengaging his golf hobby.     Coronary artery disease involving native coronary artery of native heart without angina pectoris  Stable, chronic ischemic heart disease. Aspirin/clopidogrel (the patient prefers to stay on DAPT) and high dose statin with aggressive RF modification.        Other hyperlipidemia  LDL near goal on atorvastatin 80 mg.       Cardiomyopathy, ischemic    Coronary artery disease involving native coronary artery of native heart without angina pectoris    Other hyperlipidemia        Lonnie Heller MD

## 2022-08-14 ENCOUNTER — CLINICAL SUPPORT (OUTPATIENT)
Dept: CARDIOLOGY | Facility: HOSPITAL | Age: 77
End: 2022-08-14
Payer: MEDICARE

## 2022-08-14 DIAGNOSIS — I42.9 CARDIOMYOPATHY, UNSPECIFIED: ICD-10-CM

## 2022-08-14 DIAGNOSIS — Z95.810 PRESENCE OF AUTOMATIC (IMPLANTABLE) CARDIAC DEFIBRILLATOR: ICD-10-CM

## 2022-08-14 DIAGNOSIS — I50.9 HEART FAILURE, UNSPECIFIED: ICD-10-CM

## 2022-08-14 DIAGNOSIS — I47.20 VENTRICULAR TACHYCARDIA: ICD-10-CM

## 2022-08-14 PROCEDURE — 93296 REM INTERROG EVL PM/IDS: CPT | Performed by: INTERNAL MEDICINE

## 2022-09-28 PROBLEM — D84.821 DRUG-INDUCED IMMUNODEFICIENCY: Status: ACTIVE | Noted: 2022-09-28

## 2022-09-28 PROBLEM — Z79.899 DRUG-INDUCED IMMUNODEFICIENCY: Status: ACTIVE | Noted: 2022-09-28

## 2022-09-30 ENCOUNTER — LAB VISIT (OUTPATIENT)
Dept: LAB | Facility: HOSPITAL | Age: 77
End: 2022-09-30
Attending: INTERNAL MEDICINE
Payer: MEDICARE

## 2022-09-30 DIAGNOSIS — I25.5 CARDIOMYOPATHY, ISCHEMIC: ICD-10-CM

## 2022-09-30 DIAGNOSIS — Z12.5 PROSTATE CANCER SCREENING: ICD-10-CM

## 2022-09-30 DIAGNOSIS — I25.118 CORONARY ARTERY DISEASE OF NATIVE ARTERY OF NATIVE HEART WITH STABLE ANGINA PECTORIS: ICD-10-CM

## 2022-09-30 LAB
ALBUMIN SERPL BCP-MCNC: 3.9 G/DL (ref 3.5–5.2)
ALP SERPL-CCNC: 76 U/L (ref 55–135)
ALT SERPL W/O P-5'-P-CCNC: 23 U/L (ref 10–44)
ANION GAP SERPL CALC-SCNC: 10 MMOL/L (ref 8–16)
AST SERPL-CCNC: 21 U/L (ref 10–40)
BASOPHILS # BLD AUTO: 0.04 K/UL (ref 0–0.2)
BASOPHILS NFR BLD: 0.7 % (ref 0–1.9)
BILIRUB SERPL-MCNC: 0.5 MG/DL (ref 0.1–1)
BUN SERPL-MCNC: 21 MG/DL (ref 8–23)
CALCIUM SERPL-MCNC: 9.1 MG/DL (ref 8.7–10.5)
CHLORIDE SERPL-SCNC: 107 MMOL/L (ref 95–110)
CHOLEST SERPL-MCNC: 116 MG/DL (ref 120–199)
CHOLEST/HDLC SERPL: 2.9 {RATIO} (ref 2–5)
CO2 SERPL-SCNC: 24 MMOL/L (ref 23–29)
COMPLEXED PSA SERPL-MCNC: 0.89 NG/ML (ref 0–4)
CREAT SERPL-MCNC: 0.9 MG/DL (ref 0.5–1.4)
DIFFERENTIAL METHOD: ABNORMAL
EOSINOPHIL # BLD AUTO: 0.5 K/UL (ref 0–0.5)
EOSINOPHIL NFR BLD: 8 % (ref 0–8)
ERYTHROCYTE [DISTWIDTH] IN BLOOD BY AUTOMATED COUNT: 12.9 % (ref 11.5–14.5)
EST. GFR  (NO RACE VARIABLE): >60 ML/MIN/1.73 M^2
GLUCOSE SERPL-MCNC: 99 MG/DL (ref 70–110)
HCT VFR BLD AUTO: 40.5 % (ref 40–54)
HDLC SERPL-MCNC: 40 MG/DL (ref 40–75)
HDLC SERPL: 34.5 % (ref 20–50)
HGB BLD-MCNC: 13.3 G/DL (ref 14–18)
IMM GRANULOCYTES # BLD AUTO: 0.01 K/UL (ref 0–0.04)
IMM GRANULOCYTES NFR BLD AUTO: 0.2 % (ref 0–0.5)
LDLC SERPL CALC-MCNC: 63.4 MG/DL (ref 63–159)
LYMPHOCYTES # BLD AUTO: 1.9 K/UL (ref 1–4.8)
LYMPHOCYTES NFR BLD: 33.6 % (ref 18–48)
MCH RBC QN AUTO: 31.5 PG (ref 27–31)
MCHC RBC AUTO-ENTMCNC: 32.8 G/DL (ref 32–36)
MCV RBC AUTO: 96 FL (ref 82–98)
MONOCYTES # BLD AUTO: 0.5 K/UL (ref 0.3–1)
MONOCYTES NFR BLD: 9.1 % (ref 4–15)
NEUTROPHILS # BLD AUTO: 2.7 K/UL (ref 1.8–7.7)
NEUTROPHILS NFR BLD: 48.4 % (ref 38–73)
NONHDLC SERPL-MCNC: 76 MG/DL
NRBC BLD-RTO: 0 /100 WBC
PLATELET # BLD AUTO: 218 K/UL (ref 150–450)
PMV BLD AUTO: 9.3 FL (ref 9.2–12.9)
POTASSIUM SERPL-SCNC: 4.8 MMOL/L (ref 3.5–5.1)
PROT SERPL-MCNC: 6.5 G/DL (ref 6–8.4)
RBC # BLD AUTO: 4.22 M/UL (ref 4.6–6.2)
SODIUM SERPL-SCNC: 141 MMOL/L (ref 136–145)
TRIGL SERPL-MCNC: 63 MG/DL (ref 30–150)
WBC # BLD AUTO: 5.6 K/UL (ref 3.9–12.7)

## 2022-09-30 PROCEDURE — 85025 COMPLETE CBC W/AUTO DIFF WBC: CPT | Performed by: INTERNAL MEDICINE

## 2022-09-30 PROCEDURE — 80061 LIPID PANEL: CPT | Performed by: INTERNAL MEDICINE

## 2022-09-30 PROCEDURE — 84153 ASSAY OF PSA TOTAL: CPT | Performed by: INTERNAL MEDICINE

## 2022-09-30 PROCEDURE — 80053 COMPREHEN METABOLIC PANEL: CPT | Performed by: INTERNAL MEDICINE

## 2022-09-30 PROCEDURE — 36415 COLL VENOUS BLD VENIPUNCTURE: CPT | Performed by: INTERNAL MEDICINE

## 2022-10-10 ENCOUNTER — TELEPHONE (OUTPATIENT)
Dept: NEUROSURGERY | Facility: CLINIC | Age: 77
End: 2022-10-10
Payer: MEDICARE

## 2022-10-10 DIAGNOSIS — I67.1 ANEURYSM OF CAVERNOUS PORTION OF RIGHT INTERNAL CAROTID ARTERY: Primary | ICD-10-CM

## 2022-11-12 ENCOUNTER — CLINICAL SUPPORT (OUTPATIENT)
Dept: CARDIOLOGY | Facility: HOSPITAL | Age: 77
End: 2022-11-12
Payer: MEDICARE

## 2022-11-12 DIAGNOSIS — I47.20 VENTRICULAR TACHYCARDIA: ICD-10-CM

## 2022-11-12 DIAGNOSIS — Z95.810 PRESENCE OF AUTOMATIC (IMPLANTABLE) CARDIAC DEFIBRILLATOR: ICD-10-CM

## 2022-11-12 DIAGNOSIS — I50.9 HEART FAILURE, UNSPECIFIED: ICD-10-CM

## 2022-11-12 DIAGNOSIS — I42.0 DILATED CARDIOMYOPATHY: ICD-10-CM

## 2022-11-12 PROCEDURE — 93295 CARDIAC DEVICE CHECK - REMOTE: ICD-10-PCS | Mod: ,,, | Performed by: INTERNAL MEDICINE

## 2022-11-12 PROCEDURE — 93296 REM INTERROG EVL PM/IDS: CPT | Performed by: INTERNAL MEDICINE

## 2022-11-12 PROCEDURE — 93295 DEV INTERROG REMOTE 1/2/MLT: CPT | Mod: ,,, | Performed by: INTERNAL MEDICINE

## 2022-11-16 ENCOUNTER — HOSPITAL ENCOUNTER (OUTPATIENT)
Dept: RADIOLOGY | Facility: HOSPITAL | Age: 77
Discharge: HOME | End: 2022-11-16
Attending: NEUROLOGICAL SURGERY
Payer: MEDICARE

## 2022-11-16 ENCOUNTER — OFFICE VISIT (OUTPATIENT)
Dept: NEUROSURGERY | Facility: CLINIC | Age: 77
End: 2022-11-16
Payer: MEDICARE

## 2022-11-16 VITALS
DIASTOLIC BLOOD PRESSURE: 59 MMHG | SYSTOLIC BLOOD PRESSURE: 95 MMHG | RESPIRATION RATE: 18 BRPM | HEIGHT: 66 IN | WEIGHT: 159 LBS | BODY MASS INDEX: 25.55 KG/M2 | HEART RATE: 78 BPM

## 2022-11-16 DIAGNOSIS — I67.1 ANEURYSM OF CAVERNOUS PORTION OF RIGHT INTERNAL CAROTID ARTERY: ICD-10-CM

## 2022-11-16 DIAGNOSIS — I67.1 ANEURYSM OF CAVERNOUS PORTION OF RIGHT INTERNAL CAROTID ARTERY: Primary | ICD-10-CM

## 2022-11-16 PROCEDURE — 3074F SYST BP LT 130 MM HG: CPT | Mod: CPTII,S$GLB,, | Performed by: NURSE PRACTITIONER

## 2022-11-16 PROCEDURE — 1126F AMNT PAIN NOTED NONE PRSNT: CPT | Mod: CPTII,S$GLB,, | Performed by: NURSE PRACTITIONER

## 2022-11-16 PROCEDURE — 99999 PR PBB SHADOW E&M-EST. PATIENT-LVL III: CPT | Mod: PBBFAC,,, | Performed by: NURSE PRACTITIONER

## 2022-11-16 PROCEDURE — 70496 CT ANGIOGRAPHY HEAD: CPT | Mod: TC,PO

## 2022-11-16 PROCEDURE — 3078F DIAST BP <80 MM HG: CPT | Mod: CPTII,S$GLB,, | Performed by: NURSE PRACTITIONER

## 2022-11-16 PROCEDURE — 3074F PR MOST RECENT SYSTOLIC BLOOD PRESSURE < 130 MM HG: ICD-10-PCS | Mod: CPTII,S$GLB,, | Performed by: NURSE PRACTITIONER

## 2022-11-16 PROCEDURE — 3288F PR FALLS RISK ASSESSMENT DOCUMENTED: ICD-10-PCS | Mod: CPTII,S$GLB,, | Performed by: NURSE PRACTITIONER

## 2022-11-16 PROCEDURE — 3288F FALL RISK ASSESSMENT DOCD: CPT | Mod: CPTII,S$GLB,, | Performed by: NURSE PRACTITIONER

## 2022-11-16 PROCEDURE — 70496 CTA HEAD: ICD-10-PCS | Mod: 26,,, | Performed by: RADIOLOGY

## 2022-11-16 PROCEDURE — 99214 PR OFFICE/OUTPT VISIT, EST, LEVL IV, 30-39 MIN: ICD-10-PCS | Mod: S$GLB,,, | Performed by: NURSE PRACTITIONER

## 2022-11-16 PROCEDURE — 99214 OFFICE O/P EST MOD 30 MIN: CPT | Mod: S$GLB,,, | Performed by: NURSE PRACTITIONER

## 2022-11-16 PROCEDURE — 70496 CT ANGIOGRAPHY HEAD: CPT | Mod: 26,,, | Performed by: RADIOLOGY

## 2022-11-16 PROCEDURE — 1101F PT FALLS ASSESS-DOCD LE1/YR: CPT | Mod: CPTII,S$GLB,, | Performed by: NURSE PRACTITIONER

## 2022-11-16 PROCEDURE — 99999 PR PBB SHADOW E&M-EST. PATIENT-LVL III: ICD-10-PCS | Mod: PBBFAC,,, | Performed by: NURSE PRACTITIONER

## 2022-11-16 PROCEDURE — 1159F PR MEDICATION LIST DOCUMENTED IN MEDICAL RECORD: ICD-10-PCS | Mod: CPTII,S$GLB,, | Performed by: NURSE PRACTITIONER

## 2022-11-16 PROCEDURE — 25500020 PHARM REV CODE 255: Mod: PO | Performed by: NEUROLOGICAL SURGERY

## 2022-11-16 PROCEDURE — 3078F PR MOST RECENT DIASTOLIC BLOOD PRESSURE < 80 MM HG: ICD-10-PCS | Mod: CPTII,S$GLB,, | Performed by: NURSE PRACTITIONER

## 2022-11-16 PROCEDURE — 1159F MED LIST DOCD IN RCRD: CPT | Mod: CPTII,S$GLB,, | Performed by: NURSE PRACTITIONER

## 2022-11-16 PROCEDURE — 1126F PR PAIN SEVERITY QUANTIFIED, NO PAIN PRESENT: ICD-10-PCS | Mod: CPTII,S$GLB,, | Performed by: NURSE PRACTITIONER

## 2022-11-16 PROCEDURE — 1101F PR PT FALLS ASSESS DOC 0-1 FALLS W/OUT INJ PAST YR: ICD-10-PCS | Mod: CPTII,S$GLB,, | Performed by: NURSE PRACTITIONER

## 2022-11-16 RX ADMIN — IOHEXOL 100 ML: 350 INJECTION, SOLUTION INTRAVENOUS at 01:11

## 2022-11-16 NOTE — PROGRESS NOTES
Neurosurgery History & Physical    Patient ID: Tristen Blanc is a 77 y.o. male.    Chief Complaint   Patient presents with    Follow-up     Patient presents to clinic today in regards to one year follow up with imaging. Patient states over all doing well, no new symptoms.        History of Present Illness:   Mr. Blanc is a 76-year-old gentleman with history of what in retrospect was likely an acute left thalamic infarct in August of 2021.  Follow-up of this event led to CT angiography of the head and neck which revealed a small right cavernous segment aneurysm. We opted to follow up in one year at that time with repeat CTA.     He presents today with new imaging. He denies new neurologic symptom. His BP is 95/59 in clinic today. He has taken his BP medication but has not eaten or had caffeine which he typically does everyday. He is daily smoker but reportedly does not inhale.     Review of Systems  Constitutional: Negative for activity change and fever.   HENT: Negative for rhinorrhea, tinnitus, trouble swallowing and voice change.    Eyes: Negative for visual disturbance.   Respiratory: Negative for shortness of breath.    Cardiovascular: Negative for chest pain.   Gastrointestinal: Negative for nausea and vomiting.   Endocrine: Negative for cold intolerance, heat intolerance, polydipsia, polyphagia and polyuria.   Genitourinary: Negative for decreased urine volume, frequency and urgency.   Musculoskeletal: Negative for back pain, neck pain and neck stiffness.   Neurological: Negative for dizziness, tremors, seizures, syncope, facial asymmetry, speech difficulty, weakness, light-headedness and headaches.   Psychiatric/Behavioral: Negative for confusion.   Past Medical History:   Diagnosis Date    Allergy     Arthritis     Cardiomyopathy     ischemic    CHF (congestive heart failure)     Coronary artery disease     s/p CABG    General anesthetics causing adverse effect in therapeutic use     Hyperlipidemia      Hypertension     ICD (implantable cardioverter-defibrillator) in place 5/3/2013    Sleep apnea with use of continuous positive airway pressure (CPAP)      Social History     Socioeconomic History    Marital status:    Tobacco Use    Smoking status: Every Day     Packs/day: 1.00     Types: Cigarettes    Smokeless tobacco: Never    Tobacco comments:     SAYS TRIED EVERYTHING INCLUDING HYPNOSIOS AND DRUGS; NOT INTERESTED IN SMOKING PROGRAM HERE   Substance and Sexual Activity    Alcohol use: Yes     Comment: social; rare    Drug use: No   Social History Narrative     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    Heart attack Paternal Aunt         3 out of 4  at late age of AMI     Review of patient's allergies indicates:  No Known Allergies    Current Outpatient Medications:     ANTIOX #11/OM3/DHA/EPA/LUT/MADELINE (OCUVITE ADULT 50+ ORAL), Take by mouth once daily., Disp: , Rfl:     aspirin (ECOTRIN) 81 MG EC tablet, Take 81 mg by mouth once daily.  , Disp: , Rfl:     atorvastatin (LIPITOR) 80 MG tablet, TAKE 1 TABLET BY MOUTH EVERY EVENING, Disp: 90 tablet, Rfl: 3    clopidogreL (PLAVIX) 75 mg tablet, Take 1 tablet (75 mg total) by mouth every evening., Disp: 90 tablet, Rfl: 3    coenzyme Q10 200 mg capsule, Take 200 mg by mouth once daily., Disp: 90 capsule, Rfl: 3    ergocalciferol (ERGOCALCIFEROL) 50,000 unit Cap, Take 2,000 Units by mouth every Mon, Wed, Fri., Disp: , Rfl:     flu vac  [id number],PF, (FLUAD QUAD -,65Y UP,,PF,) 60 mcg (15 mcg x 4)/0.5 mL Syrg, Inject into the muscle., Disp: 0.5 mL, Rfl: 0    fluticasone (FLONASE) 50 mcg/actuation nasal spray, 1 spray by Nasal route  "as needed. , Disp: , Rfl: 6    FOLIC ACID/MV,FE,OTHER MIN (CENTRUM ORAL), Take 1 tablet by mouth every morning. , Disp: , Rfl:     furosemide (LASIX) 20 MG tablet, Take 1 tablet (20 mg total) by mouth once daily., Disp: 30 tablet, Rfl: 6    metoprolol succinate (TOPROL-XL) 50 MG 24 hr tablet, Take 1 tablet (50 mg total) by mouth once daily., Disp: 90 tablet, Rfl: 3    montelukast (SINGULAIR) 10 mg tablet, Take 1 tablet (10 mg total) by mouth daily as needed., Disp: 90 tablet, Rfl: 3    nitroGLYCERIN (NITROSTAT) 0.4 MG SL tablet, ONE TABLET UNDER TONGUE AS NEEDED FOR CHEST PAIN. TAKE EVERY 5 MINUTES AS NEEDED, Disp: 25 tablet, Rfl: 6    sacubitriL-valsartan (ENTRESTO)  mg per tablet, Take 1 tablet by mouth 2 (two) times daily., Disp: 60 tablet, Rfl: 11    spironolactone (ALDACTONE) 25 MG tablet, TAKE 1 TABLET(25 MG) BY MOUTH EVERY DAY, Disp: 90 tablet, Rfl: 3    vardenafil (LEVITRA) 20 MG tablet, Take 20 mg by mouth daily as needed.  , Disp: , Rfl:   No current facility-administered medications for this visit.  Blood pressure (!) 95/59, pulse 78, resp. rate 18, height 5' 6" (1.676 m), weight 72.1 kg (159 lb).      Neurologic Exam  AAOx4, NAD  GONSALO, MS grossly 5/5  SILT  CN II-XII intact    Imaging:   CTA head dated 11/16/22 personally reviewed and discussed with the patient.   There is stable small 1.5 mm outpouching present in the right cavernous segment.    Assessment & Plan:   1. 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 We discussed aneurysm and importance of smoking cessation and blood pressure control. Recommended he also follow up with Neurology for any further recommendations on stroke prevention. Will plan to follow up in 1 year with repeat CTA head. He is agreeable to the plan.   "

## 2023-01-20 ENCOUNTER — OFFICE VISIT (OUTPATIENT)
Dept: CARDIOLOGY | Facility: CLINIC | Age: 78
End: 2023-01-20
Payer: MEDICARE

## 2023-01-20 VITALS
BODY MASS INDEX: 25.37 KG/M2 | HEART RATE: 81 BPM | DIASTOLIC BLOOD PRESSURE: 56 MMHG | SYSTOLIC BLOOD PRESSURE: 112 MMHG | WEIGHT: 157.19 LBS

## 2023-01-20 DIAGNOSIS — I25.10 CORONARY ARTERY DISEASE INVOLVING NATIVE CORONARY ARTERY OF NATIVE HEART WITHOUT ANGINA PECTORIS: ICD-10-CM

## 2023-01-20 DIAGNOSIS — E78.49 OTHER HYPERLIPIDEMIA: ICD-10-CM

## 2023-01-20 DIAGNOSIS — Z95.810 ICD (IMPLANTABLE CARDIOVERTER-DEFIBRILLATOR) IN PLACE: ICD-10-CM

## 2023-01-20 DIAGNOSIS — I25.5 CARDIOMYOPATHY, ISCHEMIC: Primary | Chronic | ICD-10-CM

## 2023-01-20 PROCEDURE — 1160F PR REVIEW ALL MEDS BY PRESCRIBER/CLIN PHARMACIST DOCUMENTED: ICD-10-PCS | Mod: CPTII,S$GLB,, | Performed by: INTERNAL MEDICINE

## 2023-01-20 PROCEDURE — 3288F FALL RISK ASSESSMENT DOCD: CPT | Mod: CPTII,S$GLB,, | Performed by: INTERNAL MEDICINE

## 2023-01-20 PROCEDURE — 1159F PR MEDICATION LIST DOCUMENTED IN MEDICAL RECORD: ICD-10-PCS | Mod: CPTII,S$GLB,, | Performed by: INTERNAL MEDICINE

## 2023-01-20 PROCEDURE — 3078F DIAST BP <80 MM HG: CPT | Mod: CPTII,S$GLB,, | Performed by: INTERNAL MEDICINE

## 2023-01-20 PROCEDURE — 99999 PR PBB SHADOW E&M-EST. PATIENT-LVL III: ICD-10-PCS | Mod: PBBFAC,,, | Performed by: INTERNAL MEDICINE

## 2023-01-20 PROCEDURE — 3288F PR FALLS RISK ASSESSMENT DOCUMENTED: ICD-10-PCS | Mod: CPTII,S$GLB,, | Performed by: INTERNAL MEDICINE

## 2023-01-20 PROCEDURE — 3074F PR MOST RECENT SYSTOLIC BLOOD PRESSURE < 130 MM HG: ICD-10-PCS | Mod: CPTII,S$GLB,, | Performed by: INTERNAL MEDICINE

## 2023-01-20 PROCEDURE — 99214 PR OFFICE/OUTPT VISIT, EST, LEVL IV, 30-39 MIN: ICD-10-PCS | Mod: S$GLB,,, | Performed by: INTERNAL MEDICINE

## 2023-01-20 PROCEDURE — 1160F RVW MEDS BY RX/DR IN RCRD: CPT | Mod: CPTII,S$GLB,, | Performed by: INTERNAL MEDICINE

## 2023-01-20 PROCEDURE — 1126F AMNT PAIN NOTED NONE PRSNT: CPT | Mod: CPTII,S$GLB,, | Performed by: INTERNAL MEDICINE

## 2023-01-20 PROCEDURE — 1101F PR PT FALLS ASSESS DOC 0-1 FALLS W/OUT INJ PAST YR: ICD-10-PCS | Mod: CPTII,S$GLB,, | Performed by: INTERNAL MEDICINE

## 2023-01-20 PROCEDURE — 1126F PR PAIN SEVERITY QUANTIFIED, NO PAIN PRESENT: ICD-10-PCS | Mod: CPTII,S$GLB,, | Performed by: INTERNAL MEDICINE

## 2023-01-20 PROCEDURE — 3078F PR MOST RECENT DIASTOLIC BLOOD PRESSURE < 80 MM HG: ICD-10-PCS | Mod: CPTII,S$GLB,, | Performed by: INTERNAL MEDICINE

## 2023-01-20 PROCEDURE — 99214 OFFICE O/P EST MOD 30 MIN: CPT | Mod: S$GLB,,, | Performed by: INTERNAL MEDICINE

## 2023-01-20 PROCEDURE — 1159F MED LIST DOCD IN RCRD: CPT | Mod: CPTII,S$GLB,, | Performed by: INTERNAL MEDICINE

## 2023-01-20 PROCEDURE — 99999 PR PBB SHADOW E&M-EST. PATIENT-LVL III: CPT | Mod: PBBFAC,,, | Performed by: INTERNAL MEDICINE

## 2023-01-20 PROCEDURE — 1101F PT FALLS ASSESS-DOCD LE1/YR: CPT | Mod: CPTII,S$GLB,, | Performed by: INTERNAL MEDICINE

## 2023-01-20 PROCEDURE — 3074F SYST BP LT 130 MM HG: CPT | Mod: CPTII,S$GLB,, | Performed by: INTERNAL MEDICINE

## 2023-01-20 NOTE — PROGRESS NOTES
HISTORY:    77-year-old male with a history of anterior wall myocardial infarction in 1996 status post emergent CABG x3 at that time followed by multiple PCIs the last of which was in 2010, AICD placement 2010 with generator exchange in 2017, CVA 2021, hypertension, hyperlipidemia, and obstructive sleep apnea presenting for follow-up evaluation.     The patient was previously followed by Dr. Chand, but has since transitioned to the [facility] system due to his primary cardiologist's alf.  He has been followed by the Heart Transplant Team intermittently, but has never required Advanced Heart failure interventions.       From a cardiovascular perspective the patient has been in very good shape over the last decade. He has no issues with chest pain, shortness of breath, or dyspnea on exertion.  His functional class appears to be NYHA 1.  He generally does all the activities he wants, but has been lazy over the last 1-2 years.      He has tolerated his current regimen of aspirin, plavix, atorvastatin 80 mg, metoprolol succinate (reduced in mid summer 2021 from 100 mg to 50 mg daily), Entresto  mg, spironolactone 25 mg, and furosemide 20 mg daily.       Hasn't been checking his blood pressures at home.      MEDICATIONS:      Current Outpatient Medications:     ANTIOX #11/OM3/DHA/EPA/LUT/MADELINE (OCUVITE ADULT 50+ ORAL), Take by mouth once daily., Disp: , Rfl:     aspirin (ECOTRIN) 81 MG EC tablet, Take 81 mg by mouth once daily.  , Disp: , Rfl:     atorvastatin (LIPITOR) 80 MG tablet, TAKE 1 TABLET BY MOUTH EVERY EVENING, Disp: 90 tablet, Rfl: 3    clopidogreL (PLAVIX) 75 mg tablet, Take 1 tablet (75 mg total) by mouth every evening., Disp: 90 tablet, Rfl: 3    coenzyme Q10 200 mg capsule, Take 200 mg by mouth once daily., Disp: 90 capsule, Rfl: 3    ergocalciferol (ERGOCALCIFEROL) 50,000 unit Cap, Take 2,000 Units by mouth every Mon, Wed, Fri., Disp: , Rfl:     flu vac 2022 [id number],PF, (FLUAD QUAD  2022-23,65Y UP,,PF,) 60 mcg (15 mcg x 4)/0.5 mL Syrg, Inject into the muscle., Disp: 0.5 mL, Rfl: 0    fluticasone (FLONASE) 50 mcg/actuation nasal spray, 1 spray by Nasal route as needed. , Disp: , Rfl: 6    FOLIC ACID/MV,FE,OTHER MIN (CENTRUM ORAL), Take 1 tablet by mouth every morning. , Disp: , Rfl:     furosemide (LASIX) 20 MG tablet, TAKE 1 TABLET(20 MG) BY MOUTH EVERY DAY, Disp: 30 tablet, Rfl: 6    metoprolol succinate (TOPROL-XL) 50 MG 24 hr tablet, Take 1 tablet (50 mg total) by mouth once daily., Disp: 90 tablet, Rfl: 3    montelukast (SINGULAIR) 10 mg tablet, Take 1 tablet (10 mg total) by mouth daily as needed., Disp: 90 tablet, Rfl: 3    nitroGLYCERIN (NITROSTAT) 0.4 MG SL tablet, ONE TABLET UNDER TONGUE AS NEEDED FOR CHEST PAIN. TAKE EVERY 5 MINUTES AS NEEDED, Disp: 25 tablet, Rfl: 6    sacubitriL-valsartan (ENTRESTO)  mg per tablet, Take 1 tablet by mouth 2 (two) times daily., Disp: 60 tablet, Rfl: 11    spironolactone (ALDACTONE) 25 MG tablet, TAKE 1 TABLET(25 MG) BY MOUTH EVERY DAY, Disp: 90 tablet, Rfl: 3    vardenafil (LEVITRA) 20 MG tablet, Take 20 mg by mouth daily as needed.  , Disp: , Rfl:       PHYSICAL EXAM:    Vitals:    01/20/23 1031   BP: (!) 112/56   Pulse: 81       NAD, A+Ox3.  No jvd, no bruit.  RRR nml s1,s2. No murmurs.  CTA B no wheezes or crackles.  2+ B radial and 1+ B DP/PT. No edema.    LABS/STUDIES (imaging reviewed during clinic visit):    September 2022 CBC and CMP are unremarkable.  LDL is 63 and HDL 40. Triglycerides are 63.   EKG August 2021 is a paced with a nonspecific intraventricular block that is old.  Anterior septal cues are present.  No ST changes.  PVC noted.  AICD interrogation September 2022 demonstrates normal function with 10% RA pacing and <0.1% RV pacing.  12 at/af episodes less than 1 minute.   AICD interrogation August 2021 by me notes no evidence of tachyarrhythmias or shocks.  Patient remains a paced and V sensed 45 % of the time and a sensed/V  sensed 54% of the time.  TTE July 2020 with moderate LV enlargement and a severely decreased ejection fraction at 20-25%.  Global HK with apical akinesis.  Normal RV size and function.  No significant valve disease.  IVC pressure 3. Similar to 2018 echo.  Nuclear stress test 2018 demonstrates no LVEF 23% with no evidence of ischemia.  There is a fixed defect in the anterior septum, anterior, apical, and inferoapical walls.  Carotid September 2021 demonstrates no significant stenosis.  CT head August 2021 demonstrates no acute abnormalities.  Chronic microvascular disease noted.  CTA Head September 2021 demonstrates remote left thalamic infarction. Right cavernous carotid aneuyrysm measuring 1.5cm. No significant carotid stenosis. 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    ASSESSMENT & PLAN:    1. Cardiomyopathy, ischemic    2. Coronary artery disease involving native coronary artery of native heart without angina pectoris    3. Other hyperlipidemia    4. ICD (implantable cardioverter-defibrillator) in place              The patient has a long history of ischemic cardiomyopathy with severely depressed LVEF status post AICD. He is NYHA 1 at baseline.  He is on an aggressive medical regimen for his cardiomyopathy and has done well with this.  Will continue current regimen including metoprolol succinate 50 mg daily, Entresto  mg, and spironolactone 25 mg daily.  Patient has done well on furosemide 20x1 down from 40x1. Weight is stable.      Stable, chronic ischemic heart disease. Aspirin/clopidogrel (the patient prefers to stay on DAPT) and high dose statin with aggressive RF modification.      LDL at goal on atorvastatin 80 mg.     Recommend reintroducing an exercise regimen and reengaging his golf hobby. Worry that patient has become social introverted after his CVA. Lots of family stressors noted.     Lonnie Heller MD

## 2023-02-10 ENCOUNTER — CLINICAL SUPPORT (OUTPATIENT)
Dept: CARDIOLOGY | Facility: HOSPITAL | Age: 78
End: 2023-02-10
Payer: MEDICARE

## 2023-02-10 DIAGNOSIS — Z95.810 PRESENCE OF AUTOMATIC (IMPLANTABLE) CARDIAC DEFIBRILLATOR: ICD-10-CM

## 2023-02-10 PROCEDURE — 93296 REM INTERROG EVL PM/IDS: CPT | Performed by: INTERNAL MEDICINE

## 2023-03-28 PROBLEM — L98.9 BENIGN SKIN LESION OF FOREHEAD: Status: ACTIVE | Noted: 2023-03-28

## 2023-03-28 PROBLEM — D84.821 DRUG-INDUCED IMMUNODEFICIENCY: Status: RESOLVED | Noted: 2022-09-28 | Resolved: 2023-03-28

## 2023-03-28 PROBLEM — F32.0 MILD MAJOR DEPRESSION: Status: ACTIVE | Noted: 2023-03-28

## 2023-03-28 PROBLEM — Z79.899 DRUG-INDUCED IMMUNODEFICIENCY: Status: RESOLVED | Noted: 2022-09-28 | Resolved: 2023-03-28

## 2023-04-20 ENCOUNTER — TELEPHONE (OUTPATIENT)
Dept: NEUROLOGY | Facility: CLINIC | Age: 78
End: 2023-04-20
Payer: MEDICARE

## 2023-04-20 NOTE — TELEPHONE ENCOUNTER
Attempted to call patient to reschedule appointment due to being scheduled incorrectly (time slot is incorrect Memory patients need an hour). Patient did not answer, left message to call back.

## 2023-04-25 ENCOUNTER — TELEPHONE (OUTPATIENT)
Dept: SLEEP MEDICINE | Facility: CLINIC | Age: 78
End: 2023-04-25
Payer: MEDICARE

## 2023-04-25 NOTE — TELEPHONE ENCOUNTER
----- Message from Trina Lucas sent at 4/25/2023 10:41 AM CDT -----  Regarding: sooner appt  Contact: LOLISRADHA RIVAS [[id number]]  Type:  Sooner Apoointment Request    Caller is requesting a sooner appointment.  Caller declined first available appointment listed below.  Caller will not accept being placed on the waitlist and is requesting a message be sent to doctor.  Name of Caller:Mr. Radha Blanc    When is the first available appointment?7/27  Symptoms:CPAP  machine issues / heater issues   Would the patient rather a call back or a response via [facility]? Call back   Best Call Back Number:Home Phone      [phone]  Work Phone      Not on file.  Mobile          [phone]      Additional Information:

## 2023-04-26 ENCOUNTER — TELEPHONE (OUTPATIENT)
Dept: SLEEP MEDICINE | Facility: CLINIC | Age: 78
End: 2023-04-26
Payer: MEDICARE

## 2023-04-26 NOTE — TELEPHONE ENCOUNTER
----- Message from Julieta Mays sent at 4/26/2023 11:17 AM CDT -----  Regarding: returning call  Type:  Patient Returning Call    Who Called: Cassi ( wife)     Who Left Message for Patient: karely     Does the patient know what this is regarding?:yes     Would the patient rather a call back or a response via [facility]?  Call     Best Call Back Number:[phone]     Additional Information:

## 2023-04-26 NOTE — TELEPHONE ENCOUNTER
----- Message from India Bella sent at 4/25/2023  3:33 PM CDT -----  Regarding: Return CAll  Type: Patient Returning Call            Who Called: Cassi Wife            Who Left Message for Patient:Irish             Does the patient know what this is regarding? Yes            Best Call Back Number: [phone]

## 2023-04-27 ENCOUNTER — TELEPHONE (OUTPATIENT)
Dept: PAIN MEDICINE | Facility: CLINIC | Age: 78
End: 2023-04-27
Payer: MEDICARE

## 2023-04-27 NOTE — TELEPHONE ENCOUNTER
----- Message from Trina Lucas sent at 4/27/2023 10:21 AM CDT -----  Regarding: Returning a call  Contact: Cassi -Spouse  Type:  Patient Returning Call    Who Called:Cassi -Spouse   Who Left Message for Patient:NP   Does the patient know what this is regarding?:Yes  Would the patient rather a call back or a response via [facility]? Call back  Best Call Back Number:Home Phone      [phone]  Work Phone      Not on file.  Mobile          [phone]      Additional Information:

## 2023-04-27 NOTE — TELEPHONE ENCOUNTER
Staff called patient  and discovered that she meant to call her PCP in regards to her husbands C-pap machine

## 2023-05-06 DIAGNOSIS — I25.10 CORONARY ARTERY DISEASE: ICD-10-CM

## 2023-05-08 ENCOUNTER — LAB VISIT (OUTPATIENT)
Dept: LAB | Facility: HOSPITAL | Age: 78
End: 2023-05-08
Payer: MEDICARE

## 2023-05-08 ENCOUNTER — OFFICE VISIT (OUTPATIENT)
Dept: NEUROLOGY | Facility: CLINIC | Age: 78
End: 2023-05-08
Payer: MEDICARE

## 2023-05-08 VITALS — DIASTOLIC BLOOD PRESSURE: 60 MMHG | SYSTOLIC BLOOD PRESSURE: 109 MMHG | HEART RATE: 78 BPM

## 2023-05-08 DIAGNOSIS — R41.3 MEMORY CHANGE: ICD-10-CM

## 2023-05-08 DIAGNOSIS — G47.33 OSA ON CPAP: ICD-10-CM

## 2023-05-08 DIAGNOSIS — I72.0 ANEURYSM OF CAROTID ARTERY: ICD-10-CM

## 2023-05-08 DIAGNOSIS — R41.3 MEMORY CHANGE: Primary | ICD-10-CM

## 2023-05-08 DIAGNOSIS — Z86.73 H/O: STROKE: ICD-10-CM

## 2023-05-08 DIAGNOSIS — Z72.0 TOBACCO ABUSE: ICD-10-CM

## 2023-05-08 LAB
AMMONIA PLAS-SCNC: 23 UMOL/L (ref 10–50)
FOLATE SERPL-MCNC: 14.9 NG/ML (ref 4–24)
T4 FREE SERPL-MCNC: 0.77 NG/DL (ref 0.71–1.51)
TSH SERPL DL<=0.005 MIU/L-ACNC: 4.13 UIU/ML (ref 0.4–4)
VIT B12 SERPL-MCNC: 733 PG/ML (ref 210–950)

## 2023-05-08 PROCEDURE — 99499 UNLISTED E&M SERVICE: CPT | Mod: S$GLB,,, | Performed by: NURSE PRACTITIONER

## 2023-05-08 PROCEDURE — 99483 PR ASSMT/CARE PLANNING, PT W/COGN IMPAIRMENT: ICD-10-PCS | Mod: S$GLB,,, | Performed by: NURSE PRACTITIONER

## 2023-05-08 PROCEDURE — 99999 PR PBB SHADOW E&M-EST. PATIENT-LVL III: CPT | Mod: PBBFAC,,, | Performed by: NURSE PRACTITIONER

## 2023-05-08 PROCEDURE — 82607 VITAMIN B-12: CPT | Performed by: NURSE PRACTITIONER

## 2023-05-08 PROCEDURE — 99499 NO LOS: ICD-10-PCS | Mod: S$GLB,,, | Performed by: NURSE PRACTITIONER

## 2023-05-08 PROCEDURE — 83921 ORGANIC ACID SINGLE QUANT: CPT | Performed by: NURSE PRACTITIONER

## 2023-05-08 PROCEDURE — 82746 ASSAY OF FOLIC ACID SERUM: CPT | Performed by: NURSE PRACTITIONER

## 2023-05-08 PROCEDURE — 82140 ASSAY OF AMMONIA: CPT | Performed by: NURSE PRACTITIONER

## 2023-05-08 PROCEDURE — 86592 SYPHILIS TEST NON-TREP QUAL: CPT | Performed by: NURSE PRACTITIONER

## 2023-05-08 PROCEDURE — 36415 COLL VENOUS BLD VENIPUNCTURE: CPT | Mod: PO | Performed by: NURSE PRACTITIONER

## 2023-05-08 PROCEDURE — 84443 ASSAY THYROID STIM HORMONE: CPT | Performed by: NURSE PRACTITIONER

## 2023-05-08 PROCEDURE — 84439 ASSAY OF FREE THYROXINE: CPT | Performed by: NURSE PRACTITIONER

## 2023-05-08 PROCEDURE — 99483 ASSMT & CARE PLN PT COG IMP: CPT | Mod: S$GLB,,, | Performed by: NURSE PRACTITIONER

## 2023-05-08 PROCEDURE — 99999 PR PBB SHADOW E&M-EST. PATIENT-LVL III: ICD-10-PCS | Mod: PBBFAC,,, | Performed by: NURSE PRACTITIONER

## 2023-05-08 PROCEDURE — 84425 ASSAY OF VITAMIN B-1: CPT | Performed by: NURSE PRACTITIONER

## 2023-05-08 RX ORDER — CLOPIDOGREL BISULFATE 75 MG/1
TABLET ORAL
Qty: 90 TABLET | Refills: 3 | Status: SHIPPED | OUTPATIENT
Start: 2023-05-08
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
## 2023-05-08 NOTE — ASSESSMENT & PLAN NOTE
Patient is a 76 y/o male that presents for memory follow up. He reports feeling well overall. Spouse reports mild memory changes since last assessed in 2021.  Onset of memory concerns around same time of TGA event  There are no reported behavioral changes,hallucinations, depression, recent falls or urinary incont.   He was diagnosed with JERONIMO and recently received his new CPAP as the previous one was recalled. MMSE 25/30; overall stable   - cannot fully rule out a neurodegenerative process;vascular etiology?  Recent CT brain scan reports mild volume loss and CTA reports remote infarction in the lateral left thalamus.   Obtain serologies  Discussed ways to promote brain stimulation and encourage pt to use CPAP  Consider NP testing in the future.   There are no safety concerns

## 2023-05-08 NOTE — PROGRESS NOTES
Caregiver name: Cassi  Relationship to the patient: Wife  Does the patient have a living will? Yes  Does the patient have a designated healthcare POA? Yes  Does the patient have a designated general POA? Yes    Have educational materials/resources been provided? Yes      Activities of Daily Living    Bathing: Independent  Dressing: Independent  Grooming: Independent  Mouth Care: Independent  Toileting: Independent  Transferring Bed/Chair: Independent  Walking: Independent  Climbing: Independent  Eating: Independent      Instrumental Activities of Daily Living    Shopping: Independent  Cooking: Independent  Managing Medications: Independent  Using the phone and looking up numbers: Independent  Doing Housework: Independent  Doing Laundry: Independent  Driving or using public transportation: Independent  Managing finances: Needs Help    Functional Assessment Staging  1   No difficulty, either subjectively or objectively.         Depression Patient Health Questionnaire 5/8/2023 1/20/2023 9/28/2022 6/17/2022 12/3/2021   Over the last two weeks how often have you been bothered by little interest or pleasure in doing things Not at all Not at all More than half the days Not at all Not at all   Over the last two weeks how often have you been bothered by feeling down, depressed or hopeless Not at all Not at all Several days Not at all Not at all   PHQ-2 Total Score 0 0 3 0 0

## 2023-05-08 NOTE — ASSESSMENT & PLAN NOTE
As per recent CTA head    - pt unable to get MRI due to old pacer leads  CTA reports remote infarction in the lateral left thalamus.      Control vascular risk factors:  BP/lipid management as per PCP LDL 63  Last A1c at goal  Stop Smoking!!  Cont ASA/Plavix for stroke prevention     Stroke education provided

## 2023-05-08 NOTE — ASSESSMENT & PLAN NOTE
Stable according to recent CTA  Measuring ~ 1.5 mm  Pt has been evaluated by [facility] and will monitor PRN

## 2023-05-08 NOTE — ASSESSMENT & PLAN NOTE
Vascular risk factor  Smokes about 1.5 packs per day but states he doesn't inhale  Smoking cessation education provided

## 2023-05-09 LAB — RPR SER QL: NORMAL

## 2023-05-11 ENCOUNTER — CLINICAL SUPPORT (OUTPATIENT)
Dept: CARDIOLOGY | Facility: HOSPITAL | Age: 78
End: 2023-05-11
Payer: MEDICARE

## 2023-05-11 DIAGNOSIS — Z95.810 PRESENCE OF AUTOMATIC (IMPLANTABLE) CARDIAC DEFIBRILLATOR: ICD-10-CM

## 2023-05-11 PROCEDURE — 93295 DEV INTERROG REMOTE 1/2/MLT: CPT | Mod: ,,, | Performed by: INTERNAL MEDICINE

## 2023-05-11 PROCEDURE — 93296 REM INTERROG EVL PM/IDS: CPT | Performed by: INTERNAL MEDICINE

## 2023-05-11 PROCEDURE — 93295 CARDIAC DEVICE CHECK - REMOTE: ICD-10-PCS | Mod: ,,, | Performed by: INTERNAL MEDICINE

## 2023-05-12 LAB — METHYLMALONATE SERPL-SCNC: 0.16 UMOL/L

## 2023-05-15 LAB — VIT B1 BLD-MCNC: 103 UG/L (ref 38–122)

## 2023-05-15 NOTE — PROGRESS NOTES
The TSH was borderline abnormal, but free T4 was normal so not likely clinically significant but will alert his primary care physician.  The remaining studies were normal.  The patient was notified by portal.

## 2023-06-04 DIAGNOSIS — I42.9 CARDIOMYOPATHY, UNSPECIFIED TYPE: ICD-10-CM

## 2023-06-06 RX ORDER — FUROSEMIDE 20 MG/1
TABLET ORAL
Qty: 30 TABLET | Refills: 6 | Status: ON HOLD | OUTPATIENT
Start: 2023-06-06 | End: 2023-07-24 | Stop reason: CLARIF

## 2023-06-06 RX ORDER — FUROSEMIDE 20 MG/1
TABLET ORAL
Qty: 30 TABLET | Refills: 6 | Status: ON HOLD | OUTPATIENT
Start: 2023-06-06 | End: 2023-07-27 | Stop reason: HOSPADM

## 2023-06-29 DIAGNOSIS — Z95.810 AICD (AUTOMATIC CARDIOVERTER/DEFIBRILLATOR) PRESENT: Primary | ICD-10-CM

## 2023-07-13 ENCOUNTER — TELEPHONE (OUTPATIENT)
Dept: ELECTROPHYSIOLOGY | Facility: CLINIC | Age: 78
End: 2023-07-13
Payer: MEDICARE

## 2023-07-14 ENCOUNTER — OFFICE VISIT (OUTPATIENT)
Dept: CARDIOLOGY | Facility: CLINIC | Age: 78
End: 2023-07-14
Payer: MEDICARE

## 2023-07-14 VITALS
BODY MASS INDEX: 25.97 KG/M2 | HEIGHT: 66 IN | WEIGHT: 161.63 LBS | DIASTOLIC BLOOD PRESSURE: 54 MMHG | SYSTOLIC BLOOD PRESSURE: 94 MMHG

## 2023-07-14 DIAGNOSIS — G47.33 OSA ON CPAP: ICD-10-CM

## 2023-07-14 DIAGNOSIS — I50.42 CHRONIC COMBINED SYSTOLIC AND DIASTOLIC CHF, NYHA CLASS 2: ICD-10-CM

## 2023-07-14 DIAGNOSIS — I10 PRIMARY HYPERTENSION: ICD-10-CM

## 2023-07-14 DIAGNOSIS — Z95.810 ICD (IMPLANTABLE CARDIOVERTER-DEFIBRILLATOR) IN PLACE: ICD-10-CM

## 2023-07-14 DIAGNOSIS — I25.5 CARDIOMYOPATHY, ISCHEMIC: Primary | Chronic | ICD-10-CM

## 2023-07-14 PROCEDURE — 3078F DIAST BP <80 MM HG: CPT | Mod: CPTII,S$GLB,, | Performed by: INTERNAL MEDICINE

## 2023-07-14 PROCEDURE — 1160F PR REVIEW ALL MEDS BY PRESCRIBER/CLIN PHARMACIST DOCUMENTED: ICD-10-PCS | Mod: CPTII,S$GLB,, | Performed by: INTERNAL MEDICINE

## 2023-07-14 PROCEDURE — 1159F MED LIST DOCD IN RCRD: CPT | Mod: CPTII,S$GLB,, | Performed by: INTERNAL MEDICINE

## 2023-07-14 PROCEDURE — 1101F PR PT FALLS ASSESS DOC 0-1 FALLS W/OUT INJ PAST YR: ICD-10-PCS | Mod: CPTII,S$GLB,, | Performed by: INTERNAL MEDICINE

## 2023-07-14 PROCEDURE — 99999 PR PBB SHADOW E&M-EST. PATIENT-LVL III: ICD-10-PCS | Mod: PBBFAC,,, | Performed by: INTERNAL MEDICINE

## 2023-07-14 PROCEDURE — 3078F PR MOST RECENT DIASTOLIC BLOOD PRESSURE < 80 MM HG: ICD-10-PCS | Mod: CPTII,S$GLB,, | Performed by: INTERNAL MEDICINE

## 2023-07-14 PROCEDURE — 1126F AMNT PAIN NOTED NONE PRSNT: CPT | Mod: CPTII,S$GLB,, | Performed by: INTERNAL MEDICINE

## 2023-07-14 PROCEDURE — 99999 PR PBB SHADOW E&M-EST. PATIENT-LVL III: CPT | Mod: PBBFAC,,, | Performed by: INTERNAL MEDICINE

## 2023-07-14 PROCEDURE — 99213 PR OFFICE/OUTPT VISIT, EST, LEVL III, 20-29 MIN: ICD-10-PCS | Mod: S$GLB,,, | Performed by: INTERNAL MEDICINE

## 2023-07-14 PROCEDURE — 3074F SYST BP LT 130 MM HG: CPT | Mod: CPTII,S$GLB,, | Performed by: INTERNAL MEDICINE

## 2023-07-14 PROCEDURE — 1160F RVW MEDS BY RX/DR IN RCRD: CPT | Mod: CPTII,S$GLB,, | Performed by: INTERNAL MEDICINE

## 2023-07-14 PROCEDURE — 1126F PR PAIN SEVERITY QUANTIFIED, NO PAIN PRESENT: ICD-10-PCS | Mod: CPTII,S$GLB,, | Performed by: INTERNAL MEDICINE

## 2023-07-14 PROCEDURE — 1101F PT FALLS ASSESS-DOCD LE1/YR: CPT | Mod: CPTII,S$GLB,, | Performed by: INTERNAL MEDICINE

## 2023-07-14 PROCEDURE — 1159F PR MEDICATION LIST DOCUMENTED IN MEDICAL RECORD: ICD-10-PCS | Mod: CPTII,S$GLB,, | Performed by: INTERNAL MEDICINE

## 2023-07-14 PROCEDURE — 3288F PR FALLS RISK ASSESSMENT DOCUMENTED: ICD-10-PCS | Mod: CPTII,S$GLB,, | Performed by: INTERNAL MEDICINE

## 2023-07-14 PROCEDURE — 99213 OFFICE O/P EST LOW 20 MIN: CPT | Mod: S$GLB,,, | Performed by: INTERNAL MEDICINE

## 2023-07-14 PROCEDURE — 3288F FALL RISK ASSESSMENT DOCD: CPT | Mod: CPTII,S$GLB,, | Performed by: INTERNAL MEDICINE

## 2023-07-14 PROCEDURE — 3074F PR MOST RECENT SYSTOLIC BLOOD PRESSURE < 130 MM HG: ICD-10-PCS | Mod: CPTII,S$GLB,, | Performed by: INTERNAL MEDICINE

## 2023-07-14 NOTE — PROGRESS NOTES
Subjective:    Patient ID:  Tristen Blanc is a 77 y.o. male who presents for evaluation of Congestive Heart Failure    Primary Cardiologist: Lonnie Heller MD    [facility] our electrophysiology clinic in follow-up for ICD care. As you are aware he is a pleasant 77 year-old man with chronic ischemic cardiomyopathy with acute MI in 1996 complicated by cardiac arrest with persistent systolic heart failure with a LVEF of 20-25% and dual chamber ICD implantation in 2010 with a generator change in 2017 by Dr. Loza. He reports no history of ICD shocks. He presented to establish care of his ICD in 7/2020.  He is here for yearly follow-up. No complaints.    In-clinic device check notes stable lead parameters with 19% RA and 0% RV pacing and no arrhythmias. Estimated battery longevity of 2.6  years.      Review of Systems   Constitutional: Negative for fever and malaise/fatigue.   HENT:  Negative for congestion and sore throat.    Eyes:  Negative for blurred vision and visual disturbance.   Cardiovascular:  Negative for chest pain, dyspnea on exertion, irregular heartbeat, near-syncope, palpitations and syncope.   Respiratory:  Negative for cough and shortness of breath.    Hematologic/Lymphatic: Negative for bleeding problem. Does not bruise/bleed easily.   Skin: Negative.    Musculoskeletal: Negative.    Gastrointestinal:  Negative for bloating, abdominal pain, hematochezia and melena.   Neurological:  Negative for focal weakness and weakness.   Psychiatric/Behavioral: Negative.        Objective:    Physical Exam  Vitals reviewed.   Constitutional:       General: He is not in acute distress.     Appearance: He is well-developed. He is not diaphoretic.   HENT:      Head: Normocephalic and atraumatic.   Eyes:      General:         Right eye: No discharge.         Left eye: No discharge.      Conjunctiva/sclera: Conjunctivae normal.   Cardiovascular:      Rate and Rhythm: Normal  rate and regular rhythm.      Heart sounds: No murmur heard.    No friction rub. No gallop.   Pulmonary:      Effort: Pulmonary effort is normal. No respiratory distress.      Breath sounds: Normal breath sounds. No wheezing or rales.   Abdominal:      General: Bowel sounds are normal. There is no distension.      Palpations: Abdomen is soft.      Tenderness: There is no abdominal tenderness.   Musculoskeletal:      Cervical back: Neck supple.   Skin:     General: Skin is warm and dry.   Neurological:      Mental Status: He is alert and oriented to person, place, and time.   Psychiatric:         Behavior: Behavior normal.         Thought Content: Thought content normal.         Judgment: Judgment normal.         Assessment:       1. Cardiomyopathy, ischemic    2. Primary hypertension    3. ICD (implantable cardioverter-defibrillator) in place    4. Chronic combined systolic and diastolic CHF, NYHA class 2    5. JERONIMO on CPAP         Plan:       In summary, Mr. Blanc is a pleasant 77 year-old man with chronic ischemic cardiomyopathy with acute MI in 1996 complicated by cardiac arrest with persistent systolic heart failure with a LVEF of 20-25% and dual chamber ICD implantation in 2010 with a generator change in 2017 by Dr. Loza. Device is working normally per recent remote interrogation. Continue remote monitoring. RTC in 1 year, sooner if needed.    Thank you for allowing me to participate in the care of this patient. Please do not hesitate to call me with any questions or concerns.    Salazar Tucker MD, PhD  Cardiac Electrophysiology

## 2023-07-24 ENCOUNTER — HOSPITAL ENCOUNTER (INPATIENT)
Facility: HOSPITAL | Age: 78
LOS: 3 days | Discharge: HOME | DRG: 250 | End: 2023-07-27
Attending: EMERGENCY MEDICINE | Admitting: INTERNAL MEDICINE
Payer: MEDICARE

## 2023-07-24 DIAGNOSIS — I25.10 CAD (CORONARY ARTERY DISEASE): ICD-10-CM

## 2023-07-24 DIAGNOSIS — I25.708 CORONARY ARTERY DISEASE OF BYPASS GRAFT OF NATIVE HEART WITH STABLE ANGINA PECTORIS: ICD-10-CM

## 2023-07-24 DIAGNOSIS — G47.33 OSA ON CPAP: ICD-10-CM

## 2023-07-24 DIAGNOSIS — Z72.0 TOBACCO ABUSE: ICD-10-CM

## 2023-07-24 DIAGNOSIS — I21.4 NSTEMI (NON-ST ELEVATED MYOCARDIAL INFARCTION): ICD-10-CM

## 2023-07-24 DIAGNOSIS — I50.42 CHRONIC COMBINED SYSTOLIC AND DIASTOLIC CHF, NYHA CLASS 2: ICD-10-CM

## 2023-07-24 DIAGNOSIS — R00.0 WIDE-COMPLEX TACHYCARDIA: ICD-10-CM

## 2023-07-24 DIAGNOSIS — Z95.810 ICD (IMPLANTABLE CARDIOVERTER-DEFIBRILLATOR) IN PLACE: Primary | ICD-10-CM

## 2023-07-24 DIAGNOSIS — R57.9 SHOCK: ICD-10-CM

## 2023-07-24 DIAGNOSIS — D64.9 NORMOCYTIC ANEMIA: ICD-10-CM

## 2023-07-24 PROBLEM — I25.2 HX OF MYOCARDIAL INFARCTION: Status: ACTIVE | Noted: 2023-07-24

## 2023-07-24 PROBLEM — Z95.1 HX OF CABG: Status: ACTIVE | Noted: 2023-07-24

## 2023-07-24 LAB
ALBUMIN SERPL BCP-MCNC: 3.2 G/DL (ref 3.5–5.2)
ALP SERPL-CCNC: 80 U/L (ref 55–135)
ALT SERPL W/O P-5'-P-CCNC: 65 U/L (ref 10–44)
ANION GAP SERPL CALC-SCNC: 8 MMOL/L (ref 8–16)
APTT PPP: 23.3 SEC (ref 21–32)
AST SERPL-CCNC: 68 U/L (ref 10–40)
BASOPHILS # BLD AUTO: 0.04 K/UL (ref 0–0.2)
BASOPHILS # BLD AUTO: 0.06 K/UL (ref 0–0.2)
BASOPHILS NFR BLD: 0.3 % (ref 0–1.9)
BASOPHILS NFR BLD: 0.6 % (ref 0–1.9)
BILIRUB SERPL-MCNC: 0.3 MG/DL (ref 0.1–1)
BNP SERPL-MCNC: 318 PG/ML (ref 0–99)
BUN SERPL-MCNC: 24 MG/DL (ref 8–23)
CALCIUM SERPL-MCNC: 7.4 MG/DL (ref 8.7–10.5)
CHLORIDE SERPL-SCNC: 112 MMOL/L (ref 95–110)
CO2 SERPL-SCNC: 20 MMOL/L (ref 23–29)
CREAT SERPL-MCNC: 1.3 MG/DL (ref 0.5–1.4)
DIFFERENTIAL METHOD: ABNORMAL
DIFFERENTIAL METHOD: ABNORMAL
EOSINOPHIL # BLD AUTO: 0.2 K/UL (ref 0–0.5)
EOSINOPHIL # BLD AUTO: 0.2 K/UL (ref 0–0.5)
EOSINOPHIL NFR BLD: 0.9 % (ref 0–8)
EOSINOPHIL NFR BLD: 3 % (ref 0–8)
ERYTHROCYTE [DISTWIDTH] IN BLOOD BY AUTOMATED COUNT: 12.7 % (ref 11.5–14.5)
ERYTHROCYTE [DISTWIDTH] IN BLOOD BY AUTOMATED COUNT: 12.8 % (ref 11.5–14.5)
EST. GFR  (NO RACE VARIABLE): 57 ML/MIN/1.73 M^2
GLUCOSE SERPL-MCNC: 160 MG/DL (ref 70–110)
HCT VFR BLD AUTO: 32.9 % (ref 40–54)
HCT VFR BLD AUTO: 38.1 % (ref 40–54)
HGB BLD-MCNC: 11.1 G/DL (ref 14–18)
HGB BLD-MCNC: 12.5 G/DL (ref 14–18)
IMM GRANULOCYTES # BLD AUTO: 0.02 K/UL (ref 0–0.04)
IMM GRANULOCYTES # BLD AUTO: 0.08 K/UL (ref 0–0.04)
IMM GRANULOCYTES NFR BLD AUTO: 0.3 % (ref 0–0.5)
IMM GRANULOCYTES NFR BLD AUTO: 0.4 % (ref 0–0.5)
INR PPP: 1.1 (ref 0.8–1.2)
LYMPHOCYTES # BLD AUTO: 1.8 K/UL (ref 1–4.8)
LYMPHOCYTES # BLD AUTO: 2.1 K/UL (ref 1–4.8)
LYMPHOCYTES NFR BLD: 29.1 % (ref 18–48)
LYMPHOCYTES NFR BLD: 9.4 % (ref 18–48)
MCH RBC QN AUTO: 31.4 PG (ref 27–31)
MCH RBC QN AUTO: 31.8 PG (ref 27–31)
MCHC RBC AUTO-ENTMCNC: 32.8 G/DL (ref 32–36)
MCHC RBC AUTO-ENTMCNC: 33.7 G/DL (ref 32–36)
MCV RBC AUTO: 94 FL (ref 82–98)
MCV RBC AUTO: 96 FL (ref 82–98)
MONOCYTES # BLD AUTO: 0.5 K/UL (ref 0.3–1)
MONOCYTES # BLD AUTO: 1 K/UL (ref 0.3–1)
MONOCYTES NFR BLD: 5.2 % (ref 4–15)
MONOCYTES NFR BLD: 7.2 % (ref 4–15)
NEUTROPHILS # BLD AUTO: 15.8 K/UL (ref 1.8–7.7)
NEUTROPHILS # BLD AUTO: 4.2 K/UL (ref 1.8–7.7)
NEUTROPHILS NFR BLD: 59.8 % (ref 38–73)
NEUTROPHILS NFR BLD: 83.8 % (ref 38–73)
NRBC BLD-RTO: 0 /100 WBC
NRBC BLD-RTO: 0 /100 WBC
PLATELET # BLD AUTO: 167 K/UL (ref 150–450)
PLATELET # BLD AUTO: 177 K/UL (ref 150–450)
PMV BLD AUTO: 9.3 FL (ref 9.2–12.9)
PMV BLD AUTO: 9.6 FL (ref 9.2–12.9)
POCT GLUCOSE: 95 MG/DL (ref 70–110)
POTASSIUM SERPL-SCNC: 3.5 MMOL/L (ref 3.5–5.1)
PROT SERPL-MCNC: 5 G/DL (ref 6–8.4)
PROTHROMBIN TIME: 11.8 SEC (ref 9–12.5)
RBC # BLD AUTO: 3.49 M/UL (ref 4.6–6.2)
RBC # BLD AUTO: 3.98 M/UL (ref 4.6–6.2)
SODIUM SERPL-SCNC: 140 MMOL/L (ref 136–145)
T4 FREE SERPL-MCNC: 0.78 NG/DL (ref 0.71–1.51)
TROPONIN I SERPL DL<=0.01 NG/ML-MCNC: 0.02 NG/ML (ref 0–0.03)
TSH SERPL DL<=0.005 MIU/L-ACNC: 4.49 UIU/ML (ref 0.4–4)
WBC # BLD AUTO: 18.84 K/UL (ref 3.9–12.7)
WBC # BLD AUTO: 7.07 K/UL (ref 3.9–12.7)

## 2023-07-24 PROCEDURE — 25000003 PHARM REV CODE 250: Performed by: EMERGENCY MEDICINE

## 2023-07-24 PROCEDURE — 93005 ELECTROCARDIOGRAM TRACING: CPT

## 2023-07-24 PROCEDURE — 85610 PROTHROMBIN TIME: CPT | Performed by: EMERGENCY MEDICINE

## 2023-07-24 PROCEDURE — 99285 EMERGENCY DEPT VISIT HI MDM: CPT | Mod: 25

## 2023-07-24 PROCEDURE — 85730 THROMBOPLASTIN TIME PARTIAL: CPT | Performed by: EMERGENCY MEDICINE

## 2023-07-24 PROCEDURE — 84443 ASSAY THYROID STIM HORMONE: CPT | Performed by: EMERGENCY MEDICINE

## 2023-07-24 PROCEDURE — 92960 CARDIOVERSION ELECTRIC EXT: CPT

## 2023-07-24 PROCEDURE — 93010 EKG 12-LEAD: ICD-10-PCS | Mod: ,,, | Performed by: INTERNAL MEDICINE

## 2023-07-24 PROCEDURE — 93010 ELECTROCARDIOGRAM REPORT: CPT | Mod: 76,,, | Performed by: INTERNAL MEDICINE

## 2023-07-24 PROCEDURE — 80053 COMPREHEN METABOLIC PANEL: CPT | Performed by: EMERGENCY MEDICINE

## 2023-07-24 PROCEDURE — 85025 COMPLETE CBC W/AUTO DIFF WBC: CPT | Mod: 91 | Performed by: EMERGENCY MEDICINE

## 2023-07-24 PROCEDURE — 93010 ELECTROCARDIOGRAM REPORT: CPT | Mod: ,,, | Performed by: INTERNAL MEDICINE

## 2023-07-24 PROCEDURE — 63600175 PHARM REV CODE 636 W HCPCS: Performed by: EMERGENCY MEDICINE

## 2023-07-24 PROCEDURE — 25000003 PHARM REV CODE 250: Performed by: STUDENT IN AN ORGANIZED HEALTH CARE EDUCATION/TRAINING PROGRAM

## 2023-07-24 PROCEDURE — 20000000 HC ICU ROOM

## 2023-07-24 PROCEDURE — 83880 ASSAY OF NATRIURETIC PEPTIDE: CPT | Performed by: EMERGENCY MEDICINE

## 2023-07-24 PROCEDURE — 84484 ASSAY OF TROPONIN QUANT: CPT | Performed by: EMERGENCY MEDICINE

## 2023-07-24 PROCEDURE — 96375 TX/PRO/DX INJ NEW DRUG ADDON: CPT

## 2023-07-24 PROCEDURE — 96365 THER/PROPH/DIAG IV INF INIT: CPT

## 2023-07-24 PROCEDURE — 84439 ASSAY OF FREE THYROXINE: CPT | Performed by: EMERGENCY MEDICINE

## 2023-07-24 PROCEDURE — 25000003 PHARM REV CODE 250

## 2023-07-24 RX ORDER — SODIUM CHLORIDE 0.9 % (FLUSH) 0.9 %
10 SYRINGE (ML) INJECTION
Status: DISCONTINUED | OUTPATIENT
Start: 2023-07-24 | End: 2023-07-27 | Stop reason: HOSPADM

## 2023-07-24 RX ORDER — MIDAZOLAM HYDROCHLORIDE 1 MG/ML
1 INJECTION INTRAMUSCULAR; INTRAVENOUS
Status: COMPLETED | OUTPATIENT
Start: 2023-07-24 | End: 2023-07-24

## 2023-07-24 RX ORDER — NOREPINEPHRINE BITARTRATE/D5W 4MG/250ML
0-3 PLASTIC BAG, INJECTION (ML) INTRAVENOUS CONTINUOUS
Status: DISCONTINUED | OUTPATIENT
Start: 2023-07-24 | End: 2023-07-26

## 2023-07-24 RX ORDER — MIDAZOLAM HYDROCHLORIDE 5 MG/ML
INJECTION INTRAMUSCULAR; INTRAVENOUS
Status: DISPENSED
Start: 2023-07-24 | End: 2023-07-25

## 2023-07-24 RX ORDER — NOREPINEPHRINE BITARTRATE/D5W 4MG/250ML
0-3 PLASTIC BAG, INJECTION (ML) INTRAVENOUS CONTINUOUS
Status: DISCONTINUED | OUTPATIENT
Start: 2023-07-24 | End: 2023-07-24

## 2023-07-24 RX ORDER — ASPIRIN 81 MG/1
81 TABLET ORAL DAILY
Status: DISCONTINUED | OUTPATIENT
Start: 2023-07-25 | End: 2023-07-27 | Stop reason: HOSPADM

## 2023-07-24 RX ORDER — SODIUM CHLORIDE 9 MG/ML
1000 INJECTION, SOLUTION INTRAVENOUS
Status: COMPLETED | OUTPATIENT
Start: 2023-07-24 | End: 2023-07-24

## 2023-07-24 RX ORDER — HEPARIN SODIUM,PORCINE/D5W 25000/250
0-40 INTRAVENOUS SOLUTION INTRAVENOUS CONTINUOUS
Status: DISCONTINUED | OUTPATIENT
Start: 2023-07-24 | End: 2023-07-26

## 2023-07-24 RX ORDER — MUPIROCIN 20 MG/G
OINTMENT TOPICAL 2 TIMES DAILY
Status: DISCONTINUED | OUTPATIENT
Start: 2023-07-24 | End: 2023-07-27 | Stop reason: HOSPADM

## 2023-07-24 RX ORDER — FUROSEMIDE 10 MG/ML
40 INJECTION INTRAMUSCULAR; INTRAVENOUS DAILY
Status: COMPLETED | OUTPATIENT
Start: 2023-07-25 | End: 2023-07-25

## 2023-07-24 RX ORDER — NOREPINEPHRINE BITARTRATE/D5W 4MG/250ML
PLASTIC BAG, INJECTION (ML) INTRAVENOUS
Status: COMPLETED
Start: 2023-07-24 | End: 2023-07-24

## 2023-07-24 RX ORDER — FENTANYL CITRATE 50 UG/ML
INJECTION, SOLUTION INTRAMUSCULAR; INTRAVENOUS
Status: DISCONTINUED
Start: 2023-07-24 | End: 2023-07-24 | Stop reason: WASHOUT

## 2023-07-24 RX ORDER — FUROSEMIDE 20 MG/1
20 TABLET ORAL DAILY
Status: DISCONTINUED | OUTPATIENT
Start: 2023-07-25 | End: 2023-07-24

## 2023-07-24 RX ORDER — ATORVASTATIN CALCIUM 40 MG/1
80 TABLET, FILM COATED ORAL DAILY
Status: DISCONTINUED | OUTPATIENT
Start: 2023-07-25 | End: 2023-07-27 | Stop reason: HOSPADM

## 2023-07-24 RX ADMIN — AMIODARONE HYDROCHLORIDE 1 MG/MIN: 1.8 INJECTION, SOLUTION INTRAVENOUS at 07:07

## 2023-07-24 RX ADMIN — NOREPINEPHRINE BITARTRATE 0.02 MCG/KG/MIN: 4 INJECTION, SOLUTION INTRAVENOUS at 07:07

## 2023-07-24 RX ADMIN — NOREPINEPHRINE BITARTRATE 0.1 MCG/KG/MIN: 4 INJECTION, SOLUTION INTRAVENOUS at 11:07

## 2023-07-24 RX ADMIN — HEPARIN SODIUM 12 UNITS/KG/HR: 10000 INJECTION, SOLUTION INTRAVENOUS at 08:07

## 2023-07-24 RX ADMIN — MIDAZOLAM HYDROCHLORIDE 1 MG: 1 INJECTION, SOLUTION INTRAMUSCULAR; INTRAVENOUS at 07:07

## 2023-07-24 RX ADMIN — SODIUM CHLORIDE 1000 ML: 0.9 INJECTION, SOLUTION INTRAVENOUS at 07:07

## 2023-07-24 RX ADMIN — AMIODARONE HYDROCHLORIDE 150 MG: 1.5 INJECTION, SOLUTION INTRAVENOUS at 07:07

## 2023-07-24 RX ADMIN — MUPIROCIN: 20 OINTMENT TOPICAL at 11:07

## 2023-07-24 NOTE — Clinical Note
The catheter was repositioned into the ostial SVG graft. An angiography was performed of the graft. Multiple views were taken. The angiography was performed via hand injection with 20 mL of contrast.  SVG TO CX AND LAD VISUALIZED

## 2023-07-24 NOTE — Clinical Note
The catheter was repositioned into the right subclavian artery. An angiography was performed of the AUGUSTUS. The angiography was performed via hand injection with 10 mL of contrast.

## 2023-07-24 NOTE — Clinical Note
175 ml of contrast were injected throughout the case. 125 mL of contrast was the total wasted during the case. 300 mL was the total amount used during the case.

## 2023-07-24 NOTE — Clinical Note
The catheter was repositioned into the aorta and left subclavian artery. An angiography was performed of the LIMA. The angiography was performed via hand injection with 10 mL of contrast.

## 2023-07-24 NOTE — Clinical Note
Diagnosis: Wide-complex tachycardia [448387]   Admitting Provider:: GENESIS MG [[id number]]   Future Attending Provider: GENESIS MG [[id number]]   Reason for IP Medical Treatment  (Clinical interventions that can only be accomplished in the IP setting? ) :: interrogation, stat ECHO, cards consult, anticoagulation, trending enzymes.   I certify that Inpatient services for greater than or equal to 2 midnights are medically necessary:: Yes   Plans for Post-Acute care--if anticipated (pick the single best option):: A. No post acute care anticipated at this time   Special Needs:: No Special Needs [1]

## 2023-07-25 ENCOUNTER — CLINICAL SUPPORT (OUTPATIENT)
Dept: SMOKING CESSATION | Facility: CLINIC | Age: 78
End: 2023-07-25

## 2023-07-25 ENCOUNTER — PATIENT MESSAGE (OUTPATIENT)
Dept: PRIMARY CARE CLINIC | Facility: CLINIC | Age: 78
End: 2023-07-25
Payer: MEDICARE

## 2023-07-25 DIAGNOSIS — F17.210 CIGARETTE SMOKER: Primary | ICD-10-CM

## 2023-07-25 PROBLEM — I21.4 NSTEMI (NON-ST ELEVATED MYOCARDIAL INFARCTION): Status: ACTIVE | Noted: 2023-07-25

## 2023-07-25 PROBLEM — D64.9 NORMOCYTIC ANEMIA: Status: ACTIVE | Noted: 2023-07-25

## 2023-07-25 LAB
ALBUMIN SERPL BCP-MCNC: 3.7 G/DL (ref 3.5–5.2)
ALP SERPL-CCNC: 105 U/L (ref 55–135)
ALT SERPL W/O P-5'-P-CCNC: 131 U/L (ref 10–44)
ANION GAP SERPL CALC-SCNC: 7 MMOL/L (ref 8–16)
AORTIC ROOT ANNULUS: 2.8 CM
APTT PPP: 40.2 SEC (ref 21–32)
APTT PPP: 44.4 SEC (ref 21–32)
APTT PPP: 85.4 SEC (ref 21–32)
AST SERPL-CCNC: 151 U/L (ref 10–40)
AV INDEX (PROSTH): 0.47
AV MEAN GRADIENT: 3 MMHG
AV PEAK GRADIENT: 6 MMHG
AV VALVE AREA: 1.8 CM2
AV VELOCITY RATIO: 0.47
BASOPHILS # BLD AUTO: 0.04 K/UL (ref 0–0.2)
BASOPHILS NFR BLD: 0.5 % (ref 0–1.9)
BILIRUB SERPL-MCNC: 0.2 MG/DL (ref 0.1–1)
BSA FOR ECHO PROCEDURE: 1.82 M2
BUN SERPL-MCNC: 27 MG/DL (ref 8–23)
CALCIUM SERPL-MCNC: 8.6 MG/DL (ref 8.7–10.5)
CHLORIDE SERPL-SCNC: 107 MMOL/L (ref 95–110)
CO2 SERPL-SCNC: 22 MMOL/L (ref 23–29)
CREAT SERPL-MCNC: 1.3 MG/DL (ref 0.5–1.4)
CV ECHO LV RWT: 0.41 CM
DIFFERENTIAL METHOD: ABNORMAL
DOP CALC AO PEAK VEL: 1.19 M/S
DOP CALC AO VTI: 25.8 CM
DOP CALC LVOT AREA: 3.8 CM2
DOP CALC LVOT DIAMETER: 2.2 CM
DOP CALC LVOT PEAK VEL: 0.56 M/S
DOP CALC LVOT STROKE VOLUME: 46.35 CM3
DOP CALC MV VTI: 23.3 CM
DOP CALCLVOT PEAK VEL VTI: 12.2 CM
E WAVE DECELERATION TIME: 281.87 MSEC
E/A RATIO: 1.27
E/E' RATIO: 16 M/S
ECHO LV POSTERIOR WALL: 1.16 CM (ref 0.6–1.1)
EJECTION FRACTION: 15 %
EOSINOPHIL # BLD AUTO: 0.1 K/UL (ref 0–0.5)
EOSINOPHIL NFR BLD: 1 % (ref 0–8)
ERYTHROCYTE [DISTWIDTH] IN BLOOD BY AUTOMATED COUNT: 12.7 % (ref 11.5–14.5)
EST. GFR  (NO RACE VARIABLE): 57 ML/MIN/1.73 M^2
ESTIMATED AVG GLUCOSE: 100 MG/DL (ref 68–131)
FERRITIN SERPL-MCNC: 158 NG/ML (ref 20–300)
FOLATE SERPL-MCNC: 14.6 NG/ML (ref 4–24)
FRACTIONAL SHORTENING: 13 % (ref 28–44)
GLUCOSE SERPL-MCNC: 138 MG/DL (ref 70–110)
HBA1C MFR BLD: 5.1 % (ref 4–5.6)
HCT VFR BLD AUTO: 37.7 % (ref 40–54)
HGB BLD-MCNC: 12.7 G/DL (ref 14–18)
IMM GRANULOCYTES # BLD AUTO: 0.03 K/UL (ref 0–0.04)
IMM GRANULOCYTES NFR BLD AUTO: 0.3 % (ref 0–0.5)
INTERVENTRICULAR SEPTUM: 1.18 CM (ref 0.6–1.1)
IRON SERPL-MCNC: 52 UG/DL (ref 45–160)
IVC DIAMETER: 2.15 CM
LA MAJOR: 5.2 CM
LA MINOR: 4.7 CM
LA WIDTH: 4 CM
LEFT ATRIUM SIZE: 4.62 CM
LEFT ATRIUM VOLUME INDEX MOD: 21.6 ML/M2
LEFT ATRIUM VOLUME INDEX: 43.1 ML/M2
LEFT ATRIUM VOLUME MOD: 38.96 CM3
LEFT ATRIUM VOLUME: 77.56 CM3
LEFT INTERNAL DIMENSION IN SYSTOLE: 4.9 CM (ref 2.1–4)
LEFT VENTRICLE DIASTOLIC VOLUME INDEX: 89.66 ML/M2
LEFT VENTRICLE DIASTOLIC VOLUME: 161.39 ML
LEFT VENTRICLE MASS INDEX: 151 G/M2
LEFT VENTRICLE SYSTOLIC VOLUME INDEX: 68.7 ML/M2
LEFT VENTRICLE SYSTOLIC VOLUME: 123.59 ML
LEFT VENTRICULAR INTERNAL DIMENSION IN DIASTOLE: 5.6 CM (ref 3.5–6)
LEFT VENTRICULAR MASS: 270.96 G
LV LATERAL E/E' RATIO: 10 M/S
LV SEPTAL E/E' RATIO: 40 M/S
LVOT MG: 0.75 MMHG
LVOT MV: 0.41 CM/S
LYMPHOCYTES # BLD AUTO: 2.2 K/UL (ref 1–4.8)
LYMPHOCYTES NFR BLD: 25.6 % (ref 18–48)
MAGNESIUM SERPL-MCNC: 2.1 MG/DL (ref 1.6–2.6)
MCH RBC QN AUTO: 31.1 PG (ref 27–31)
MCHC RBC AUTO-ENTMCNC: 33.7 G/DL (ref 32–36)
MCV RBC AUTO: 92 FL (ref 82–98)
MONOCYTES # BLD AUTO: 1 K/UL (ref 0.3–1)
MONOCYTES NFR BLD: 11.4 % (ref 4–15)
MV A" WAVE DURATION": 125.59 MSEC
MV MEAN GRADIENT: 1 MMHG
MV PEAK A VEL: 0.63 M/S
MV PEAK E VEL: 0.8 M/S
MV PEAK GRADIENT: 3 MMHG
MV STENOSIS PRESSURE HALF TIME: 81.74 MS
MV VALVE AREA BY CONTINUITY EQUATION: 1.99 CM2
MV VALVE AREA P 1/2 METHOD: 2.69 CM2
NEUTROPHILS # BLD AUTO: 5.4 K/UL (ref 1.8–7.7)
NEUTROPHILS NFR BLD: 61.2 % (ref 38–73)
NRBC BLD-RTO: 0 /100 WBC
PHOSPHATE SERPL-MCNC: 4.2 MG/DL (ref 2.7–4.5)
PISA RADIUS: 0.92 CM
PISA TR MAX VEL: 2.15 M/S
PISA VN NYQUIST MS: 0.37 M/S
PISA VN NYQUIST: 0.37 M/S
PLATELET # BLD AUTO: 197 K/UL (ref 150–450)
PMV BLD AUTO: 9.4 FL (ref 9.2–12.9)
POTASSIUM SERPL-SCNC: 4.3 MMOL/L (ref 3.5–5.1)
PROT SERPL-MCNC: 5.7 G/DL (ref 6–8.4)
PULM VEIN S/D RATIO: 0.73
PV PEAK D VEL: 0.62 M/S
PV PEAK S VEL: 0.45 M/S
RA MAJOR: 4.2 CM
RA PRESSURE: 3 MMHG
RA WIDTH: 4 CM
RBC # BLD AUTO: 4.08 M/UL (ref 4.6–6.2)
RIGHT VENTRICULAR END-DIASTOLIC DIMENSION: 2.7 CM
RIGHT VENTRICULAR LENGTH IN DIASTOLE (APICAL 4-CHAMBER VIEW): 6 CM
RV TISSUE DOPPLER FREE WALL SYSTOLIC VELOCITY 1 (APICAL 4 CHAMBER VIEW): 7.16 CM/S
SATURATED IRON: 15 % (ref 20–50)
SODIUM SERPL-SCNC: 136 MMOL/L (ref 136–145)
TDI LATERAL: 0.08 M/S
TDI SEPTAL: 0.02 M/S
TDI: 0.05 M/S
TOTAL IRON BINDING CAPACITY: 342 UG/DL (ref 250–450)
TR MAX PG: 18 MMHG
TRANSFERRIN SERPL-MCNC: 231 MG/DL (ref 200–375)
TRICUSPID ANNULAR PLANE SYSTOLIC EXCURSION: 2 CM
TROPONIN I SERPL DL<=0.01 NG/ML-MCNC: 14.12 NG/ML (ref 0–0.03)
TROPONIN I SERPL DL<=0.01 NG/ML-MCNC: 17.98 NG/ML (ref 0–0.03)
TV REST PULMONARY ARTERY PRESSURE: 21 MMHG
VIT B12 SERPL-MCNC: 631 PG/ML (ref 210–950)
WBC # BLD AUTO: 8.76 K/UL (ref 3.9–12.7)

## 2023-07-25 PROCEDURE — 99900035 HC TECH TIME PER 15 MIN (STAT)

## 2023-07-25 PROCEDURE — 82746 ASSAY OF FOLIC ACID SERUM: CPT | Performed by: STUDENT IN AN ORGANIZED HEALTH CARE EDUCATION/TRAINING PROGRAM

## 2023-07-25 PROCEDURE — 84484 ASSAY OF TROPONIN QUANT: CPT | Performed by: STUDENT IN AN ORGANIZED HEALTH CARE EDUCATION/TRAINING PROGRAM

## 2023-07-25 PROCEDURE — 83036 HEMOGLOBIN GLYCOSYLATED A1C: CPT | Performed by: STUDENT IN AN ORGANIZED HEALTH CARE EDUCATION/TRAINING PROGRAM

## 2023-07-25 PROCEDURE — 93010 ELECTROCARDIOGRAM REPORT: CPT | Mod: ,,, | Performed by: INTERNAL MEDICINE

## 2023-07-25 PROCEDURE — 82728 ASSAY OF FERRITIN: CPT | Performed by: STUDENT IN AN ORGANIZED HEALTH CARE EDUCATION/TRAINING PROGRAM

## 2023-07-25 PROCEDURE — 84100 ASSAY OF PHOSPHORUS: CPT | Performed by: STUDENT IN AN ORGANIZED HEALTH CARE EDUCATION/TRAINING PROGRAM

## 2023-07-25 PROCEDURE — 85025 COMPLETE CBC W/AUTO DIFF WBC: CPT | Performed by: STUDENT IN AN ORGANIZED HEALTH CARE EDUCATION/TRAINING PROGRAM

## 2023-07-25 PROCEDURE — 20000000 HC ICU ROOM

## 2023-07-25 PROCEDURE — 63600175 PHARM REV CODE 636 W HCPCS: Performed by: STUDENT IN AN ORGANIZED HEALTH CARE EDUCATION/TRAINING PROGRAM

## 2023-07-25 PROCEDURE — 27100171 HC OXYGEN HIGH FLOW UP TO 24 HOURS

## 2023-07-25 PROCEDURE — 80053 COMPREHEN METABOLIC PANEL: CPT | Performed by: STUDENT IN AN ORGANIZED HEALTH CARE EDUCATION/TRAINING PROGRAM

## 2023-07-25 PROCEDURE — 36415 COLL VENOUS BLD VENIPUNCTURE: CPT | Performed by: INTERNAL MEDICINE

## 2023-07-25 PROCEDURE — 27000190 HC CPAP FULL FACE MASK W/VALVE

## 2023-07-25 PROCEDURE — 82607 VITAMIN B-12: CPT | Performed by: STUDENT IN AN ORGANIZED HEALTH CARE EDUCATION/TRAINING PROGRAM

## 2023-07-25 PROCEDURE — 25000003 PHARM REV CODE 250: Performed by: STUDENT IN AN ORGANIZED HEALTH CARE EDUCATION/TRAINING PROGRAM

## 2023-07-25 PROCEDURE — 94660 CPAP INITIATION&MGMT: CPT

## 2023-07-25 PROCEDURE — 99406 BEHAV CHNG SMOKING 3-10 MIN: CPT | Mod: S$GLB,,,

## 2023-07-25 PROCEDURE — 99406 PT REFUSED TOBACCO CESSATION: ICD-10-PCS | Mod: S$GLB,,,

## 2023-07-25 PROCEDURE — 93010 EKG 12-LEAD: ICD-10-PCS | Mod: ,,, | Performed by: INTERNAL MEDICINE

## 2023-07-25 PROCEDURE — 25500020 PHARM REV CODE 255: Performed by: INTERNAL MEDICINE

## 2023-07-25 PROCEDURE — 85730 THROMBOPLASTIN TIME PARTIAL: CPT | Performed by: INTERNAL MEDICINE

## 2023-07-25 PROCEDURE — 36415 COLL VENOUS BLD VENIPUNCTURE: CPT | Performed by: STUDENT IN AN ORGANIZED HEALTH CARE EDUCATION/TRAINING PROGRAM

## 2023-07-25 PROCEDURE — 94761 N-INVAS EAR/PLS OXIMETRY MLT: CPT

## 2023-07-25 PROCEDURE — 99223 1ST HOSP IP/OBS HIGH 75: CPT | Mod: ,,, | Performed by: INTERNAL MEDICINE

## 2023-07-25 PROCEDURE — 99223 PR INITIAL HOSPITAL CARE,LEVL III: ICD-10-PCS | Mod: ,,, | Performed by: INTERNAL MEDICINE

## 2023-07-25 PROCEDURE — 83735 ASSAY OF MAGNESIUM: CPT | Performed by: STUDENT IN AN ORGANIZED HEALTH CARE EDUCATION/TRAINING PROGRAM

## 2023-07-25 PROCEDURE — 84484 ASSAY OF TROPONIN QUANT: CPT | Mod: 91

## 2023-07-25 PROCEDURE — 84466 ASSAY OF TRANSFERRIN: CPT | Performed by: STUDENT IN AN ORGANIZED HEALTH CARE EDUCATION/TRAINING PROGRAM

## 2023-07-25 PROCEDURE — 93005 ELECTROCARDIOGRAM TRACING: CPT

## 2023-07-25 RX ORDER — CLOPIDOGREL BISULFATE 75 MG/1
75 TABLET ORAL DAILY
Status: DISCONTINUED | OUTPATIENT
Start: 2023-07-25 | End: 2023-07-27 | Stop reason: HOSPADM

## 2023-07-25 RX ADMIN — ATORVASTATIN CALCIUM 80 MG: 40 TABLET, FILM COATED ORAL at 10:07

## 2023-07-25 RX ADMIN — AMIODARONE HYDROCHLORIDE 0.5 MG/MIN: 1.8 INJECTION, SOLUTION INTRAVENOUS at 02:07

## 2023-07-25 RX ADMIN — FUROSEMIDE 40 MG: 10 INJECTION, SOLUTION INTRAMUSCULAR; INTRAVENOUS at 10:07

## 2023-07-25 RX ADMIN — CLOPIDOGREL BISULFATE 75 MG: 75 TABLET ORAL at 10:07

## 2023-07-25 RX ADMIN — ASPIRIN 81 MG: 81 TABLET, COATED ORAL at 10:07

## 2023-07-25 RX ADMIN — AMIODARONE HYDROCHLORIDE 1 MG/MIN: 1.8 INJECTION, SOLUTION INTRAVENOUS at 01:07

## 2023-07-25 RX ADMIN — HUMAN ALBUMIN MICROSPHERES AND PERFLUTREN 0.11 MG: 10; .22 INJECTION, SOLUTION INTRAVENOUS at 12:07

## 2023-07-25 RX ADMIN — MUPIROCIN: 20 OINTMENT TOPICAL at 09:07

## 2023-07-25 RX ADMIN — AMIODARONE HYDROCHLORIDE 0.5 MG/MIN: 1.8 INJECTION, SOLUTION INTRAVENOUS at 10:07

## 2023-07-25 RX ADMIN — NOREPINEPHRINE BITARTRATE 0.05 MCG/KG/MIN: 4 INJECTION, SOLUTION INTRAVENOUS at 10:07

## 2023-07-25 NOTE — HPI
76yo male with CAD s/p CABG s/p PCI, ICM s/p AICD (Medtronic), HLP, JERONIMO- CPAP, HTN, chronic combined systolic and diastolic heart failure, tobacco abuse, CVA, MI, shock who presented to the ER with chest pain. He reports feeling yesterday until the evening when he noted sudden onset of chest pain that was not relieving therefore he was brought to the ER. He took SL NTG at home with no relief. He reports associated symptom of SOB with the chest pain and his pain was similar to his episodes prior to his CABG/PCI. EMS arrived and noted his HR to be elevated in the 170s and was given IVP Cardizem in route to the hospital. His BP was noted to be down in the 70s upon arrival with continued tachycardia and was cardioverted and started on IV Levophed.   Labs CBC WNL BMP with Creatinine 1.3  Troponin 0.024 up to 17.984 this AM. Admitted to [facility] and Cardiology consulted for evaluation of WCT.

## 2023-07-25 NOTE — PROGRESS NOTES
"  Smoking cessation education provided. Pt states that he started smoking in college, and smoked as much as 3 pk/day cigarettes. He states that over 3 yrs ago, he cut down to 1 pk/day and does not inhale. Pt declines referral to Ambulatory Smoking Cessation clinic stating that he has "tried everything" and is unable to quit. Handout provided.  "

## 2023-07-25 NOTE — ASSESSMENT & PLAN NOTE
- FLP in 2022 with LDL 63.4  - on Lipitor as an outpatient and continued while admitted  - goal LDL ; cholesterol well controlled  - repeat FLP for completeness

## 2023-07-25 NOTE — ASSESSMENT & PLAN NOTE
- hypotensive upon admission  - medications on hold; remains on Levophed at .05mcg/kg/min  - continue to hold meds and anticipate slow resumption as BP tolerates

## 2023-07-25 NOTE — H&P
[facility] Medicine H&P Note     Admitting Team: [facility] Hospital[facility] Team A  Attending Physician: Miracle Lerner MD  Resident: Raven  Intern: Bony     Date of Admit: 7/24/2023    Chief Complaint     Chest Pain (Pt started with CP / SOB at 5:30 - took his personal Nitro. EMS found pt Afib RVR (190) and sys BP 70's. EMS administered 10mg Cardizem. )   for 3 hours     Subjective:      History of Present Illness:  Tristen Blanc is a 77 y.o. male who has a past medical history of HFrEF sp/ AICD, MI, CAD, cardiac arrest, JERONIMO on nightly CPAP, hx of stroke, carotid artery aneurysm, tobacco use, HTN and HLD.    The patient was in their usual state of health until the evening of 7/24. He reports that he suddenly began to experience substernal chest pain. The pain last until he received therapy in the ED. He reports resolution of the pain at time of admission. He does report associated shortness of breath with the pain. He denies any chest pain, shortness of breath, worsening swelling, nausea, vomiting, fever or chills prior to this evening. He reports that the pain was similar to his prior heart attacks.     Past Medical History:  HFrEF sp/ AICD, MI, CAD, cardiac arrest, JERONIMO on nightly CPAP, hx of stroke, carotid artery aneurysm, tobacco use, HTN and HLD    Past Surgical History:  Past Surgical History:   Procedure Laterality Date    CARDIAC CATHETERIZATION  January 2011    CARDIAC DEFIBRILLATOR PLACEMENT      COLONOSCOPY      CORONARY ANGIOPLASTY  1/3/2011    Last stent was in LMCA, Cx. Had previous stentsd    CORONARY ARTERY BYPASS GRAFT  06/1996    bypass of 2 vessels    INSERT / REPLACE / REMOVE PACEMAKER      AICD; generator change in 2/10/17    JOINT REPLACEMENT Right 12/08/1991    total hip replacement    SINUS SURGERY         Allergies:  Review of patient's allergies indicates:  No Known Allergies    Home Medications:  Lipitor 80 mg daily  Aspirin 81 mg daily   Clopidogrel 75 mg daily  SL Nitro  "PRN  Furosemide 20 mg daily  Metoprolol Succinate 50 mg daily   Sacubitril/valsartan 97 mg - 103 mg daily   Spironolactone 25 mg daily     Family History:  Family History   Problem Relation Age of Onset    Stroke Mother     Heart attack Father     Heart attack Paternal Uncle         2 uncles  of heart attcks    Heart attack Maternal Aunt         2 aunts one  from AMI asnd the other  od complications later on.    Heart attack Paternal Aunt         3 out of 4  at late age of AMI       Social History:  Social History     Tobacco Use    Smoking status: Every Day     Packs/day: 1.00     Types: Cigarettes    Smokeless tobacco: Never    Tobacco comments:     SAYS TRIED EVERYTHING INCLUDING HYPNOSIOS AND DRUGS; NOT INTERESTED IN SMOKING PROGRAM HERE   Substance Use Topics    Alcohol use: Yes     Comment: social; rare    Drug use: No       Review of Systems:  Pertinent items are noted in HPI. All other systems are reviewed and are negative.    Health Maintaince :   Primary Care Physician: Dr. Patterson     Immunizations:   TDap not UTD    Flu UTD   Pna UTD  COVID x2  Zoster UTD    Cancer Screening:  Colonoscopy: n/a     Objective:   Last 24 Hour Vital Signs:  BP  Min: 69/48  Max: 99/52  Temp  Av.6 °F (36.4 °C)  Min: 97.6 °F (36.4 °C)  Max: 97.6 °F (36.4 °C)  Pulse  Av.9  Min: 58  Max: 188  Resp  Av.3  Min: 9  Max: 20  SpO2  Av.6 %  Min: 98 %  Max: 100 %  Height  Av' 5" (165.1 cm)  Min: 5' 5" (165.1 cm)  Max: 5' 5" (165.1 cm)  Weight  Av.6 kg (160 lb)  Min: 72.6 kg (160 lb)  Max: 72.6 kg (160 lb)  Body mass index is 26.63 kg/m².  No intake/output data recorded.    Physical Examination:  General appearance: alert, appears stated age, and cooperative  Head: Normocephalic, without obvious abnormality, atraumatic  Back: symmetric, no curvature. ROM normal. No CVA tenderness.  Lungs:  BL crackles present on RA  Chest wall: no tenderness, well healed midline scar present  Heart: regular " rate and rhythm and S1, S2 normal  Abdomen: normal findings: bowel sounds normal, soft, non-tender, and symmetric  Extremities: extremities normal, atraumatic, no cyanosis or edema  Pulses: radial and DP pulses 2+ and symmetric  Skin: Skin color, texture, turgor normal. No rashes or lesions  Neurologic: Mental status: Alert, oriented, thought content appropriate  Sensory: normal  Motor:moves all extremities spontaneously against gravity        Laboratory:  Trended Lab Data:  Recent Labs   Lab 07/24/23 1919   WBC 7.07   HGB 11.1*   HCT 32.9*      MCV 94   RDW 12.7      K 3.5   *   CO2 20*   BUN 24*   CREATININE 1.3   *   PROT 5.0*   ALBUMIN 3.2*   BILITOT 0.3   AST 68*   ALKPHOS 80   ALT 65*     Diff: 84% granulocytes, 9.4% lymphocytes, 5.2% monocytes, 0.9% Eosinophils, 0.3% Basophils, 0.4 immature granulocytes     Trended Cardiac Data:  Recent Labs   Lab 07/24/23 1919   TROPONINI 0.024   *     PT 11.8  INR 1.1  aPTT 23.3  TSH 4.488  T4 0.78    Other Results:  EKG (my interpretation): Wide complex tachycardia     Radiology:    CXR Impression: Mild hypoventilatory changes with no convincing evidence of acute chest disease.     Assessment:     Tristen Blanc is a 77 y.o. male who has a past medical history of HFrEF sp/ AICD, MI, CAD, cardiac arrest, JERONIMO on nightly CPAP, hx of stroke, carotid artery aneurysm, tobacco use, HTN and HLD. Patient is admitted to [facility] hemodynamically unstable wide complex tachycardia requiring cardioversion in the ED.      Plan:     Unstable wide complex tachyarrhythmia  - s/p synchronized cardioversion in the ED  - Started on amiodarone drip and heparin drip per Cardiology recommendations   - initial trop wnl; will rpt  - ECHO in the AM  - Cardiology consulted placed for interrogation and further recs     Shock; suspect Cardiogenic    - Cardiogenic mostly likely 2/2 unstable tachycardia and known hx of HFrEF  - Hold home BP meds  - Wean  Levophed as tolerated now that HR better controlled     Chronic HFrEF sp/ AICD  - Holding home GDMT while patient critically ill requiring Levophed and amio drip  - Appreciate Cardiology recs regarding timing of restarting home medications   - Elevated BNP with mild crackles on admission; will give one dose of IV Lasix 40 mg then can continue home dosing there after     Hx of MI  CAD  Cardiac arrest  - Trop wnl  - Continue home aspirin and statin  - Continue heparin drip per Cardiology  - Will hold home Plavix while patient on heparin drip     JERONIMO on nightly CPAP  - Issues with home CPAP 2/2 recall  - Will start CPAP nightly while admitted   - Needs follow up with sleep medicine    Hx of stroke  - Continue home statin and aspirin  - Hold home Plavix while on heparin drip     Hx of carotid artery aneurysm  - Follows with NSGY  - Last visit on 11/16/22  - Plan was for follow up and rpt CTA head in 1 year     Tobacco use  - Counseled on cessation     Essential HTN  - Hold home BP meds while patient requiring Levophed    HLD  - continue home statin    Subclinical Hypothyroidism   - TSH 4.489, T4 0.78  - Needs rpt when not acutely ill     Acute Normocytic anemia  - Hbg 12.5, MCV 96 on admission  - Iron studies, B12 and folate ordered     Code: Full  DVT: Heparin drip  Diet: Cardiac; NPO at midnight pending Cardiology recs in the AM    Disposition: Admit to ICU while requiring amiodarone drip and Levophed     Hung Rivas MD  [facility] HO-3    [facility] Hospitalist Pager numbers:   [facility] Hospitalist Medicine Team A (Lenka/Eduarda): [phone]  [facility] Hospitalist Medicine Team B (Bong/Chau):  [phone]

## 2023-07-25 NOTE — ED NOTES
Verbal orders from MD Vincent for 1mg versed and 1L NS. Pt on 4L NC. Bag valve mask at bedside. Pt connected to cardiac monitoring, automatic bp cuff taking q 1 min, continuous pulse ox.

## 2023-07-25 NOTE — ASSESSMENT & PLAN NOTE
- long standing history of HFrEF with AICD placed in 2010 with generator change in 2017  - reports Medtronic AICD; presented with WCT with cardioversion; currently on IV Amiodarone and will continue  - interrogation pending

## 2023-07-25 NOTE — ED NOTES
Patient presents to the ED via EMS with c/o Afib RVR and hypotension. Upon arrival patient hypotensive, pale, 's, clammy, and diaphoretic. Patient alert and oriented but stating he cannot think straight to answer questions at this time. Pt with history of MI and pacemaker/defibrillator. EDP called to bedside for orders.

## 2023-07-25 NOTE — ASSESSMENT & PLAN NOTE
- presented with chest pain with EKG with WCT concerning for VTach; SBP down to 70s and cardioverted with restoration to normal rhythm  - no recurrent arrhythmias noted overnight on telemetry; currently on IV Amiodarone and will continue for now  - continue to monitor on telemetry; maintain K+>4.0 Mg >2.0

## 2023-07-25 NOTE — ASSESSMENT & PLAN NOTE
- long history of HFrEF/ICM  - on Entresto, Aldacone, Toprol XL and Lasix as an outpatient  - meds on hold for now given hypotension requiring pressors  - given IV Lasix overnight with only 200cc documented out; no acute distress noted currently  - continue to monitor fluid volume status closely; ; CXR reviewed  - plan on slow resumption of oral medication regimen as BP allows

## 2023-07-25 NOTE — PROGRESS NOTES
"[facility] Medicine Progress Note      Subjective/Interval History      Overnight, patient did well, states he got some sleep once being brought up to ICU. This morning patient states he is feeling much better, denies any chest pain, shortness of breath, palpitations. States he did not notice increased urine output after dose of IV lasix. Patient to get TTE this morning, aware of plan.     Objective     Physical Examination:  BP (!) 90/55   Pulse 60   Temp 97.6 °F (36.4 °C) (Oral)   Resp 12   Ht 5' 5" (1.651 m)   Wt 73 kg (160 lb 15 oz)   SpO2 97%   BMI 26.78 kg/m²    General appearance: alert, appears stated age, and cooperative  Head: Normocephalic, without obvious abnormality, atraumatic  Back: symmetric, normal curvature. ROM normal. No CVA tenderness.  Lungs:  BL crackles present on RA  Chest wall: no tenderness, well healed midline scar present  Heart: regular rate and rhythm and S1, S2 normal  Abdomen: normal findings: bowel sounds normal, soft, non-tender, and symmetric  Extremities: extremities normal, atraumatic, no cyanosis or edema  Pulses: radial and DP pulses 2+ and symmetric  Skin: Skin color, texture, turgor normal. No rashes or lesions  Neurologic: Mental status: Alert, oriented, thought content appropriate  Sensory: normal  Motor:moves all extremities spontaneously against gravity       Intake/Output:    Intake/Output Summary (Last 24 hours) at 7/25/2023 0732  Last data filed at 7/25/2023 0700  Gross per 24 hour   Intake 1427.87 ml   Output 200 ml   Net 1227.87 ml     Net IO Since Admission: 1,227.87 mL [07/25/23 0732]    Laboratory data: reviewed     EKG data: reviewed   Initial EKG 7/24/23: Wide complex tachycardia    Repeat EKG post synchronized cardioversion: NSR, L axis deviation    Radiology data: reviewed   CXR 7/24/23    FINDINGS:  The heart is not enlarged the trachea is midline.  Twin lead pacemaker defibrillator is noted.  Defibrillating pad projects over the left side of the " patient.  Mild hypoventilatory changes are suggested with no consolidation or effusion.     Impression:     Mild hypoventilatory changes with no convincing evidence of acute chest disease.     Devices as described.     Current Medications:     Infusions:   amiodarone in dextrose 5% 0.5 mg/min (07/25/23 0700)    heparin (porcine) in D5W 9 Units/kg/hr (07/25/23 0700)    NORepinephrine bitartrate-D5W 0.06 mcg/kg/min (07/25/23 0700)        Scheduled:   aspirin  81 mg Oral Daily    atorvastatin  80 mg Oral Daily    clopidogreL  75 mg Oral Daily    furosemide (LASIX) injection  40 mg Intravenous Daily    mupirocin   Nasal BID        PRN:  heparin (PORCINE), heparin (PORCINE), sodium chloride 0.9%      Assessment/Plan:     Tristen Blanc is a 77 y.o. male who has a past medical history of HFrEF sp/ AICD, MI, CAD, cardiac arrest, JERONIMO on nightly CPAP, hx of stroke, carotid artery aneurysm, tobacco use, HTN and HLD. Patient is admitted to [facility] hemodynamically unstable wide complex tachycardia requiring cardioversion in the ED. Now requiring ICU level care with minimal pressor support and amiodarone drip per cardiology.       Unstable wide complex tachyarrhythmia  Elevated troponin  - s/p synchronized cardioversion in the ED  - Started on amiodarone drip and heparin drip per Cardiology recommendations,   - continue amio, heparin  - initial trop 0.024 -> 17.9, continue to trend  - TTE today  - Continue telemetry  - Cardiology consult placed for interrogation and further recs      Shock; suspect Cardiogenic    - Cardiogenic mostly likely 2/2 unstable tachycardia and known hx of HFrEF  - Hold home BP meds  - Wean Levophed as tolerated now that HR better controlled      Acute exacerbation of 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  NYHA HF Class 2  - Last TTE 6/2020     - EF 20-25%     - Grade 1 LV diastolic dysfunction     - Normal CVP 3mmHg     - PA systolic pressure 27mmHg  - Repeat TTE  today  - Holding home GDMT while patient critically ill requiring Levophed and amio drip  - Appreciate Cardiology recs regarding timing of restarting home medications   - On admission,  with mild crackles   - Will give one dose of IV Lasix 40 mg then can continue home dosing there after      Hx of MI  CAD  Cardiac arrest  - initial trop 0.024 -> 17.9, continue to trend  - Continue home aspirin and statin  - Continue heparin drip per Cardiology  - Will hold home Plavix while patient on heparin drip      JERONIMO on nightly CPAP  - Issues with home CPAP 2/2 recall  - Continue CPAP nightly while admitted   - Needs follow up with sleep medicine     Hx of CVA without residual deficits  - Continue home statin and aspirin  - Hold home Plavix while on heparin drip      Hx of carotid artery aneurysm  - Follows with NSGY  - Last visit on 11/16/22  - Plan was for follow up and rpt CTA head in 1 year      Tobacco use  - 1 PPD >30 years  - Counseled on cessation      Essential HTN  - Hold home BP meds while patient requiring Levophed     HLD  - continue home statin     Subclinical Hypothyroidism   - TSH 4.489, T4 0.78  - repeat TSH when not acutely ill      Acute Normocytic anemia  - Hbg 12.5, MCV 96 on admission  - Iron studies, B12 and folate ordered   - Ferritin 158    Healthcare maintenance   -primary care provider is Alan Patterson MD      Code: Full  DVT: Heparin drip  Diet: Cardiac; NPO at midnight pending Cardiology recs in the AM    Disposition: Requiring ICU level care while requiring amiodarone drip and Levophed     Esequiel Carbajal DO  [facility] Internal Medicine HO-II  [facility] Hospitalist Medicine Team A    [facility] Medicine Hospitalist Pager numbers:   [facility] Hospitalist Medicine Team A (Lenka/Eduarda):          [phone]  [facility] Hospitalist Medicine Team B (Bong/Chau):        [phone]

## 2023-07-25 NOTE — ASSESSMENT & PLAN NOTE
- history of CABG with PCI after CABG  - on DAPT and statin; no BB due to pressor use and anticipate resumption once BP stabilizes  - chest pain resolved overnight; plan as detailed under NSTEMI

## 2023-07-25 NOTE — CONSULTS
[facility] - Intensive Care  Cardiology  Consult Note    Patient Name: Tristen Blanc  MRN: [id number]  Admission Date: 7/24/2023  Hospital Length of Stay: 1 days  Code Status: Full Code   Attending Provider: Miracle Lerner MD   Consulting Provider: MELO Medina ANP  Primary Care Physician: Alan Patterson MD  Principal Problem:Wide-complex tachycardia    Patient information was obtained from patient, past medical records and ER records.     Inpatient consult to [facility]  Consult performed by: MELO Abdalla, MINA  Consult ordered by: Hung Rivas MD  Reason for consult: WCT; NSTEMI         Subjective:     Chief Complaint:  Chest pain      HPI:   76yo male with CAD s/p CABG s/p PCI, ICM s/p AICD (Medtronic), HLP, JERONIMO- CPAP, HTN, chronic combined systolic and diastolic heart failure, tobacco abuse, CVA, MI, shock who presented to the ER with chest pain. He reports feeling yesterday until the evening when he noted sudden onset of chest pain that was not relieving therefore he was brought to the ER. He took SL NTG at home with no relief. He reports associated symptom of SOB with the chest pain and his pain was similar to his episodes prior to his CABG/PCI. EMS arrived and noted his HR to be elevated in the 170s and was given IVP Cardizem in route to the hospital. His BP was noted to be down in the 70s upon arrival with continued tachycardia and was cardioverted and started on IV Levophed.   Labs CBC WNL BMP with Creatinine 1.3  Troponin 0.024 up to 17.984 this AM. Admitted to [facility] Medicine and Cardiology consulted for evaluation of WCT.     Hospital Course: 7/25/2023 per HPI    Past Medical History:   Diagnosis Date    Allergy     Arthritis     Cardiomyopathy     ischemic    CHF (congestive heart failure)     Coronary artery disease     s/p CABG    General anesthetics causing adverse effect in therapeutic use     Hyperlipidemia     Hypertension     ICD (implantable  cardioverter-defibrillator) in place 5/3/2013    Sleep apnea with use of continuous positive airway pressure (CPAP)        Past Surgical History:   Procedure Laterality Date    CARDIAC CATHETERIZATION  January 2011    CARDIAC DEFIBRILLATOR PLACEMENT      COLONOSCOPY      CORONARY ANGIOPLASTY  1/3/2011    Last stent was in LMCA, Cx. Had previous stentsd    CORONARY ARTERY BYPASS GRAFT  06/1996    bypass of 2 vessels    INSERT / REPLACE / REMOVE PACEMAKER      AICD; generator change in 2/10/17    JOINT REPLACEMENT Right 12/08/1991    total hip replacement    SINUS SURGERY         Review of patient's allergies indicates:  No Known Allergies    No current facility-administered medications on file prior to encounter.     Current Outpatient Medications on File Prior to Encounter   Medication Sig    aspirin (ECOTRIN) 81 MG EC tablet Take 81 mg by mouth once daily.      atorvastatin (LIPITOR) 80 MG tablet TAKE 1 TABLET BY MOUTH EVERY EVENING    clopidogreL (PLAVIX) 75 mg tablet TAKE 1 TABLET(75 MG) BY MOUTH EVERY EVENING    coenzyme Q10 200 mg capsule Take 200 mg by mouth once daily.    ENTRESTO  mg per tablet TAKE 1 TABLET BY MOUTH TWICE DAILY    ergocalciferol (ERGOCALCIFEROL) 50,000 unit Cap Take 2,000 Units by mouth every Mon, Wed, Fri.    fluticasone (FLONASE) 50 mcg/actuation nasal spray 1 spray by Nasal route as needed.     FOLIC ACID/MV,FE,OTHER MIN (CENTRUM ORAL) Take 1 tablet by mouth every morning.     furosemide (LASIX) 20 MG tablet TAKE 1 TABLET(20 MG) BY MOUTH EVERY DAY    metoprolol succinate (TOPROL-XL) 50 MG 24 hr tablet TAKE 1 TABLET(50 MG) BY MOUTH EVERY DAY    montelukast (SINGULAIR) 10 mg tablet Take 1 tablet (10 mg total) by mouth daily as needed.    nitroGLYCERIN (NITROSTAT) 0.4 MG SL tablet ONE TABLET UNDER TONGUE AS NEEDED FOR CHEST PAIN. TAKE EVERY 5 MINUTES AS NEEDED    spironolactone (ALDACTONE) 25 MG tablet TAKE 1 TABLET(25 MG) BY MOUTH EVERY DAY    vardenafil (LEVITRA) 20 MG tablet Take  20 mg by mouth daily as needed.      mupirocin (BACTROBAN) 2 % ointment Apply topically 2 (two) times daily.     Family History       Problem Relation (Age of Onset)    Heart attack Father, Paternal Uncle, Maternal Aunt, Paternal Aunt    Stroke Mother          Tobacco Use    Smoking status: Every Day     Packs/day: 1.00     Types: Cigarettes    Smokeless tobacco: Never    Tobacco comments:     SAYS TRIED EVERYTHING INCLUDING HYPNOSIOS AND DRUGS; NOT INTERESTED IN SMOKING PROGRAM HERE   Substance and Sexual Activity    Alcohol use: Yes     Comment: social; rare    Drug use: No    Sexual activity: Yes     Partners: Female     Review of Systems   Constitutional: Negative for chills, decreased appetite, diaphoresis, fever, malaise/fatigue, weight gain and weight loss.   Cardiovascular:  Positive for chest pain. Negative for claudication, dyspnea on exertion, irregular heartbeat, leg swelling, near-syncope, orthopnea, palpitations and paroxysmal nocturnal dyspnea.   Respiratory:  Negative for cough, shortness of breath, snoring, sputum production and wheezing.    Endocrine: Negative for cold intolerance, heat intolerance, polydipsia, polyphagia and polyuria.   Skin:  Negative for color change, dry skin, itching, nail changes and poor wound healing.   Musculoskeletal:  Negative for back pain, gout, joint pain and joint swelling.   Gastrointestinal:  Negative for bloating, abdominal pain, constipation, diarrhea, hematemesis, hematochezia, melena, nausea and vomiting.   Genitourinary:  Negative for dysuria, hematuria and nocturia.   Neurological:  Negative for dizziness, headaches, light-headedness, numbness, paresthesias and weakness.   Psychiatric/Behavioral:  Negative for altered mental status, depression and memory loss.    Objective:     Vital Signs (Most Recent):  Temp: 97.6 °F (36.4 °C) (07/25/23 0315)  Pulse: 61 (07/25/23 0827)  Resp: (!) 23 (07/25/23 0827)  BP: (!) 90/55 (07/25/23 0700)  SpO2: 98 % (07/25/23 0827)  Vital Signs (24h Range):  Temp:  [97.6 °F (36.4 °C)] 97.6 °F (36.4 °C)  Pulse:  [] 61  Resp:  [9-24] 23  SpO2:  [96 %-100 %] 98 %  BP: ()/(44-59) 90/55     Weight: 73 kg (160 lb 15 oz)  Body mass index is 26.78 kg/m².    SpO2: 98 %         Intake/Output Summary (Last 24 hours) at 7/25/2023 1022  Last data filed at 7/25/2023 0700  Gross per 24 hour   Intake 1427.87 ml   Output 200 ml   Net 1227.87 ml       Lines/Drains/Airways       Peripheral Intravenous Line  Duration                  Peripheral IV - Single Lumen 20 G Right Antecubital -- days         Peripheral IV - Single Lumen 07/24/23 16 G Anterior;Left Forearm 1 day                     Physical Exam  Constitutional:       General: He is not in acute distress.     Appearance: He is well-developed.   Cardiovascular:      Rate and Rhythm: Normal rate and regular rhythm.      Heart sounds: No murmur heard.    No gallop.   Pulmonary:      Effort: Pulmonary effort is normal. No respiratory distress.      Breath sounds: Normal breath sounds. No wheezing.   Abdominal:      General: Bowel sounds are normal. There is no distension.      Palpations: Abdomen is soft.      Tenderness: There is no abdominal tenderness.   Skin:     General: Skin is warm and dry.   Neurological:      Mental Status: He is alert and oriented to person, place, and time.        Significant Labs: BMP:   Recent Labs   Lab 07/24/23 1919 07/25/23  0342   * 138*    136   K 3.5 4.3   * 107   CO2 20* 22*   BUN 24* 27*   CREATININE 1.3 1.3   CALCIUM 7.4* 8.6*   MG  --  2.1   , CBC   Recent Labs   Lab 07/24/23 1919 07/24/23 2028 07/25/23  0342   WBC 7.07 18.84* 8.76   HGB 11.1* 12.5* 12.7*   HCT 32.9* 38.1* 37.7*    177 197   , and Troponin   Recent Labs   Lab 07/24/23 1919 07/25/23  0342   TROPONINI 0.024 17.984*       Significant Imaging: Echocardiogram: Transthoracic echo (TTE) complete ([facility] Only):   Results for orders placed or performed during the hospital  encounter of 07/15/20   Echo Color Flow Doppler? Yes   Result Value Ref Range    Ascending aorta 3.53 cm    STJ 3.00 cm    AV mean gradient 3 mmHg    Ao peak silas 1.22 m/s    Ao VTI 25.63 cm    IVRT 111.32 msec    IVS 0.65 0.6 - 1.1 cm    LA size 4.68 cm    Left Atrium Major Axis 5.07 cm    Left Atrium Minor Axis 5.04 cm    LVIDd 5.91 3.5 - 6.0 cm    LVIDs 5.15 (A) 2.1 - 4.0 cm    LVOT diameter 2.12 cm    LVOT peak VTI 16.50 cm    Posterior Wall 0.92 0.6 - 1.1 cm    MV Peak A Silas 0.66 m/s    E wave deceleration time 200.00 msec    MV Peak E Silas 0.62 m/s    PV Peak D Silas 0.67 m/s    PV Peak S Silas 0.39 m/s    RA Major Axis 4.10 cm    RA Width 3.39 cm    RVDD 3.68 cm    Sinus 3.46 cm    TAPSE 2.02 cm    TR Max Silas 2.44 m/s    TDI LATERAL 0.07 m/s    TDI SEPTAL 0.05 m/s    LA WIDTH 3.97 cm    MV stenosis pressure 1/2 time 45.54 ms    LV Diastolic Volume 174.15 mL    LV Systolic Volume 126.58 mL    RV S' 7.94 cm/s    LVOT peak silas 0.78 m/s    LV LATERAL E/E' RATIO 8.86 m/s    LV SEPTAL E/E' RATIO 12.40 m/s    FS 13 %    LA volume 79.83 cm3    LV mass 177.05 g    Left Ventricle Relative Wall Thickness 0.31 cm    AV valve area 2.27 cm2    AV Velocity Ratio 0.64     AV index (prosthetic) 0.64     MV valve area p 1/2 method 4.83 cm2    E/A ratio 0.94     Mean e' 0.06 m/s    Pulm vein S/D ratio 0.58     LVOT area 3.5 cm2    LVOT stroke volume 58.21 cm3    AV peak gradient 6 mmHg    E/E' ratio 10.33 m/s    Triscuspid Valve Regurgitation Peak Gradient 24 mmHg    BSA 1.98 m2    LV Systolic Volume Index 65.3 mL/m2    LV Diastolic Volume Index 89.80 mL/m2    LA Volume Index 41.2 mL/m2    LV Mass Index 91 g/m2    Right Atrial Pressure (from IVC) 3 mmHg    TV rest pulmonary artery pressure 27 mmHg    LA Volume Index (Mod) 35.6 mL/m2    LA volume (mod) 69.00 cm3    Narrative    · Moderate left atrial enlargement.  · Moderate left ventricular enlargement.  · Severely decreased left ventricular systolic function. The estimated    ejection fraction is 20-25%.  · Grade I (mild) left ventricular diastolic dysfunction consistent with   impaired relaxation.  · Low normal right ventricular systolic function.  · Mild mitral regurgitation.  · Normal central venous pressure (3 mmHg).  · The estimated PA systolic pressure is 27 mmHg.        Assessment and Plan:     Wide-complex tachycardia  - presented with chest pain with EKG with WCT concerning for VTach; SBP down to 70s and cardioverted with restoration to normal rhythm  - no recurrent arrhythmias noted overnight on telemetry; currently on IV Amiodarone and will continue for now  - continue to monitor on telemetry; maintain K+>4.0 Mg >2.0    NSTEMI (non-ST elevated myocardial infarction)  - presented with complaints of chest pain eventually relieved in ER after medical management and cardioversion  - initial troponin .024 with trend up to 17.984 after CV; initial EKG with WCTrepeat EKGs with intermittent paced rhythm with non specific T wave changes to lateral leads improved from EKG in 2020  - currently chest pain free; continue DAPT, statin; no BB given pressor use; continue IV Heparin drip; anticipate repeat echo today  - concerned about ischemic etiology given history/risk factors with presentation with chest pain and WCT; will plan to proceed with [facility] tomorrow- further recs once [facility] completed     Chronic combined systolic and diastolic CHF, NYHA class 2  - long history of HFrEF/ICM  - on Entresto, Aldacone, Toprol XL and Lasix as an outpatient  - meds on hold for now given hypotension requiring pressors  - given IV Lasix overnight with only 200cc documented out; no acute distress noted currently  - continue to monitor fluid volume status closely; ; CXR reviewed  - plan on slow resumption of oral medication regimen as BP allows     ICD (implantable cardioverter-defibrillator) in place  - long standing history of HFrEF with AICD placed in 2010 with generator change in 2017  - reports  [employer]tr[employer] AICD; presented with WCT with cardioversion; currently on IV Amiodarone and will continue  - interrogation pending     Essential (primary) hypertension  - hypotensive upon admission  - medications on hold; remains on Levophed at .05mcg/kg/min  - continue to hold meds and anticipate slow resumption as BP tolerates     Other hyperlipidemia  - FLP in 2022 with LDL 63.4  - on Lipitor as an outpatient and continued while admitted  - goal LDL ; cholesterol well controlled  - repeat FLP for completeness     CAD (coronary artery disease) of artery bypass graft  - history of CABG with PCI after CABG  - on DAPT and statin; no BB due to pressor use and anticipate resumption once BP stabilizes  - chest pain resolved overnight; plan as detailed under NSTEMI     VTE Risk Mitigation (From admission, onward)           Ordered     IP VTE HIGH RISK PATIENT  Once         07/24/23 2216     Place sequential compression device  Until discontinued         07/24/23 2216     heparin 25,000 units in dextrose 5% (100 units/ml) IV bolus from bag - ADDITIONAL PRN BOLUS - 60 units/kg  As needed (PRN)        Question:  Heparin Infusion Adjustment (DO NOT MODIFY ANSWER)  Answer:  \\[email or web address]\epic\Images\Pharmacy\HeparinInfusions\heparin LOW INTENSITY nomogram for [facility] [id number]    07/24/23 2019     heparin 25,000 units in dextrose 5% (100 units/ml) IV bolus from bag - ADDITIONAL PRN BOLUS - 30 units/kg  As needed (PRN)        Question:  Heparin Infusion Adjustment (DO NOT MODIFY ANSWER)  Answer:  \[email or web address]\epic\Images\Pharmacy\HeparinInfusions\heparin LOW INTENSITY nomogram for [facility] [id number]    07/24/23 2019     heparin 25,000 units in dextrose 5% 250 mL (100 units/mL) infusion LOW INTENSITY nomogram - [facility]  Continuous        Question Answer Comment   Heparin Infusion Adjustment (DO NOT MODIFY ANSWER) \\[email or web address]\epic\Images\Pharmacy\HeparinInfusions\heparin LOW INTENSITY nomogram for [facility] [id number]    Begin at (in  units/kg/hr) 12        07/24/23 2019                    Thank you for your consult. I will follow-up with patient. Please contact us if you have any additional questions.    MELO Medina, ANP  Cardiology   [facility] - Intensive Care    I have seen the patient with Nurse Practitioner Hayley Dang. I have personally interviewed and examined the patient at bedside and agree with her assessment and plan as written above in the note.

## 2023-07-25 NOTE — PROGRESS NOTES
78 yo male active smoker w. PMHx HFrEF s/p AICD, MI, CAD, Cardiac arrest, JERONIMO on nightly CPAP, CVA, HTN, HL here w/ acute onset SOB and chest pain, didn't resolve w/ NTG SL.     By EMS pt found in Afib RVR (190) and sys BP 70's. EMS administered 10mg Cardizem IV x 1.     In ED still in AF w/ RVR and hypotensive needing 1L NS bolus and now s/p CV after versed sedation.     Now in the ICU for observation.     On Amio gtt and levo gtt.   HR in 60s, can stop amio.   On Hep gtt.     Pt being managed by the residents team w/ cardiologist assistance.     K 3.5  Cr 1.3 < 0.9    CXR:  - No acute process.     ECHO in am.   No abx for now, would f/up for s/s infection and also replace electrolytes as needed.     WBC 7 > 18    Please call for further assistance.

## 2023-07-25 NOTE — PLAN OF CARE
The sw met with the pt,Cassi Blanc(wife)[phone] and his dtr who were in the room during the assessment. The pt has a very supportive family. The pt lives in [place] with his wife. The pt's independent with his ADL's and doesn't use dme. The pt still drives but his wife will transport him home at d/c. The sw completed the white board in the pt's room with her name and contact info. The sw encouraged them to call if they have nay further questions or concerns. The sw will continue to follow the pt throughout his transitions of care and will assist with any d/c needs.        07/25/23 1145   Discharge Assessment   Assessment Type Discharge Planning Assessment   Confirmed/corrected address, phone number and insurance Yes   Confirmed Demographics Correct on Facesheet   Source of Information patient   When was your last doctors appointment?   (2 weeks ago)   Communicated ANTHONY with patient/caregiver Date not available/Unable to determine   Reason For Admission Wide-complex tachycardia   People in Home spouse   Do you expect to return to your current living situation? Yes   Do you have help at home or someone to help you manage your care at home? Yes   Who are your caregiver(s) and their phone number(s)? Cassi Blanc(wife)[phone]   Prior to hospitilization cognitive status: Alert/Oriented   Current cognitive status: Alert/Oriented   Home Layout Able to live on 1st floor   Equipment Currently Used at Home none   Readmission within 30 days? No   Patient currently being followed by outpatient case management? No   Do you currently have service(s) that help you manage your care at home? No   Do you take prescription medications? Yes   Do you have prescription coverage? Yes   Coverage [employer]   Do you have any problems affording any of your prescribed medications? No  (the pt receives his meds affordably at [facility] in [place])   Is the patient taking medications as prescribed? yes   Who is going to help you get  home at discharge? Cassi Blanc(wife)[phone]   How do you get to doctors appointments? car, drives self   Are you on dialysis? No   Do you take coumadin? No   Discharge Plan A Home with family   Discharge Plan B Home Health   DME Needed Upon Discharge  other (see comments)  (TBD)   Discharge Plan discussed with: Patient;Adult children;Spouse/sig other   Name(s) and Number(s) Cassi Blanc(wife)[phone]   Transition of Care Barriers None   Housing Stability   In the last 12 months, was there a time when you were not able to pay the mortgage or rent on time? N   In the last 12 months, how many places have you lived? 1   In the last 12 months, was there a time when you did not have a steady place to sleep or slept in a shelter (including now)? N   Transportation Needs   In the past 12 months, has lack of transportation kept you from medical appointments or from getting medications? no   In the past 12 months, has lack of transportation kept you from meetings, work, or from getting things needed for daily living? No   Social Connections   Are you , , , , never , or living with a partner?    OTHER   Name(s) of People in Home Cassi Blanc(wife)[phone]

## 2023-07-25 NOTE — PROGRESS NOTES
[facility] Consult Note    Primary Attending Physician: Dr. Lerner  Consultant Attending: Dr. Ramírez  Consultant Fellow: Dr. Lopez    Reason for Consult:     Cardiogenic Shock in setting of     Subjective:      History of Present Illness:  Tristen Blanc is a 77 y.o. male with a pmhx of HfrEF s/p AICD placement in 2013 and most recent echo in 2020 showing EF of 20-25% and grade I diastolic dysfunction, CAD s/p 2v-CABG in 1996, remote hx of stroke, carotid artery aneurysm, tobacco use, HTN, and HLD who was brought in by EMS for pressure-like chest pain.    Mr. Blanc explained that that morning he was in his usual state of health. At baseline patient is able to complete all his ADL's independently and takes all his medications as prescribed. Suddenly around ~5:30pm patient explained that he felt a severe like chest pain, similar to what he felt when he had his first MI in 1996. Despite taking a SL Nitro the pain did not improve so he called EMS.     On the monitor en route to the hospital patient found to be in monomorphic vtach. He was given a sigle dose of diltiazem 10mg by EMS. On arrical to the ED patient was still at a rate of ~190 with MAP in the low to mid 50's. Given hemodynamic instability patient was cardioverted and transferred to the ICU.  \  Past Medical History:  As mentioned above.    Past Surgical History:  Past Surgical History:   Procedure Laterality Date    CARDIAC CATHETERIZATION  January 2011    CARDIAC DEFIBRILLATOR PLACEMENT      COLONOSCOPY      CORONARY ANGIOPLASTY  1/3/2011    Last stent was in LMCA, Cx. Had previous stentsd    CORONARY ARTERY BYPASS GRAFT  06/1996    bypass of 2 vessels    INSERT / REPLACE / REMOVE PACEMAKER      AICD; generator change in 2/10/17    JOINT REPLACEMENT Right 12/08/1991    total hip replacement    SINUS SURGERY         Allergies:  Review of patient's allergies indicates:  No Known Allergies    Home  "Medications:  Lipitor 80 mg daily  Aspirin 81 mg daily   Clopidogrel 75 mg daily  SL Nitro PRN  Furosemide 20 mg daily  Metoprolol Succinate 50 mg daily   Sacubitril/valsartan 97 mg - 103 mg daily   Spironolactone 25 mg daily     Family History:  Family History   Problem Relation Age of Onset    Stroke Mother     Heart attack Father     Heart attack Paternal Uncle         2 uncles  of heart attcks    Heart attack Maternal Aunt         2 aunts one  from AMI asnd the other  od complications later on.    Heart attack Paternal Aunt         3 out of 4  at late age of AMI       Social History:  Tobacco:   Review of Systems:  Pertinent items are noted in HPI. All other systems are reviewed and are negative.     Objective:   Last 24 Hour Vital Signs:  BP  Min: 69/48  Max: 115/56  Temp  Av.6 °F (36.4 °C)  Min: 97.6 °F (36.4 °C)  Max: 97.6 °F (36.4 °C)  Pulse  Av.5  Min: 58  Max: 188  Resp  Avg: 15  Min: 9  Max: 24  SpO2  Av.5 %  Min: 96 %  Max: 100 %  Height  Av' 5" (165.1 cm)  Min: 5' 5" (165.1 cm)  Max: 5' 5" (165.1 cm)  Weight  Av.8 kg (160 lb 7.5 oz)  Min: 72.6 kg (160 lb)  Max: 73 kg (160 lb 15 oz)  I/O last 3 completed shifts:  In: 1427.9 [I.V.:1280.2; IV Piggyback:147.7]  Out: 200 [Urine:200]    Physical Examination:  BP (!) 90/55   Pulse 61   Temp 97.6 °F (36.4 °C) (Oral)   Resp (!) 23   Ht 5' 5" (1.651 m)   Wt 72.6 kg (160 lb)   SpO2 98%   BMI 26.63 kg/m²     General Appearance:    Alert, cooperative, no distress, appears stated age   Head:    Normocephalic, without obvious abnormality, atraumatic   Eyes:    PERRL, conjunctiva/corneas clear, EOM's intact      Lungs:     Clear to auscultation bilaterally, respirations unlabored   Chest wall:    No tenderness or deformity   Heart:    Regular rate and rhythm, S1 and S2 normal, no murmur, rub   or gallop   Abdomen:     Soft, non-tender, bowel sounds active all four quadrants,     no masses, no organomegaly   Extremities:   " Extremities normal, atraumatic, no cyanosis or edema   Pulses:   2+ and symmetric all extremities   Skin:   Skin color, texture, turgor normal, no rashes or lesions   Neurologic:   Normal strength and sensation          Laboratory:  Trended Lab Data:  Recent Labs     07/24/23 1919 07/24/23 2028 07/25/23  0342   WBC 7.07 18.84* 8.76   HGB 11.1* 12.5* 12.7*   HCT 32.9* 38.1* 37.7*    177 197     --  136   K 3.5  --  4.3   *  --  107   CO2 20*  --  22*   BUN 24*  --  27*   CREATININE 1.3  --  1.3   *  --  138*   BILITOT 0.3  --  0.2   AST 68*  --  151*   ALT 65*  --  131*   ALKPHOS 80  --  105   CALCIUM 7.4*  --  8.6*   ALBUMIN 3.2*  --  3.7   PROT 5.0*  --  5.7*   MG  --   --  2.1   PHOS  --   --  4.2   INR  --  1.1  --        Cardiac:   Recent Labs   Lab 07/24/23 1919 07/25/23  0342   TROPONINI 0.024 17.984*   *  --        Radiology:  CXR reviewed.    I have personally reviewed the above labs and imaging.    Current Medications:     Infusions:   amiodarone in dextrose 5% 0.5 mg/min (07/25/23 0700)    heparin (porcine) in D5W 9 Units/kg/hr (07/25/23 0700)    NORepinephrine bitartrate-D5W 0.06 mcg/kg/min (07/25/23 0700)        Scheduled:   aspirin  81 mg Oral Daily    atorvastatin  80 mg Oral Daily    clopidogreL  75 mg Oral Daily    furosemide (LASIX) injection  40 mg Intravenous Daily    mupirocin   Nasal BID        PRN:  heparin (PORCINE), heparin (PORCINE), sodium chloride 0.9%     Assessment:     Tristen Blanc is a 77 y.o. male with a PMHx as mentioned above who presented to the ED for monomorphic vtach. Now s/p cardioversion, on amiodarone gtt, heparin gtt, and levophed 0.05. This morning on the monitor patient in sinus bradycardia requiring intermittent pacing. Bedside echo c/f worsening EF, cannot rule out MI at this time. Troponin up trending to 17 on repeat today. Patient likely in cardiogenic shock, will plan for formal echo and repeat troponin until stable.  Cardiology onboard and following.    Plan:     #Monomorphic Ventricular Tachycardia  - pt now s/p cardioversion   - on Amio gtt  Plan:  - plan for device interrogation this afternoon  -will defer to cardiology management of amiodarone  -replete electrolytes as needed    #Cardiogenic Shock  #HFpEF  #H/o MI s/p CABG and PCI  -patient requiring Levo 0.05 to maintain MAP>65  -pt continue to smoke ~1ppd  -troponin up trending to ~17 this am  -on heparin gtt and plavix 75mg  -last echo in 2020 sowing EF 20-25%  -bedside echo with possible worsening EF  Plan:  -plan for repeat echo today; will follow up results  -continue heparin gtt/Plavix/ASA  -cardiology on board, angiogram scheduled for tomorrow    Patient seen and examined with attending physician. Please refer to attestation for any changes in above plan.     Kalyan Frank MD  [facility]     Pt seen and examined with Pulmonary/Critical Care team. Time was spent validating the history and physical exam, reviewing the lab and imaging results, and discussing the care of the patient with the bedside nurse. The following additional comments are made:    Severe ischemic DCM, worse than last echo. Troponins to 17.  Now pain free. Has not been able to quit smoking.  Cardiology to see pt.      Issa Ramírez MD  Phone [phone]

## 2023-07-25 NOTE — HOSPITAL COURSE
7/25/2023 per HPI  07/26/2023 Coronary angiogram, LVEDP, POBA Lcx  Access: R-CFA  Indication: Vtac     Findings  Co-dominant  LM: Mild stenosis  LAD: Occluded at the ostium  Lcx: 80-90% ostial Lcx (ISR)  RCA: Patent stent proximal and mid RCA     Bypass Graft  - SVG to LAD filling prox LAD retrograde and into diagonal branch which has 2 serial 90% stenosis. Patent LAD  - LIMA and AUGUSTUS visualized and noted not utilized     LVEDP 21     Intervention  - sp IVUS guided POBA proximal Lcx with 3.5 NC and 4.0 NC balloon  - Attempted diagonal intervention via SVG graft. Able to wire into diagonal branch (via retrograde wiring in the LAD), but unable to deliver balloon    07/27/2023 Patient hemodynamically stable without chest pain or SOB, ambulating in room. BB resumed. PET stress ordered as outpatient as well as EP follow up. Compliance with medication was discussed with particular attention paid to importance of  DAPT for one year without interruption. The risk of abrupt stent occlusion and MI with early discontinuation was specifically stressed. Verbalized understanding and will follow up with primary cardiologist in 2-3 weeks

## 2023-07-25 NOTE — ED NOTES
Verified with pharmacy, amiodarone and levophed are compatible and able to infuse through same y-site.

## 2023-07-25 NOTE — ED PROVIDER NOTES
"Encounter Date: 7/24/2023       History     Chief Complaint   Patient presents with    Chest Pain     Pt started with CP / SOB at 5:30 - took his personal Nitro. EMS found pt Afib RVR (190) and sys BP 70's. EMS administered 10mg Cardizem.      HPI  This is a 77 y.o. male who has a past medical history of Allergy, Arthritis, Cardiomyopathy, CHF (congestive heart failure), Coronary artery disease, General anesthetics causing adverse effect in therapeutic use, Hyperlipidemia, Hypertension, 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     The patient presents to the Emergency Department with chest pain started at 5:30 p.m., took his nitroglycerin.  EMS found patient in "AFib RVR " With heart rate 190s and BP 70s.  EMS gave 10 mg Cardizem.  Associated symptoms of shortness of breath.  Patient required my emergent attention at bedside.  Heart rate in the 180s with blood pressure in the 60 systolic.  Nursing had found when the other ED physician temporarily for orders prior to getting me, placed the patient on the monitor, Versed 1 mg IV ready to push prior to synchronized cardioversion      Review of patient's allergies indicates:  No Known Allergies  Past Medical History:   Diagnosis Date    Allergy     Arthritis     Cardiomyopathy     ischemic    CHF (congestive heart failure)     Coronary artery disease     s/p CABG    General anesthetics causing adverse effect in therapeutic use     Hyperlipidemia     Hypertension     ICD (implantable cardioverter-defibrillator) in place 5/3/2013    Sleep apnea with use of continuous positive airway pressure (CPAP)      Past Surgical History:   Procedure Laterality Date    CARDIAC CATHETERIZATION  January 2011    CARDIAC DEFIBRILLATOR PLACEMENT      COLONOSCOPY      CORONARY ANGIOPLASTY  1/3/2011    Last stent was in LMCA, Cx. Had previous stentsd    CORONARY ARTERY BYPASS GRAFT  06/1996    " bypass of 2 vessels    INSERT / REPLACE / REMOVE PACEMAKER      AICD; generator change in 2/10/17    JOINT REPLACEMENT Right 1991    total hip replacement    SINUS SURGERY       Family History   Problem Relation Age of Onset    Stroke Mother     Heart attack Father     Heart attack Paternal Uncle         2 uncles  of heart attcks    Heart attack Maternal Aunt         2 aunts one  from AMI asnd the other  od complications later on.    Heart attack Paternal Aunt         3 out of 4  at late age of AMI     Social History     Tobacco Use    Smoking status: Every Day     Packs/day: 1.00     Types: Cigarettes    Smokeless tobacco: Never    Tobacco comments:     SAYS TRIED EVERYTHING INCLUDING HYPNOSIOS AND DRUGS; NOT INTERESTED IN SMOKING PROGRAM HERE   Substance Use Topics    Alcohol use: Yes     Comment: social; rare    Drug use: No     Review of Systems   Unable to perform ROS: Acuity of condition     Physical Exam     Initial Vitals [23 1843]   BP Pulse Resp Temp SpO2   (!) 69/48 (!) 188 18 97.6 °F (36.4 °C) 98 %      MAP       --         Physical Exam    Nursing note and vitals reviewed.  Constitutional: He appears well-developed and well-nourished. He is diaphoretic (mild). He appears distressed.   HENT:   Head: Normocephalic and atraumatic.   Dry mouth   Eyes: Conjunctivae are normal.   Cardiovascular:  Normal rate, regular rhythm and intact distal pulses.           Pulmonary/Chest: Breath sounds normal. No respiratory distress.   Median sternotomy scar, cardiac device in left upper chest  Defibrillator pads placed on patient   Musculoskeletal:         General: Normal range of motion.     Neurological: He is alert and oriented to person, place, and time. He has normal strength.   Skin: Skin is warm. Capillary refill takes 2 to 3 seconds. No rash noted. No pallor.   Psychiatric:   Anxious       ED Course   Cardioversion    Date/Time: 2023 7:00 PM  Location procedure was performed:  [facility] EMERGENCY DEPARTMENT  Performed by: Eduardo Kaur MD  Authorized by: Eduardo Kaur MD   Pre-operative diagnosis: Wide complex tachycardia  Post-operative diagnosis: Same  Consent Done: Emergent Situation    Patient sedated: yes  Sedation type: anxiolysis    Sedatives: midazolam  Sedation start date/time: 7/24/2023 7:00 PM  Sedation end date/time: 7/24/2023 7:10 PM  Vitals: Vital signs were monitored during sedation.  Cardioversion basis: emergent  Pre-procedure rhythm: Wide complex tachycardia.  Patient position: patient was placed in a supine position  Chest area: chest area exposed  Electrodes: pads  Electrodes placed: anterior-posterior  Number of attempts: 1  Attempt 1 mode: synchronous  Attempt 1 shock (in Joules): 75  Cardioversion outcome attempt one: Converted to atrial paced rhythm with PVCs.  Post-procedure rhythm: paced-atrial  Complications: no complications  Complications: Yes (hypopnea, however never hypoxic)  Specimens: No  Implants: No        Medical Decision Making  This is an emergent evaluation of a 77 y.o.male patient with presentation of Chest Pain associated with wide complex tachycardia.  Patient unstable, hypotensive/shock with associated chest discomfort, shortness of breath, generalized weakness.  Patient was emergently cardioverted at 75 joules.  Was given Versed 1 mg IV prior to cardioversion.  Had brief 30 sec period of depressed respirations, given supportive ventilation via BVM.  Sats remained 100%. .     Initial differentials include but are not limited to:  V-tach, AFib with aberrancy, electrolyte abnormality, cardiac ischemia.     Plan: cards consult, cbc, cmp, trop, ekg, cardiac monitoring.  Will start with Levophed to maintain blood pressure, map > 65, amiodarone drip, plan for admission to ICU.    Problems Addressed:  Wide-complex tachycardia: complicated acute illness or injury with systemic symptoms that poses a threat to life or bodily functions    Amount and/or  Complexity of Data Reviewed  Independent Historian: EMS     Details: As per HPI  External Data Reviewed: notes.     Details: Echo from 2020 showed EF 20-25% with systolic/diastolic dysfunction  Labs: ordered. Decision-making details documented in ED Course.  Radiology: ordered and independent interpretation performed.  ECG/medicine tests: ordered and independent interpretation performed.     Details: See below  Discussion of management or test interpretation with external provider(s): Case discussed with Dr. Fermin, cardiology, agrees with plan, with the addition of adding heparin bolus and drip.  Will follow..    Case discussed with [facility], accepted the patient to their service.      Risk  Decision regarding hospitalization.    Critical Care  Total time providing critical care: 70 minutes        Labs Reviewed   CBC W/ AUTO DIFFERENTIAL - Abnormal; Notable for the following components:       Result Value    RBC 3.49 (*)     Hemoglobin 11.1 (*)     Hematocrit 32.9 (*)     MCH 31.8 (*)     All other components within normal limits   COMPREHENSIVE METABOLIC PANEL - Abnormal; Notable for the following components:    Chloride 112 (*)     CO2 20 (*)     Glucose 160 (*)     BUN 24 (*)     Calcium 7.4 (*)     Total Protein 5.0 (*)     Albumin 3.2 (*)     AST 68 (*)     ALT 65 (*)     eGFR 57 (*)     All other components within normal limits   TSH - Abnormal; Notable for the following components:    TSH 4.489 (*)     All other components within normal limits   B-TYPE NATRIURETIC PEPTIDE - Abnormal; Notable for the following components:     (*)     All other components within normal limits   CBC W/ AUTO DIFFERENTIAL - Abnormal; Notable for the following components:    WBC 18.84 (*)     RBC 3.98 (*)     Hemoglobin 12.5 (*)     Hematocrit 38.1 (*)     MCH 31.4 (*)     Gran # (ANC) 15.8 (*)     Immature Grans (Abs) 0.08 (*)     Gran % 83.8 (*)     Lymph % 9.4 (*)     All other components within normal  limits    Narrative:     Draw baseline aPTT prior to starting the heparin bolus or  infusion  (if patient is on warfarin prior to heparin therapy)   TROPONIN I   APTT    Narrative:     Draw baseline aPTT prior to starting the heparin bolus or  infusion  (if patient is on warfarin prior to heparin therapy)   PROTIME-INR    Narrative:     Draw baseline aPTT prior to starting the heparin bolus or  infusion  (if patient is on warfarin prior to heparin therapy)   T4, FREE     EKG Readings: (Independently Interpreted)   Initial Reading: No STEMI.   EKG 1.  At 1859: Wide complex tachycardia at 184/minute.  EKG 2.:  1908 - atrial paced rhythm at 77/minute with PVCs, left axis, LBBB, slight ST depression in II, III, AVF.  Impression possible ischemia     Imaging Results              X-Ray Chest AP Portable (In process)  Result time 07/24/23 20:53:28                  X-Rays:   Independently Interpreted Readings:   Chest X-Ray: No acute abnormalities. Cardiomegaly present. ICD seen  Perihilar congestion  No effusion or consolidation     Medications   midazolam (VERSED) 5 mg/mL injection (has no administration in time range)   NORepinephrine 4 mg in dextrose 5% 250 mL infusion (premix) (titrating) (0.22 mcg/kg/min × 72.6 kg Intravenous Verify Only 7/24/23 1938)   amiodarone in dextrose 150 mg/100 mL (1.5 mg/mL) loading dose 150 mg (0 mg Intravenous Stopped 7/24/23 1935)     Followed by   amiodarone 360 mg/200 mL (1.8 mg/mL) infusion (1 mg/min Intravenous Verify Only 7/24/23 1938)     Followed by   amiodarone 360 mg/200 mL (1.8 mg/mL) infusion (has no administration in time range)   heparin 25,000 units in dextrose 5% 250 mL (100 units/mL) infusion LOW INTENSITY nomogram - [facility] (has no administration in time range)   heparin 25,000 units in dextrose 5% (100 units/ml) IV bolus from bag - ADDITIONAL PRN BOLUS - 60 units/kg (has no administration in time range)   heparin 25,000 units in dextrose 5% (100 units/ml) IV bolus from bag -  ADDITIONAL PRN BOLUS - 30 units/kg (has no administration in time range)   midazolam (VERSED) 1 mg/mL injection 1 mg (1 mg Intravenous Given 7/24/23 1904)   0.9%  NaCl infusion (0 mLs Intravenous Stopped 7/24/23 1934)   heparin 25,000 units in dextrose 5% (100 units/ml) IV bolus from bag INITIAL BOLUS (3,950 Units Intravenous Bolus from Bag 7/24/23 2046)                   ED Course as of 07/24/23 2055 Mon Jul 24, 2023 1913 Synchronized cardioverted at 75 joules with conversion to paced rhythm 77 bpm with PVCs.  Previously patient was in a wide complex tachycardia at 184, hypotensive in complaining of chest discomfort.    Will place orders for Levophed drip.  Will contact Dr. Fermin and follow-up consult. Plan to admit to ICU.     Also plan for interrogation, labs incl trop. [NP]   1938 WBC: 7.07 [NP]   1938 Hemoglobin(!): 11.1 [NP]   1938 Hematocrit(!): 32.9 [NP]   1938 Platelets: 167 [NP]      ED Course User Index  [NP] Eduardo Kaur MD                 Clinical Impression:   Final diagnoses:  [R00.0] Wide-complex tachycardia        ED Disposition Condition    Admit Serious                Eduardo Kaur MD  07/24/23 2055

## 2023-07-25 NOTE — ASSESSMENT & PLAN NOTE
- presented with complaints of chest pain eventually relieved in ER after medical management and cardioversion  - initial troponin .024 with trend up to 17.984; initial EKG with WCTrepeat EKGs with intermittent paced rhythm with non specific T wave changes to lateral leads improved from EKG in 2020  - currently chest pain free; continue DAPT, statin; no BB given pressor use; continue IV Heparin drip; anticipate repeat echo today  - concerned about ischemic etiology given history/risk factors with presentation with chest pain and WCT; will plan to proceed with [facility] tomorrow- further recs once LHC completed

## 2023-07-25 NOTE — PLAN OF CARE
Problem: Adult Inpatient Plan of Care  Goal: Plan of Care Review  Outcome: Ongoing, Progressing  Goal: Absence of Hospital-Acquired Illness or Injury  Outcome: Ongoing, Progressing  Goal: Optimal Comfort and Wellbeing  Outcome: Ongoing, Progressing  Goal: Readiness for Transition of Care  Outcome: Ongoing, Progressing     Problem: Fall Injury Risk  Goal: Absence of Fall and Fall-Related Injury  Outcome: Ongoing, Progressing     POC reviewed with patient and wife overnight.  Patient remains on levo, amio, and hep gtt.  No complaints of chest pain or shortness of breath.  Patient cardioverted in ED last night.  ECHO pending for today.  Patient has been NPO since midnight.

## 2023-07-25 NOTE — SUBJECTIVE & OBJECTIVE
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 Had previous stentsd    CORONARY ARTERY BYPASS GRAFT  06/1996    bypass of 2 vessels    INSERT / REPLACE / REMOVE PACEMAKER      AICD; generator change in 2/10/17    JOINT REPLACEMENT Right 12/08/1991    total hip replacement    SINUS SURGERY         Review of patient's allergies indicates:  No Known Allergies    No current facility-administered medications on file prior to encounter.     Current Outpatient Medications on File Prior to Encounter   Medication Sig    aspirin (ECOTRIN) 81 MG EC tablet Take 81 mg by mouth once daily.      atorvastatin (LIPITOR) 80 MG tablet TAKE 1 TABLET BY MOUTH EVERY EVENING    clopidogreL (PLAVIX) 75 mg tablet TAKE 1 TABLET(75 MG) BY MOUTH EVERY EVENING    coenzyme Q10 200 mg capsule Take 200 mg by mouth once daily.    ENTRESTO  mg per tablet TAKE 1 TABLET BY MOUTH TWICE DAILY    ergocalciferol (ERGOCALCIFEROL) 50,000 unit Cap Take 2,000 Units by mouth every Mon, Wed, Fri.    fluticasone (FLONASE) 50 mcg/actuation nasal spray 1 spray by Nasal route as needed.     FOLIC ACID/MV,FE,OTHER MIN (CENTRUM ORAL) Take 1 tablet by mouth every morning.     furosemide (LASIX) 20 MG tablet TAKE 1 TABLET(20 MG) BY MOUTH EVERY DAY    metoprolol succinate (TOPROL-XL) 50 MG 24 hr tablet TAKE 1 TABLET(50 MG) BY MOUTH EVERY DAY    montelukast (SINGULAIR) 10 mg tablet Take 1 tablet  (10 mg total) by mouth daily as needed.    nitroGLYCERIN (NITROSTAT) 0.4 MG SL tablet ONE TABLET UNDER TONGUE AS NEEDED FOR CHEST PAIN. TAKE EVERY 5 MINUTES AS NEEDED    spironolactone (ALDACTONE) 25 MG tablet TAKE 1 TABLET(25 MG) BY MOUTH EVERY DAY    vardenafil (LEVITRA) 20 MG tablet Take 20 mg by mouth daily as needed.      mupirocin (BACTROBAN) 2 % ointment Apply topically 2 (two) times daily.     Family History       Problem Relation (Age of Onset)    Heart attack Father, Paternal Uncle, Maternal Aunt, Paternal Aunt    Stroke Mother          Tobacco Use    Smoking status: Every Day     Packs/day: 1.00     Types: Cigarettes    Smokeless tobacco: Never    Tobacco comments:     SAYS TRIED EVERYTHING INCLUDING HYPNOSIOS AND DRUGS; NOT INTERESTED IN SMOKING PROGRAM HERE   Substance and Sexual Activity    Alcohol use: Yes     Comment: social; rare    Drug use: No    Sexual activity: Yes     Partners: Female     Review of Systems   Constitutional: Negative for chills, decreased appetite, diaphoresis, fever, malaise/fatigue, weight gain and weight loss.   Cardiovascular:  Positive for chest pain. Negative for claudication, dyspnea on exertion, irregular heartbeat, leg swelling, near-syncope, orthopnea, palpitations and paroxysmal nocturnal dyspnea.   Respiratory:  Negative for cough, shortness of breath, snoring, sputum production and wheezing.    Endocrine: Negative for cold intolerance, heat intolerance, polydipsia, polyphagia and polyuria.   Skin:  Negative for color change, dry skin, itching, nail changes and poor wound healing.   Musculoskeletal:  Negative for back pain, gout, joint pain and joint swelling.   Gastrointestinal:  Negative for bloating, abdominal pain, constipation, diarrhea, hematemesis, hematochezia, melena, nausea and vomiting.   Genitourinary:  Negative for dysuria, hematuria and nocturia.   Neurological:  Negative for dizziness, headaches, light-headedness, numbness, paresthesias and weakness.    Psychiatric/Behavioral:  Negative for altered mental status, depression and memory loss.    Objective:     Vital Signs (Most Recent):  Temp: 97.6 °F (36.4 °C) (07/25/23 0315)  Pulse: 61 (07/25/23 0827)  Resp: (!) 23 (07/25/23 0827)  BP: (!) 90/55 (07/25/23 0700)  SpO2: 98 % (07/25/23 0827) Vital Signs (24h Range):  Temp:  [97.6 °F (36.4 °C)] 97.6 °F (36.4 °C)  Pulse:  [] 61  Resp:  [9-24] 23  SpO2:  [96 %-100 %] 98 %  BP: ()/(44-59) 90/55     Weight: 73 kg (160 lb 15 oz)  Body mass index is 26.78 kg/m².    SpO2: 98 %         Intake/Output Summary (Last 24 hours) at 7/25/2023 1022  Last data filed at 7/25/2023 0700  Gross per 24 hour   Intake 1427.87 ml   Output 200 ml   Net 1227.87 ml       Lines/Drains/Airways       Peripheral Intravenous Line  Duration                  Peripheral IV - Single Lumen 20 G Right Antecubital -- days         Peripheral IV - Single Lumen 07/24/23 16 G Anterior;Left Forearm 1 day                     Physical Exam  Constitutional:       General: He is not in acute distress.     Appearance: He is well-developed.   Cardiovascular:      Rate and Rhythm: Normal rate and regular rhythm.      Heart sounds: No murmur heard.    No gallop.   Pulmonary:      Effort: Pulmonary effort is normal. No respiratory distress.      Breath sounds: Normal breath sounds. No wheezing.   Abdominal:      General: Bowel sounds are normal. There is no distension.      Palpations: Abdomen is soft.      Tenderness: There is no abdominal tenderness.   Skin:     General: Skin is warm and dry.   Neurological:      Mental Status: He is alert and oriented to person, place, and time.        Significant Labs: BMP:   Recent Labs   Lab 07/24/23 1919 07/25/23  0342   * 138*    136   K 3.5 4.3   * 107   CO2 20* 22*   BUN 24* 27*   CREATININE 1.3 1.3   CALCIUM 7.4* 8.6*   MG  --  2.1   , CBC   Recent Labs   Lab 07/24/23 1919 07/24/23 2028 07/25/23  0342   WBC 7.07 18.84* 8.76   HGB 11.1* 12.5*  12.7*   HCT 32.9* 38.1* 37.7*    177 197   , and Troponin   Recent Labs   Lab 07/24/23  1919 07/25/23  0342   TROPONINI 0.024 17.984*       Significant Imaging: Echocardiogram: Transthoracic echo (TTE) complete (Cupid Only):   Results for orders placed or performed during the hospital encounter of 07/15/20   Echo Color Flow Doppler? Yes   Result Value Ref Range    Ascending aorta 3.53 cm    STJ 3.00 cm    AV mean gradient 3 mmHg    Ao peak silas 1.22 m/s    Ao VTI 25.63 cm    IVRT 111.32 msec    IVS 0.65 0.6 - 1.1 cm    LA size 4.68 cm    Left Atrium Major Axis 5.07 cm    Left Atrium Minor Axis 5.04 cm    LVIDd 5.91 3.5 - 6.0 cm    LVIDs 5.15 (A) 2.1 - 4.0 cm    LVOT diameter 2.12 cm    LVOT peak VTI 16.50 cm    Posterior Wall 0.92 0.6 - 1.1 cm    MV Peak A Silas 0.66 m/s    E wave deceleration time 200.00 msec    MV Peak E Silas 0.62 m/s    PV Peak D Silas 0.67 m/s    PV Peak S Silas 0.39 m/s    RA Major Axis 4.10 cm    RA Width 3.39 cm    RVDD 3.68 cm    Sinus 3.46 cm    TAPSE 2.02 cm    TR Max Silas 2.44 m/s    TDI LATERAL 0.07 m/s    TDI SEPTAL 0.05 m/s    LA WIDTH 3.97 cm    MV stenosis pressure 1/2 time 45.54 ms    LV Diastolic Volume 174.15 mL    LV Systolic Volume 126.58 mL    RV S' 7.94 cm/s    LVOT peak silas 0.78 m/s    LV LATERAL E/E' RATIO 8.86 m/s    LV SEPTAL E/E' RATIO 12.40 m/s    FS 13 %    LA volume 79.83 cm3    LV mass 177.05 g    Left Ventricle Relative Wall Thickness 0.31 cm    AV valve area 2.27 cm2    AV Velocity Ratio 0.64     AV index (prosthetic) 0.64     MV valve area p 1/2 method 4.83 cm2    E/A ratio 0.94     Mean e' 0.06 m/s    Pulm vein S/D ratio 0.58     LVOT area 3.5 cm2    LVOT stroke volume 58.21 cm3    AV peak gradient 6 mmHg    E/E' ratio 10.33 m/s    Triscuspid Valve Regurgitation Peak Gradient 24 mmHg    BSA 1.98 m2    LV Systolic Volume Index 65.3 mL/m2    LV Diastolic Volume Index 89.80 mL/m2    LA Volume Index 41.2 mL/m2    LV Mass Index 91 g/m2    Right Atrial Pressure (from IVC)  3 mmHg    TV rest pulmonary artery pressure 27 mmHg    LA Volume Index (Mod) 35.6 mL/m2    LA volume (mod) 69.00 cm3    Narrative    · Moderate left atrial enlargement.  · Moderate left ventricular enlargement.  · Severely decreased left ventricular systolic function. The estimated   ejection fraction is 20-25%.  · Grade I (mild) left ventricular diastolic dysfunction consistent with   impaired relaxation.  · Low normal right ventricular systolic function.  · Mild mitral regurgitation.  · Normal central venous pressure (3 mmHg).  · The estimated PA systolic pressure is 27 mmHg.

## 2023-07-26 PROBLEM — R00.0 WIDE-COMPLEX TACHYCARDIA: Status: RESOLVED | Noted: 2023-07-24 | Resolved: 2023-07-26

## 2023-07-26 PROBLEM — I21.4 NSTEMI (NON-ST ELEVATED MYOCARDIAL INFARCTION): Status: RESOLVED | Noted: 2023-07-25 | Resolved: 2023-07-26

## 2023-07-26 PROBLEM — R57.9 SHOCK: Status: RESOLVED | Noted: 2023-07-24 | Resolved: 2023-07-26

## 2023-07-26 LAB
ALBUMIN SERPL BCP-MCNC: 3.3 G/DL (ref 3.5–5.2)
ALP SERPL-CCNC: 85 U/L (ref 55–135)
ALT SERPL W/O P-5'-P-CCNC: 77 U/L (ref 10–44)
ANION GAP SERPL CALC-SCNC: 6 MMOL/L (ref 8–16)
APTT PPP: 40.7 SEC (ref 21–32)
AST SERPL-CCNC: 74 U/L (ref 10–40)
BASOPHILS # BLD AUTO: 0.03 K/UL (ref 0–0.2)
BASOPHILS NFR BLD: 0.5 % (ref 0–1.9)
BILIRUB SERPL-MCNC: 0.3 MG/DL (ref 0.1–1)
BUN SERPL-MCNC: 26 MG/DL (ref 8–23)
CALCIUM SERPL-MCNC: 8.1 MG/DL (ref 8.7–10.5)
CHLORIDE SERPL-SCNC: 109 MMOL/L (ref 95–110)
CO2 SERPL-SCNC: 20 MMOL/L (ref 23–29)
CREAT SERPL-MCNC: 1.1 MG/DL (ref 0.5–1.4)
DIFFERENTIAL METHOD: ABNORMAL
EOSINOPHIL # BLD AUTO: 0.2 K/UL (ref 0–0.5)
EOSINOPHIL NFR BLD: 3.7 % (ref 0–8)
ERYTHROCYTE [DISTWIDTH] IN BLOOD BY AUTOMATED COUNT: 13.4 % (ref 11.5–14.5)
EST. GFR  (NO RACE VARIABLE): >60 ML/MIN/1.73 M^2
GLUCOSE SERPL-MCNC: 99 MG/DL (ref 70–110)
HCT VFR BLD AUTO: 33.8 % (ref 40–54)
HGB BLD-MCNC: 11.5 G/DL (ref 14–18)
IMM GRANULOCYTES # BLD AUTO: 0.01 K/UL (ref 0–0.04)
IMM GRANULOCYTES NFR BLD AUTO: 0.2 % (ref 0–0.5)
LYMPHOCYTES # BLD AUTO: 1.5 K/UL (ref 1–4.8)
LYMPHOCYTES NFR BLD: 23.2 % (ref 18–48)
MAGNESIUM SERPL-MCNC: 2 MG/DL (ref 1.6–2.6)
MCH RBC QN AUTO: 31.8 PG (ref 27–31)
MCHC RBC AUTO-ENTMCNC: 34 G/DL (ref 32–36)
MCV RBC AUTO: 93 FL (ref 82–98)
MONOCYTES # BLD AUTO: 0.7 K/UL (ref 0.3–1)
MONOCYTES NFR BLD: 10.1 % (ref 4–15)
NEUTROPHILS # BLD AUTO: 4 K/UL (ref 1.8–7.7)
NEUTROPHILS NFR BLD: 62.3 % (ref 38–73)
NRBC BLD-RTO: 0 /100 WBC
PHOSPHATE SERPL-MCNC: 3.2 MG/DL (ref 2.7–4.5)
PLATELET # BLD AUTO: 199 K/UL (ref 150–450)
PMV BLD AUTO: 11.1 FL (ref 9.2–12.9)
POC ACTIVATED CLOTTING TIME K: 167 SEC (ref 74–137)
POC ACTIVATED CLOTTING TIME K: 269 SEC (ref 74–137)
POCT GLUCOSE: 85 MG/DL (ref 70–110)
POTASSIUM SERPL-SCNC: 4.2 MMOL/L (ref 3.5–5.1)
PROT SERPL-MCNC: 5.3 G/DL (ref 6–8.4)
RBC # BLD AUTO: 3.62 M/UL (ref 4.6–6.2)
SAMPLE: ABNORMAL
SODIUM SERPL-SCNC: 135 MMOL/L (ref 136–145)
WBC # BLD AUTO: 6.42 K/UL (ref 3.9–12.7)

## 2023-07-26 PROCEDURE — 92978 ENDOLUMINL IVUS OCT C 1ST: CPT | Mod: LC | Performed by: INTERNAL MEDICINE

## 2023-07-26 PROCEDURE — 99152 MOD SED SAME PHYS/QHP 5/>YRS: CPT | Mod: ,,, | Performed by: INTERNAL MEDICINE

## 2023-07-26 PROCEDURE — 99153 MOD SED SAME PHYS/QHP EA: CPT | Performed by: INTERNAL MEDICINE

## 2023-07-26 PROCEDURE — 92937 PR BYPASS (IN OR THROUGH): ICD-10-PCS | Mod: LC,,, | Performed by: INTERNAL MEDICINE

## 2023-07-26 PROCEDURE — 80053 COMPREHEN METABOLIC PANEL: CPT | Performed by: STUDENT IN AN ORGANIZED HEALTH CARE EDUCATION/TRAINING PROGRAM

## 2023-07-26 PROCEDURE — C1887 CATHETER, GUIDING: HCPCS | Performed by: INTERNAL MEDICINE

## 2023-07-26 PROCEDURE — 85730 THROMBOPLASTIN TIME PARTIAL: CPT | Performed by: INTERNAL MEDICINE

## 2023-07-26 PROCEDURE — 25000003 PHARM REV CODE 250: Performed by: INTERNAL MEDICINE

## 2023-07-26 PROCEDURE — C1753 CATH, INTRAVAS ULTRASOUND: HCPCS | Performed by: INTERNAL MEDICINE

## 2023-07-26 PROCEDURE — 21400001 HC TELEMETRY ROOM

## 2023-07-26 PROCEDURE — 93459 L HRT ART/GRFT ANGIO: CPT | Mod: 59 | Performed by: INTERNAL MEDICINE

## 2023-07-26 PROCEDURE — 99152 MOD SED SAME PHYS/QHP 5/>YRS: CPT | Performed by: INTERNAL MEDICINE

## 2023-07-26 PROCEDURE — C1769 GUIDE WIRE: HCPCS | Performed by: INTERNAL MEDICINE

## 2023-07-26 PROCEDURE — 93459 L HRT ART/GRFT ANGIO: CPT | Mod: 26,59,, | Performed by: INTERNAL MEDICINE

## 2023-07-26 PROCEDURE — 93010 EKG 12-LEAD: ICD-10-PCS | Mod: ,,, | Performed by: INTERNAL MEDICINE

## 2023-07-26 PROCEDURE — 85347 COAGULATION TIME ACTIVATED: CPT | Performed by: INTERNAL MEDICINE

## 2023-07-26 PROCEDURE — 63600175 PHARM REV CODE 636 W HCPCS: Performed by: INTERNAL MEDICINE

## 2023-07-26 PROCEDURE — 94761 N-INVAS EAR/PLS OXIMETRY MLT: CPT

## 2023-07-26 PROCEDURE — C1760 CLOSURE DEV, VASC: HCPCS | Performed by: INTERNAL MEDICINE

## 2023-07-26 PROCEDURE — 93459: ICD-10-PCS | Mod: 26,59,, | Performed by: INTERNAL MEDICINE

## 2023-07-26 PROCEDURE — 25000003 PHARM REV CODE 250

## 2023-07-26 PROCEDURE — 25000003 PHARM REV CODE 250: Performed by: STUDENT IN AN ORGANIZED HEALTH CARE EDUCATION/TRAINING PROGRAM

## 2023-07-26 PROCEDURE — 92978 ENDOLUMINL IVUS OCT C 1ST: CPT | Mod: 26,LC,, | Performed by: INTERNAL MEDICINE

## 2023-07-26 PROCEDURE — 92978 PR IVUS, CORONARY, 1ST VESSEL: ICD-10-PCS | Mod: 26,LC,, | Performed by: INTERNAL MEDICINE

## 2023-07-26 PROCEDURE — C1725 CATH, TRANSLUMIN NON-LASER: HCPCS | Performed by: INTERNAL MEDICINE

## 2023-07-26 PROCEDURE — 85025 COMPLETE CBC W/AUTO DIFF WBC: CPT | Performed by: STUDENT IN AN ORGANIZED HEALTH CARE EDUCATION/TRAINING PROGRAM

## 2023-07-26 PROCEDURE — 27201423 OPTIME MED/SURG SUP & DEVICES STERILE SUPPLY: Performed by: INTERNAL MEDICINE

## 2023-07-26 PROCEDURE — 93010 ELECTROCARDIOGRAM REPORT: CPT | Mod: ,,, | Performed by: INTERNAL MEDICINE

## 2023-07-26 PROCEDURE — 63600175 PHARM REV CODE 636 W HCPCS: Performed by: STUDENT IN AN ORGANIZED HEALTH CARE EDUCATION/TRAINING PROGRAM

## 2023-07-26 PROCEDURE — 83735 ASSAY OF MAGNESIUM: CPT | Performed by: STUDENT IN AN ORGANIZED HEALTH CARE EDUCATION/TRAINING PROGRAM

## 2023-07-26 PROCEDURE — 99152 PR MOD CONSCIOUS SEDATION, SAME PHYS, 5+ YRS, FIRST 15 MIN: ICD-10-PCS | Mod: ,,, | Performed by: INTERNAL MEDICINE

## 2023-07-26 PROCEDURE — 84100 ASSAY OF PHOSPHORUS: CPT | Performed by: STUDENT IN AN ORGANIZED HEALTH CARE EDUCATION/TRAINING PROGRAM

## 2023-07-26 PROCEDURE — 93005 ELECTROCARDIOGRAM TRACING: CPT

## 2023-07-26 PROCEDURE — 25500020 PHARM REV CODE 255: Performed by: INTERNAL MEDICINE

## 2023-07-26 PROCEDURE — C1894 INTRO/SHEATH, NON-LASER: HCPCS | Performed by: INTERNAL MEDICINE

## 2023-07-26 PROCEDURE — 92937 PRQ TRLUML REVSC CAB GRF 1: CPT | Mod: LC,,, | Performed by: INTERNAL MEDICINE

## 2023-07-26 PROCEDURE — C9604 PERC D-E COR REVASC T CABG S: HCPCS | Mod: LC | Performed by: INTERNAL MEDICINE

## 2023-07-26 RX ORDER — HEPARIN SODIUM 200 [USP'U]/100ML
INJECTION, SOLUTION INTRAVENOUS
Status: DISCONTINUED | OUTPATIENT
Start: 2023-07-26 | End: 2023-07-27

## 2023-07-26 RX ORDER — FUROSEMIDE 40 MG/1
40 TABLET ORAL DAILY
Status: DISCONTINUED | OUTPATIENT
Start: 2023-07-26 | End: 2023-07-27 | Stop reason: HOSPADM

## 2023-07-26 RX ORDER — IODIXANOL 320 MG/ML
INJECTION, SOLUTION INTRAVASCULAR
Status: DISCONTINUED | OUTPATIENT
Start: 2023-07-26 | End: 2023-07-26 | Stop reason: HOSPADM

## 2023-07-26 RX ORDER — HEPARIN SODIUM 1000 [USP'U]/ML
INJECTION, SOLUTION INTRAVENOUS; SUBCUTANEOUS
Status: DISCONTINUED | OUTPATIENT
Start: 2023-07-26 | End: 2023-07-26 | Stop reason: HOSPADM

## 2023-07-26 RX ORDER — FENTANYL CITRATE 50 UG/ML
INJECTION, SOLUTION INTRAMUSCULAR; INTRAVENOUS
Status: DISCONTINUED | OUTPATIENT
Start: 2023-07-26 | End: 2023-07-26 | Stop reason: HOSPADM

## 2023-07-26 RX ORDER — CEFAZOLIN SODIUM 1 G/3ML
INJECTION, POWDER, FOR SOLUTION INTRAMUSCULAR; INTRAVENOUS
Status: DISCONTINUED | OUTPATIENT
Start: 2023-07-26 | End: 2023-07-26 | Stop reason: HOSPADM

## 2023-07-26 RX ORDER — MIDAZOLAM HYDROCHLORIDE 1 MG/ML
INJECTION, SOLUTION INTRAMUSCULAR; INTRAVENOUS
Status: DISCONTINUED | OUTPATIENT
Start: 2023-07-26 | End: 2023-07-26 | Stop reason: HOSPADM

## 2023-07-26 RX ORDER — LIDOCAINE HYDROCHLORIDE 10 MG/ML
INJECTION, SOLUTION EPIDURAL; INFILTRATION; INTRACAUDAL; PERINEURAL
Status: DISCONTINUED | OUTPATIENT
Start: 2023-07-26 | End: 2023-07-26 | Stop reason: HOSPADM

## 2023-07-26 RX ADMIN — HEPARIN SODIUM 9 UNITS/KG/HR: 10000 INJECTION, SOLUTION INTRAVENOUS at 06:07

## 2023-07-26 RX ADMIN — MUPIROCIN: 20 OINTMENT TOPICAL at 08:07

## 2023-07-26 RX ADMIN — CLOPIDOGREL BISULFATE 75 MG: 75 TABLET ORAL at 08:07

## 2023-07-26 RX ADMIN — ATORVASTATIN CALCIUM 80 MG: 40 TABLET, FILM COATED ORAL at 08:07

## 2023-07-26 RX ADMIN — AMIODARONE HYDROCHLORIDE 0.5 MG/MIN: 1.8 INJECTION, SOLUTION INTRAVENOUS at 09:07

## 2023-07-26 RX ADMIN — FUROSEMIDE 40 MG: 40 TABLET ORAL at 09:07

## 2023-07-26 RX ADMIN — ASPIRIN 81 MG: 81 TABLET, COATED ORAL at 08:07

## 2023-07-26 NOTE — NURSING
Cath lab RNs at bedside. Pt connected to [facility] and transported to cath lab.

## 2023-07-26 NOTE — BRIEF OP NOTE
Procedure: Coronary angiogram, LVEDP, POBA Lcx  Access: R-CFA  Indication: Vtac    Findings  Co-dominant  LM: Mild stenosis  LAD: Occluded at the ostium  Lcx: 80-90% ostial Lcx (ISR)  RCA: Patent stent proximal and mid RCA    Bypass Graft  - SVG to LAD filling prox LAD retrograde and into diagonal branch which has 2 serial 90% stenosis. Patent LAD  - LIMA and AUGUSTUS visualized and noted not utilized    LVEDP 21    Intervention  - sp IVUS guided POBA proximal Lcx with 3.5 NC and 4.0 NC balloon  - Attempted diagonal intervention via SVG graft. Able to wire into diagonal branch (via retrograde wiring in the LAD), but unable to deliver balloon    Recommendations  - Aspirin 81 mg daily for life  - Clopidogrel 75 mg daily for at least 1 year  - High intensity statin  - Follow up with cardiology, EP as OP. Consider PET stress test    Updated family including wife in the waiting room

## 2023-07-26 NOTE — PLAN OF CARE
Problem: Adult Inpatient Plan of Care  Goal: Plan of Care Review  Outcome: Ongoing, Progressing  Goal: Absence of Hospital-Acquired Illness or Injury  Outcome: Ongoing, Progressing  Goal: Optimal Comfort and Wellbeing  Outcome: Ongoing, Progressing     Problem: Fall Injury Risk  Goal: Absence of Fall and Fall-Related Injury  Outcome: Ongoing, Progressing     POC reviewed with patient.  Levophed gtt titrated off.  Patient NPO since midnight for [facility] today.  CPAP worn overnight.  Patient intermittently paced.  No complaints of chest pain or shortness of breath.

## 2023-07-26 NOTE — PROGRESS NOTES
"[facility] Medicine Progress Note      Subjective/Interval History      Overnight, patient states he slept OK, no chest pain/shortness of breath/palpitations, no complaints. This morning he states he feels well. No longer requiring pressors, Cath lab today.     Objective     Physical Examination:  BP (!) 106/59 (BP Location: Left arm, Patient Position: Lying)   Pulse 60   Temp 96.7 °F (35.9 °C) (Axillary)   Resp (!) 9   Ht 5' 5" (1.651 m)   Wt 73 kg (160 lb 15 oz)   SpO2 96%   BMI 26.78 kg/m²    General appearance: alert, appears stated age, and cooperative  Head: Normocephalic, without obvious abnormality, atraumatic  Back: symmetric, normal curvature. ROM normal. No CVA tenderness.  Lungs:  BL crackles present on RA  Chest wall: no tenderness, well healed midline scar present  Heart: regular rate and rhythm and S1, S2 normal  Abdomen: normal findings: bowel sounds normal, soft, non-tender, and symmetric  Extremities: extremities normal, atraumatic, no cyanosis or edema  Pulses: radial and DP pulses 2+ and symmetric  Skin: Skin color, texture, turgor normal. No rashes or lesions  Neurologic: Mental status: Alert, oriented, thought content appropriate  Sensory: normal  Motor:moves all extremities spontaneously against gravity       Intake/Output:    Intake/Output Summary (Last 24 hours) at 7/26/2023 0738  Last data filed at 7/26/2023 0645  Gross per 24 hour   Intake 743.39 ml   Output 800 ml   Net -56.61 ml       Net IO Since Admission: 1,171.26 mL [07/26/23 0738]    Laboratory data: reviewed     EKG data: reviewed   Initial EKG 7/24/23: Wide complex tachycardia    Repeat EKG post synchronized cardioversion: NSR, L axis deviation    Radiology data: reviewed   CXR 7/24/23    FINDINGS:  The heart is not enlarged the trachea is midline.  Twin lead pacemaker defibrillator is noted.  Defibrillating pad projects over the left side of the patient.  Mild hypoventilatory changes are suggested with no consolidation " or effusion.     Impression:     Mild hypoventilatory changes with no convincing evidence of acute chest disease.     Devices as described.     Current Medications:     Infusions:   amiodarone in dextrose 5% 0.5 mg/min (07/26/23 0645)    heparin (porcine) in D5W 9 Units/kg/hr (07/26/23 0645)        Scheduled:   aspirin  81 mg Oral Daily    atorvastatin  80 mg Oral Daily    clopidogreL  75 mg Oral Daily    mupirocin   Nasal BID        PRN:  heparin (PORCINE), heparin (PORCINE), sodium chloride 0.9%      Assessment/Plan:     Tristen Blanc is a 77 y.o. male who has a past medical history of HFrEF sp/ AICD, MI, CAD, cardiac arrest, JERONIMO on nightly CPAP, hx of stroke, carotid artery aneurysm, tobacco use, HTN and HLD. Patient is admitted to [facility] hemodynamically unstable wide complex tachycardia requiring cardioversion in the ED. Now requiring ICU level care with minimal pressor support and amiodarone drip per cardiology.       Unstable wide complex tachyarrhythmia  NSTEMI  - s/p synchronized cardioversion in the ED  - Started on amiodarone drip and heparin drip per Cardiology recommendations,   - continue amio, heparin  - initial trop 0.024 -> 17.9, continue to trend  - Repeat TTE 7/25/23 with decreased EF (15%, down from 20-25% in 2020)  - Segmental wall motion abnormality noted on TTE 7/25/23  - Continue telemetry  - Continue amio drip per cardiology recommendations  - Cath lab today       Cardiogenic Shock  - Cardiogenic mostly likely 2/2 unstable tachycardia and known hx of HFrEF  - Held home BP meds initially in setting of   Shock  - TTE with new segmental wall motion abnormalities, patient to go to cath today  - LFTs initially elevated, now downtrending  - Patient now off levophed, appreciate cardiology input on resuming GDMT     Acute exacerbation of 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  NYHA HF Class 2  - Last TTE 6/2020  - EF 20-25%  - Grade 1 LV diastolic dysfunction  -  Normal CVP 3mmHg  - PA systolic pressure 27mmHg  - Repeat TTE 7/25/23  - EF 15%  - Grade 2 LV diastolic dysfunction  - Segmental wall motion abnormalities  - Septal motion abnormalities consistent with post-op  - Moderate mitral regurg  - Normal CVP   - PA systolic 21mmHg    - Held home GDMT initially in setting of shock, patient now no longer requiring pressor support  - Appreciate Cardiology recs regarding timing of restarting home medications   - On admission,  with mild crackles   - Received 1 time dose Lasix IV 40mg  - Continue Lasix PO 40mg, monitor UOP for response     Hx of MI  CAD  Cardiac arrest  - initial trop 0.024 -> 17.9 -> 14.1  - Continue home aspirin and statin  - Continue heparin drip per Cardiology  - Will hold home Plavix while patient on heparin drip      JERONIMO on nightly CPAP  - Issues with home CPAP 2/2 recall  - Continue CPAP nightly while admitted   - Needs follow up with sleep medicine     Hx of CVA without residual deficits  - Continue home statin and aspirin  - Hold home Plavix while on heparin drip      Hx of carotid artery aneurysm  - Follows with NSGY  - Last visit on 11/16/22  - Plan was for follow up and rpt CTA head in 1 year      Tobacco use  - 1 PPD >30 years  - Counseled on cessation      Essential HTN  - Hold home BP meds while patient requiring Levophed     HLD  - continue home statin     Subclinical Hypothyroidism   - TSH 4.489, T4 0.78  - repeat TSH when not acutely ill      Acute Normocytic anemia  - Hbg 12.5, MCV 96 on admission  - Iron studies, B12 and folate ordered   - Ferritin 158    Healthcare maintenance   -primary care provider is Alan Patterson MD      Code: Full  DVT: Heparin drip  Diet: NPO for cath lab, resume cardiac diet afterwards    Disposition: Requiring ICU level care while requiring amiodarone drip, cath lab today    Esequiel Carbajal,   [facility] HO-II  [facility] Hospitalist Medicine Team A    [facility] Hospitalist Pager numbers:   U  Hospitalist Medicine Team A (Lenka/Eduarda):          [phone]  [facility] Hospitalist Medicine Team B (Bong/Chau):        [phone]

## 2023-07-26 NOTE — NURSING
Nurses Note -- 4 Eyes      7/26/2023   7:11 AM      Skin assessed during: Q Shift Change      [x] No Altered Skin Integrity Present    []Prevention Measures Documented      [] Yes- Altered Skin Integrity Present or Discovered   [] LDA Added if Not in Epic (Describe Wound)   [] New Altered Skin Integrity was Present on Admit and Documented in LDA   [] Wound Image Taken    Wound Care Consulted? No    Attending Nurse:  Ynes Vasquez RN     Second RN/Staff Member:  JAMES Reddy

## 2023-07-27 VITALS
BODY MASS INDEX: 26.81 KG/M2 | WEIGHT: 160.94 LBS | HEART RATE: 72 BPM | HEIGHT: 65 IN | RESPIRATION RATE: 18 BRPM | OXYGEN SATURATION: 97 % | TEMPERATURE: 98 F | SYSTOLIC BLOOD PRESSURE: 117 MMHG | DIASTOLIC BLOOD PRESSURE: 59 MMHG

## 2023-07-27 DIAGNOSIS — I25.112 ATHEROSCLEROSIS OF NATIVE CORONARY ARTERY OF NATIVE HEART WITH REFRACTORY ANGINA PECTORIS: ICD-10-CM

## 2023-07-27 DIAGNOSIS — I25.708 CORONARY ARTERY DISEASE OF BYPASS GRAFT OF NATIVE HEART WITH STABLE ANGINA PECTORIS: Primary | ICD-10-CM

## 2023-07-27 LAB
ALBUMIN SERPL BCP-MCNC: 3.7 G/DL (ref 3.5–5.2)
ALP SERPL-CCNC: 90 U/L (ref 55–135)
ALT SERPL W/O P-5'-P-CCNC: 57 U/L (ref 10–44)
ANION GAP SERPL CALC-SCNC: 8 MMOL/L (ref 8–16)
APTT PPP: 27.3 SEC (ref 21–32)
AST SERPL-CCNC: 43 U/L (ref 10–40)
BASOPHILS # BLD AUTO: 0.03 K/UL (ref 0–0.2)
BASOPHILS NFR BLD: 0.4 % (ref 0–1.9)
BILIRUB SERPL-MCNC: 0.5 MG/DL (ref 0.1–1)
BUN SERPL-MCNC: 21 MG/DL (ref 8–23)
CALCIUM SERPL-MCNC: 8.6 MG/DL (ref 8.7–10.5)
CHLORIDE SERPL-SCNC: 109 MMOL/L (ref 95–110)
CO2 SERPL-SCNC: 25 MMOL/L (ref 23–29)
CREAT SERPL-MCNC: 1.1 MG/DL (ref 0.5–1.4)
DIFFERENTIAL METHOD: ABNORMAL
EOSINOPHIL # BLD AUTO: 0.2 K/UL (ref 0–0.5)
EOSINOPHIL NFR BLD: 3 % (ref 0–8)
ERYTHROCYTE [DISTWIDTH] IN BLOOD BY AUTOMATED COUNT: 13.1 % (ref 11.5–14.5)
EST. GFR  (NO RACE VARIABLE): >60 ML/MIN/1.73 M^2
GLUCOSE SERPL-MCNC: 96 MG/DL (ref 70–110)
HCT VFR BLD AUTO: 36.6 % (ref 40–54)
HGB BLD-MCNC: 12.3 G/DL (ref 14–18)
IMM GRANULOCYTES # BLD AUTO: 0.01 K/UL (ref 0–0.04)
IMM GRANULOCYTES NFR BLD AUTO: 0.1 % (ref 0–0.5)
LYMPHOCYTES # BLD AUTO: 1.1 K/UL (ref 1–4.8)
LYMPHOCYTES NFR BLD: 15.5 % (ref 18–48)
MAGNESIUM SERPL-MCNC: 2 MG/DL (ref 1.6–2.6)
MCH RBC QN AUTO: 31.6 PG (ref 27–31)
MCHC RBC AUTO-ENTMCNC: 33.6 G/DL (ref 32–36)
MCV RBC AUTO: 94 FL (ref 82–98)
MONOCYTES # BLD AUTO: 0.7 K/UL (ref 0.3–1)
MONOCYTES NFR BLD: 10 % (ref 4–15)
NEUTROPHILS # BLD AUTO: 5.2 K/UL (ref 1.8–7.7)
NEUTROPHILS NFR BLD: 71 % (ref 38–73)
NRBC BLD-RTO: 0 /100 WBC
PHOSPHATE SERPL-MCNC: 3.2 MG/DL (ref 2.7–4.5)
PLATELET # BLD AUTO: 143 K/UL (ref 150–450)
PMV BLD AUTO: 9.1 FL (ref 9.2–12.9)
POC ACTIVATED CLOTTING TIME K: 299 SEC (ref 74–137)
POCT GLUCOSE: 81 MG/DL (ref 70–110)
POTASSIUM SERPL-SCNC: 4.5 MMOL/L (ref 3.5–5.1)
PROT SERPL-MCNC: 6 G/DL (ref 6–8.4)
RBC # BLD AUTO: 3.89 M/UL (ref 4.6–6.2)
SAMPLE: ABNORMAL
SODIUM SERPL-SCNC: 142 MMOL/L (ref 136–145)
WBC # BLD AUTO: 7.37 K/UL (ref 3.9–12.7)

## 2023-07-27 PROCEDURE — 84100 ASSAY OF PHOSPHORUS: CPT | Performed by: STUDENT IN AN ORGANIZED HEALTH CARE EDUCATION/TRAINING PROGRAM

## 2023-07-27 PROCEDURE — 25000003 PHARM REV CODE 250

## 2023-07-27 PROCEDURE — 36415 COLL VENOUS BLD VENIPUNCTURE: CPT | Performed by: INTERNAL MEDICINE

## 2023-07-27 PROCEDURE — 80053 COMPREHEN METABOLIC PANEL: CPT | Performed by: STUDENT IN AN ORGANIZED HEALTH CARE EDUCATION/TRAINING PROGRAM

## 2023-07-27 PROCEDURE — 63600175 PHARM REV CODE 636 W HCPCS

## 2023-07-27 PROCEDURE — 25000003 PHARM REV CODE 250: Performed by: NURSE PRACTITIONER

## 2023-07-27 PROCEDURE — 99233 PR SUBSEQUENT HOSPITAL CARE,LEVL III: ICD-10-PCS | Mod: ,,, | Performed by: NURSE PRACTITIONER

## 2023-07-27 PROCEDURE — 85025 COMPLETE CBC W/AUTO DIFF WBC: CPT | Performed by: STUDENT IN AN ORGANIZED HEALTH CARE EDUCATION/TRAINING PROGRAM

## 2023-07-27 PROCEDURE — 94761 N-INVAS EAR/PLS OXIMETRY MLT: CPT

## 2023-07-27 PROCEDURE — 99233 SBSQ HOSP IP/OBS HIGH 50: CPT | Mod: ,,, | Performed by: NURSE PRACTITIONER

## 2023-07-27 PROCEDURE — 83735 ASSAY OF MAGNESIUM: CPT | Performed by: STUDENT IN AN ORGANIZED HEALTH CARE EDUCATION/TRAINING PROGRAM

## 2023-07-27 PROCEDURE — 99900035 HC TECH TIME PER 15 MIN (STAT)

## 2023-07-27 PROCEDURE — 85730 THROMBOPLASTIN TIME PARTIAL: CPT | Performed by: INTERNAL MEDICINE

## 2023-07-27 PROCEDURE — 94660 CPAP INITIATION&MGMT: CPT

## 2023-07-27 RX ORDER — METOPROLOL SUCCINATE 50 MG/1
50 TABLET, EXTENDED RELEASE ORAL DAILY
Status: DISCONTINUED | OUTPATIENT
Start: 2023-07-27 | End: 2023-07-27 | Stop reason: HOSPADM

## 2023-07-27 RX ORDER — FUROSEMIDE 40 MG/1
40 TABLET ORAL DAILY
Qty: 30 TABLET | Refills: 11 | Status: SHIPPED | OUTPATIENT
Start: 2023-07-28 | End: 2023-11-13 | Stop reason: SDUPTHER

## 2023-07-27 RX ADMIN — FUROSEMIDE 40 MG: 40 TABLET ORAL at 11:07

## 2023-07-27 RX ADMIN — CLOPIDOGREL BISULFATE 75 MG: 75 TABLET ORAL at 11:07

## 2023-07-27 RX ADMIN — METOPROLOL SUCCINATE 50 MG: 50 TABLET, EXTENDED RELEASE ORAL at 11:07

## 2023-07-27 RX ADMIN — SACUBITRIL AND VALSARTAN 1 TABLET: 24; 26 TABLET, FILM COATED ORAL at 11:07

## 2023-07-27 RX ADMIN — MUPIROCIN: 20 OINTMENT TOPICAL at 11:07

## 2023-07-27 RX ADMIN — IRON SUCROSE 300 MG: 20 INJECTION, SOLUTION INTRAVENOUS at 11:07

## 2023-07-27 RX ADMIN — ATORVASTATIN CALCIUM 80 MG: 40 TABLET, FILM COATED ORAL at 11:07

## 2023-07-27 RX ADMIN — ASPIRIN 81 MG: 81 TABLET, COATED ORAL at 11:07

## 2023-07-27 NOTE — MEDICAL/APP STUDENT
[facility] Medicine Progress Note    Primary Team: [facility] Hospitalist Team A  Attending Physician: Miracle Lerner MD  Student: Hermila Bradley    Subjective/Interval History      Patient resting comfortably this morning and says he feels well. Denies and chest pain or shortness of breath or other complaints at this time.      Objective     Physical Examination:  Temp:  [97.5 °F (36.4 °C)-98.7 °F (37.1 °C)] 97.8 °F (36.6 °C)  Pulse:  [60-98] 74  Resp:  [12-20] 18  SpO2:  [94 %-99 %] 94 %  BP: ()/(51-57) 115/55  General: No acute distress, resting comfortably   HEENT: Atraumatic, normocephalic, moist mucous membranes  Cardiovascular: Regular rate and rhythm, normal S1 and S2  Chest wall: Non-tender to palpation, no gross deformities noted   Back: Non-tender to palpation, no abnormal curvature noted, symmetric rise with respiration   Respiratory: Stable on room air, non-labored breathing, no accessory muscle use noted. BL crackles present on RA  Abdominal: Non-distended, soft, normoactive bowel sounds, non-tender to palpation, no rebound tenderness, no guarding, no organomegaly noted   Extremities: Atraumatic, non-edematous, moving all four extremities   Pulses: 2+ and symmetric radial artery, dorsalis pedis artery, posterior tibial artery  Skin: Non-diaphoretic, non-jaundice, intact  Neurologic: No gross focal neurologic deficits noted     Intake/Output:    Intake/Output Summary (Last 24 hours) at 7/27/2023 0734  Last data filed at 7/26/2023 2135  Gross per 24 hour   Intake 584.85 ml   Output 750 ml   Net -165.15 ml     Net IO Since Admission: 1,006.11 mL [07/27/23 0734]    Laboratory:  Recent Labs   Lab 07/24/23  1919 07/24/23 2028 07/25/23  0342 07/25/23  1052 07/26/23  0320 07/27/23  0631   WBC 7.07 18.84* 8.76  --  6.42 7.37   HGB 11.1* 12.5* 12.7*  --  11.5* 12.3*    177 197  --  199 143*   MCV 94 96 92  --  93 94     --  136  --  135* 142   K 3.5  --  4.3  --  4.2 4.5   *  --  107  --   109 109   CO2 20*  --  22*  --  20* 25   BUN 24*  --  27*  --  26* 21   CREATININE 1.3  --  1.3  --  1.1 1.1   *  --  138*  --  99 96   CALCIUM 7.4*  --  8.6*  --  8.1* 8.6*   PROT 5.0*  --  5.7*  --  5.3* 6.0   ALBUMIN 3.2*  --  3.7  --  3.3* 3.7   PHOS  --   --  4.2  --  3.2 3.2   MG  --   --  2.1  --  2.0 2.0   AST 68*  --  151*  --  74* 43*   ALT 65*  --  131*  --  77* 57*   ALKPHOS 80  --  105  --  85 90   TROPONINI 0.024  --  17.984* 14.120*  --   --    *  --   --   --   --   --      All laboratory data reviewed    EKG data: reviewed   Initial EKG 7/24/23: Wide complex tachycardia     EKG 7/25/23:   Suspect unspecified pacemaker failure   Normal sinus rhythm   Left axis deviation   Nonspecific intraventricular block   Cannot rule out Septal infarct ,age undetermined   T wave abnormality, consider lateral ischemia   Abnormal ECG   When compared with ECG of 24-JUL-2023 19:33,   Sinus rhythm has replaced Electronic ventricular pacemaker     EKG 7/26/23:  Normal sinus rhythm   Nonspecific intraventricular block   Nonspecific T wave abnormality   Abnormal ECG   When compared with ECG of 25-JUL-2023 04:31,   Minimal criteria for Septal infarct are no longer Present   Nonspecific T wave abnormality no longer evident in Inferior leads   QT has lengthened     Radiology: reviewed     CXR 7/24/23     FINDINGS:  The heart is not enlarged the trachea is midline.  Twin lead pacemaker defibrillator is noted.  Defibrillating pad projects over the left side of the patient.  Mild hypoventilatory changes are suggested with no consolidation or effusion.     Impression:     Mild hypoventilatory changes with no convincing evidence of acute chest disease.     Devices as described.     Current Medications:     Infusions:   heparin (porcine) 1,000 mL/hr at 07/26/23 1232        Scheduled:   aspirin  81 mg Oral Daily    atorvastatin  80 mg Oral Daily    clopidogreL  75 mg Oral Daily    furosemide  40 mg Oral Daily     mupirocin   Nasal BID    sacubitriL-valsartan  1 tablet Oral BID        PRN:  heparin (porcine), sodium chloride 0.9%      Assessment/Plan:     Tristen Blanc is a 77 y.o. male with past medical history of HFrEF sp/AICD, MI, CAD, cardiac arrest, JERONIMO on nightly CPAP, hx of stroke, carotid artery aneurysm, tobacco use, HTN, and HLD. Patient is admitted to [facility] for hemodynamically unstable wide complex tachycardia requiring cardioversion in the ED. Has now been stepped down to the floor and is off pressor support and amiodarone drip per cardiology.     Unstable wide complex tachyarrhythmia  NSTEMI  - s/p synchronized cardioversion in the ED  - Started on amiodarone drip and heparin drip per Cardiology recommendations  - Amio drip discontinued per cardiology, continue heparin   - initial trop 0.024 -> 17.9 -> 14.1, continue to trend   - Repeat TTE 7/25/23 with decreased EF (15%, down from 20-25% in 2020)  - Segmental wall motion abnormality noted on TTE 7/25/23  - Continue telemetry  - Left heart cath on 7/26/23 showed:  Co-dominant  LM: Mild stenosis  LAD: Occluded at the ostium  Lcx: 80-90% ostial Lcx (ISR)  RCA: Patent stent proximal and mid RCA  - ASA 81mg daily for life, clopidogrel 75mg daily for atleast 1 year, high intensity statin per cardiology recommendations   - Follow up with cardiology and EP outpatient   - appreciate cardiology input on resuming GDMT     Cardiogenic Shock  - Cardiogenic mostly likely 2/2 unstable tachycardia and known hx of HFrEF  - Held home BP meds initially in setting of shock  - TTE with new segmental wall motion abnormalities, left heart cath performed 7/26/23  - LFTs initially elevated, now downtrending  - Patient now off levophed, appreciate cardiology input on resuming GDMT     Acute exacerbation of 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  NYHA HF Class 2  - Last TTE 6/2020  - EF 20-25%  - Grade 1 LV diastolic dysfunction  - Normal CVP 3mmHg  -  PA systolic pressure 27mmHg  - Repeat TTE 7/25/23  - EF 15%  - Grade 2 LV diastolic dysfunction  - Segmental wall motion abnormalities  - Septal motion abnormalities consistent with post-op  - Moderate mitral regurg  - Normal CVP   - PA systolic 21mmHg     - Held home GDMT initially in setting of shock, patient now no longer requiring pressor support  - Appreciate Cardiology recs regarding timing of restarting home medications   - On admission,  with mild crackles   - Received 1 time dose Lasix IV 40mg  - Continue Lasix PO 40mg, monitor UOP for response     Hx of MI  CAD  Cardiac arrest  - initial trop 0.024 -> 17.9 -> 14.1  - Continue home aspirin and statin  - Continue heparin drip per Cardiology  - Will hold home Plavix while patient on heparin drip      JERONIMO on nightly CPAP  - Issues with home CPAP 2/2 recall  - Continue CPAP nightly while admitted   - Needs follow up with sleep medicine     Hx of CVA without residual deficits  - Continue home statin and aspirin  - Hold home Plavix while on heparin drip      Hx of carotid artery aneurysm  - Follows with NSGY  - Last visit on 11/16/22  - Plan was for follow up and rpt CTA head in 1 year      Tobacco use  - 1 PPD >30 years  - Counseled on cessation      Essential HTN  - patient no longer requiring Levophed   - appreciate cardiology recs for timing of restarting home medications     HLD  - continue home statin     Subclinical Hypothyroidism   - TSH 4.489, T4 0.78  - repeat TSH when not acutely ill      Acute Normocytic anemia  - Hbg 12.5, MCV 96 on admission  - Iron 52, Transferrin 231, TIBC 342, Saturated iron 15  - Ferritin 158  - Folate 14.6, B12 631  - Consider iron transfusion today    Healthcare maintenance   -primary care provider is Alan Patterson MD      Diet: Cardiac  VTE PPx: Heparin drip  Code Status: Full   Disposition: Likely today, pending cardiology recs for restarting GDMT    Hermila Bradley   L3 Medical Student   [facility] Hospitalist Medicine  Team A      [facility] Medicine Hospitalist Pager numbers:   [facility] Hospitalist Medicine Team A (Lenka/Eduarda): [phone]  [facility] Hospitalist Medicine Team B (Bong/Chau):  [phone]

## 2023-07-27 NOTE — PROGRESS NOTES
[facility] - Telemetry  Cardiology  Progress Note    Patient Name: Tristen Blanc  MRN: [id number]  Admission Date: 7/24/2023  Hospital Length of Stay: 3 days  Code Status: Full Code   Attending Physician: Miracle Lerner MD   Primary Care Physician: Alan Patterson MD  Expected Discharge Date:   Principal Problem:Wide-complex tachycardia    Subjective:     Hospital Course:   7/25/2023 per HPI  07/26/2023 Coronary angiogram, LVEDP, POBA Lcx  Access: R-CFA  Indication: Vtac     Findings  Co-dominant  LM: Mild stenosis  LAD: Occluded at the ostium  Lcx: 80-90% ostial Lcx (ISR)  RCA: Patent stent proximal and mid RCA     Bypass Graft  - SVG to LAD filling prox LAD retrograde and into diagonal branch which has 2 serial 90% stenosis. Patent LAD  - LIMA and AUGUSTUS visualized and noted not utilized     LVEDP 21     Intervention  - sp IVUS guided POBA proximal Lcx with 3.5 NC and 4.0 NC balloon  - Attempted diagonal intervention via SVG graft. Able to wire into diagonal branch (via retrograde wiring in the LAD), but unable to deliver balloon    07/27/2023 Patient hemodynamically stable without chest pain or SOB, ambulating in room. BB resumed. PET stress ordered as outpatient as well as EP follow up. Compliance with medication was discussed with particular attention paid to importance of  DAPT for one year without interruption. The risk of abrupt stent occlusion and MI with early discontinuation was specifically stressed. Verbalized understanding and will follow up with primary cardiologist in 2-3 weeks        Past Medical History:   Diagnosis Date    Allergy     Arthritis     Cardiomyopathy     ischemic    CHF (congestive heart failure)     Coronary artery disease     s/p CABG    General anesthetics causing adverse effect in therapeutic use     Hyperlipidemia     Hypertension     ICD (implantable cardioverter-defibrillator) in place 5/3/2013    Sleep apnea with use of continuous positive airway pressure (CPAP)         Past Surgical History:   Procedure Laterality Date    CARDIAC CATHETERIZATION  January 2011    CARDIAC DEFIBRILLATOR PLACEMENT      COLONOSCOPY      CORONARY ANGIOPLASTY  1/3/2011    Last stent was in LMCA, Cx. Had previous stentsd    CORONARY ARTERY BYPASS GRAFT  06/1996    bypass of 2 vessels    INSERT / REPLACE / REMOVE PACEMAKER      AICD; generator change in 2/10/17    JOINT REPLACEMENT Right 12/08/1991    total hip replacement    SINUS SURGERY         Review of patient's allergies indicates:  No Known Allergies    No current facility-administered medications on file prior to encounter.     Current Outpatient Medications on File Prior to Encounter   Medication Sig    aspirin (ECOTRIN) 81 MG EC tablet Take 81 mg by mouth once daily.      atorvastatin (LIPITOR) 80 MG tablet TAKE 1 TABLET BY MOUTH EVERY EVENING    clopidogreL (PLAVIX) 75 mg tablet TAKE 1 TABLET(75 MG) BY MOUTH EVERY EVENING    coenzyme Q10 200 mg capsule Take 200 mg by mouth once daily.    ENTRESTO  mg per tablet TAKE 1 TABLET BY MOUTH TWICE DAILY    ergocalciferol (ERGOCALCIFEROL) 50,000 unit Cap Take 2,000 Units by mouth every Mon, Wed, Fri.    fluticasone (FLONASE) 50 mcg/actuation nasal spray 1 spray by Nasal route as needed.     FOLIC ACID/MV,FE,OTHER MIN (CENTRUM ORAL) Take 1 tablet by mouth every morning.     furosemide (LASIX) 20 MG tablet TAKE 1 TABLET(20 MG) BY MOUTH EVERY DAY    metoprolol succinate (TOPROL-XL) 50 MG 24 hr tablet TAKE 1 TABLET(50 MG) BY MOUTH EVERY DAY    montelukast (SINGULAIR) 10 mg tablet Take 1 tablet (10 mg total) by mouth daily as needed.    nitroGLYCERIN (NITROSTAT) 0.4 MG SL tablet ONE TABLET UNDER TONGUE AS NEEDED FOR CHEST PAIN. TAKE EVERY 5 MINUTES AS NEEDED    spironolactone (ALDACTONE) 25 MG tablet TAKE 1 TABLET(25 MG) BY MOUTH EVERY DAY    vardenafil (LEVITRA) 20 MG tablet Take 20 mg by mouth daily as needed.      mupirocin (BACTROBAN) 2 % ointment Apply topically 2 (two)  times daily.     Family History       Problem Relation (Age of Onset)    Heart attack Father, Paternal Uncle, Maternal Aunt, Paternal Aunt    Stroke Mother          Tobacco Use    Smoking status: Every Day     Packs/day: 1.00     Years: 57.00     Pack years: 57.00     Types: Cigarettes     Start date: 1966    Smokeless tobacco: Never    Tobacco comments:     SAYS TRIED EVERYTHING INCLUDING HYPNOSIOS AND DRUGS; NOT INTERESTED IN SMOKING PROGRAM HERE   Substance and Sexual Activity    Alcohol use: Yes     Comment: social; rare    Drug use: No    Sexual activity: Yes     Partners: Female     Review of Systems   Constitutional: Negative for chills, decreased appetite, diaphoresis, fever, malaise/fatigue, weight gain and weight loss.   Cardiovascular:  Negative for chest pain, claudication, dyspnea on exertion, irregular heartbeat, leg swelling, near-syncope, orthopnea, palpitations and paroxysmal nocturnal dyspnea.   Respiratory:  Negative for cough, shortness of breath, snoring, sputum production and wheezing.    Endocrine: Negative for cold intolerance, heat intolerance, polydipsia, polyphagia and polyuria.   Skin:  Negative for color change, dry skin, itching, nail changes and poor wound healing.   Musculoskeletal:  Negative for back pain, gout, joint pain and joint swelling.   Gastrointestinal:  Negative for bloating, abdominal pain, constipation, diarrhea, hematemesis, hematochezia, melena, nausea and vomiting.   Genitourinary:  Negative for dysuria, hematuria and nocturia.   Neurological:  Negative for dizziness, headaches, light-headedness, numbness, paresthesias and weakness.   Psychiatric/Behavioral:  Negative for altered mental status, depression and memory loss.    Objective:     Vital Signs (Most Recent):  Temp: 96.6 °F (35.9 °C) (07/27/23 0803)  Pulse: 77 (07/27/23 0818)  Resp: 18 (07/27/23 0803)  BP: (!) 124/59 (07/27/23 0803)  SpO2: 97 % (07/27/23 0818) Vital Signs (24h Range):  Temp:  [96.6 °F  (35.9 °C)-98.7 °F (37.1 °C)] 96.6 °F (35.9 °C)  Pulse:  [60-98] 77  Resp:  [12-20] 18  SpO2:  [94 %-99 %] 97 %  BP: (101-124)/(51-59) 124/59     Weight: 73 kg (160 lb 15 oz)  Body mass index is 26.78 kg/m².    SpO2: 97 %         Intake/Output Summary (Last 24 hours) at 7/27/2023 1043  Last data filed at 7/26/2023 2135  Gross per 24 hour   Intake 511.76 ml   Output 750 ml   Net -238.24 ml         Lines/Drains/Airways       Peripheral Intravenous Line  Duration                  Peripheral IV - Single Lumen 07/24/23 16 G Anterior;Left Forearm 3 days         Peripheral IV - Single Lumen 07/25/23 1920 20 G Posterior;Right Forearm 1 day                     Physical Exam  Constitutional:       General: He is not in acute distress.     Appearance: He is well-developed.   Cardiovascular:      Rate and Rhythm: Normal rate and regular rhythm.      Heart sounds: No murmur heard.    No gallop.   Pulmonary:      Effort: Pulmonary effort is normal. No respiratory distress.      Breath sounds: Normal breath sounds. No wheezing.   Abdominal:      General: Bowel sounds are normal. There is no distension.      Palpations: Abdomen is soft.      Tenderness: There is no abdominal tenderness.   Skin:     General: Skin is warm and dry.   Neurological:      Mental Status: He is alert and oriented to person, place, and time.        Significant Labs: BMP:   Recent Labs   Lab 07/26/23  0320 07/27/23  0631   GLU 99 96   * 142   K 4.2 4.5    109   CO2 20* 25   BUN 26* 21   CREATININE 1.1 1.1   CALCIUM 8.1* 8.6*   MG 2.0 2.0     , CBC   Recent Labs   Lab 07/26/23  0320 07/27/23  0631   WBC 6.42 7.37   HGB 11.5* 12.3*   HCT 33.8* 36.6*    143*     , and Troponin   Recent Labs   Lab 07/25/23  1052   TROPONINI 14.120*         Significant Imaging: Echocardiogram: Transthoracic echo (TTE) complete (Cupid Only):   Results for orders placed or performed during the hospital encounter of 07/24/23   Echo   Result Value Ref Range    BSA  "1.82 m2    TDI SEPTAL 0.02 m/s    LV LATERAL E/E' RATIO 10.00 m/s    LV SEPTAL E/E' RATIO 40.00 m/s    LA WIDTH 4.00 cm    IVC diameter 2.15 cm    Left Ventricular Outflow Tract Mean Velocity 0.41 cm/s    Left Ventricular Outflow Tract Mean Gradient 0.75 mmHg    TDI LATERAL 0.08 m/s    LVIDd 5.60 3.5 - 6.0 cm    IVS 1.18 (A) 0.6 - 1.1 cm    Posterior Wall 1.16 (A) 0.6 - 1.1 cm    LVIDs 4.90 (A) 2.1 - 4.0 cm    FS 13 28 - 44 %    LA volume 77.56 cm3    LV mass 270.96 g    LA size 4.62 cm    RV S' 7.16 cm/s    Left Ventricle Relative Wall Thickness 0.41 cm    AV mean gradient 3 mmHg    AV valve area 1.80 cm2    AV Velocity Ratio 0.47     AV index (prosthetic) 0.47     MV mean gradient 1 mmHg    MV valve area p 1/2 method 2.69 cm2    MV valve area by continuity eq 1.99 cm2    E/A ratio 1.27     Mean e' 0.05 m/s    E wave deceleration time 281.87 msec    MV "A" wave duration [id number] msec    Pulm vein S/D ratio 0.73     LVOT diameter 2.20 cm    LVOT area 3.8 cm2    LVOT peak silas 0.56 m/s    LVOT peak VTI 12.20 cm    Ao peak silas 1.19 m/s    Ao VTI 25.8 cm    Vn Nyquist 0.37 m/s    Radius 0.92 cm    LVOT stroke volume 46.35 cm3    AV peak gradient 6 mmHg    MV peak gradient 3 mmHg    E/E' ratio 16.00 m/s    Vn Nyquist MS 0.37 m/s    MV Peak E Silas 0.80 m/s    TR Max Silas 2.15 m/s    MV VTI 23.3 cm    MV stenosis pressure 1/2 time 81.74 ms    MV Peak A Silas 0.63 m/s    PV Peak S Silas 0.45 m/s    PV Peak D Silas 0.62 m/s    LV Systolic Volume 123.59 mL    LV Systolic Volume Index 68.7 mL/m2    LV Diastolic Volume 161.39 mL    LV Diastolic Volume Index 89.66 mL/m2    LA Volume Index 43.1 mL/m2    LV Mass Index 151 g/m2    RA Major Axis 4.20 cm    Left Atrium Minor Axis 4.70 cm    Left Atrium Major Axis 5.20 cm    Triscuspid Valve Regurgitation Peak Gradient 18 mmHg    LA Volume Index (Mod) 21.6 mL/m2    LA volume (mod) 38.96 cm3    RA Width 4.00 cm    Right Atrial Pressure (from IVC) 3 mmHg    EF 15 %    Ao root annulus " 2.80 cm    RVDD 2.70 cm    TAPSE 2.00 cm    Right ventricular length in diastole (apical 4-chamber view) 6.00 cm    TV rest pulmonary artery pressure 21 mmHg    Narrative    · The left ventricle is mildly enlarged with eccentric hypertrophy and   severely decreased systolic function.  · The estimated ejection fraction is 15%.  · Grade II left ventricular diastolic dysfunction.  · There are segmental left ventricular wall motion abnormalities.  · There is abnormal septal wall motion consistent with post-operative   status.  · Normal right ventricular size with normal right ventricular systolic   function.  · Moderate left atrial enlargement.  · Moderate mitral regurgitation.  · The estimated PA systolic pressure is 21 mmHg.  · Normal central venous pressure (3 mmHg).        Assessment and Plan:     Brief HPI: Patient seen this morning on rounds without cardiac complaint. POC discussed for OP PET stress, EP follow up, and cardiology follow up. BB resumed.     Chronic combined systolic and diastolic CHF, NYHA class 2  - long history of HFrEF/ICM  - Continue Entresto and BB  - No ADHF on exam  - Close cardiology follow up for resumption of additional HF medications as outpatient once BP stabilizes     ICD (implantable cardioverter-defibrillator) in place  - long standing history of HFrEF with AICD placed in 2010 with generator change in 2017  - reports [employer] AICD; presented with WCT with cardioversion  -VT on interrogation with VT therapies off- turned on with treatment zone set at 165  -Follow up with EP     Essential (primary) hypertension  - hypotensive upon admission  - BP marginal, BB and Entresto resumed     Other hyperlipidemia  - FLP in 2022 with LDL 63.4  - Continue statin     CAD (coronary artery disease) of artery bypass graft  - history of CABG with PCI after CABG  - on DAPT and statin, BB, ARB  S/p LHC     LM: Mild stenosis  LAD: Occluded at the ostium  Lcx: 80-90% ostial Lcx (ISR)  RCA: Patent stent  proximal and mid RCA     Bypass Graft  - SVG to LAD filling prox LAD retrograde and into diagonal branch which has 2 serial 90% stenosis. Patent LAD  - LIMA and AUGUSTUS visualized and noted not utilized     LVEDP 21     Intervention  - sp IVUS guided POBA proximal Lcx with 3.5 NC and 4.0 NC balloon  - Attempted diagonal intervention via SVG graft. Able to wire into diagonal branch (via retrograde wiring in the LAD), but unable to deliver balloon     PET stress as OP- ordered and office messaged to schedule  Follow up with primary cardiologist in 2 weeks        VTE Risk Mitigation (From admission, onward)         Ordered     IP VTE HIGH RISK PATIENT  Once         07/24/23 2216     Place sequential compression device  Until discontinued         07/24/23 2216                Roderick Shelton NP  Cardiology  [place] [facility]

## 2023-07-27 NOTE — PT/OT/SLP PROGRESS
Physical Therapy Missed session/Discharge      Patient Name:  Tristen Blanc   MRN:  [id number]    Patient not seen today secondary to Nurse/ ADRIANNE hold. Per nursing pt on hold at 9:50 due to runs of v-tach.    11:31- PT orders have been d/c per MD.     7/27/23

## 2023-07-27 NOTE — PLAN OF CARE
Problem: Adult Inpatient Plan of Care  Goal: Plan of Care Review  Outcome: Ongoing, Progressing     Problem: Fall Injury Risk  Goal: Absence of Fall and Fall-Related Injury  Outcome: Ongoing, Progressing     Problem: Obstructive Sleep Apnea Risk or Actual Comorbidity Management  Goal: Unobstructed Breathing During Sleep  Outcome: Ongoing, Progressing

## 2023-07-27 NOTE — ASSESSMENT & PLAN NOTE
- history of CABG with PCI after CABG  - on DAPT and statin, BB, ARB  S/p LHC     LM: Mild stenosis  LAD: Occluded at the ostium  Lcx: 80-90% ostial Lcx (ISR)  RCA: Patent stent proximal and mid RCA     Bypass Graft  - SVG to LAD filling prox LAD retrograde and into diagonal branch which has 2 serial 90% stenosis. Patent LAD  - LIMA and AUGUSTUS visualized and noted not utilized     LVEDP 21     Intervention  - sp IVUS guided POBA proximal Lcx with 3.5 NC and 4.0 NC balloon  - Attempted diagonal intervention via SVG graft. Able to wire into diagonal branch (via retrograde wiring in the LAD), but unable to deliver balloon     PET stress as OP- ordered and office messaged to schedule  Follow up with primary cardiologist in 2 weeks

## 2023-07-27 NOTE — ASSESSMENT & PLAN NOTE
- long history of HFrEF/ICM  - Continue Entresto and BB  - No ADHF on exam  - Close cardiology follow up for resumption of additional HF medications as outpatient once BP stabilizes

## 2023-07-27 NOTE — ASSESSMENT & PLAN NOTE
- long standing history of HFrEF with AICD placed in 2010 with generator change in 2017  - reports [employer] AICD; presented with WCT with cardioversion  -VT on interrogation with VT therapies off- turned on with treatment zone set at 165  -Follow up with EP

## 2023-07-27 NOTE — NURSING
Telemetry tech called stating that patient had a 12-beat run of VT at 0517. Upon entering room to assess patient, he was resting quietly in bed with eyes closed. Patient denied any adverse symptoms. At this time he was NSR 78 on monitor.

## 2023-07-27 NOTE — PROGRESS NOTES
"[facility] Medicine Progress Note      Subjective/Interval History      Patient stepped down from ICU yesterday evening. Did well overnight, slept OK, denies chest pain, palpitations, shortness of breath. This morning patient states he feels well, inquiring about discharge. He denies any other complaints at this time.     Objective     Physical Examination:  BP (!) 115/55 (BP Location: Right arm, Patient Position: Lying)   Pulse 74   Temp 97.8 °F (36.6 °C) (Oral)   Resp 18   Ht 5' 5" (1.651 m)   Wt 73 kg (160 lb 15 oz)   SpO2 (!) 94%   BMI 26.78 kg/m²    General appearance: alert, appears stated age, and cooperative  Head: Normocephalic, without obvious abnormality, atraumatic  Back: symmetric, normal curvature. ROM normal. No CVA tenderness.  Lungs:  BL crackles present on RA  Chest wall: no tenderness, well healed midline scar present  Heart: regular rate and rhythm and S1, S2 normal  Abdomen: normal findings: bowel sounds normal, soft, non-tender, and symmetric  Extremities: extremities normal, atraumatic, no cyanosis or edema  Pulses: radial and DP pulses 2+ and symmetric  Skin: Skin color, texture, turgor normal. No rashes or lesions  Neurologic: Mental status: Alert, oriented, thought content appropriate  Sensory: normal  Motor:moves all extremities spontaneously against gravity       Intake/Output:    Intake/Output Summary (Last 24 hours) at 7/27/2023 0734  Last data filed at 7/26/2023 2135  Gross per 24 hour   Intake 584.85 ml   Output 750 ml   Net -165.15 ml       Net IO Since Admission: 1,006.11 mL [07/27/23 0734]    Laboratory data: reviewed     EKG data: reviewed   Initial EKG 7/24/23: Wide complex tachycardia    Repeat EKG post synchronized cardioversion: NSR, L axis deviation    Radiology data: reviewed   CXR 7/24/23    FINDINGS:  The heart is not enlarged the trachea is midline.  Twin lead pacemaker defibrillator is noted.  Defibrillating pad projects over the left side of the patient.  Mild " hypoventilatory changes are suggested with no consolidation or effusion.     Impression:     Mild hypoventilatory changes with no convincing evidence of acute chest disease.     Devices as described.     Current Medications:     Infusions:   heparin (porcine) 1,000 mL/hr at 07/26/23 1232        Scheduled:   aspirin  81 mg Oral Daily    atorvastatin  80 mg Oral Daily    clopidogreL  75 mg Oral Daily    furosemide  40 mg Oral Daily    mupirocin   Nasal BID    sacubitriL-valsartan  1 tablet Oral BID        PRN:  heparin (porcine), sodium chloride 0.9%      Assessment/Plan:     Tristen Blanc is a 77 y.o. male who has a past medical history of HFrEF sp/ AICD, MI, CAD, cardiac arrest, JERONIMO on nightly CPAP, hx of stroke, carotid artery aneurysm, tobacco use, HTN and HLD. Patient is admitted to [facility] hemodynamically unstable wide complex tachycardia requiring cardioversion in the ED. Initially required ICU level care with pressor support, now off pressors and amiodarone drip, s/p cath with balloon angioplasty to Lcx, additional stenoses in diagonal branches of LAD unable to be intervened on.        Unstable wide complex tachyarrhythmia  NSTEMI  - s/p synchronized cardioversion in the ED  - Started on amiodarone drip and heparin drip per Cardiology recommendations,   - continue amio, heparin  - initial trop 0.024 -> 17.9, continue to trend  - Repeat TTE 7/25/23 with decreased EF (15%, down from 20-25% in 2020)  - Segmental wall motion abnormality noted on TTE 7/25/23  - Patient had coronary angiogram 7/26/23, findings below:    - Co-dominant     - LAD occluded at ostium     - ostial Lcx 80-90% occluded     - patent stent proximal and mid RCA     - SVG to LAD bypass graft filling proximal LAD retrograde, into diagonal branch which has 2 serial 90% stenoses, Patent LAD     - LIMA and AUGUSTUS visualized and noted not utilized     - LVEDP 21  - S/P percutaneous balloon angioplasty to proximal Lcx  - Cardiology was  unable to intervene on diagonal branch via SVG  - Cardiology recommended continuing home ASA 81, lipitor 80, resuming home plavix for at least another year  - Amiodarone drip stopped after angiogram  - Follow up with Cardiology, EP out-patient    Cardiogenic Shock (resolved)  - Cardiogenic mostly likely 2/2 unstable tachycardia and known hx of HFrEF  - Held home BP meds initially in setting of   Shock  - TTE with new segmental wall motion abnormalities, patient to go to cath today  - LFTs initially elevated, now downtrending  - Patient now off levophed, appreciate cardiology input on resuming GDMT     Acute exacerbation of 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  NYHA HF Class 2  - Last TTE 6/2020  - EF 20-25%  - Grade 1 LV diastolic dysfunction  - Normal CVP 3mmHg  - PA systolic pressure 27mmHg  - Repeat TTE 7/25/23  - EF 15%  - Grade 2 LV diastolic dysfunction  - Segmental wall motion abnormalities  - Septal motion abnormalities consistent with post-op  - Moderate mitral regurg  - Normal CVP   - PA systolic 21mmHg    - Held home GDMT initially in setting of shock, patient now no longer requiring pressor support  - Appreciate Cardiology recs regarding timing of restarting home medications   - On admission,  with mild crackles   - Received 1 time dose Lasix IV 40mg  - Continue Lasix PO 40mg, monitor UOP for response  - Restarted entresto at low dose this AM, if well tolerated will also add toprol XL  - Close follow up with Cardiology upon discharge     Hx of MI  CAD  Cardiac arrest  - initial trop 0.024 -> 17.9 -> 14.1  - Continue home aspirin and statin  - Was on heparin drip per cardiology, discontinued after cath 7/26  - Patient restarted on home plavix 7/26     JERONIMO on nightly CPAP  - Issues with home CPAP 2/2 recall  - Continue CPAP nightly while admitted  - Patient declining CPAP at this time, wants to see sleep medicine clinic  - Needs follow up with sleep medicine on discharge      Hx of CVA without residual deficits  - Continue home statin and aspirin  - Held home Plavix while on heparin drip   - heparin drip stopped after cath 7/26, home plavix restarted     Hx of carotid artery aneurysm  - Follows with NSGY  - Last visit on 11/16/22  - Plan was for follow up and rpt CTA head in 1 year      Tobacco use  - 1 PPD >30 years  - Counseled on cessation      Essential HTN  - Home regimen entresto , toprol XL 50mg, spironolactone 25, initially held in setting of shock  - Restart entresto at low dose, if patient tolerates will also add back toprol XL  - Follow up with cardiology outpatient for resumption, titration of GDMT     HLD  - continue home statin     Subclinical Hypothyroidism   - TSH 4.489, T4 0.78  - repeat TSH when not acutely ill      Acute Normocytic anemia  - Hbg 12.5, MCV 96 on admission  - Iron 52, Ferritin 158, TIBC 342, saturated iron low at 15%  - Saturated iron 15% consistent with LOBITO, will give iron infusion prior to discharge    Healthcare maintenance   - primary care provider is Alan Patterson MD   - Established with cardiologist Dr Heller     Code: Full  DVT: scd  Diet: cardiac     Disposition: Possible discharge today    Esequiel Carbajal DO  [facility] HO[facility]II  [facility] Hospitalist Medicine Team A    [facility] Medicine Hospitalist Pager numbers:   [facility] Hospitalist Medicine Team A (Lenka/Eduarda):          [phone]  [facility] Hospitalist Medicine Team B (Bong/Chau):        [phone]

## 2023-07-27 NOTE — PLAN OF CARE
VN note: VN completed AVS and attachments and notified bedside nurseJane. Will cont to be available and intervene prn.

## 2023-07-27 NOTE — DISCHARGE SUMMARY
[facility] Hospital Medicine Discharge Summary    Primary Team: [facility] Hospital[facility]  Attending Physician: Miracle Lerner MD  Resident: Dr. Carbajal   Intern: Dr. Monsalve    Date of Admit: 7/24/2023  Date of Discharge: 7/27/2023    Discharge to: Home/Self-Care  Condition: Stable    Discharge Diagnoses     Patient Active Problem List   Diagnosis    Cardiac angina    CAD (coronary artery disease) of artery bypass graft    Cardiomyopathy, ischemic    Other hyperlipidemia    Episode of dizziness    JERONIMO on CPAP    Nasal congestion    GERD (gastroesophageal reflux disease)    Hyperkalemia    Essential (primary) hypertension    Coronary artery disease involving native coronary artery of native heart without angina pectoris    Pain of great toe    Acid reflux    ICD (implantable cardioverter-defibrillator) in place    Dyspnea on effort    Knee pain    Encounter for servicing of automatic implantable cardioverter-defibrillator (AICD) at end of battery life    Chronic combined systolic and diastolic CHF, NYHA class 2    Memory change    Tobacco abuse    Transient global amnesia    Aneurysm of carotid artery    H/O: stroke    Benign skin lesion of forehead    Mild major depression    Hx of myocardial infarction    Hx of CABG    Normocytic anemia       Consultants and Procedures     Consultants:  Cardiology, Pulm/crit, PT    Procedures:     Coronary angiogram, LVEDP, POBA Lcx  Indication: Vtac     Findings  Co-dominant  LM: Mild stenosis  LAD: Occluded at the ostium  Lcx: 80-90% ostial Lcx (ISR)  RCA: Patent stent proximal and mid RCA     Bypass Graft  - SVG to LAD filling prox LAD retrograde and into diagonal branch which has 2 serial 90% stenosis. Patent LAD  - LIMA and AUGUSTUS visualized and noted not utilized     LVEDP 21     Intervention  - sp IVUS guided POBA proximal Lcx with 3.5 NC and 4.0 NC balloon  - Attempted diagonal intervention via SVG graft. Able to wire into diagonal branch (via retrograde wiring in the LAD), but unable  to deliver balloon    Brief History of Present Illness      Tristen Blanc is a 77 y.o. male who has a past medical history of HFrEF sp/ AICD, MI, CAD, cardiac arrest, JERONIMO on nightly CPAP, hx of stroke, carotid artery aneurysm, tobacco use, HTN and HLD.     The patient was in their usual state of health until the evening of 7/24. He reported that he suddenly began to experience substernal chest pain. The pain lasted until he received therapy in the ED. He reported resolution of the pain at time of admission. He did report associated shortness of breath with the pain. He denied any chest pain, shortness of breath, worsening swelling, nausea, vomiting, fever or chills prior to the evening of admission. He reported that the pain was similar to his prior heart attacks. In ED patient was noted to be in Vtach and underwent synchronized cardioversion with conversion to NSR. Patient was admitted to [facility] for Vtach, NSTEMI.     Patient required pressor support post-cardioversion and was admitted to ICU on low dose levophed. [facility] was consulted, Patient was started on heparin drip and amiodarone per cards recommendations. Patient with elevated troponin which peaked at 17 then downtrended. Patient underwent coronary angiogram with cardiology on 7/26, noted to have 80-90% stenosis of Lcx which was intervened upon with balloon angioplasty, serial stenoses in diagonal branches which could not be intervened upon. Cardiology recommended continuing ASA, lipitor, resuming home plavix x1 year. Patient was already on home plavix for CVA.    Cardiology also recommended close outpatient follow up with cards, EP, out-patient PET stress. Day of discharge, patient was stable, discharged with close follow up with cardiology (Dr Heller), EP, PCP (Dr Patterson).    For the full HPI please refer to the History & Physical from this admission.    Hospital Course By Problem with Pertinent Findings        Unstable wide complex  tachyarrhythmia  NSTEMI  - s/p synchronized cardioversion in the ED  - Started on amiodarone drip and heparin drip per Cardiology recommendations,   - continue amio, heparin  - initial trop 0.024 -> 17.9, continue to trend  - Repeat TTE 7/25/23 with decreased EF (15%, down from 20-25% in 2020)  - Segmental wall motion abnormality noted on TTE 7/25/23  - Patient had coronary angiogram 7/26/23, findings below:    - Co-dominant     - LAD occluded at ostium     - ostial Lcx 80-90% occluded     - patent stent proximal and mid RCA     - SVG to LAD bypass graft filling proximal LAD retrograde, into diagonal branch which has 2 serial 90% stenoses, Patent LAD     - LIMA and AUGUSTUS visualized and noted not utilized     - LVEDP 21  - S/P percutaneous balloon angioplasty to proximal Lcx  - Cardiology was unable to intervene on diagonal branch via SVG  - Cardiology recommended continuing home ASA 81, lipitor 80, resuming home plavix for at least another year  - Amiodarone drip stopped after angiogram  - Follow up with Cardiology, EP out-patient     Cardiogenic Shock (resolved)  - Cardiogenic mostly likely 2/2 unstable tachycardia and known hx of HFrEF  - Held home BP meds initially in setting of   Shock  - TTE with new segmental wall motion abnormalities, patient to go to cath today  - LFTs initially elevated, now downtrending  - Patient now off levophed, appreciate cardiology input on resuming GDMT     Acute exacerbation of 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  NYHA HF Class 2  - Last TTE 6/2020  - EF 20-25%  - Grade 1 LV diastolic dysfunction  - Normal CVP 3mmHg  - PA systolic pressure 27mmHg  - Repeat TTE 7/25/23  - EF 15%  - Grade 2 LV diastolic dysfunction  - Segmental wall motion abnormalities  - Septal motion abnormalities consistent with post-op  - Moderate mitral regurg  - Normal CVP   - PA systolic 21mmHg     - Held home GDMT initially in setting of shock, patient now no longer requiring pressor  support  - Appreciate Cardiology recs regarding timing of restarting home medications   - On admission,  with mild crackles   - Received 1 time dose Lasix IV 40mg  - Increased Lasix PO from 20 to 40mg  - Restarted entresto BID at low dose, home toprol XL 50mg daily on 7/27,  - Close out-patient follow up with Cardiology, EP     Hx of MI  CAD  NSTEMI  Cardiac arrest  - initial trop 0.024 -> 17.9 -> 14.1  - Continue home aspirin and statin  - Was on heparin drip per cardiology, discontinued after cath 7/26  - Patient restarted on home plavix 7/26     JERONIMO on nightly CPAP  - Issues with home CPAP 2/2 recall  - Continue CPAP nightly while admitted  - Patient declining CPAP at this time, wants to see sleep medicine clinic  - Needs follow up with sleep medicine on discharge     Hx of CVA without residual deficits  - Continue home statin and aspirin  - Held home Plavix while on heparin drip   - heparin drip stopped after cath 7/26, home plavix restarted     Hx of carotid artery aneurysm  - Follows with NSGY  - Last visit on 11/16/22  - Plan was for follow up and rpt CTA head in 1 year      Tobacco use  - 1 PPD >30 years  - Counseled on cessation      Essential HTN  - Home regimen entresto , toprol XL 50mg, spironolactone 25, initially held in setting of shock  - Restart entresto at low dose, if patient tolerates will also add back toprol XL  - Follow up with cardiology outpatient for resumption, titration of GDMT     HLD  - continue home statin     Subclinical Hypothyroidism   - TSH 4.489, T4 0.78  - repeat TSH when not acutely ill      Acute Normocytic anemia  - Hbg 12.5, MCV 96 on admission  - Iron 52, Ferritin 158, TIBC 342, saturated iron low at 15%  - Saturated iron 15% consistent with LOBITO, given iron infusion venofer 300mg prior to discharge  - Follow up with PCP, recommend weekly iron infusions until iron sat >20    Discharge vital signs     BP (!) 124/59 (BP Location: Right arm, Patient Position: Lying)  "  Pulse 76   Temp 96.6 °F (35.9 °C) (Oral)   Resp 18   Ht 5' 5" (1.651 m)   Wt 73 kg (160 lb 15 oz)   SpO2 95%   BMI 26.78 kg/m²      Discharge Medications        Medication List        ASK your doctor about these medications      aspirin 81 MG EC tablet  Commonly known as: ECOTRIN     atorvastatin 80 MG tablet  Commonly known as: LIPITOR  TAKE 1 TABLET BY MOUTH EVERY EVENING     CENTRUM ORAL     clopidogreL 75 mg tablet  Commonly known as: PLAVIX  TAKE 1 TABLET(75 MG) BY MOUTH EVERY EVENING     coenzyme Q10 200 mg capsule  Take 200 mg by mouth once daily.     ENTRESTO  mg per tablet  Generic drug: sacubitriL-valsartan  TAKE 1 TABLET BY MOUTH TWICE DAILY     ergocalciferol 50,000 unit Cap  Commonly known as: ERGOCALCIFEROL     fluticasone propionate 50 mcg/actuation nasal spray  Commonly known as: FLONASE     furosemide 20 MG tablet  Commonly known as: LASIX  TAKE 1 TABLET(20 MG) BY MOUTH EVERY DAY  Ask about: Which instructions should I use?     metoprolol succinate 50 MG 24 hr tablet  Commonly known as: TOPROL-XL  TAKE 1 TABLET(50 MG) BY MOUTH EVERY DAY     montelukast 10 mg tablet  Commonly known as: SINGULAIR  Take 1 tablet (10 mg total) by mouth daily as needed.     mupirocin 2 % ointment  Commonly known as: BACTROBAN  Apply topically 2 (two) times daily.     nitroGLYCERIN 0.4 MG SL tablet  Commonly known as: NITROSTAT  ONE TABLET UNDER TONGUE AS NEEDED FOR CHEST PAIN. TAKE EVERY 5 MINUTES AS NEEDED     spironolactone 25 MG tablet  Commonly known as: ALDACTONE  TAKE 1 TABLET(25 MG) BY MOUTH EVERY DAY     vardenafiL 20 MG tablet  Commonly known as: LEVITRA               Discharge Information:   Diet:  Cardiac    Physical Activity:  As tolerated             Instructions:  1. Take all medications as prescribed  2. Keep all follow-up appointments  3. Return to the hospital or call your primary care physicians if any worsening symptoms such as fever, chest pain, shortness of breath, return of symptoms, " or any other concerns.    Follow-Up Appointments:  Cardiology - Dr Heller  EP  PCP - Dr Patterson - recommend weekly iron transfusions for LOBITO until iron sat >20%  Sleep Medicine    Esequiel Carbajal DO  [facility] Internal Medicine HO-II  07/27/2023 8:42 AM

## 2023-07-27 NOTE — PLAN OF CARE
AVS and educational attachments shared with patient and wife via wife's cell phone number. Discussed thoroughly. Patient and wife verbalized clear understanding using teach back method. Notified bedside nurse of completion of education. At present no distress noted. Patient to be discharged via w/c with escort service and family with all of patient's belonings. Will cont to be available to patient and family and intervene prn.

## 2023-07-27 NOTE — PLAN OF CARE
KRUPA met with the pt and pt's wife Cassi [phone] at bedside to complete final d/c. Pt will have his wife help transport him home at time of d/c. SW requested pt's f/u appts. Pt did not have any additional questions or concerns at the end of our conversation. Rounds completed on pt. All questions addressed. Bedside nurse to discuss d/c medications. Discussed importance to attend all f/u appts and take medications as prescribed. Verbalized understanding. Case Management to sign off.     AUBRIE Espinoza  [phone]    Future Appointments   Date Time Provider Department Center   8/3/2023 10:00 AM Alan Patterson MD [facility]   8/4/2023 11:20 AM Lonnie Heller MD [facility] [place]   8/9/2023 10:00 AM HOME MONITOR DEVICE CHECK, [facility]   9/26/2023  9:30 AM Alan Patterson MD [facility]        07/27/23 1207   Final Note   Assessment Type Final Discharge Note   Anticipated Discharge Disposition Home   What phone number can be called within the next 1-3 days to see how you are doing after discharge? [phone]   Hospital Resources/Appts/Education Provided Appointments scheduled and added to AVS   Post-Acute Status   Coverage [employer]   Discharge Delays None known at this time

## 2023-07-27 NOTE — NURSING
Notified Mrs Blanc that patient is transferring to room 474. Report called to nurse [place] on 4th floor. Patient alert and oriented with no complaints. Right groin area dressing dry and intact with no hematoma noted.

## 2023-07-27 NOTE — SUBJECTIVE & OBJECTIVE
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 Had previous stentsd    CORONARY ARTERY BYPASS GRAFT  06/1996    bypass of 2 vessels    INSERT / REPLACE / REMOVE PACEMAKER      AICD; generator change in 2/10/17    JOINT REPLACEMENT Right 12/08/1991    total hip replacement    SINUS SURGERY         Review of patient's allergies indicates:  No Known Allergies    No current facility-administered medications on file prior to encounter.     Current Outpatient Medications on File Prior to Encounter   Medication Sig    aspirin (ECOTRIN) 81 MG EC tablet Take 81 mg by mouth once daily.      atorvastatin (LIPITOR) 80 MG tablet TAKE 1 TABLET BY MOUTH EVERY EVENING    clopidogreL (PLAVIX) 75 mg tablet TAKE 1 TABLET(75 MG) BY MOUTH EVERY EVENING    coenzyme Q10 200 mg capsule Take 200 mg by mouth once daily.    ENTRESTO  mg per tablet TAKE 1 TABLET BY MOUTH TWICE DAILY    ergocalciferol (ERGOCALCIFEROL) 50,000 unit Cap Take 2,000 Units by mouth every Mon, Wed, Fri.    fluticasone (FLONASE) 50 mcg/actuation nasal spray 1 spray by Nasal route as needed.     FOLIC ACID/MV,FE,OTHER MIN (CENTRUM ORAL) Take 1 tablet by mouth every morning.     furosemide (LASIX) 20 MG tablet TAKE 1 TABLET(20 MG) BY MOUTH EVERY DAY    metoprolol succinate (TOPROL-XL) 50 MG 24 hr tablet TAKE 1 TABLET(50 MG) BY MOUTH EVERY DAY    montelukast  (SINGULAIR) 10 mg tablet Take 1 tablet (10 mg total) by mouth daily as needed.    nitroGLYCERIN (NITROSTAT) 0.4 MG SL tablet ONE TABLET UNDER TONGUE AS NEEDED FOR CHEST PAIN. TAKE EVERY 5 MINUTES AS NEEDED    spironolactone (ALDACTONE) 25 MG tablet TAKE 1 TABLET(25 MG) BY MOUTH EVERY DAY    vardenafil (LEVITRA) 20 MG tablet Take 20 mg by mouth daily as needed.      mupirocin (BACTROBAN) 2 % ointment Apply topically 2 (two) times daily.     Family History       Problem Relation (Age of Onset)    Heart attack Father, Paternal Uncle, Maternal Aunt, Paternal Aunt    Stroke Mother          Tobacco Use    Smoking status: Every Day     Packs/day: 1.00     Years: 57.00     Pack years: 57.00     Types: Cigarettes     Start date: 1966    Smokeless tobacco: Never    Tobacco comments:     SAYS TRIED EVERYTHING INCLUDING HYPNOSIOS AND DRUGS; NOT INTERESTED IN SMOKING PROGRAM HERE   Substance and Sexual Activity    Alcohol use: Yes     Comment: social; rare    Drug use: No    Sexual activity: Yes     Partners: Female     Review of Systems   Constitutional: Negative for chills, decreased appetite, diaphoresis, fever, malaise/fatigue, weight gain and weight loss.   Cardiovascular:  Negative for chest pain, claudication, dyspnea on exertion, irregular heartbeat, leg swelling, near-syncope, orthopnea, palpitations and paroxysmal nocturnal dyspnea.   Respiratory:  Negative for cough, shortness of breath, snoring, sputum production and wheezing.    Endocrine: Negative for cold intolerance, heat intolerance, polydipsia, polyphagia and polyuria.   Skin:  Negative for color change, dry skin, itching, nail changes and poor wound healing.   Musculoskeletal:  Negative for back pain, gout, joint pain and joint swelling.   Gastrointestinal:  Negative for bloating, abdominal pain, constipation, diarrhea, hematemesis, hematochezia, melena, nausea and vomiting.   Genitourinary:  Negative for dysuria, hematuria and nocturia.    Neurological:  Negative for dizziness, headaches, light-headedness, numbness, paresthesias and weakness.   Psychiatric/Behavioral:  Negative for altered mental status, depression and memory loss.    Objective:     Vital Signs (Most Recent):  Temp: 96.6 °F (35.9 °C) (07/27/23 0803)  Pulse: 77 (07/27/23 0818)  Resp: 18 (07/27/23 0803)  BP: (!) 124/59 (07/27/23 0803)  SpO2: 97 % (07/27/23 0818) Vital Signs (24h Range):  Temp:  [96.6 °F (35.9 °C)-98.7 °F (37.1 °C)] 96.6 °F (35.9 °C)  Pulse:  [60-98] 77  Resp:  [12-20] 18  SpO2:  [94 %-99 %] 97 %  BP: (101-124)/(51-59) 124/59     Weight: 73 kg (160 lb 15 oz)  Body mass index is 26.78 kg/m².    SpO2: 97 %         Intake/Output Summary (Last 24 hours) at 7/27/2023 1043  Last data filed at 7/26/2023 2135  Gross per 24 hour   Intake 511.76 ml   Output 750 ml   Net -238.24 ml         Lines/Drains/Airways       Peripheral Intravenous Line  Duration                  Peripheral IV - Single Lumen 07/24/23 16 G Anterior;Left Forearm 3 days         Peripheral IV - Single Lumen 07/25/23 1920 20 G Posterior;Right Forearm 1 day                     Physical Exam  Constitutional:       General: He is not in acute distress.     Appearance: He is well-developed.   Cardiovascular:      Rate and Rhythm: Normal rate and regular rhythm.      Heart sounds: No murmur heard.    No gallop.   Pulmonary:      Effort: Pulmonary effort is normal. No respiratory distress.      Breath sounds: Normal breath sounds. No wheezing.   Abdominal:      General: Bowel sounds are normal. There is no distension.      Palpations: Abdomen is soft.      Tenderness: There is no abdominal tenderness.   Skin:     General: Skin is warm and dry.   Neurological:      Mental Status: He is alert and oriented to person, place, and time.        Significant Labs: BMP:   Recent Labs   Lab 07/26/23  0320 07/27/23  0631   GLU 99 96   * 142   K 4.2 4.5    109   CO2 20* 25   BUN 26* 21   CREATININE 1.1 1.1   CALCIUM  "8.1* 8.6*   MG 2.0 2.0     , CBC   Recent Labs   Lab 07/26/23  0320 07/27/23  0631   WBC 6.42 7.37   HGB 11.5* 12.3*   HCT 33.8* 36.6*    143*     , and Troponin   Recent Labs   Lab 07/25/23  1052   TROPONINI 14.120*         Significant Imaging: Echocardiogram: Transthoracic echo (TTE) complete (Cupid Only):   Results for orders placed or performed during the hospital encounter of 07/24/23   Echo   Result Value Ref Range    BSA 1.82 m2    TDI SEPTAL 0.02 m/s    LV LATERAL E/E' RATIO 10.00 m/s    LV SEPTAL E/E' RATIO 40.00 m/s    LA WIDTH 4.00 cm    IVC diameter 2.15 cm    Left Ventricular Outflow Tract Mean Velocity 0.41 cm/s    Left Ventricular Outflow Tract Mean Gradient 0.75 mmHg    TDI LATERAL 0.08 m/s    LVIDd 5.60 3.5 - 6.0 cm    IVS 1.18 (A) 0.6 - 1.1 cm    Posterior Wall 1.16 (A) 0.6 - 1.1 cm    LVIDs 4.90 (A) 2.1 - 4.0 cm    FS 13 28 - 44 %    LA volume 77.56 cm3    LV mass 270.96 g    LA size 4.62 cm    RV S' 7.16 cm/s    Left Ventricle Relative Wall Thickness 0.41 cm    AV mean gradient 3 mmHg    AV valve area 1.80 cm2    AV Velocity Ratio 0.47     AV index (prosthetic) 0.47     MV mean gradient 1 mmHg    MV valve area p 1/2 method 2.69 cm2    MV valve area by continuity eq 1.99 cm2    E/A ratio 1.27     Mean e' 0.05 m/s    E wave deceleration time 281.87 msec    MV "A" wave duration [id number] msec    Pulm vein S/D ratio 0.73     LVOT diameter 2.20 cm    LVOT area 3.8 cm2    LVOT peak silas 0.56 m/s    LVOT peak VTI 12.20 cm    Ao peak silas 1.19 m/s    Ao VTI 25.8 cm    Vn Nyquist 0.37 m/s    Radius 0.92 cm    LVOT stroke volume 46.35 cm3    AV peak gradient 6 mmHg    MV peak gradient 3 mmHg    E/E' ratio 16.00 m/s    Vn Nyquist MS 0.37 m/s    MV Peak E Silas 0.80 m/s    TR Max Silas 2.15 m/s    MV VTI 23.3 cm    MV stenosis pressure 1/2 time 81.74 ms    MV Peak A Silas 0.63 m/s    PV Peak S Silas 0.45 m/s    PV Peak D Silas 0.62 m/s    LV Systolic Volume 123.59 mL    LV Systolic Volume Index 68.7 " mL/m2    LV Diastolic Volume 161.39 mL    LV Diastolic Volume Index 89.66 mL/m2    LA Volume Index 43.1 mL/m2    LV Mass Index 151 g/m2    RA Major Axis 4.20 cm    Left Atrium Minor Axis 4.70 cm    Left Atrium Major Axis 5.20 cm    Triscuspid Valve Regurgitation Peak Gradient 18 mmHg    LA Volume Index (Mod) 21.6 mL/m2    LA volume (mod) 38.96 cm3    RA Width 4.00 cm    Right Atrial Pressure (from IVC) 3 mmHg    EF 15 %    Ao root annulus 2.80 cm    RVDD 2.70 cm    TAPSE 2.00 cm    Right ventricular length in diastole (apical 4-chamber view) 6.00 cm    TV rest pulmonary artery pressure 21 mmHg    Narrative    · The left ventricle is mildly enlarged with eccentric hypertrophy and   severely decreased systolic function.  · The estimated ejection fraction is 15%.  · Grade II left ventricular diastolic dysfunction.  · There are segmental left ventricular wall motion abnormalities.  · There is abnormal septal wall motion consistent with post-operative   status.  · Normal right ventricular size with normal right ventricular systolic   function.  · Moderate left atrial enlargement.  · Moderate mitral regurgitation.  · The estimated PA systolic pressure is 21 mmHg.  · Normal central venous pressure (3 mmHg).

## 2023-07-27 NOTE — NURSING
"Patient arrived to floor without IV amiodarone and heparin gtt infusing. Call Roz RN (ICU) and she stated that patient didn't have any of theses infusing when she assumed care of patient and that she will place discontinue orders for the medications.  Mr. Blanc stated that he was taken off when he went for his procedure today". I spoke with MD covering for Dr. Lerner and she was not familiar and states that she will review chart. Awaiting response at this time.  "

## 2023-07-28 ENCOUNTER — HOSPITAL ENCOUNTER (INPATIENT)
Facility: HOSPITAL | Age: 78
LOS: 2 days | Discharge: HOME | DRG: 281 | End: 2023-07-30
Attending: EMERGENCY MEDICINE | Admitting: INTERNAL MEDICINE
Payer: MEDICARE

## 2023-07-28 ENCOUNTER — TELEPHONE (OUTPATIENT)
Dept: ELECTROPHYSIOLOGY | Facility: CLINIC | Age: 78
End: 2023-07-28
Payer: MEDICARE

## 2023-07-28 DIAGNOSIS — I25.10 CORONARY ARTERY DISEASE INVOLVING NATIVE CORONARY ARTERY OF NATIVE HEART WITHOUT ANGINA PECTORIS: ICD-10-CM

## 2023-07-28 DIAGNOSIS — I10 ESSENTIAL (PRIMARY) HYPERTENSION: ICD-10-CM

## 2023-07-28 DIAGNOSIS — I50.42 CHRONIC COMBINED SYSTOLIC AND DIASTOLIC CHF, NYHA CLASS 2: ICD-10-CM

## 2023-07-28 DIAGNOSIS — I47.20 SUSTAINED VENTRICULAR TACHYCARDIA: Primary | ICD-10-CM

## 2023-07-28 DIAGNOSIS — E78.49 OTHER HYPERLIPIDEMIA: ICD-10-CM

## 2023-07-28 DIAGNOSIS — Z72.0 TOBACCO ABUSE: ICD-10-CM

## 2023-07-28 DIAGNOSIS — I25.5 CARDIOMYOPATHY, ISCHEMIC: Chronic | ICD-10-CM

## 2023-07-28 DIAGNOSIS — Z45.02 DEFIBRILLATOR DISCHARGE: ICD-10-CM

## 2023-07-28 DIAGNOSIS — I47.20 VENTRICULAR TACHYCARDIA: ICD-10-CM

## 2023-07-28 DIAGNOSIS — G47.33 OSA ON CPAP: ICD-10-CM

## 2023-07-28 LAB
ALBUMIN SERPL BCP-MCNC: 3.9 G/DL (ref 3.5–5.2)
ALP SERPL-CCNC: 98 U/L (ref 55–135)
ALT SERPL W/O P-5'-P-CCNC: 43 U/L (ref 10–44)
ANION GAP SERPL CALC-SCNC: 9 MMOL/L (ref 8–16)
AST SERPL-CCNC: 34 U/L (ref 10–40)
BASOPHILS # BLD AUTO: 0.03 K/UL (ref 0–0.2)
BASOPHILS NFR BLD: 0.4 % (ref 0–1.9)
BILIRUB SERPL-MCNC: 0.7 MG/DL (ref 0.1–1)
BNP SERPL-MCNC: 341 PG/ML (ref 0–99)
BUN SERPL-MCNC: 23 MG/DL (ref 8–23)
CALCIUM SERPL-MCNC: 9.2 MG/DL (ref 8.7–10.5)
CHLORIDE SERPL-SCNC: 108 MMOL/L (ref 95–110)
CO2 SERPL-SCNC: 24 MMOL/L (ref 23–29)
CREAT SERPL-MCNC: 1.1 MG/DL (ref 0.5–1.4)
DIFFERENTIAL METHOD: ABNORMAL
EOSINOPHIL # BLD AUTO: 0.2 K/UL (ref 0–0.5)
EOSINOPHIL NFR BLD: 3.4 % (ref 0–8)
ERYTHROCYTE [DISTWIDTH] IN BLOOD BY AUTOMATED COUNT: 12.9 % (ref 11.5–14.5)
EST. GFR  (NO RACE VARIABLE): >60 ML/MIN/1.73 M^2
GLUCOSE SERPL-MCNC: 107 MG/DL (ref 70–110)
HCT VFR BLD AUTO: 38.9 % (ref 40–54)
HGB BLD-MCNC: 12.9 G/DL (ref 14–18)
IMM GRANULOCYTES # BLD AUTO: 0.01 K/UL (ref 0–0.04)
IMM GRANULOCYTES NFR BLD AUTO: 0.1 % (ref 0–0.5)
LYMPHOCYTES # BLD AUTO: 1.1 K/UL (ref 1–4.8)
LYMPHOCYTES NFR BLD: 15.5 % (ref 18–48)
MAGNESIUM SERPL-MCNC: 2 MG/DL (ref 1.6–2.6)
MCH RBC QN AUTO: 31.2 PG (ref 27–31)
MCHC RBC AUTO-ENTMCNC: 33.2 G/DL (ref 32–36)
MCV RBC AUTO: 94 FL (ref 82–98)
MONOCYTES # BLD AUTO: 0.7 K/UL (ref 0.3–1)
MONOCYTES NFR BLD: 9.9 % (ref 4–15)
NEUTROPHILS # BLD AUTO: 5 K/UL (ref 1.8–7.7)
NEUTROPHILS NFR BLD: 70.7 % (ref 38–73)
NRBC BLD-RTO: 0 /100 WBC
PLATELET # BLD AUTO: 169 K/UL (ref 150–450)
PMV BLD AUTO: 9.5 FL (ref 9.2–12.9)
POCT GLUCOSE: 115 MG/DL (ref 70–110)
POTASSIUM SERPL-SCNC: 3.9 MMOL/L (ref 3.5–5.1)
PROT SERPL-MCNC: 6.5 G/DL (ref 6–8.4)
RBC # BLD AUTO: 4.13 M/UL (ref 4.6–6.2)
SODIUM SERPL-SCNC: 141 MMOL/L (ref 136–145)
TROPONIN I SERPL DL<=0.01 NG/ML-MCNC: 2.2 NG/ML (ref 0–0.03)
TROPONIN I SERPL DL<=0.01 NG/ML-MCNC: 2.62 NG/ML (ref 0–0.03)
WBC # BLD AUTO: 7.14 K/UL (ref 3.9–12.7)

## 2023-07-28 PROCEDURE — 93010 EKG 12-LEAD: ICD-10-PCS | Mod: 76,,, | Performed by: INTERNAL MEDICINE

## 2023-07-28 PROCEDURE — 93005 ELECTROCARDIOGRAM TRACING: CPT

## 2023-07-28 PROCEDURE — 94761 N-INVAS EAR/PLS OXIMETRY MLT: CPT

## 2023-07-28 PROCEDURE — 63600175 PHARM REV CODE 636 W HCPCS

## 2023-07-28 PROCEDURE — 99291 CRITICAL CARE FIRST HOUR: CPT | Mod: ,,, | Performed by: INTERNAL MEDICINE

## 2023-07-28 PROCEDURE — 63600175 PHARM REV CODE 636 W HCPCS: Performed by: EMERGENCY MEDICINE

## 2023-07-28 PROCEDURE — 85025 COMPLETE CBC W/AUTO DIFF WBC: CPT | Performed by: EMERGENCY MEDICINE

## 2023-07-28 PROCEDURE — 84484 ASSAY OF TROPONIN QUANT: CPT | Mod: 91

## 2023-07-28 PROCEDURE — 63600175 PHARM REV CODE 636 W HCPCS: Performed by: NURSE PRACTITIONER

## 2023-07-28 PROCEDURE — 83735 ASSAY OF MAGNESIUM: CPT

## 2023-07-28 PROCEDURE — 96374 THER/PROPH/DIAG INJ IV PUSH: CPT

## 2023-07-28 PROCEDURE — 80053 COMPREHEN METABOLIC PANEL: CPT | Performed by: EMERGENCY MEDICINE

## 2023-07-28 PROCEDURE — 93010 ELECTROCARDIOGRAM REPORT: CPT | Mod: ,,, | Performed by: INTERNAL MEDICINE

## 2023-07-28 PROCEDURE — 96376 TX/PRO/DX INJ SAME DRUG ADON: CPT

## 2023-07-28 PROCEDURE — 25000003 PHARM REV CODE 250

## 2023-07-28 PROCEDURE — 99285 EMERGENCY DEPT VISIT HI MDM: CPT | Mod: 25

## 2023-07-28 PROCEDURE — 83880 ASSAY OF NATRIURETIC PEPTIDE: CPT | Performed by: EMERGENCY MEDICINE

## 2023-07-28 PROCEDURE — 93010 ELECTROCARDIOGRAM REPORT: CPT | Mod: 76,,, | Performed by: INTERNAL MEDICINE

## 2023-07-28 PROCEDURE — 99291 PR CRITICAL CARE, E/M 30-74 MINUTES: ICD-10-PCS | Mod: ,,, | Performed by: INTERNAL MEDICINE

## 2023-07-28 PROCEDURE — 20000000 HC ICU ROOM

## 2023-07-28 PROCEDURE — 96375 TX/PRO/DX INJ NEW DRUG ADDON: CPT

## 2023-07-28 PROCEDURE — 84484 ASSAY OF TROPONIN QUANT: CPT | Performed by: EMERGENCY MEDICINE

## 2023-07-28 RX ORDER — FUROSEMIDE 40 MG/1
40 TABLET ORAL DAILY
Status: DISCONTINUED | OUTPATIENT
Start: 2023-07-29 | End: 2023-07-30 | Stop reason: HOSPADM

## 2023-07-28 RX ORDER — METOPROLOL SUCCINATE 50 MG/1
50 TABLET, EXTENDED RELEASE ORAL DAILY
Status: DISCONTINUED | OUTPATIENT
Start: 2023-07-29 | End: 2023-07-30 | Stop reason: HOSPADM

## 2023-07-28 RX ORDER — ATORVASTATIN CALCIUM 40 MG/1
80 TABLET, FILM COATED ORAL NIGHTLY
Status: DISCONTINUED | OUTPATIENT
Start: 2023-07-28 | End: 2023-07-30 | Stop reason: HOSPADM

## 2023-07-28 RX ORDER — SODIUM CHLORIDE 0.9 % (FLUSH) 0.9 %
10 SYRINGE (ML) INJECTION
Status: DISCONTINUED | OUTPATIENT
Start: 2023-07-28 | End: 2023-07-30 | Stop reason: HOSPADM

## 2023-07-28 RX ORDER — CLOPIDOGREL BISULFATE 75 MG/1
75 TABLET ORAL EVERY EVENING
Status: DISCONTINUED | OUTPATIENT
Start: 2023-07-28 | End: 2023-07-30 | Stop reason: HOSPADM

## 2023-07-28 RX ORDER — ASPIRIN 81 MG/1
81 TABLET ORAL DAILY
Status: DISCONTINUED | OUTPATIENT
Start: 2023-07-29 | End: 2023-07-30 | Stop reason: HOSPADM

## 2023-07-28 RX ORDER — TALC
6 POWDER (GRAM) TOPICAL NIGHTLY PRN
Status: DISCONTINUED | OUTPATIENT
Start: 2023-07-28 | End: 2023-07-30 | Stop reason: HOSPADM

## 2023-07-28 RX ORDER — MORPHINE SULFATE 4 MG/ML
4 INJECTION, SOLUTION INTRAMUSCULAR; INTRAVENOUS
Status: COMPLETED | OUTPATIENT
Start: 2023-07-28 | End: 2023-07-28

## 2023-07-28 RX ADMIN — MORPHINE SULFATE 4 MG: 4 INJECTION INTRAVENOUS at 02:07

## 2023-07-28 RX ADMIN — ATORVASTATIN CALCIUM 80 MG: 40 TABLET, FILM COATED ORAL at 08:07

## 2023-07-28 RX ADMIN — AMIODARONE HYDROCHLORIDE 1 MG/MIN: 1.8 INJECTION, SOLUTION INTRAVENOUS at 01:07

## 2023-07-28 RX ADMIN — AMIODARONE HYDROCHLORIDE 150 MG: 1.5 INJECTION, SOLUTION INTRAVENOUS at 01:07

## 2023-07-28 RX ADMIN — AMIODARONE HYDROCHLORIDE 0.5 MG/MIN: 1.8 INJECTION, SOLUTION INTRAVENOUS at 07:07

## 2023-07-28 RX ADMIN — CLOPIDOGREL BISULFATE 75 MG: 75 TABLET ORAL at 06:07

## 2023-07-28 NOTE — PHARMACY MED REC
"      [facility]           Pharmacy  Admission Medication History     The home medication history was taken by Deja Streeter.      Medication history obtained from Medications listed below were obtained from: Patient/family    Based on information gathered for medication list, you may go to "Admission" then "Reconcile Home Medications" tabs to review and/or act upon those items.     The home medication list has been updated by the Pharmacy department.   Please read ALL comments highlighted in yellow.   Please address this information as you see fit.    Feel free to contact us if you have any questions or require assistance.    The medications listed below were removed from the home medication list.  Please reorder if appropriate:    Patient reports NOT TAKING the following medication(s):  Bactroban oint      Current Facility-Administered Medications on File Prior to Encounter   Medication Dose Route Frequency Provider Last Rate Last Admin    [DISCONTINUED] aspirin EC tablet 81 mg  81 mg Oral Daily Esequiel Byl, DO   81 mg at 07/27/23 1100    [DISCONTINUED] atorvastatin tablet 80 mg  80 mg Oral Daily Esequiel Byl, DO   80 mg at 07/27/23 1101    [DISCONTINUED] clopidogreL tablet 75 mg  75 mg Oral Daily Esequiel Byl, DO   75 mg at 07/27/23 1100    [DISCONTINUED] furosemide tablet 40 mg  40 mg Oral Daily Esequiel Byl, DO   40 mg at 07/27/23 1100    [DISCONTINUED] metoprolol succinate (TOPROL-XL) 24 hr tablet 50 mg  50 mg Oral Daily Roderick Shelton NP   50 mg at 07/27/23 1100    [DISCONTINUED] mupirocin 2 % ointment   Nasal BID Esequiel Byl, DO   Given at 07/27/23 1101    [DISCONTINUED] sacubitriL-valsartan 24-26 mg per tablet 1 tablet  1 tablet Oral BID Esequiel Byl, DO   1 tablet at 07/27/23 1101    [DISCONTINUED] sodium chloride 0.9% flush 10 mL  10 mL Intravenous PRN Esequiel Byl, DO         Current Outpatient Medications on File Prior to Encounter   Medication Sig Dispense Refill    aspirin (ECOTRIN) 81 MG EC " tablet Take 81 mg by mouth once daily.        atorvastatin (LIPITOR) 80 MG tablet TAKE 1 TABLET BY MOUTH EVERY EVENING (Patient taking differently: Take 80 mg by mouth every evening.) 90 tablet 3    clopidogreL (PLAVIX) 75 mg tablet TAKE 1 TABLET(75 MG) BY MOUTH EVERY EVENING (Patient taking differently: Take 75 mg by mouth every evening.) 90 tablet 3    coenzyme Q10 200 mg capsule Take 200 mg by mouth once daily. 90 capsule 3    fluticasone (FLONASE) 50 mcg/actuation nasal spray 1 spray by Nasal route as needed.   6    FOLIC ACID/MV,FE,OTHER MIN (CENTRUM ORAL) Take 1 tablet by mouth every morning.       furosemide (LASIX) 40 MG tablet Take 1 tablet (40 mg total) by mouth once daily. 30 tablet 11    metoprolol succinate (TOPROL-XL) 50 MG 24 hr tablet TAKE 1 TABLET(50 MG) BY MOUTH EVERY DAY (Patient taking differently: 50 mg once daily.) 90 tablet 3    montelukast (SINGULAIR) 10 mg tablet Take 1 tablet (10 mg total) by mouth daily as needed. 90 tablet 3    nitroGLYCERIN (NITROSTAT) 0.4 MG SL tablet ONE TABLET UNDER TONGUE AS NEEDED FOR CHEST PAIN. TAKE EVERY 5 MINUTES AS NEEDED 25 tablet 6    sacubitriL-valsartan (ENTRESTO) 24-26 mg per tablet Take 1 tablet by mouth 2 (two) times daily. 60 tablet 11    spironolactone (ALDACTONE) 25 MG tablet TAKE 1 TABLET(25 MG) BY MOUTH EVERY DAY (Patient taking differently: Take 25 mg by mouth once daily.) 90 tablet 3    vardenafil (LEVITRA) 20 MG tablet Take 20 mg by mouth daily as needed.           Please address this information as you see fit.  Feel free to contact us if you have any questions or require assistance.    Deja Streeter  [phone]            .

## 2023-07-28 NOTE — CONSULTS
[facility] Dept  Cardiology  Consult Note    Patient Name: Tristen Blanc  MRN: [id number]  Admission Date: 7/28/2023  Hospital Length of Stay: 0 days  Code Status: Prior   Attending Provider: Ashkan Gay III, MD   Consulting Provider: Roderick Shelton NP  Primary Care Physician: Alan Patterson MD  Principal Problem:<principal problem not specified>    Patient information was obtained from patient, past medical records and ER records.     Consults  Subjective:     Chief Complaint:  ICD discharge     HPI:   76yo male with CAD s/p CABG s/p PCI, ICM s/p AICD ([employer]), HLP, JERONIMO- CPAP, HTN, chronic combined systolic and diastolic heart failure, tobacco abuse, CVA. Patient admitted on 07/24/2023 with WCT and NSTEMI. Patient was loaded with Amiodarone and placed on a gtt. Infrequent self limited runs of VT were noted on tele once Amiodarone was discontinued. BB was resumed prior to DC. On 07/26/2023 he underwent POBA to LCx. His device was interrogated noting episodes of VT and his VT treatment was noted to be off. Device was adjusted to turn VT zone at 165 bpm. VF zone left at 220 BPM.   [facility] noted  LM: Mild stenosis  LAD: Occluded at the ostium  Lcx: 80-90% ostial Lcx (ISR)  RCA: Patent stent proximal and mid RCA  Bypass Graft  - SVG to LAD filling prox LAD retrograde and into diagonal branch which has 2 serial 90% stenosis. Patent LAD  - LIMA and AUGUSTUS visualized and noted not utilized     Intervention  - sp IVUS guided POBA proximal Lcx with 3.5 NC and 4.0 NC balloon  - Attempted diagonal intervention via SVG graft. Able to wire into diagonal branch (via retrograde wiring in the LAD), but unable to deliver balloon     PET stress and EP follow up recommended and patient was discharged on 07/27/2023. Patient presented to the ED this AM after being shocked by his ICD- interrogation pending. He denies chest pain, palpitations, SOB, LANGE, syncope.  Troponin noted trending down to 2. Lytes unremarkable.    Amiodarone gtt initiated.       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 Had previous stentsd    CORONARY ARTERY BYPASS GRAFT  06/1996    bypass of 2 vessels    INSERT / REPLACE / REMOVE PACEMAKER      AICD; generator change in 2/10/17    JOINT REPLACEMENT Right 12/08/1991    total hip replacement    LEFT HEART CATHETERIZATION Left 7/26/2023    Procedure: Left heart cath;  Surgeon: Kaycee Fermin MD;  Location: [facility] CATH LAB/EP;  Service: Cardiology;  Laterality: Left;    SINUS SURGERY         Review of patient's allergies indicates:  No Known Allergies    Current Facility-Administered Medications on File Prior to Encounter   Medication    [DISCONTINUED] aspirin EC tablet 81 mg    [DISCONTINUED] atorvastatin tablet 80 mg    [DISCONTINUED] clopidogreL tablet 75 mg    [DISCONTINUED] furosemide tablet 40 mg    [DISCONTINUED] metoprolol succinate (TOPROL-XL) 24 hr tablet 50 mg    [DISCONTINUED] mupirocin 2 % ointment    [DISCONTINUED] sacubitriL-valsartan 24-26 mg per tablet 1 tablet    [DISCONTINUED] sodium chloride 0.9% flush 10 mL     Current Outpatient Medications on File Prior to Encounter   Medication Sig    aspirin (ECOTRIN) 81 MG EC tablet Take 81 mg by mouth once daily.      atorvastatin (LIPITOR) 80 MG tablet TAKE 1 TABLET BY MOUTH EVERY EVENING (Patient taking differently: Take 80 mg by mouth every  evening.)    clopidogreL (PLAVIX) 75 mg tablet TAKE 1 TABLET(75 MG) BY MOUTH EVERY EVENING (Patient taking differently: Take 75 mg by mouth every evening.)    coenzyme Q10 200 mg capsule Take 200 mg by mouth once daily.    fluticasone (FLONASE) 50 mcg/actuation nasal spray 1 spray by Nasal route as needed.     FOLIC ACID/MV,FE,OTHER MIN (CENTRUM ORAL) Take 1 tablet by mouth every morning.     furosemide (LASIX) 40 MG tablet Take 1 tablet (40 mg total) by mouth once daily.    metoprolol succinate (TOPROL-XL) 50 MG 24 hr tablet TAKE 1 TABLET(50 MG) BY MOUTH EVERY DAY (Patient taking differently: 50 mg once daily.)    montelukast (SINGULAIR) 10 mg tablet Take 1 tablet (10 mg total) by mouth daily as needed.    nitroGLYCERIN (NITROSTAT) 0.4 MG SL tablet ONE TABLET UNDER TONGUE AS NEEDED FOR CHEST PAIN. TAKE EVERY 5 MINUTES AS NEEDED    sacubitriL-valsartan (ENTRESTO) 24-26 mg per tablet Take 1 tablet by mouth 2 (two) times daily.    spironolactone (ALDACTONE) 25 MG tablet TAKE 1 TABLET(25 MG) BY MOUTH EVERY DAY (Patient taking differently: Take 25 mg by mouth once daily.)    vardenafil (LEVITRA) 20 MG tablet Take 20 mg by mouth daily as needed.      [DISCONTINUED] ergocalciferol (ERGOCALCIFEROL) 50,000 unit Cap Take 2,000 Units by mouth every Mon, Wed, Fri.    [DISCONTINUED] mupirocin (BACTROBAN) 2 % ointment Apply topically 2 (two) times daily.     Family History       Problem Relation (Age of Onset)    Heart attack Father, Paternal Uncle, Maternal Aunt, Paternal Aunt    Stroke Mother          Tobacco Use    Smoking status: Every Day     Packs/day: 1.00     Years: 57.00     Pack years: 57.00     Types: Cigarettes     Start date: 1966    Smokeless tobacco: Never    Tobacco comments:     SAYS TRIED EVERYTHING INCLUDING HYPNOSIOS AND DRUGS; NOT INTERESTED IN SMOKING PROGRAM HERE   Substance and Sexual Activity    Alcohol use: Yes     Comment: social; rare    Drug use: No    Sexual activity: Yes      Partners: Female     Review of Systems   Constitutional: Negative.   HENT: Negative.     Eyes: Negative.    Cardiovascular:  Positive for irregular heartbeat. Negative for chest pain, dyspnea on exertion, leg swelling, near-syncope, orthopnea, palpitations, paroxysmal nocturnal dyspnea and syncope.   Respiratory: Negative.  Negative for cough and shortness of breath.    Endocrine: Negative.    Hematologic/Lymphatic: Negative.    Skin: Negative.    Musculoskeletal: Negative.    Gastrointestinal: Negative.    Neurological: Negative.    Psychiatric/Behavioral: Negative.     Allergic/Immunologic: Negative.    Objective:     Vital Signs (Most Recent):  Temp: 98 °F (36.7 °C) (07/28/23 1157)  Pulse: 90 (07/28/23 1157)  Resp: 18 (07/28/23 1406)  BP: (!) 100/52 (07/28/23 1157)  SpO2: 98 % (07/28/23 1157) Vital Signs (24h Range):  Temp:  [98 °F (36.7 °C)] 98 °F (36.7 °C)  Pulse:  [90] 90  Resp:  [18] 18  SpO2:  [98 %] 98 %  BP: (100)/(52) 100/52     Weight: 72.6 kg (160 lb)  Body mass index is 26.63 kg/m².    SpO2: 98 %       No intake or output data in the 24 hours ending 07/28/23 1434    Lines/Drains/Airways       Peripheral Intravenous Line  Duration                  Peripheral IV - Single Lumen 07/28/23 20 G Anterior;Left Forearm <1 day                     Physical Exam  Constitutional:       General: He is not in acute distress.     Appearance: He is not diaphoretic.   HENT:      Head: Atraumatic.   Eyes:      General:         Right eye: No discharge.         Left eye: No discharge.   Cardiovascular:      Rate and Rhythm: Normal rate and regular rhythm.      Heart sounds: Murmur heard.   Pulmonary:      Effort: Pulmonary effort is normal.      Breath sounds: Normal breath sounds.   Abdominal:      General: Bowel sounds are normal.      Palpations: Abdomen is soft.   Musculoskeletal:      Right lower leg: No edema.      Left lower leg: No edema.   Skin:     General: Skin is warm and dry.   Neurological:      Mental  Status: He is alert and oriented to person, place, and time.   Psychiatric:         Mood and Affect: Mood normal.         Behavior: Behavior normal.         Thought Content: Thought content normal.         Judgment: Judgment normal.        Significant Labs: BMP:   Recent Labs   Lab 07/27/23  0631 07/28/23  1242   GLU 96 107    141   K 4.5 3.9    108   CO2 25 24   BUN 21 23   CREATININE 1.1 1.1   CALCIUM 8.6* 9.2   MG 2.0  --    , CMP   Recent Labs   Lab 07/27/23  0631 07/28/23  1242    141   K 4.5 3.9    108   CO2 25 24   GLU 96 107   BUN 21 23   CREATININE 1.1 1.1   CALCIUM 8.6* 9.2   PROT 6.0 6.5   ALBUMIN 3.7 3.9   BILITOT 0.5 0.7   ALKPHOS 90 98   AST 43* 34   ALT 57* 43   ANIONGAP 8 9   , CBC   Recent Labs   Lab 07/27/23  0631 07/28/23  1242   WBC 7.37 7.14   HGB 12.3* 12.9*   HCT 36.6* 38.9*   * 169   , INR No results for input(s): INR, PROTIME in the last 48 hours., Lipid Panel No results for input(s): CHOL, HDL, LDLCALC, TRIG, CHOLHDL in the last 48 hours., Troponin   Recent Labs   Lab 07/28/23  1242   TROPONINI 2.619*   , and All pertinent lab results from the last 24 hours have been reviewed.    Significant Imaging: Echocardiogram: Transthoracic echo (TTE) complete ([facility] Only):   Results for orders placed or performed during the hospital encounter of 07/24/23   Echo   Result Value Ref Range    BSA 1.82 m2    TDI SEPTAL 0.02 m/s    LV LATERAL E/E' RATIO 10.00 m/s    LV SEPTAL E/E' RATIO 40.00 m/s    LA WIDTH 4.00 cm    IVC diameter 2.15 cm    Left Ventricular Outflow Tract Mean Velocity 0.41 cm/s    Left Ventricular Outflow Tract Mean Gradient 0.75 mmHg    TDI LATERAL 0.08 m/s    LVIDd 5.60 3.5 - 6.0 cm    IVS 1.18 (A) 0.6 - 1.1 cm    Posterior Wall 1.16 (A) 0.6 - 1.1 cm    LVIDs 4.90 (A) 2.1 - 4.0 cm    FS 13 28 - 44 %    LA volume 77.56 cm3    LV mass 270.96 g    LA size 4.62 cm    RV S' 7.16 cm/s    Left Ventricle Relative Wall Thickness 0.41 cm    AV mean gradient 3 mmHg  "   AV valve area 1.80 cm2    AV Velocity Ratio 0.47     AV index (prosthetic) 0.47     MV mean gradient 1 mmHg    MV valve area p 1/2 method 2.69 cm2    MV valve area by continuity eq 1.99 cm2    E/A ratio 1.27     Mean e' 0.05 m/s    E wave deceleration time 281.87 msec    MV "A" wave duration [id number] msec    Pulm vein S/D ratio 0.73     LVOT diameter 2.20 cm    LVOT area 3.8 cm2    LVOT peak silas 0.56 m/s    LVOT peak VTI 12.20 cm    Ao peak silas 1.19 m/s    Ao VTI 25.8 cm    Vn Nyquist 0.37 m/s    Radius 0.92 cm    LVOT stroke volume 46.35 cm3    AV peak gradient 6 mmHg    MV peak gradient 3 mmHg    E/E' ratio 16.00 m/s    Vn Nyquist MS 0.37 m/s    MV Peak E Silas 0.80 m/s    TR Max Silas 2.15 m/s    MV VTI 23.3 cm    MV stenosis pressure 1/2 time 81.74 ms    MV Peak A Silas 0.63 m/s    PV Peak S Silas 0.45 m/s    PV Peak D Silas 0.62 m/s    LV Systolic Volume 123.59 mL    LV Systolic Volume Index 68.7 mL/m2    LV Diastolic Volume 161.39 mL    LV Diastolic Volume Index 89.66 mL/m2    LA Volume Index 43.1 mL/m2    LV Mass Index 151 g/m2    RA Major Axis 4.20 cm    Left Atrium Minor Axis 4.70 cm    Left Atrium Major Axis 5.20 cm    Triscuspid Valve Regurgitation Peak Gradient 18 mmHg    LA Volume Index (Mod) 21.6 mL/m2    LA volume (mod) 38.96 cm3    RA Width 4.00 cm    Right Atrial Pressure (from IVC) 3 mmHg    EF 15 %    Ao root annulus 2.80 cm    RVDD 2.70 cm    TAPSE 2.00 cm    Right ventricular length in diastole (apical 4-chamber view) 6.00 cm    TV rest pulmonary artery pressure 21 mmHg    Narrative    · The left ventricle is mildly enlarged with eccentric hypertrophy and   severely decreased systolic function.  · The estimated ejection fraction is 15%.  · Grade II left ventricular diastolic dysfunction.  · There are segmental left ventricular wall motion abnormalities.  · There is abnormal septal wall motion consistent with post-operative   status.  · Normal right ventricular size with normal right " ventricular systolic   function.  · Moderate left atrial enlargement.  · Moderate mitral regurgitation.  · The estimated PA systolic pressure is 21 mmHg.  · Normal central venous pressure (3 mmHg).        Assessment and Plan:     Defibrillator discharge  Interrogated- ATP out of VT this AM x 20+ times then ATP attempt x 6 unsuccessful and shock was delivered   VT treatment zone was off during admission earlier this week- turned on due to VT requiring shock in the the ED, zone set at 165 bpm   Patient underwent POBA to LCx earlier this week with SVG -Diag stenosis remaining due to tortuosity and inability to deliver balloon. PET stress ordered as outpatient   Patient without sustained VT while on Amio gtt and post Amio DC, BB was resumed as well  Will re-load with Amiodarone with plans to convert to Amiodarone p.o. 400 mg BID x 2 weeks then decrease to 400 mg daily thereafter  Continue BB  Needs EP follow up upon DC- Dr Ruvalcaba's- will review case with EP today   LVEF 15%      JERONIMO on CPAP  CPAP at HS    Cardiomyopathy, ischemic  Per defib discharge  PET stress as OP  Hold Entresto given soft BP  Continue BB, statin, DAPT    CAD (coronary artery disease) of artery bypass graft  S/p LHC this week  Findings  Co-dominant  LM: Mild stenosis  LAD: Occluded at the ostium  Lcx: 80-90% ostial Lcx (ISR)  RCA: Patent stent proximal and mid RCA     Bypass Graft  - SVG to LAD filling prox LAD retrograde and into diagonal branch which has 2 serial 90% stenosis. Patent LAD  - LIMA and AUGUSTUS visualized and noted not utilized     LVEDP 21     Intervention  - sp IVUS guided POBA proximal Lcx with 3.5 NC and 4.0 NC balloon  - Attempted diagonal intervention via SVG graft. Able to wire into diagonal branch (via retrograde wiring in the LAD), but unable to deliver balloon      DAPT, Statin, BB  PET stress as OP        VTE Risk Mitigation (From admission, onward)    None          Thank you for your consult. I will follow-up with patient.  Please contact us if you have any additional questions.    Roderick Shelton NP  Cardiology   [facility] - Emergency Dept

## 2023-07-28 NOTE — ED PROVIDER NOTES
Encounter Date: 7/28/2023       History     Chief Complaint   Patient presents with    Defibrillator Shock     Brought in by  EMS from home. Pt had MI on Monday, today defibrillator delivered 1 shock. No reports of CP, SOB. Defibrillator set at 156.     History obtained from the patient without limitations.  He presents by ambulance from home after his defibrillator discharged.  He was lying in bed watching television when the device shocked him.  He felt well prior to the discharge and denies weakness, dizziness, lightheadedness, chest pain, palpitations, shortness of breath, and nausea.  He presented to this ED earlier this week with chest pain.  He developed ventricular tachycardia and required cardioversion.  He was admitted to the intensive care unit initially because he required pressors.  He underwent angiography and angioplasty.  He was discharged yesterday.        Review of patient's allergies indicates:  No Known Allergies  Past Medical History:   Diagnosis Date    Allergy     Arthritis     Cardiomyopathy     ischemic    CHF (congestive heart failure)     Coronary artery disease     s/p CABG    General anesthetics causing adverse effect in therapeutic use     Hyperlipidemia     Hypertension     ICD (implantable cardioverter-defibrillator) in place 5/3/2013    Sleep apnea with use of continuous positive airway pressure (CPAP)      Past Surgical History:   Procedure Laterality Date    CARDIAC CATHETERIZATION  January 2011    CARDIAC DEFIBRILLATOR PLACEMENT      COLONOSCOPY      CORONARY ANGIOPLASTY  1/3/2011    Last stent was in LMCA, Cx. Had previous stentsd    CORONARY ARTERY BYPASS GRAFT  06/1996    bypass of 2 vessels    INSERT / REPLACE / REMOVE PACEMAKER      AICD; generator change in 2/10/17    JOINT REPLACEMENT Right 12/08/1991    total hip replacement    LEFT HEART CATHETERIZATION Left 7/26/2023    Procedure: Left heart cath;  Surgeon: Kaycee Fermin MD;  Location[facility]ATH LAB/EP;  Service:  Cardiology;  Laterality: Left;    SINUS SURGERY       Family History   Problem Relation Age of Onset    Stroke Mother     Heart attack Father     Heart attack Paternal Uncle         2 uncles  of heart attcks    Heart attack Maternal Aunt         2 aunts one  from AMI asnd the other  od complications later on.    Heart attack Paternal Aunt         3 out of 4  at late age of AMI     Social History     Tobacco Use    Smoking status: Every Day     Current packs/day: 1.00     Average packs/day: 1 pack/day for 57.6 years (57.6 ttl pk-yrs)     Types: Cigarettes     Start date:     Smokeless tobacco: Never    Tobacco comments:     SAYS TRIED EVERYTHING INCLUDING HYPNOSIOS AND DRUGS; NOT INTERESTED IN SMOKING PROGRAM HERE   Substance Use Topics    Alcohol use: Yes     Comment: social; rare    Drug use: No     Review of Systems  See HPI.  Remainder of 10 point systems review negative.    Physical Exam     Initial Vitals [23 1157]   BP Pulse Resp Temp SpO2   (!) 100/52 90 18 98 °F (36.7 °C) 98 %      MAP       --         Physical Exam  GENERAL:  No apparent distress.  EYES:  Normal conjunctivae.    ENT:  Moist mucous membranes.    CARDIOVASCULAR:  Regular rate and rhythm.  No murmurs, rubs, or gallops.  RESPIRATORY:  Unlabored.  Clear breath sounds throughout all lung fields bilaterally.  No wheezes, rales, rhonchi.  GASTROINTESTINAL:  Soft, nontender, nondistended.  INTEGUMENTARY:  Warm.  Dry. No pallor.  NEUROLOGICAL:  Awake and alert.  Normal orientation.  Normal mentation.    ED Course   Procedures  Labs Reviewed   CBC W/ AUTO DIFFERENTIAL - Abnormal; Notable for the following components:       Result Value    RBC 4.13 (*)     Hemoglobin 12.9 (*)     Hematocrit 38.9 (*)     MCH 31.2 (*)     Lymph % 15.5 (*)     All other components within normal limits   TROPONIN I - Abnormal; Notable for the following components:    Troponin I 2.619 (*)     All other components within normal limits   B-TYPE  NATRIURETIC PEPTIDE - Abnormal; Notable for the following components:     (*)     All other components within normal limits   COMPREHENSIVE METABOLIC PANEL   MAGNESIUM            Imaging Results              X-Ray Chest AP Portable (Final result)  Result time 07/28/23 13:33:28      Final result by Rich Tavarez III, MD (07/28/23 13:33:28)                   Impression:      No acute process seen.      Electronically signed by: Rich Tavarez MD  Date:    07/28/2023  Time:    13:33               Narrative:    EXAMINATION:  XR CHEST AP PORTABLE    CLINICAL HISTORY:  Defibrillator discharge;    FINDINGS:  Chest one view: There is a pacemaker and postoperative change.  Heart size is normal.  Lungs are clear.  There is aortic plaque and DJD.                                       Medications   amiodarone 360 mg/200 mL (1.8 mg/mL) infusion (0 mg/min Intravenous Stopped 7/28/23 1919)   amiodarone in dextrose 150 mg/100 mL (1.5 mg/mL) loading dose 150 mg (0 mg Intravenous Stopped 7/28/23 1334)   morphine injection 4 mg (4 mg Intravenous Given 7/28/23 1406)                 ED Course as of 08/05/23 0259   Fri Jul 28, 2023   1225 Initial workup and treatment plan:   77-year-old patient with history of coronary disease and combined systolic and diastolic heart failure who was discharged yesterday presents following discharge of his AICD.  His initial blood pressure was low but he feels well and has no symptoms.  Initiating continuous cardiopulmonary monitoring and obtaining CBC, CMP, troponin, BNP.  Will discuss with cardiology. [LP]   1300 Discussed the patient's presentation with cardiology ADRIANNE, Roderick Shelton, who recommends HM/IM/FM admission. She will order amiodarone bolus/infusion. [LP]   1426 Discussed patient's presentation, workup, and management with the on-call [facility] resident who will evaluate the patient for admission. [LP]      ED Course User Index  [LP] Ashkan Gay III, MD                    Clinical Impression:   Final diagnoses:  [Z45.02] Defibrillator discharge  [I47.20] Sustained ventricular tachycardia (Primary)        ED Disposition Condition    Admit Serious                Ashkan Gay III, MD  08/05/23 0259

## 2023-07-28 NOTE — TELEPHONE ENCOUNTER
Following Provider:  Dr. Tucker    Device Type:  ICD    Date/Time:  7/28/23 at 1049    Event Type:  MMVT    Ventricular CL:  V rate 188bpm    Duration:  1 min 9 secs    Therapy Delivered:  ATP x 6 + 1 Shock    Appropriate Therapy:  Yes    Successful:  Yes    EVENT #2    Date/Time:  7/28/23 at 1049    Event Type:  MMVT    Ventricular CL:  V rate 188bpm    Duration:  8 ecs    Therapy Delivered:  ATP    Appropriate Therapy:  Yes    Successful:  NO    EVENT #3    Date/Time:  7/28/23 at 1048    Event Type:  MMVT    Ventricular CL:  V rate 188 bpm    Duration: 9 secs    Therapy Delivered:  ATP    Appropriate Therapy:  Yes    Successful:  NO      EVENT #4    Date/Time:  7/28/23 at 1047    Event Type:  MMVT    Ventricular CL:  V rate 188 bpm    Duration: 9 secs    Therapy Delivered:  ATP    Appropriate Therapy:  Yes    Successful:  NO    EVENT #5    Date/Time:  7/28/23 at 1035    Event Type:  MMVT    Ventricular CL:  V rate 194    Duration:  45 secs    Therapy Delivered:  ATP x 3    Appropriate Therapy:  Yes    Successful:  Yes    EVENT #6    Date/Time:  7/28/23 at 1032    Event Type:  MMVT    Ventricular CL:  V rate 194bpm    Duration: 9 secs    Therapy Delivered:  ATP    Appropriate Therapy:  Yes    Successful:  Yes    EVENT #7    Date/Time:  7/28/23 at 0935    Event Type:  MMVT    Ventricular CL:  V rate 194    Duration:  31 secs    Therapy Delivered:  ATP x 3    Appropriate Therapy:  Yes    Successful:  Yes    EVENT #8    Date/Time:  7/28/23 at 0929    Event Type:  MMVT    Ventricular CL:  V rate 194bpm    Duration: 8 secs    Therapy Delivered:  ATP    Appropriate Therapy:  Yes    Successful:  Yes    EVENT #9    Date/Time:  7/27/23 at 0920    Event Type:  MMVT    Ventricular CL:  V rate 200bpm    Duration:  14 secs    Therapy Delivered:  ATP x 2    Appropriate Therapy:  Yes    Successful:  Yes    Pt Symptomatic:  Unable to determine.  Message left on cell.  Patient has reported to [place] ED.  Recently discharged with VT  requiring DCCV in the ED, NSTEMI, with LAD, L cx stenosis and balloon angioplasty.    Hx of VT ablation:  No  Notable medications/anti-arrhythmics:  Metoprolol 50mg QD  Last EF and date:  7/25/23 15%    Last clinic visit: 7/14/23  Hx of ICM, CAD, CHF, CABG, MI.

## 2023-07-28 NOTE — ED NOTES
Received report from JAMES Aguirre. Introduced myself to Pt [id number] family at bedside. Pt appears to be comfortable and in no distress.

## 2023-07-28 NOTE — ASSESSMENT & PLAN NOTE
S/p LHC this week  Findings  Co-dominant  LM: Mild stenosis  LAD: Occluded at the ostium  Lcx: 80-90% ostial Lcx (ISR)  RCA: Patent stent proximal and mid RCA     Bypass Graft  - SVG to LAD filling prox LAD retrograde and into diagonal branch which has 2 serial 90% stenosis. Patent LAD  - LIMA and AUGUSTUS visualized and noted not utilized     LVEDP 21     Intervention  - sp IVUS guided POBA proximal Lcx with 3.5 NC and 4.0 NC balloon  - Attempted diagonal intervention via SVG graft. Able to wire into diagonal branch (via retrograde wiring in the LAD), but unable to deliver balloon      DAPT, Statin, BB  PET stress as OP

## 2023-07-28 NOTE — NURSING
Per MD; ok with BP 90/50 and above, as long as he remains asymptomatic. Normal range for patient. If he drops any lower, will call back.

## 2023-07-28 NOTE — HPI
76yo male with CAD s/p CABG s/p PCI, ICM s/p AICD ([employer]), HLP, JERONIMO- CPAP, HTN, chronic combined systolic and diastolic heart failure, tobacco abuse, CVA. Patient admitted on 07/24/2023 with WCT and NSTEMI. Patient was loaded with Amiodarone and placed on a gtt. Infrequent self limited runs of VT were noted on tele once Amiodarone was discontinued. BB was resumed prior to DC. On 07/26/2023 he underwent POBA to LCx. His device was interrogated noting episodes of VT and his VT treatment was noted to be off. Device was adjusted to turn VT zone at 165 bpm. VF zone left at 220 BPM.   [facility] noted  LM: Mild stenosis  LAD: Occluded at the ostium  Lcx: 80-90% ostial Lcx (ISR)  RCA: Patent stent proximal and mid RCA  Bypass Graft  - SVG to LAD filling prox LAD retrograde and into diagonal branch which has 2 serial 90% stenosis. Patent LAD  - LIMA and AUGUSTUS visualized and noted not utilized     Intervention  - sp IVUS guided POBA proximal Lcx with 3.5 NC and 4.0 NC balloon  - Attempted diagonal intervention via SVG graft. Able to wire into diagonal branch (via retrograde wiring in the LAD), but unable to deliver balloon     PET stress and EP follow up recommended and patient was discharged on 07/27/2023. Patient presented to the ED this AM after being shocked by his ICD- interrogation pending. He denies chest pain, palpitations, SOB, LANGE, syncope.  Troponin noted trending down to 2. Lytes unremarkable.   Amiodarone gtt initiated.

## 2023-07-28 NOTE — PHYSICIAN QUERY
PT Name: Tristen Blanc  MR #: [id number]     DOCUMENTATION CLARIFICATION     CDS/: Maureen Osman RN             Contact [email or web address]  This form is a permanent document in the medical record.     Query Date: July 27, 2023    By submitting this query, we are merely seeking further clarification of documentation.  Please utilize your independent clinical judgment when addressing the question(s) below.    The Medical Record contains the following   Indicators Supporting Clinical Findings Location in Medical Record   x Heart Failure documented Acute exacerbation of chronic ischemic systolic and diastolic heart failure    Chronic combined systolic and diastolic CHF Internal medicine note 7-25      Cardiology note 7-25   x BNP  Cardiology note 7-25    EF/Echo     x Radiology findings The heart is not enlarged the trachea is midline.  Twin lead pacemaker defibrillator is noted.  Defibrillating pad projects over the left side of the patient.  Mild hypoventilatory changes are suggested with no consolidation or effusion.     Impression:     Mild hypoventilatory changes with no convincing evidence of acute chest disease. Chest xray 7-24    Subjective/Objective Respiratory Conditions      Recent/Current MI      Heart Transplant, LVAD      Edema, JVD      Ascites     x Diuretics/Meds IV Furosemide   P.o Furosemide Mar 7-24 to 7-25  Mar 7-26 to -27    Other Treatment      Other       Heart failure is a clinical diagnosis which includes symptomatic fluid retention, elevated intracardiac pressures, and/or the inability of the heart to deliver adequate blood flow.    Heart Failure with reduced Ejection Fraction (HFrEF) or Systolic Heart Failure (loses ability to contract normally, EF is <40%)    Heart Failure with preserved Ejection Fraction (HFpEF) or Diastolic Heart Failure (stiff ventricles, does not relax properly, EF is >50%)     Heart Failure with Combined Systolic and  Diastolic Failure (stiff ventricles, does not relax properly and EF is <50%)    Mid-range or mildly reduced ejection fraction (HFmrEF) is classified as systolic heart failure.  Congestive heart failure with a recovered EF is classified as Diastolic Heart Failure.  Common clues to acute exacerbation:  Rapidly progressive symptoms (w/in 2 weeks of presentation), using IV diuretics, using supplemental O2, pulmonary edema on Xray, new or worsening pleural effusion, +JVD or other signs of volume overload, MI w/in 4 weeks, and/or BNP >500  The clinical guidelines noted are only system guidelines, and do not replace the providers clinical judgment.    Provider, please clarify the conflicting acuity of chf  diagnosis associated with the above clinical findings.    [   ]  Acute on Chronic Combined Systolic and Diastolic Heart Failure - worsening of CHF signs/symptoms in preexisting CHF   [x   ]  Chronic Combined Systolic and Diastolic Heart Failure - pre-existing and stable   [   ]  Other (please specify): ___________________________________     Please document in your progress notes daily for the duration of treatment until resolved and include in your discharge summary.    References:  American Heart Association editorial staff. (2017, May). Ejection Fraction Heart Failure Measurement. American Heart Association. https://www.heart.org/en/health-topics/heart-failure/diagnosing-heart-failure/ejection-fraction-heart-failure-measurement#:~:text=Ejection%20fraction%20(EF)%20is%20a,pushed%20out%20with%20each%20heartbeat  SHEREEN Harris (2020, December 15). Heart failure with preserved ejection fraction: Clinical manifestations and diagnosis. [employer]. https://www.[employer].com/contents/heart-failure-with-preserved-ejection-fraction-clinical-manifestations-and-diagnosis.  ICD-10-CM/PCS Coding Clinic Third Quarter ICD-10, Effective with discharges: September 8, 2020 [facility] § Heart failure with mid-range or  mildly reduced ejection fraction (2020).  ICD-10-CM/PCS Coding Clinic Third Quarter ICD-10, Effective with discharges: September 8, 2020 [facility] § Heart failure with recovered ejection fraction (2020).  Form No. 23788

## 2023-07-28 NOTE — SUBJECTIVE & OBJECTIVE
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 Had previous stentsd    CORONARY ARTERY BYPASS GRAFT  06/1996    bypass of 2 vessels    INSERT / REPLACE / REMOVE PACEMAKER      AICD; generator change in 2/10/17    JOINT REPLACEMENT Right 12/08/1991    total hip replacement    LEFT HEART CATHETERIZATION Left 7/26/2023    Procedure: Left heart cath;  Surgeon: Kaycee Fermin MD;  Location: [facility] CATH LAB/EP;  Service: Cardiology;  Laterality: Left;    SINUS SURGERY         Review of patient's allergies indicates:  No Known Allergies    Current Facility-Administered Medications on File Prior to Encounter   Medication    [DISCONTINUED] aspirin EC tablet 81 mg    [DISCONTINUED] atorvastatin tablet 80 mg    [DISCONTINUED] clopidogreL tablet 75 mg    [DISCONTINUED] furosemide tablet 40 mg    [DISCONTINUED] metoprolol succinate (TOPROL-XL) 24 hr tablet 50 mg    [DISCONTINUED] mupirocin 2 % ointment    [DISCONTINUED] sacubitriL-valsartan 24-26 mg per tablet 1 tablet    [DISCONTINUED] sodium chloride 0.9% flush 10 mL     Current Outpatient Medications on File Prior to Encounter   Medication Sig    aspirin (ECOTRIN) 81 MG EC tablet Take 81 mg by mouth once daily.      atorvastatin (LIPITOR) 80 MG tablet TAKE 1 TABLET BY MOUTH EVERY EVENING (Patient taking differently: Take 80 mg by mouth every evening.)    clopidogreL (PLAVIX) 75 mg tablet TAKE 1 TABLET(75  MG) BY MOUTH EVERY EVENING (Patient taking differently: Take 75 mg by mouth every evening.)    coenzyme Q10 200 mg capsule Take 200 mg by mouth once daily.    fluticasone (FLONASE) 50 mcg/actuation nasal spray 1 spray by Nasal route as needed.     FOLIC ACID/MV,FE,OTHER MIN (CENTRUM ORAL) Take 1 tablet by mouth every morning.     furosemide (LASIX) 40 MG tablet Take 1 tablet (40 mg total) by mouth once daily.    metoprolol succinate (TOPROL-XL) 50 MG 24 hr tablet TAKE 1 TABLET(50 MG) BY MOUTH EVERY DAY (Patient taking differently: 50 mg once daily.)    montelukast (SINGULAIR) 10 mg tablet Take 1 tablet (10 mg total) by mouth daily as needed.    nitroGLYCERIN (NITROSTAT) 0.4 MG SL tablet ONE TABLET UNDER TONGUE AS NEEDED FOR CHEST PAIN. TAKE EVERY 5 MINUTES AS NEEDED    sacubitriL-valsartan (ENTRESTO) 24-26 mg per tablet Take 1 tablet by mouth 2 (two) times daily.    spironolactone (ALDACTONE) 25 MG tablet TAKE 1 TABLET(25 MG) BY MOUTH EVERY DAY (Patient taking differently: Take 25 mg by mouth once daily.)    vardenafil (LEVITRA) 20 MG tablet Take 20 mg by mouth daily as needed.      [DISCONTINUED] ergocalciferol (ERGOCALCIFEROL) 50,000 unit Cap Take 2,000 Units by mouth every Mon, Wed, Fri.    [DISCONTINUED] mupirocin (BACTROBAN) 2 % ointment Apply topically 2 (two) times daily.     Family History       Problem Relation (Age of Onset)    Heart attack Father, Paternal Uncle, Maternal Aunt, Paternal Aunt    Stroke Mother          Tobacco Use    Smoking status: Every Day     Packs/day: 1.00     Years: 57.00     Pack years: 57.00     Types: Cigarettes     Start date: 1966    Smokeless tobacco: Never    Tobacco comments:     SAYS TRIED EVERYTHING INCLUDING HYPNOSIOS AND DRUGS; NOT INTERESTED IN SMOKING PROGRAM HERE   Substance and Sexual Activity    Alcohol use: Yes     Comment: social; rare    Drug use: No    Sexual activity: Yes     Partners: Female     Review of Systems   Constitutional: Negative.   HENT: Negative.      Eyes: Negative.    Cardiovascular:  Positive for irregular heartbeat. Negative for chest pain, dyspnea on exertion, leg swelling, near-syncope, orthopnea, palpitations, paroxysmal nocturnal dyspnea and syncope.   Respiratory: Negative.  Negative for cough and shortness of breath.    Endocrine: Negative.    Hematologic/Lymphatic: Negative.    Skin: Negative.    Musculoskeletal: Negative.    Gastrointestinal: Negative.    Neurological: Negative.    Psychiatric/Behavioral: Negative.     Allergic/Immunologic: Negative.    Objective:     Vital Signs (Most Recent):  Temp: 98 °F (36.7 °C) (07/28/23 1157)  Pulse: 90 (07/28/23 1157)  Resp: 18 (07/28/23 1406)  BP: (!) 100/52 (07/28/23 1157)  SpO2: 98 % (07/28/23 1157) Vital Signs (24h Range):  Temp:  [98 °F (36.7 °C)] 98 °F (36.7 °C)  Pulse:  [90] 90  Resp:  [18] 18  SpO2:  [98 %] 98 %  BP: (100)/(52) 100/52     Weight: 72.6 kg (160 lb)  Body mass index is 26.63 kg/m².    SpO2: 98 %       No intake or output data in the 24 hours ending 07/28/23 1434    Lines/Drains/Airways       Peripheral Intravenous Line  Duration                  Peripheral IV - Single Lumen 07/28/23 20 G Anterior;Left Forearm <1 day                     Physical Exam  Constitutional:       General: He is not in acute distress.     Appearance: He is not diaphoretic.   HENT:      Head: Atraumatic.   Eyes:      General:         Right eye: No discharge.         Left eye: No discharge.   Cardiovascular:      Rate and Rhythm: Normal rate and regular rhythm.      Heart sounds: Murmur heard.   Pulmonary:      Effort: Pulmonary effort is normal.      Breath sounds: Normal breath sounds.   Abdominal:      General: Bowel sounds are normal.      Palpations: Abdomen is soft.   Musculoskeletal:      Right lower leg: No edema.      Left lower leg: No edema.   Skin:     General: Skin is warm and dry.   Neurological:      Mental Status: He is alert and oriented to person, place, and time.   Psychiatric:         Mood and  Affect: Mood normal.         Behavior: Behavior normal.         Thought Content: Thought content normal.         Judgment: Judgment normal.        Significant Labs: BMP:   Recent Labs   Lab 07/27/23  0631 07/28/23  1242   GLU 96 107    141   K 4.5 3.9    108   CO2 25 24   BUN 21 23   CREATININE 1.1 1.1   CALCIUM 8.6* 9.2   MG 2.0  --    , CMP   Recent Labs   Lab 07/27/23  0631 07/28/23  1242    141   K 4.5 3.9    108   CO2 25 24   GLU 96 107   BUN 21 23   CREATININE 1.1 1.1   CALCIUM 8.6* 9.2   PROT 6.0 6.5   ALBUMIN 3.7 3.9   BILITOT 0.5 0.7   ALKPHOS 90 98   AST 43* 34   ALT 57* 43   ANIONGAP 8 9   , CBC   Recent Labs   Lab 07/27/23  0631 07/28/23  1242   WBC 7.37 7.14   HGB 12.3* 12.9*   HCT 36.6* 38.9*   * 169   , INR No results for input(s): INR, PROTIME in the last 48 hours., Lipid Panel No results for input(s): CHOL, HDL, LDLCALC, TRIG, CHOLHDL in the last 48 hours., Troponin   Recent Labs   Lab 07/28/23  1242   TROPONINI 2.619*   , and All pertinent lab results from the last 24 hours have been reviewed.    Significant Imaging: Echocardiogram: Transthoracic echo (TTE) complete (Cupid Only):   Results for orders placed or performed during the hospital encounter of 07/24/23   Echo   Result Value Ref Range    BSA 1.82 m2    TDI SEPTAL 0.02 m/s    LV LATERAL E/E' RATIO 10.00 m/s    LV SEPTAL E/E' RATIO 40.00 m/s    LA WIDTH 4.00 cm    IVC diameter 2.15 cm    Left Ventricular Outflow Tract Mean Velocity 0.41 cm/s    Left Ventricular Outflow Tract Mean Gradient 0.75 mmHg    TDI LATERAL 0.08 m/s    LVIDd 5.60 3.5 - 6.0 cm    IVS 1.18 (A) 0.6 - 1.1 cm    Posterior Wall 1.16 (A) 0.6 - 1.1 cm    LVIDs 4.90 (A) 2.1 - 4.0 cm    FS 13 28 - 44 %    LA volume 77.56 cm3    LV mass 270.96 g    LA size 4.62 cm    RV S' 7.16 cm/s    Left Ventricle Relative Wall Thickness 0.41 cm    AV mean gradient 3 mmHg    AV valve area 1.80 cm2    AV Velocity Ratio 0.47     AV index (prosthetic) 0.47     MV  "mean gradient 1 mmHg    MV valve area p 1/2 method 2.69 cm2    MV valve area by continuity eq 1.99 cm2    E/A ratio 1.27     Mean e' 0.05 m/s    E wave deceleration time 281.87 msec    MV "A" wave duration [id number] msec    Pulm vein S/D ratio 0.73     LVOT diameter 2.20 cm    LVOT area 3.8 cm2    LVOT peak silas 0.56 m/s    LVOT peak VTI 12.20 cm    Ao peak silas 1.19 m/s    Ao VTI 25.8 cm    Vn Nyquist 0.37 m/s    Radius 0.92 cm    LVOT stroke volume 46.35 cm3    AV peak gradient 6 mmHg    MV peak gradient 3 mmHg    E/E' ratio 16.00 m/s    Vn Nyquist MS 0.37 m/s    MV Peak E Silas 0.80 m/s    TR Max Silas 2.15 m/s    MV VTI 23.3 cm    MV stenosis pressure 1/2 time 81.74 ms    MV Peak A Silas 0.63 m/s    PV Peak S Silas 0.45 m/s    PV Peak D Silas 0.62 m/s    LV Systolic Volume 123.59 mL    LV Systolic Volume Index 68.7 mL/m2    LV Diastolic Volume 161.39 mL    LV Diastolic Volume Index 89.66 mL/m2    LA Volume Index 43.1 mL/m2    LV Mass Index 151 g/m2    RA Major Axis 4.20 cm    Left Atrium Minor Axis 4.70 cm    Left Atrium Major Axis 5.20 cm    Triscuspid Valve Regurgitation Peak Gradient 18 mmHg    LA Volume Index (Mod) 21.6 mL/m2    LA volume (mod) 38.96 cm3    RA Width 4.00 cm    Right Atrial Pressure (from IVC) 3 mmHg    EF 15 %    Ao root annulus 2.80 cm    RVDD 2.70 cm    TAPSE 2.00 cm    Right ventricular length in diastole (apical 4-chamber view) 6.00 cm    TV rest pulmonary artery pressure 21 mmHg    Narrative    · The left ventricle is mildly enlarged with eccentric hypertrophy and   severely decreased systolic function.  · The estimated ejection fraction is 15%.  · Grade II left ventricular diastolic dysfunction.  · There are segmental left ventricular wall motion abnormalities.  · There is abnormal septal wall motion consistent with post-operative   status.  · Normal right ventricular size with normal right ventricular systolic   function.  · Moderate left atrial enlargement.  · Moderate mitral " regurgitation.  · The estimated PA systolic pressure is 21 mmHg.  · Normal central venous pressure (3 mmHg).

## 2023-07-28 NOTE — CONSULTS
[facility] Pulmonary/Critical Care Consult Note:  Primary Attending:  Ashkan Gay III, MD   Consultant Attending: Elian Ramírez MD   Consultant Fellow: John Snyder MD     Admit Date: 7/28/2023   Hospital LOS: 0     Subjective:  78yo M with PMHx HFrEF (s/p AICD placement in 2013; EF 15% and grade II diastolic dysfunction on 7/2023), CAD (s/p 2v-CABG in 1996; s/p PCI 2023), remote hx of stroke, carotid artery aneurysm, tobacco use, HTN, and HLD who presented after defibrillator discharged. He was watching TV when he felt a shock from his defibrillator. While in the ED, his defibrillator discharged once again. His device was interrogated, and he was noted to have several episodes of VT from 9:30-11 AM. On admit, vitals stable. Labs significant for  (318 during previous admission), trop 2.61 (14 during previous admission).    Of note, he was recently admitted from 7/24-7/27 for wide complex tachyarrhythmia and NSTEMI. S/p synchronized cardioversion, percutaneous balloon angioplasty to proximal Lcx 7/26.      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 Had previous stentsd    CORONARY ARTERY BYPASS GRAFT  06/1996    bypass of 2 vessels    INSERT / REPLACE / REMOVE PACEMAKER      AICD; generator change in 2/10/17    JOINT REPLACEMENT Right 12/08/1991    total hip replacement    LEFT HEART CATHETERIZATION Left 7/26/2023     Procedure: Left heart cath;  Surgeon: Kaycee Fermin MD;  Location: [facility] CATH LAB/EP;  Service: Cardiology;  Laterality: Left;    SINUS SURGERY         Review of patient's allergies indicates:  No Known Allergies    Social History     Tobacco Use    Smoking status: Every Day     Packs/day: 1.00     Years: 57.00     Pack years: 57.00     Types: Cigarettes     Start date:     Smokeless tobacco: Never    Tobacco comments:     SAYS TRIED EVERYTHING INCLUDING HYPNOSIOS AND DRUGS; NOT INTERESTED IN SMOKING PROGRAM HERE   Substance Use Topics    Alcohol use: Yes     Comment: social; rare    Drug use: No       Family History   Problem Relation Age of Onset    Stroke Mother     Heart attack Father     Heart attack Paternal Uncle         2 uncles  of heart attcks    Heart attack Maternal Aunt         2 aunts one  from AMI asnd the other  od complications later on.    Heart attack Paternal Aunt         3 out of 4  at late age of AMI       ROS:  Review of Systems - A 10 point review of systems was negative except where listed above.    Objective:  Last 24 Hour Vital Signs:  BP  Min: 100/52  Max: 100/52  Temp  Av °F (36.7 °C)  Min: 98 °F (36.7 °C)  Max: 98 °F (36.7 °C)  Pulse  Av  Min: 90  Max: 90  Resp  Av  Min: 18  Max: 18  SpO2  Av %  Min: 98 %  Max: 98 %  Weight  Av.6 kg (160 lb)  Min: 72.6 kg (160 lb)  Max: 72.6 kg (160 lb)  Body mass index is 26.63 kg/m².  I & O (Last 24H):No intake or output data in the 24 hours ending 23 1435    Physical Exam:  GEN: NAD, in bed  HEENT: MMM, no scleral icterus  NECK: Supple, midline trachea  CV: RRR, no murmurs appreciated  PULM: No increased WOB, CTAB  ABDOMEN: Soft, non-tender, non-distended, no rebound or guarding  SKIN: Warm, dry  MSK: No lower extremity edema  NEURO: AOx3      I have reviewed all labs / images / cultures  Pertinent labs are as follows:   No results for input(s): PH, PCO2, PO2, HCO3, POCSATURATED, BE in the last 24  hours.  Recent Labs   Lab 07/28/23  1242   WBC 7.14   RBC 4.13*   HGB 12.9*   HCT 38.9*      MCV 94   MCH 31.2*   MCHC 33.2     Recent Labs   Lab 07/28/23  1242      K 3.9      CO2 24   BUN 23   CREATININE 1.1       Meds:      Assessment & Plan: Tristen Blanc is a 77 y.o. male with PMHx HFrEF (s/p AICD placement in 2013; EF 15% and grade II diastolic dysfunction on 7/2023), CAD (s/p 2v-CABG in 1996; s/p PCI 2023), remote hx of stroke, carotid artery aneurysm, tobacco use, HTN, and HLD who presented after defibrillator discharged. Currently on amio gtt.    Defibrillator discharge  - cardiology following  - currently on amiodarone gtt. Transition to PO 400mg BID x2 weeks then decrease to 400mg daily   - continue toprol  - Mg pending. Replete as needed  - will require EP follow up on discharge (Dr. Tucker)    HFrEF (EF 15%)  - continue home toprol, statin, DAPT  - hold entresto given hypotension    CAD  - scheduled for outpatient stress test 8/4  - continue home toprol, statin, DAPT    JERONIMO (on CPAP)  - CPAP nightly    Patient seen and examined with Dr. Ramírez    We will continue to follow with you, thank you for the opportunity to be involved in ./Ms. Blanc's care.    Kori Aguayo MD  [facility] PGY-1    Pt seen and examined with Pulmonary/Critical Care team and this note reviewed and validated with the following additional comments:    CAD with recent PCI, HFrEF, V-tach.  Received 2 shocks from his AICD today.  Being loaded with amiodarone.  Admit to ICU for monitoring.  Repeat troponin pending.    Issa Ramírez MD  Phone [phone]

## 2023-07-28 NOTE — ED NOTES
Pt c/o pain from intermittent episodes from internal defibrillator  Dr. Gay informed   Verbal order for morphine 4 mg IV   Verbal read back verification

## 2023-07-28 NOTE — TELEPHONE ENCOUNTER
Alert received in carelink for treated event x 9 occurring today from 0929 thru 1049.  Called patient, left message for a urgent call back to discuss events, symptomatic and compliant with Metoprolol 50mg QD.

## 2023-07-28 NOTE — ASSESSMENT & PLAN NOTE
Interrogated- ATP out of VT this AM x 20+ times then ATP attempt x 6 unsuccessful and shock was delivered   VT treatment zone was off during admission earlier this week- turned on due to VT requiring shock in the the ED, zone set at 165 bpm   Patient underwent POBA to LCx earlier this week with SVG -Diag stenosis remaining due to tortuosity and inability to deliver balloon. PET stress ordered as outpatient   Patient without sustained VT while on Amio gtt and post Amio DC, BB was resumed as well  Will re-load with Amiodarone with plans to convert to Amiodarone p.o. 400 mg BID x 2 weeks then decrease to 400 mg daily thereafter  Continue BB  Needs EP follow up upon DC- Dr Ruvalcaba's- will review case with EP today   LVEF 15%

## 2023-07-28 NOTE — PLAN OF CARE
Problem: Adult Inpatient Plan of Care  Goal: Plan of Care Review  Outcome: Ongoing, Progressing  -Pt resting comfortably in bed, no pain reported. VSS, called MD about BP- ok with 90/50 and above, will call if it goes below and becomes symptomatic, but does not believe he needs to be on pressors. Pt in NSR since ICU admission.  Bed in low position, call light within reach, bed rails up. Pt in room with wife and daughter. Will continue with current treatment plan.

## 2023-07-28 NOTE — ED NOTES
Present to ED via EMS for defibrillator shock. Pt reports he has a MI on Monday and had stents opened and pacemaker defibrillator settings adjusted. Reports sitting up watching t.v. when he felt a shock from his defibrillator. Denies dizziness, chest pain or SOB.

## 2023-07-29 PROBLEM — I47.20 VENTRICULAR TACHYCARDIA: Status: ACTIVE | Noted: 2023-07-29

## 2023-07-29 PROCEDURE — 99291 CRITICAL CARE FIRST HOUR: CPT | Mod: ,,, | Performed by: INTERNAL MEDICINE

## 2023-07-29 PROCEDURE — 25000003 PHARM REV CODE 250: Performed by: INTERNAL MEDICINE

## 2023-07-29 PROCEDURE — 11000001 HC ACUTE MED/SURG PRIVATE ROOM

## 2023-07-29 PROCEDURE — 99291 PR CRITICAL CARE, E/M 30-74 MINUTES: ICD-10-PCS | Mod: ,,, | Performed by: INTERNAL MEDICINE

## 2023-07-29 PROCEDURE — 25000003 PHARM REV CODE 250

## 2023-07-29 PROCEDURE — 99900035 HC TECH TIME PER 15 MIN (STAT)

## 2023-07-29 PROCEDURE — 94761 N-INVAS EAR/PLS OXIMETRY MLT: CPT

## 2023-07-29 PROCEDURE — 63600175 PHARM REV CODE 636 W HCPCS

## 2023-07-29 RX ORDER — FAMOTIDINE 20 MG/1
20 TABLET, FILM COATED ORAL DAILY
Status: DISCONTINUED | OUTPATIENT
Start: 2023-07-29 | End: 2023-07-30 | Stop reason: HOSPADM

## 2023-07-29 RX ORDER — AMIODARONE HYDROCHLORIDE 200 MG/1
400 TABLET ORAL 2 TIMES DAILY
Status: DISCONTINUED | OUTPATIENT
Start: 2023-07-29 | End: 2023-07-30 | Stop reason: HOSPADM

## 2023-07-29 RX ORDER — FAMOTIDINE 20 MG/1
20 TABLET, FILM COATED ORAL 2 TIMES DAILY
Status: DISCONTINUED | OUTPATIENT
Start: 2023-07-29 | End: 2023-07-29

## 2023-07-29 RX ADMIN — AMIODARONE HYDROCHLORIDE 400 MG: 200 TABLET ORAL at 11:07

## 2023-07-29 RX ADMIN — ASPIRIN 81 MG: 81 TABLET, COATED ORAL at 09:07

## 2023-07-29 RX ADMIN — FAMOTIDINE 20 MG: 20 TABLET, FILM COATED ORAL at 04:07

## 2023-07-29 RX ADMIN — FUROSEMIDE 40 MG: 40 TABLET ORAL at 09:07

## 2023-07-29 RX ADMIN — AMIODARONE HYDROCHLORIDE 0.5 MG/MIN: 1.8 INJECTION, SOLUTION INTRAVENOUS at 07:07

## 2023-07-29 RX ADMIN — ATORVASTATIN CALCIUM 80 MG: 40 TABLET, FILM COATED ORAL at 08:07

## 2023-07-29 RX ADMIN — AMIODARONE HYDROCHLORIDE 400 MG: 200 TABLET ORAL at 08:07

## 2023-07-29 RX ADMIN — CLOPIDOGREL BISULFATE 75 MG: 75 TABLET ORAL at 05:07

## 2023-07-29 RX ADMIN — METOPROLOL SUCCINATE 50 MG: 50 TABLET, EXTENDED RELEASE ORAL at 09:07

## 2023-07-29 NOTE — NURSING TRANSFER
Nursing Transfer Note      7/29/2023 at 1425     Nurse giving handoff: Ernst Nielson  Nurse receiving handoff: JAMES Marrero    Reason patient is being transferred: Stepdown from ICU    Transfer To: [id number]    Transfer via wheelchair    Transfer with cardiac monitoring    Transported by this RN    Transfer Vital Signs:  Blood Pressure: 115/59  Heart Rate: 60  O2: 97 on room air  Temperature: T98.4 at 1215  Respirations:12    Telemetry: Rate 60 and Rhythm NSR  Order for Tele Monitor? Yes    Medicines sent: N/A    Any special needs or follow-up needed: N/A    Patient belongings transferred with patient:  N/A    Chart send with patient: Yes    Notified: spouse, daughter at bedside     Upon arrival to floor: cardiac monitor applied, patient oriented to room, call bell in reach, and bed in lowest position.  Urinal within reach.  Accepting Janes RAMIREZ, at bedside.

## 2023-07-29 NOTE — H&P
[facility] Hospital Medicine H&P Note     Admitting Team: [facility] Hospitalist Team A  Attending Physician: Miracle Lerner MD  Resident: Dr. Carbajal  Intern: Dr. Monsalve    Date of Admit: 7/28/2023    Chief Complaint     Sustained v tach, AICD fired x 1 day    Subjective:      History of Present Illness:    Tristen Blanc is a 77 y.o. male who has a past medical history of HFrEF sp/ AICD, MI, CAD, cardiac arrest, JERONIMO on nightly CPAP, hx of stroke, carotid artery aneurysm, tobacco use, HTN and HLD who presents after having his AICD fire while at home resting x 1 day.     Of note, patient discharged 1 from [facility] 1 day prior to presentation. Details of that admission are as follows:    The patient was in their usual state of health until the evening of 7/24. He reported that he suddenly began to experience substernal chest pain. The pain lasted until he received therapy in the ED. He reported resolution of the pain at time of admission. He did report associated shortness of breath with the pain. He denied any chest pain, shortness of breath, worsening swelling, nausea, vomiting, fever or chills prior to the evening of admission. He reported that the pain was similar to his prior heart attacks. In ED patient was noted to be in Vtach and underwent synchronized cardioversion with conversion to NSR. Patient was admitted to [facility] for Vtach, NSTEMI.      Patient required pressor support post-cardioversion and was admitted to ICU on low dose levophed. [facility] was consulted, Patient was started on heparin drip and amiodarone per cards recommendations. Patient with elevated troponin which peaked at 17 then downtrended. Patient underwent coronary angiogram with cardiology on 7/26, noted to have 80-90% stenosis of Lcx which was intervened upon with balloon angioplasty, serial stenoses in diagonal branches which could not be intervened upon. Cardiology recommended continuing ASA, lipitor, resuming home plavix  x1 year. Patient was already on home plavix for CVA.   Cardiology also recommended close outpatient follow up with cards, EP, out-patient PET stress.     Upon discharge, patient returned home without issue. The morning of this most recent admission, patient states he was watching TV while laying in bed when he experienced palpitations and then felt his AICD fire, at which point he presented to ED after being contacted by his EP doctor over the phone. Patient's AICD recorded sustained V tach with rate 180s-190s. Patient was seen by [facility] in ED and re-started on amiodarone drip. At time of interview, patient was asymptomatic, he denied any chest pain, shortness of breath, palpitations. Patient was admitted to [facility] for sustained V tach and firing of AICD.        Past Medical History:  HFrEF sp/ AICD, MI, CAD, cardiac arrest, JERONIMO on nightly CPAP, hx of stroke, carotid artery aneurysm, tobacco use, HTN and HLD    Past Surgical History:    Past Surgical History:   Procedure Laterality Date    CARDIAC CATHETERIZATION  January 2011    CARDIAC DEFIBRILLATOR PLACEMENT      COLONOSCOPY      CORONARY ANGIOPLASTY  1/3/2011    Last stent was in LMCA, Cx. Had previous stentsd    CORONARY ARTERY BYPASS GRAFT  06/1996    bypass of 2 vessels    INSERT / REPLACE / REMOVE PACEMAKER      AICD; generator change in 2/10/17    JOINT REPLACEMENT Right 12/08/1991    total hip replacement    LEFT HEART CATHETERIZATION Left 7/26/2023    Procedure: Left heart cath;  Surgeon: Kaycee Fermin MD;  Location: [facility] CATH LAB/EP;  Service: Cardiology;  Laterality: Left;    SINUS SURGERY         Allergies:    Review of patient's allergies indicates:  No Known Allergies    Home Medications:  Medications reconciled with patient.    Lipitor 80 mg daily  Aspirin 81 mg daily   Clopidogrel 75 mg daily  SL Nitro PRN  Furosemide 40 mg daily  Metoprolol Succinate 50 mg daily   Sacubitril/valsartan 97 mg - 103 mg daily   Spironolactone 25  "mg daily   Vardenafil PRN    Family History:    Family History   Problem Relation Age of Onset    Stroke Mother     Heart attack Father     Heart attack Paternal Uncle         2 uncles  of heart attcks    Heart attack Maternal Aunt         2 aunts one  from AMI asnd the other  od complications later on.    Heart attack Paternal Aunt         3 out of 4  at late age of AMI       Social History:        Tobacco Use    Smoking status: Every Day       Packs/day: 1.00       Types: Cigarettes    Smokeless tobacco: Never    Tobacco comments:       SAYS TRIED EVERYTHING INCLUDING HYPNOSIOS AND DRUGS; NOT INTERESTED IN SMOKING PROGRAM HERE   Substance Use Topics    Alcohol use: Yes       Comment: social; rare    Drug use: No        Review of Systems:  A comprehensive review of systems was negative unless otherwise stated in the HPI.     Health Maintaince :   Primary Care Physician:  Alan Patterson MD     Immunizations:   Currently on File:   Most Recent Immunizations   Administered Date(s) Administered    COVID-19, MRNA, LN-S, PF (Pfizer) (Purple Cap) 2021    Influenza 10/19/2011    Influenza (FLUAD) - Quadrivalent - Adjuvanted - PF *Preferred* (65+) 10/13/2022    Influenza - High Dose - PF (65 years and older) 10/01/2018    Influenza - Trivalent - MDCK - PF 2013    Pneumococcal Conjugate - 13 Valent 2015    Pneumococcal Polysaccharide - 23 Valent 2016    Zoster 2011    Zoster Recombinant 2023     Immunizations:   TDap not UTD    Flu UTD   Pna UTD  COVID x2  Zoster UTD     Cancer Screening:  Colonoscopy: n/a     Objective     ED Vitals: T 98, HR 90, /52, RR 18, O2 98%% on RA, BMI 72.6    ED Intervention: Amiodarone drip    Physical Examination:  Exam Vitals: T 97.7, HR 60, BP 94/50, RR 16, O2 98% on RA    BP (!) 90/52   Pulse 61   Temp 97.7 °F (36.5 °C) (Oral)   Resp 16   Ht 5' 5" (1.651 m)   Wt 70 kg (154 lb 5.2 oz)   SpO2 98%   BMI 25.68 kg/m²    General " appearance: alert, appears stated age, and cooperative  Head: Normocephalic, without obvious abnormality, atraumatic  Back: symmetric, normal curvature. ROM normal. No CVA tenderness.  Lungs:  BL crackles present on RA  Chest wall: no tenderness, well healed midline scar present  Heart: regular rate and rhythm and S1, S2 normal  Abdomen: normal findings: bowel sounds normal, soft, non-tender, and symmetric  Extremities: extremities normal, atraumatic, no cyanosis or edema  Pulses: radial and DP pulses 2+ and symmetric  Skin: Skin color, texture, turgor normal. No rashes or lesions  Neurologic: Mental status: Alert, oriented, thought content appropriate  Sensory: normal  Motor:moves all extremities spontaneously against gravity          Laboratory:  Recent Labs   Lab 07/24/23  1919 07/24/23 2028 07/25/23  0342 07/25/23  1052 07/26/23  0320 07/27/23  0631 07/28/23  1242 07/28/23  1529 07/28/23  1850   WBC 7.07   < > 8.76  --  6.42 7.37 7.14  --   --    HGB 11.1*   < > 12.7*  --  11.5* 12.3* 12.9*  --   --       < > 197  --  199 143* 169  --   --    MCV 94   < > 92  --  93 94 94  --   --      --  136  --  135* 142 141  --   --    K 3.5  --  4.3  --  4.2 4.5 3.9  --   --    *  --  107  --  109 109 108  --   --    CO2 20*  --  22*  --  20* 25 24  --   --    BUN 24*  --  27*  --  26* 21 23  --   --    CREATININE 1.3  --  1.3  --  1.1 1.1 1.1  --   --    *  --  138*  --  99 96 107  --   --    CALCIUM 7.4*  --  8.6*  --  8.1* 8.6* 9.2  --   --    PROT 5.0*  --  5.7*  --  5.3* 6.0 6.5  --   --    ALBUMIN 3.2*  --  3.7  --  3.3* 3.7 3.9  --   --    PHOS  --   --  4.2  --  3.2 3.2  --   --   --    MG  --   --  2.1  --  2.0 2.0  --  2.0  --    AST 68*  --  151*  --  74* 43* 34  --   --    ALT 65*  --  131*  --  77* 57* 43  --   --    ALKPHOS 80  --  105  --  85 90 98  --   --    TROPONINI 0.024  --  17.984* 14.120*  --   --  2.619*  --  2.196*   *  --   --   --   --   --  341*  --   --     < >  = values in this interval not displayed.     All laboratory data reviewed    EKG Data: Reviewed  NSR, PACs present, left axis deviation    Radiology Data: Reviewed  CXR 7/28/23    FINDINGS:  Chest one view: There is a pacemaker and postoperative change.  Heart size is normal.  Lungs are clear.  There is aortic plaque and DJD.     Impression:     No acute process seen.         Assessment/Plan     Sustained V tach (resolved)  AICD firing  - Patient again with sustained v tach with rate 180s-190s upon returning home from previous discharge, causing AICD to fire  - Cardiology consulted  - Started on amiodarone drip per Cardiology recommendations  - continue amio, heparin  - Previous admission Trop 0.024 -> 14.120 -> now 2.619 (downtrending from previous admission)  - Repeat TTE 7/25/23 with decreased EF (15%, down from 20-25% in 2020)  - Segmental wall motion abnormality noted on TTE 7/25/23  - Patient had coronary angiogram 7/26/23, findings below:    - Co-dominant     - LAD occluded at ostium     - ostial Lcx 80-90% occluded     - patent stent proximal and mid RCA     - SVG to LAD bypass graft filling proximal LAD retrograde, into diagonal branch which has 2 serial 90% stenoses, Patent LAD     - LIMA and AUGUSTUS visualized and noted not utilized     - LVEDP 21  - S/P percutaneous balloon angioplasty to proximal Lcx  - Cardiology was unable to intervene on diagonal branch via SVG  - Per cardiology ASA 81, lipitor 80, continue home plavix for at least another year  - Upon discharge, follow up with Cardiology, EP out-patient     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  NYHA HF Class 2  - Last TTE 6/2020  - EF 20-25%  - Grade 1 LV diastolic dysfunction  - Normal CVP 3mmHg  - PA systolic pressure 27mmHg  - Repeat TTE 7/25/23  - EF 15%  - Grade 2 LV diastolic dysfunction  - Segmental wall motion abnormalities  - Septal motion abnormalities consistent with post-op  - Moderate mitral regurg  - Normal CVP    - PA systolic 21mmHg  - Cardiology consulted  - Continue home toprol XL 50mg  - Continue Lasix PO 40mg  - Entresto held per cardiology recs  - Upon discharge, close out-patient follow up with Cardiology, EP     Hx of MI  CAD  NSTEMI  Cardiac arrest  - On previous admission, initial trop 0.024 -> 17.9 -> 14.1-> now 2.619 (downtrending from previous admission)  - Continue home aspirin, plavix, statin     JERONIMO on nightly CPAP  - Issues with home CPAP 2/2 recall  - Continue CPAP nightly while admitted  - Patient declining CPAP at this time, wants to see sleep medicine clinic  - Needs follow up with sleep medicine on discharge     Hx of CVA without residual deficits  - Continue home statin, plavix, aspirin    Hx of carotid artery aneurysm  - Follows with NSGY  - Last visit on 11/16/22  - Plan was for follow up and rpt CTA head in 1 year      Tobacco use  - 1 PPD >30 years  - Counseled on cessation      Essential HTN  - Hypotensive on admission 90s/50s  - Home regimen entresto , toprol XL 50mg, spironolactone 25  - Entresto held per cardiology recs     HLD  - continue home statin     Subclinical Hypothyroidism   - TSH 4.489, T4 0.78  - repeat TSH when not acutely ill      Acute Normocytic anemia  - Hbg 12.5, MCV 96 on admission  - Iron 52, Ferritin 158, TIBC 342, saturated iron low at 15%  - Saturated iron 15% consistent with LOBITO, given iron infusion venofer 300mg 7/28 prior to discharge  - Follow up with PCP, recommend weekly iron infusions until iron sat >20        Code Status: Full  DVT Prophylaxis: SCDs (on ASA, plavix)  Diet: Cardiac  Disposition: Admit to ICU for titration of amiodarone garrett Carbajal, DO  [facility] HO-II  [facility] Hospitalist Medicine Team A    [facility] Medicine Hospitalist Pager numbers:   [facility] Hospitalist Medicine Team A (Lenka/Eduarda):          [phone]  [facility] Hospitalist Medicine Team B (Bong/Chau):        [phone]

## 2023-07-29 NOTE — NURSING
Impression: S/P CE/Standard IOL OD - 1 Day. Presence of intraocular lens  Z96.1. Plan: S/P Cataract extraction right eye with moxifloxacin intracamerally - post-op day #1 - Advised patient that likely will see floaters for 1-2 weeks. Advised no heavy lifting or strenuous activity for at least one week and no swimming for at least three weeks. Use eye shield at night for one week. Patient advised to call immediately for any problems including, but not limited to, pain, redness, increase in floaters, flashing lights, changes in peripheral vision, decreased vision, and/or any other problems or concerns. Patient stated an understanding of all the instructions. Start prednisolone acetate 1% QID OD x 1 week.   RTC 1 week Received report from ICU nurse . Pt arrived on unit.VS taken. Family at bedside.

## 2023-07-29 NOTE — PROGRESS NOTES
"    Seen this am  No even events last night  Tolerated amiodarone well       Vitals:    07/29/23 1045 07/29/23 1100 07/29/23 1115 07/29/23 1130   BP:  (!) 112/56  104/73   Pulse: 60 60 60 60   Resp: 13 (!) 21 16 18   Temp:       TempSrc:       SpO2: 97% 97% 96% 98%   Weight:       Height:            LABS  CBC  Recent Labs   Lab 07/26/23 0320 07/27/23  0631 07/28/23  1242   WBC 6.42 7.37 7.14   RBC 3.62* 3.89* 4.13*   HGB 11.5* 12.3* 12.9*   HCT 33.8* 36.6* 38.9*    143* 169   MCV 93 94 94   MCH 31.8* 31.6* 31.2*   MCHC 34.0 33.6 33.2     BMP  Recent Labs   Lab 07/26/23 0320 07/27/23 0631 07/28/23  1242   * 142 141   K 4.2 4.5 3.9   CO2 20* 25 24    109 108   BUN 26* 21 23   CREATININE 1.1 1.1 1.1   GLU 99 96 107       POCT-Glucose  POCT Glucose   Date Value Ref Range Status   07/28/2023 115 (H) 70 - 110 mg/dL Final   07/27/2023 81 70 - 110 mg/dL Final   07/26/2023 85 70 - 110 mg/dL Final       Recent Labs   Lab 07/25/23 0342 07/26/23 0320 07/27/23 0631 07/28/23  1242 07/28/23  1529   CALCIUM 8.6* 8.1* 8.6* 9.2  --    MG 2.1 2.0 2.0  --  2.0   PHOS 4.2 3.2 3.2  --   --      LFT  Recent Labs   Lab 07/26/23 0320 07/27/23  0631 07/28/23  1242   PROT 5.3* 6.0 6.5   ALBUMIN 3.3* 3.7 3.9   BILITOT 0.3 0.5 0.7   AST 74* 43* 34   ALKPHOS 85 90 98   ALT 77* 57* 43     GFR     COAGS  Recent Labs   Lab 07/24/23 2028 07/25/23  0342 07/25/23  1553 07/26/23  0320 07/27/23  0631   INR 1.1  --   --   --   --    APTT 23.3   < > 40.2* 40.7* 27.3    < > = values in this interval not displayed.     CE  Recent Labs   Lab 07/25/23  1052 07/28/23  1242 07/28/23  1850   TROPONINI 14.120* 2.619* 2.196*     ABGs  No results for input(s): "PH", "PCO2", "PO2", "HCO3", "POCSATURATED", "BE" in the last 24 hours.  BNP  Recent Labs   Lab 07/24/23  1919 07/28/23  1242   * 341*       LAST HbA1c  Lab Results   Component Value Date    HGBA1C 5.1 07/25/2023       Lipid panel  Lab Results   Component Value Date    CHOL " 116 (L) 09/30/2022    CHOL 136 08/24/2021    CHOL 107 (L) 06/29/2020     Lab Results   Component Value Date    HDL 40 09/30/2022    HDL 31 (L) 08/24/2021    HDL 30 (L) 06/29/2020     Lab Results   Component Value Date    LDLCALC 63.4 09/30/2022    LDLCALC 76.8 08/24/2021    LDLCALC 59.6 (L) 06/29/2020     Lab Results   Component Value Date    TRIG 63 09/30/2022    TRIG 141 08/24/2021    TRIG 87 06/29/2020     Lab Results   Component Value Date    CHOLHDL 34.5 09/30/2022    CHOLHDL 22.8 08/24/2021    CHOLHDL 28.0 06/29/2020            Imp      VT   HFrEF  CAD s/p CABG + PCI  HTN  HLP  JERONIMO      Plan    Transition to oral amiodarone today    400 mg po bid for 2 weeks then 200 mg po bid for 2 weeks then 200 mg daily     Transfer to tele   Follow up with EP

## 2023-07-29 NOTE — PLAN OF CARE
Care Plan    Pt remains in ICU at this time. No acute events over night. Pt AAOx4. SpO2 90s overnight on RA, NSR 60s, BP 90s/50s. Amio gtt at 0.5 mg.   POC reviewed with pt and family. Questions and concerns addressed. Safety and infection precautions in place. See below and flowsheets for full assessment and VS info.     Neuro:  Jake Coma Scale  Best Eye Response: 4-->(E4) spontaneous  Best Motor Response: 6-->(M6) obeys commands  Best Verbal Response: 5-->(V5) oriented  [place] Coma Scale Score: 15  Assessment Qualifiers: patient not sedated/intubated  Pupil PERRLA: yes  24 hr Temp:  [97.7 °F (36.5 °C)-98 °F (36.7 °C)]      CV:  Rhythm: normal sinus rhythm  DVT prophylaxis: VTE Required Core Measure: (SCDs) Sequential compression device initiated/maintained    Resp:          GI/:  GI prophylaxis: no  Diet/Nutrition Received: 2 gram sodium, low saturated fat/low cholesterol  Last Bowel Movement: 07/28/23      Intake/Output Summary (Last 24 hours) at 7/29/2023 0732  Last data filed at 7/29/2023 0600  Gross per 24 hour   Intake 460.63 ml   Output 100 ml   Net 360.63 ml       Labs/Accuchecks:  Recent Labs   Lab 07/28/23  1242   WBC 7.14   RBC 4.13*   HGB 12.9*   HCT 38.9*         Recent Labs   Lab 07/28/23  1242      K 3.9   CO2 24      BUN 23   CREATININE 1.1   ALKPHOS 98   ALT 43   AST 34   BILITOT 0.7      Recent Labs   Lab 07/24/23 2028 07/25/23  0342 07/27/23  0631   INR 1.1  --   --    APTT 23.3   < > 27.3    < > = values in this interval not displayed.      Recent Labs   Lab 07/28/23  1850   TROPONINI 2.196*       Electrolytes: N/A - electrolytes WDL  Accuchecks: none    Gtts/LDAs:   amiodarone in dextrose 5% 0.5 mg/min (07/29/23 0600)       Lines/Drains/Airways       Peripheral Intravenous Line  Duration                  Peripheral IV - Single Lumen 07/28/23 20 G Anterior;Left Forearm 1 day         Peripheral IV - Single Lumen 07/28/23 Posterior;Right Forearm 1 day                     Skin/Wounds  Bathing/Skin Care: linen changed (07/28/23 6886)  Wounds: No  Wound care consulted: No    Consults  Consults (From admission, onward)          Status Ordering Provider     Inpatient consult to Cardiology-[facility]  Once        Provider:  Kaycee Fermin MD    Acknowledged JENNA MURPHY

## 2023-07-29 NOTE — PROGRESS NOTES
[facility] Medicine Progress Note    Primary Team: [facility] [place] Hospitalist Team A  Attending Physician: Miracle Lerner MD  Intern: Bony    Subjective/Interval History      The patient is doing well, no complains and no events overnight. No chest pain or palpitations.     Objective     Physical Examination:  Temp:  [97.7 °F (36.5 °C)-98 °F (36.7 °C)] 97.7 °F (36.5 °C)  Pulse:  [] 60  Resp:  [9-26] 11  SpO2:  [93 %-100 %] 96 %  BP: ()/(46-67) 104/51  General appearance: alert, appears stated age, and cooperative  Head: Normocephalic, without obvious abnormality, atraumatic  Back: symmetric, normal curvature. ROM normal. No CVA tenderness.  Lungs:  BL crackles present on RA  Chest wall: no tenderness, well healed midline scar present  Heart: regular rate and rhythm and S1, S2 normal  Abdomen: normal findings: bowel sounds normal, soft, non-tender, and symmetric  Extremities: extremities normal, atraumatic, no cyanosis or edema  Pulses: radial and DP pulses 2+ and symmetric  Skin: Skin color, texture, turgor normal. No rashes or lesions  Neurologic: Mental status: Alert, oriented, thought content appropriate  Sensory: normal  Motor:moves all extremities spontaneously against gravity       Intake/Output:    Intake/Output Summary (Last 24 hours) at 7/29/2023 0728  Last data filed at 7/29/2023 0600  Gross per 24 hour   Intake 460.63 ml   Output 100 ml   Net 360.63 ml     Net IO Since Admission: 360.63 mL [07/29/23 0728]    Laboratory:  Recent Labs   Lab 07/24/23  1919 07/24/23 2028 07/25/23  0342 07/25/23  1052 07/26/23  0320 07/27/23  0631 07/28/23  1242 07/28/23  1529 07/28/23  1850   WBC 7.07   < > 8.76  --  6.42 7.37 7.14  --   --    HGB 11.1*   < > 12.7*  --  11.5* 12.3* 12.9*  --   --       < > 197  --  199 143* 169  --   --    MCV 94   < > 92  --  93 94 94  --   --      --  136  --  135* 142 141  --   --    K 3.5  --  4.3  --  4.2 4.5 3.9  --   --    *  --  107  --  109 109 108  --    --    CO2 20*  --  22*  --  20* 25 24  --   --    BUN 24*  --  27*  --  26* 21 23  --   --    CREATININE 1.3  --  1.3  --  1.1 1.1 1.1  --   --    *  --  138*  --  99 96 107  --   --    CALCIUM 7.4*  --  8.6*  --  8.1* 8.6* 9.2  --   --    PROT 5.0*  --  5.7*  --  5.3* 6.0 6.5  --   --    ALBUMIN 3.2*  --  3.7  --  3.3* 3.7 3.9  --   --    PHOS  --   --  4.2  --  3.2 3.2  --   --   --    MG  --   --  2.1  --  2.0 2.0  --  2.0  --    AST 68*  --  151*  --  74* 43* 34  --   --    ALT 65*  --  131*  --  77* 57* 43  --   --    ALKPHOS 80  --  105  --  85 90 98  --   --    TROPONINI 0.024  --  17.984* 14.120*  --   --  2.619*  --  2.196*   *  --   --   --   --   --  341*  --   --     < > = values in this interval not displayed.     All laboratory data reviewed    Microbiology: reviewed   None    Radiology: reviewed   Imaging Results              X-Ray Chest AP Portable (Final result)  Result time 07/28/23 13:33:28      Final result by Rich Tavarez III, MD (07/28/23 13:33:28)                   Impression:      No acute process seen.      Electronically signed by: Rich Tavarez MD  Date:    07/28/2023  Time:    13:33               Narrative:    EXAMINATION:  XR CHEST AP PORTABLE    CLINICAL HISTORY:  Defibrillator discharge;    FINDINGS:  Chest one view: There is a pacemaker and postoperative change.  Heart size is normal.  Lungs are clear.  There is aortic plaque and DJD.                                     Current Medications:     Infusions:   amiodarone in dextrose 5% 0.5 mg/min (07/29/23 0600)        Scheduled:   aspirin  81 mg Oral Daily    atorvastatin  80 mg Oral QHS    clopidogreL  75 mg Oral Daily PM    furosemide  40 mg Oral Daily    metoprolol succinate  50 mg Oral Daily        PRN:  melatonin, sodium chloride 0.9%    Assessment/Plan:     Tristen Blanc is a 77 y.o. male who has a past medical history of HFrEF sp/ AICD, MI, CAD, cardiac arrest, JERONIMO on nightly CPAP, hx of stroke, carotid  artery aneurysm, tobacco use, HTN and HLD admitted after having Sustained v tach and had his AICD fire while at home resting x 1 day.    Sustained V tach (resolved)  AICD firing  - Patient again with sustained v tach with rate 180s-190s upon returning home from previous discharge, causing AICD to fire  - Cardiology consulted  - Started on amiodarone drip per Cardiology recommendations  - continue amio, heparin  - Previous admission Trop 0.024 -> 14.120 -> now 2.619 (downtrending from previous admission)  - Repeat TTE 7/25/23 with decreased EF (15%, down from 20-25% in 2020)  - Segmental wall motion abnormality noted on TTE 7/25/23  - Patient had coronary angiogram 7/26/23, findings below:    - Co-dominant     - LAD occluded at ostium     - ostial Lcx 80-90% occluded     - patent stent proximal and mid RCA     - SVG to LAD bypass graft filling proximal LAD retrograde, into diagonal branch which has 2 serial 90% stenoses, Patent LAD     - LIMA and AUGUSTUS visualized and noted not utilized     - LVEDP 21  - S/P percutaneous balloon angioplasty to proximal Lcx  - Cardiology was unable to intervene on diagonal branch via SVG  - Per cardiology ASA 81, lipitor 80, continue home plavix for at least another year  - Upon discharge, follow up with Cardiology, EP out-patient     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  NYHA HF Class 2  - Last TTE 6/2020  - EF 20-25%  - Grade 1 LV diastolic dysfunction  - Normal CVP 3mmHg  - PA systolic pressure 27mmHg  - Repeat TTE 7/25/23  - EF 15%  - Grade 2 LV diastolic dysfunction  - Segmental wall motion abnormalities  - Septal motion abnormalities consistent with post-op  - Moderate mitral regurg  - Normal CVP   - PA systolic 21mmHg  - Cardiology consulted  - Continue home toprol XL 50mg  - Continue Lasix PO 40mg  - Entresto held per cardiology recs  - Upon discharge, close out-patient follow up with Cardiology, EP     Hx of MI  CAD  NSTEMI  Cardiac arrest  - On  previous admission, initial trop 0.024 -> 17.9 -> 14.1-> now 2.619 (downtrending from previous admission)  - Continue home aspirin, plavix, statin     JERONIMO on nightly CPAP  - Issues with home CPAP 2/2 recall  - Continue CPAP nightly while admitted  - Patient declining CPAP at this time, wants to see sleep medicine clinic  - Needs follow up with sleep medicine on discharge     Hx of CVA without residual deficits  - Continue home statin, plavix, aspirin     Hx of carotid artery aneurysm  - Follows with NSGY  - Last visit on 11/16/22  - Plan was for follow up and rpt CTA head in 1 year      Tobacco use  - 1 PPD >30 years  - Counseled on cessation      Essential HTN  - Hypotensive on admission 90s/50s  - Home regimen entresto , toprol XL 50mg, spironolactone 25  - Entresto held per cardiology recs     HLD  - continue home statin     Subclinical Hypothyroidism   - TSH 4.489, T4 0.78  - repeat TSH when not acutely ill      Acute Normocytic anemia  - Hbg 12.5, MCV 96 on admission  - Iron 52, Ferritin 158, TIBC 342, saturated iron low at 15%  - Saturated iron 15% consistent with LOBITO, given iron infusion venofer 300mg 7/28 prior to discharge  - Follow up with PCP, recommend weekly iron infusions until iron sat >20     #Healthcare maintenance   -primary care provider is Alan Patterson MD      Diet: Cardiac  VTE PPx: SCDs (on ASA, Plavix)  Code Status: Full  Dispo: pending cardiology recs    Shira Lovett MD  [facility] Intern  [facility] Hospitalist Medicine Team A      [facility] Medicine Hospitalist Pager numbers:   [facility] Hospitalist Medicine Team A (Lenka/Eduarda): [phone]  [facility] Hospitalist Medicine Team B (Bong/Chau):  [phone]

## 2023-07-30 VITALS
SYSTOLIC BLOOD PRESSURE: 110 MMHG | DIASTOLIC BLOOD PRESSURE: 58 MMHG | HEIGHT: 65 IN | WEIGHT: 157.63 LBS | TEMPERATURE: 96 F | OXYGEN SATURATION: 96 % | BODY MASS INDEX: 26.26 KG/M2 | RESPIRATION RATE: 20 BRPM | HEART RATE: 60 BPM

## 2023-07-30 PROBLEM — I47.20 VENTRICULAR TACHYCARDIA: Status: RESOLVED | Noted: 2023-07-30 | Resolved: 2023-07-30

## 2023-07-30 PROBLEM — I47.20 VENTRICULAR TACHYCARDIA: Status: ACTIVE | Noted: 2023-07-30

## 2023-07-30 PROBLEM — Z45.02 DEFIBRILLATOR DISCHARGE: Status: RESOLVED | Noted: 2017-02-10 | Resolved: 2023-07-30

## 2023-07-30 PROBLEM — I47.20 VENTRICULAR TACHYCARDIA: Status: RESOLVED | Noted: 2023-07-29 | Resolved: 2023-07-30

## 2023-07-30 PROCEDURE — 25000003 PHARM REV CODE 250: Performed by: INTERNAL MEDICINE

## 2023-07-30 PROCEDURE — 94761 N-INVAS EAR/PLS OXIMETRY MLT: CPT

## 2023-07-30 PROCEDURE — 99900035 HC TECH TIME PER 15 MIN (STAT)

## 2023-07-30 PROCEDURE — 25000003 PHARM REV CODE 250

## 2023-07-30 RX ORDER — AMIODARONE HYDROCHLORIDE 200 MG/1
TABLET ORAL
Qty: 174 TABLET | Refills: 0 | Status: SHIPPED | OUTPATIENT
Start: 2023-07-30 | End: 2023-08-04

## 2023-07-30 RX ADMIN — FAMOTIDINE 20 MG: 20 TABLET, FILM COATED ORAL at 11:07

## 2023-07-30 RX ADMIN — AMIODARONE HYDROCHLORIDE 400 MG: 200 TABLET ORAL at 11:07

## 2023-07-30 RX ADMIN — METOPROLOL SUCCINATE 50 MG: 50 TABLET, EXTENDED RELEASE ORAL at 11:07

## 2023-07-30 RX ADMIN — FUROSEMIDE 40 MG: 40 TABLET ORAL at 11:07

## 2023-07-30 RX ADMIN — ASPIRIN 81 MG: 81 TABLET, COATED ORAL at 11:07

## 2023-07-30 NOTE — NURSING
Discharge instruction and education packet provided. VN notified. IV site removed cath tip intact. Telemetry discontinued without adverse reaction. Patient shows no acute distress. Wife at bedside to take pt home.

## 2023-07-30 NOTE — PLAN OF CARE
D/C recs noted. Pt to have follow up with cardiology and to have EP completed. Pt has cardiology appointment on 8/4. SW contacted schedulers to ensure EP. Pharmacist will go over home medications and reasons for medications. VN and bedside nurse to reiterate final discharge instructions.     At time of discharge pt will be transported home by spouse Cassi at bedside.   DME at discharge: none  Home Health: none    Pt has follow up appointments added to AVS.      Pt is reported to be independent of ADL's and have support of spouse.        07/30/23 1007   Final Note   Assessment Type Final Discharge Note   Anticipated Discharge Disposition Home   What phone number can be called within the next 1-3 days to see how you are doing after discharge? [phone]   Hospital Resources/Appts/Education Provided Appointments scheduled and added to AVS   Post-Acute Status   Discharge Delays None known at this time       Future Appointments   Date Time Provider Department Center   8/3/2023 10:00 AM MD ALAINA Moreno [facility]   8/4/2023 11:20 AM Lonnie Heller MD [facility] [place]   8/9/2023 10:00 AM HOME MONITOR DEVICE CHECK, [facility] SHARANRO Delbert [facility]   9/26/2023  9:30 AM Alan Patterson MD [facility]   11/9/2023  1:40 PM Janny Cordero NP [facility] SLEEP RASHE[facility]A [facility]  [phone]

## 2023-07-30 NOTE — PLAN OF CARE
Problem: Fall Injury Risk  Goal: Absence of Fall and Fall-Related Injury  Outcome: Ongoing, Progressing     Problem: Adult Inpatient Plan of Care  Goal: Plan of Care Review  Outcome: Ongoing, Progressing  Goal: Patient-Specific Goal (Individualized)  Outcome: Ongoing, Progressing  Goal: Optimal Comfort and Wellbeing  Outcome: Ongoing, Progressing

## 2023-07-30 NOTE — PROGRESS NOTES
[facility] Medicine Progress Note    Primary Team: [facility] Hospitalist Team A  Attending Physician: Miracle Lerner MD  Intern: Bony    Subjective/Interval History      No issues overnight.  Did have difficulty sleeping 2/2 computer monitor light being on in room.  No further episodes of AICD firing.  Denies any CP, SOB.  Pt and wife updated on plan at bedside, all questions answered.      Objective     Physical Examination:  Temp:  [96.5 °F (35.8 °C)-98.4 °F (36.9 °C)] 97.7 °F (36.5 °C)  Pulse:  [60-76] 60  Resp:  [10-21] 18  SpO2:  [94 %-98 %] 97 %  BP: ()/(53-73) 130/63    General appearance: alert, appears stated age, and cooperative  Head: Normocephalic, without obvious abnormality, atraumatic  Back: symmetric, normal curvature. ROM normal. No CVA tenderness.  Lungs:  clear to auscultation   Chest wall: no tenderness, well healed midline scar present  Heart: regular rate and rhythm and S1, S2 normal  Abdomen: normal findings: bowel sounds normal, soft, non-tender, and symmetric  Extremities: extremities normal, atraumatic, no cyanosis or edema  Pulses: radial and DP pulses 2+ and symmetric  Skin: Skin color, texture, turgor normal. No rashes or lesions  Neurologic: Mental status: Alert, oriented, thought content appropriate  Sensory: normal  Motor:moves all extremities spontaneously against gravity       Intake/Output:    Intake/Output Summary (Last 24 hours) at 7/30/2023 0720  Last data filed at 7/29/2023 2005  Gross per 24 hour   Intake 794.36 ml   Output 775 ml   Net 19.36 ml       Net IO Since Admission: 396.56 mL [07/30/23 0720]    Laboratory:  Recent Labs   Lab 07/24/23  1919 07/24/23 2028 07/25/23  0342 07/25/23  1052 07/26/23  0320 07/27/23  0631 07/28/23  1242 07/28/23  1529 07/28/23  1850   WBC 7.07   < > 8.76  --  6.42 7.37 7.14  --   --    HGB 11.1*   < > 12.7*  --  11.5* 12.3* 12.9*  --   --       < > 197  --  199 143* 169  --   --    MCV 94   < > 92  --  93 94 94  --   --    NA  140  --  136  --  135* 142 141  --   --    K 3.5  --  4.3  --  4.2 4.5 3.9  --   --    *  --  107  --  109 109 108  --   --    CO2 20*  --  22*  --  20* 25 24  --   --    BUN 24*  --  27*  --  26* 21 23  --   --    CREATININE 1.3  --  1.3  --  1.1 1.1 1.1  --   --    *  --  138*  --  99 96 107  --   --    CALCIUM 7.4*  --  8.6*  --  8.1* 8.6* 9.2  --   --    PROT 5.0*  --  5.7*  --  5.3* 6.0 6.5  --   --    ALBUMIN 3.2*  --  3.7  --  3.3* 3.7 3.9  --   --    PHOS  --   --  4.2  --  3.2 3.2  --   --   --    MG  --   --  2.1  --  2.0 2.0  --  2.0  --    AST 68*  --  151*  --  74* 43* 34  --   --    ALT 65*  --  131*  --  77* 57* 43  --   --    ALKPHOS 80  --  105  --  85 90 98  --   --    TROPONINI 0.024  --  17.984* 14.120*  --   --  2.619*  --  2.196*   *  --   --   --   --   --  341*  --   --     < > = values in this interval not displayed.       All laboratory data reviewed    Microbiology: reviewed   None    Radiology: reviewed   Imaging Results              X-Ray Chest AP Portable (Final result)  Result time 07/28/23 13:33:28      Final result by Rich Tavarez III, MD (07/28/23 13:33:28)                   Impression:      No acute process seen.      Electronically signed by: Rich Tavarez MD  Date:    07/28/2023  Time:    13:33               Narrative:    EXAMINATION:  XR CHEST AP PORTABLE    CLINICAL HISTORY:  Defibrillator discharge;    FINDINGS:  Chest one view: There is a pacemaker and postoperative change.  Heart size is normal.  Lungs are clear.  There is aortic plaque and DJD.                                     Current Medications:     Infusions:         Scheduled:   amiodarone  400 mg Oral BID    aspirin  81 mg Oral Daily    atorvastatin  80 mg Oral QHS    clopidogreL  75 mg Oral Daily PM    famotidine  20 mg Oral Daily    furosemide  40 mg Oral Daily    metoprolol succinate  50 mg Oral Daily        PRN:  melatonin, sodium chloride 0.9%    Assessment/Plan:     Tristen Lauren  Guanaco is a 77 y.o. male who has a past medical history of HFrEF sp/ AICD, MI, CAD, cardiac arrest, JERONIMO on nightly CPAP, hx of stroke, carotid artery aneurysm, tobacco use, HTN and HLD admitted after having Sustained v tach and had his AICD fire while at home resting x 1 day.    Sustained V tach (resolved)  AICD firing  - Patient again with sustained v tach with rate 180s-190s upon returning home from previous discharge, causing AICD to fire  - Cardiology consulted  - Started on amiodarone drip per Cardiology recommendations  - Previous admission Trop 0.024 -> 14.120 -> now 2.619 (downtrending from previous admission)  - Repeat TTE 7/25/23 with decreased EF (15%, down from 20-25% in 2020)  - Segmental wall motion abnormality noted on TTE 7/25/23  - Patient had coronary angiogram 7/26/23, findings below:    - Co-dominant     - LAD occluded at ostium     - ostial Lcx 80-90% occluded     - patent stent proximal and mid RCA     - SVG to LAD bypass graft filling proximal LAD retrograde, into diagonal branch which has 2 serial 90% stenoses, Patent LAD     - LIMA and AUGUSTUS visualized and noted not utilized     - LVEDP 21  - S/P percutaneous balloon angioplasty to proximal Lcx  - Cardiology was unable to intervene on diagonal branch via SVG  - Continue ASA + Plavix  - initiated on amiodarone infusion, now transitioned to PO. 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  NYHA HF Class 2  - Last TTE 6/2020  - EF 20-25%  - Grade 1 LV diastolic dysfunction  - Normal CVP 3mmHg  - PA systolic pressure 27mmHg  - Repeat TTE 7/25/23  - EF 15%  - Grade 2 LV diastolic dysfunction  - Segmental wall motion abnormalities  - Septal motion abnormalities  consistent with post-op  - Moderate mitral regurg  - Normal CVP   - PA systolic 21mmHg  - Cardiology consulted  - Continue home toprol XL 50mg  - Continue Lasix PO 40mg  - Entresto held per cardiology recs  - Upon discharge, close out-patient follow up with Cardiology, EP     Hx of MI  CAD  NSTEMI  Cardiac arrest  - On previous admission, initial trop 0.024 -> 17.9 -> 14.1-> now 2.619 (downtrending from previous admission)  - Continue home aspirin, plavix, statin     JERONIMO on nightly CPAP  - Issues with home CPAP 2/2 recall  - Continue CPAP nightly while admitted  - Patient declining CPAP at this time, wants to see sleep medicine clinic  - Needs follow up with sleep medicine on discharge     Hx of CVA without residual deficits  - Continue home statin, plavix, aspirin     Hx of carotid artery aneurysm  - Follows with NSGY  - Last visit on 11/16/22  - Plan was for follow up and rpt CTA head in 1 year      Tobacco use  - 1 PPD >30 years  - Counseled on cessation      Essential HTN  - Hypotensive on admission 90s/50s  - Home regimen entresto , toprol XL 50mg, spironolactone 25  - Entresto held per cardiology recs     HLD  - continue home statin     Subclinical Hypothyroidism   - TSH 4.489, T4 0.78  - repeat TSH when not acutely ill      Acute Normocytic anemia  - Hbg 12.5, MCV 96 on admission  - Iron 52, Ferritin 158, TIBC 342, saturated iron low at 15%  - Saturated iron 15% consistent with LOBITO, given iron infusion venofer 300mg 7/28 prior to discharge  - Follow up with PCP, recommend weekly iron infusions until iron sat >20     #Healthcare maintenance   -primary care provider is Alan Patterson MD      Diet: Cardiac  VTE PPx: SCDs   Code Status: Full  Dispo: discharge home today      Julieta Rivera MD  [facility] Internal Medicine, HO-II  [facility] Hospitalist Medicine Team A      [facility] Medicine Hospitalist Pager numbers:   [facility] Hospitalist Medicine Team A (Lenka/Eduarda): [phone]  [facility] Hospitalist Medicine Team B  (Bong/Chau):  [phone]

## 2023-07-30 NOTE — DISCHARGE SUMMARY
[facility] Medicine Discharge Summary    Primary Team: [facility] Hospitalist Team A  Attending Physician: Miracle Lerner MD  Resident: Raven  Intern: Bony    Date of Admit: 7/28/2023  Date of Discharge: 7/30/2023    Discharge to: home   Condition: good    Discharge Diagnoses     Sustained V-tach with AICD firing x 2  HFrEF (15%)  CAD s/p CABG and ANDRE placement  JERONIMO on CPAP  Normocytic Anemia    Consultants and Procedures     Consultants:  Cardiology    Procedures:   AICD interrogation    Imaging:  CXR    Brief History of Present Illness      Tristen Blanc is a 77 y.o. male who has a past medical history of HFrEF sp/ AICD, MI, CAD, cardiac arrest, JERONIMO on nightly CPAP, hx of stroke, carotid artery aneurysm, tobacco use, HTN and HLD who presents after having his AICD fire while at home resting x 1 day.     (Of note, patient discharged 1 from [facility] 1 day prior to presentation.  During that hospitalization, pt was admitted for NSTEMI, found to be in vtach without AICD firing.  He was cardioverted in the ED and admitted to ICU for vasopressor support, placed on amiodarone and heparin gtts.   Patient underwent coronary angiogram with cardiology on 7/26, noted to have 80-90% stenosis of Lcx which was intervened upon with balloon angioplasty, serial stenoses in diagonal branches which could not be intervened upon. Cardiology recommended continuing ASA, lipitor, resuming home plavix x1 year.)    The patient was in their usual state of health until the day following most recent discharge.  The morning of this most recent admission, patient states he was watching TV while laying in bed when he experienced palpitations and then felt his AICD fire, at which point he presented to ED after being contacted by his EP doctor over the phone. Patient's AICD recorded sustained V tach with rate 180s-190s. Patient was seen by [facility] cardiology in ED and re-started on amiodarone drip. At time of interview, patient was  asymptomatic, he denied any chest pain, shortness of breath, palpitations. Patient was admitted to [facility] medicine for sustained V tach and firing of AICD.       For the full HPI please refer to the History & Physical from this admission.    Pt was admitted to [facility] medicine, re-loaded with amiodarone by IV infusion and transitioned to PO taper, starting dose 400mg BID.  Pt did not have any further episodes of AICD firing during admission.      Hospital Course By Problem with Pertinent Findings     Sustained V tach (resolved)  AICD firing  - Patient again with sustained v tach with rate 180s-190s upon returning home from previous discharge, causing AICD to fire  - Started on amiodarone drip   - TTE 7/25/23 with decreased EF (15%, down from 20-25% in 2020) with segmental wall motion noted  - Cronary angiogram 7/26/23, findings below:    - Co-dominant     - LAD occluded at ostium     - ostial Lcx 80-90% occluded     - patent stent proximal and mid RCA     - SVG to LAD bypass graft filling proximal LAD retrograde, into diagonal branch which has 2 serial 90% stenoses, Patent LAD     - LIMA and AUGUSTUS visualized and noted not utilized     - LVEDP 21  - S/P percutaneous balloon angioplasty to proximal Lcx  - Cardiology was unable to intervene on diagonal branch via SVG  - Continue ASA + Plavix  - initiated on amiodarone infusion, now transitioned to PO. 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  NYHA HF Class 2  - Repeat TTE 7/25/23  - EF 15%  - Grade 2 LV diastolic dysfunction  - Segmental wall motion abnormalities  - Septal motion abnormalities consistent with post-op  - Continue home toprol XL 50mg  - Continue Lasix PO 40mg  - Entresto held per cardiology  recs  - Plan for close follow up with outpt Cardiology, EP     Hx of MI  CAD  NSTEMI  Cardiac arrest  - Continue home aspirin, plavix, statin     JERONIMO on nightly CPAP  - Issues with home CPAP 2/2 recall  - Needs follow up with sleep medicine on discharge     Hx of CVA without residual deficits  - Continue home statin, plavix, aspirin     Hx of carotid artery aneurysm  - Follows with NSGY  - Last visit on 11/16/22  - Plan was for follow up and rpt CTA head in 1 year      Tobacco use  - 1 PPD >30 years  - Counseled on cessation      Essential HTN  - Home regimen entresto , toprol XL 50mg, spironolactone 25  - Entresto held per cardiology recs as Bps have been running low     HLD  - continue home statin     Subclinical Hypothyroidism   - TSH 4.489, T4 0.78  - repeat TSH when not acutely ill      Acute Normocytic anemia  - Hbg 12.5, MCV 96 on admission  - Iron 52, Ferritin 158, TIBC 342, saturated iron low at 15%  - Saturated iron 15% consistent with LOBITO, given iron infusion venofer 300mg 7/28 prior to discharge  - Follow up with PCP, recommend weekly iron infusions until iron sat >20     #Healthcare maintenance   -primary care provider is Alan Patterson MD       Discharge Medications        Medication List        START taking these medications      amiodarone 200 MG Tab  Commonly known as: PACERONE  Take 2 tablets (400 mg total) by mouth 2 (two) times daily for 14 days, THEN 1 tablet (200 mg total) 2 (two) times daily for 14 days, THEN 1 tablet (200 mg total) once daily.  Start taking on: July 30, 2023            CHANGE how you take these medications      clopidogreL 75 mg tablet  Commonly known as: PLAVIX  TAKE 1 TABLET(75 MG) BY MOUTH EVERY EVENING  What changed: when to take this     metoprolol succinate 50 MG 24 hr tablet  Commonly known as: TOPROL-XL  TAKE 1 TABLET(50 MG) BY MOUTH EVERY DAY  What changed:   how to take this  when to take this     spironolactone 25 MG tablet  Commonly known as:  ALDACTONE  TAKE 1 TABLET(25 MG) BY MOUTH EVERY DAY  What changed: when to take this            CONTINUE taking these medications      aspirin 81 MG EC tablet  Commonly known as: ECOTRIN     atorvastatin 80 MG tablet  Commonly known as: LIPITOR  TAKE 1 TABLET BY MOUTH EVERY EVENING     CENTRUM ORAL     coenzyme Q10 200 mg capsule  Take 200 mg by mouth once daily.     ENTRESTO 24-26 mg per tablet  Generic drug: sacubitriL-valsartan  Take 1 tablet by mouth 2 (two) times daily.     fluticasone propionate 50 mcg/actuation nasal spray  Commonly known as: FLONASE     furosemide 40 MG tablet  Commonly known as: LASIX  Take 1 tablet (40 mg total) by mouth once daily.     montelukast 10 mg tablet  Commonly known as: SINGULAIR  Take 1 tablet (10 mg total) by mouth daily as needed.     nitroGLYCERIN 0.4 MG SL tablet  Commonly known as: NITROSTAT  ONE TABLET UNDER TONGUE AS NEEDED FOR CHEST PAIN. TAKE EVERY 5 MINUTES AS NEEDED     vardenafiL 20 MG tablet  Commonly known as: LEVITRA               Where to Get Your Medications        These medications were sent to [id number] - [address] [facility] [address] AT [facility] [address]  [address] [place] [address]      Phone: [phone]   amiodarone 200 MG Tab          Discharge Information:   Diet:  Cardiac    Physical Activity:  As tolerated             Instructions:  1. Take all medications as prescribed  2. Keep all follow-up appointments  3. Return to the hospital or call your primary care physicians if any worsening symptoms such as fever, chest pain, shortness of breath, return of symptoms, or any other concerns.    Follow-Up Appointments:  PCP, Cardiology, EP    Julieta Rivera MD  [facility] Internal Medicine, [facility] Hospitalist Medicine Team A

## 2023-07-30 NOTE — PROGRESS NOTES
Discharge orders noted. Additional clinical references attached. Patient's discharge instructions given by bedside RN and reviewed via this VN.  Education provided on new and previous medications, diagnosis, and follow-up appointments.   Patient verbalized understanding and teach back method was used. Patient's ride/transportation home at bedside. All questions answered. Transport to [facility] requested. Floor nurse notified.              07/30/23 1231    Notification    Notified Of Discharge Status   Admission   Communication Issues? None   Shift   Virtual Nurse - Rounding Complete  (discharge)   Virtual Nurse - Patient Verbalized Approval Of Camera Use   Safety/Activity   Patient Rounds bed in low position;call light in patient/parent reach;clutter free environment maintained;visualized patient   Safety Promotion/Fall Prevention family to remain at bedside;side rails raised x 2   Activity Management Ambulated in room - L4

## 2023-07-30 NOTE — PLAN OF CARE
Problem: Adult Inpatient Plan of Care  Goal: Plan of Care Review  7/30/2023 1232 by Alyssa Kingston RN  Outcome: Met  7/30/2023 0821 by Alyssa Kingston RN  Outcome: Ongoing, Progressing  Goal: Patient-Specific Goal (Individualized)  Outcome: Met  Goal: Absence of Hospital-Acquired Illness or Injury  Outcome: Met  Goal: Optimal Comfort and Wellbeing  Outcome: Met  Goal: Readiness for Transition of Care  Outcome: Met     Problem: Fall Injury Risk  Goal: Absence of Fall and Fall-Related Injury  Outcome: Met     Problem: Heart Failure Comorbidity  Goal: Maintenance of Heart Failure Symptom Control  Outcome: Met     Problem: Hypertension Comorbidity  Goal: Blood Pressure in Desired Range  Outcome: Met     Problem: Obstructive Sleep Apnea Risk or Actual Comorbidity Management  Goal: Unobstructed Breathing During Sleep  Outcome: Met     Problem: Dysrhythmia  Goal: Normalized Cardiac Rhythm  Outcome: Met

## 2023-07-31 DIAGNOSIS — I25.5 CARDIOMYOPATHY, ISCHEMIC: Primary | ICD-10-CM

## 2023-08-02 ENCOUNTER — PATIENT OUTREACH (OUTPATIENT)
Dept: ADMINISTRATIVE | Facility: CLINIC | Age: 78
End: 2023-08-02
Payer: MEDICARE

## 2023-08-02 NOTE — PROGRESS NOTES
C3 nurse spoke with Tristen Blanc for a TCC post hospital discharge follow up call. The patient has a scheduled HOSFU appointment with [facility] PCP Alan Patterson MD on 08/03/23 @ 1000.    Unable to route [facility] PCP

## 2023-08-03 ENCOUNTER — TELEPHONE (OUTPATIENT)
Dept: ELECTROPHYSIOLOGY | Facility: CLINIC | Age: 78
End: 2023-08-03
Payer: MEDICARE

## 2023-08-04 ENCOUNTER — OFFICE VISIT (OUTPATIENT)
Dept: CARDIOLOGY | Facility: CLINIC | Age: 78
End: 2023-08-04
Payer: MEDICARE

## 2023-08-04 VITALS
HEIGHT: 65 IN | BODY MASS INDEX: 26.48 KG/M2 | SYSTOLIC BLOOD PRESSURE: 100 MMHG | HEART RATE: 67 BPM | WEIGHT: 158.94 LBS | DIASTOLIC BLOOD PRESSURE: 59 MMHG

## 2023-08-04 VITALS
SYSTOLIC BLOOD PRESSURE: 100 MMHG | HEART RATE: 76 BPM | DIASTOLIC BLOOD PRESSURE: 59 MMHG | WEIGHT: 158 LBS | HEIGHT: 65 IN | BODY MASS INDEX: 26.33 KG/M2

## 2023-08-04 DIAGNOSIS — I25.5 CARDIOMYOPATHY, ISCHEMIC: Chronic | ICD-10-CM

## 2023-08-04 DIAGNOSIS — Z95.810 ICD (IMPLANTABLE CARDIOVERTER-DEFIBRILLATOR) IN PLACE: ICD-10-CM

## 2023-08-04 DIAGNOSIS — I47.20 VENTRICULAR TACHYCARDIA: Primary | ICD-10-CM

## 2023-08-04 DIAGNOSIS — E78.49 OTHER HYPERLIPIDEMIA: ICD-10-CM

## 2023-08-04 DIAGNOSIS — I47.20 VENTRICULAR TACHYCARDIA: ICD-10-CM

## 2023-08-04 DIAGNOSIS — I50.42 CHRONIC COMBINED SYSTOLIC AND DIASTOLIC CHF, NYHA CLASS 2: ICD-10-CM

## 2023-08-04 DIAGNOSIS — I25.10 CORONARY ARTERY DISEASE INVOLVING NATIVE CORONARY ARTERY OF NATIVE HEART WITHOUT ANGINA PECTORIS: ICD-10-CM

## 2023-08-04 DIAGNOSIS — I25.5 CARDIOMYOPATHY, ISCHEMIC: Primary | Chronic | ICD-10-CM

## 2023-08-04 DIAGNOSIS — Z95.1 HX OF CABG: ICD-10-CM

## 2023-08-04 DIAGNOSIS — I25.2 HX OF MYOCARDIAL INFARCTION: ICD-10-CM

## 2023-08-04 DIAGNOSIS — I25.708 CORONARY ARTERY DISEASE OF BYPASS GRAFT OF NATIVE HEART WITH STABLE ANGINA PECTORIS: ICD-10-CM

## 2023-08-04 DIAGNOSIS — G47.33 OSA ON CPAP: ICD-10-CM

## 2023-08-04 DIAGNOSIS — I10 ESSENTIAL (PRIMARY) HYPERTENSION: ICD-10-CM

## 2023-08-04 DIAGNOSIS — I47.20 SUSTAINED VENTRICULAR TACHYCARDIA: ICD-10-CM

## 2023-08-04 PROCEDURE — 99214 PR OFFICE/OUTPT VISIT, EST, LEVL IV, 30-39 MIN: ICD-10-PCS | Mod: S$GLB,,, | Performed by: INTERNAL MEDICINE

## 2023-08-04 PROCEDURE — 1159F MED LIST DOCD IN RCRD: CPT | Mod: CPTII,S$GLB,, | Performed by: INTERNAL MEDICINE

## 2023-08-04 PROCEDURE — 3288F FALL RISK ASSESSMENT DOCD: CPT | Mod: CPTII,S$GLB,, | Performed by: INTERNAL MEDICINE

## 2023-08-04 PROCEDURE — 99214 OFFICE O/P EST MOD 30 MIN: CPT | Mod: S$GLB,,, | Performed by: INTERNAL MEDICINE

## 2023-08-04 PROCEDURE — 3078F DIAST BP <80 MM HG: CPT | Mod: CPTII,S$GLB,, | Performed by: INTERNAL MEDICINE

## 2023-08-04 PROCEDURE — 1160F PR REVIEW ALL MEDS BY PRESCRIBER/CLIN PHARMACIST DOCUMENTED: ICD-10-PCS | Mod: CPTII,S$GLB,, | Performed by: INTERNAL MEDICINE

## 2023-08-04 PROCEDURE — 99215 PR OFFICE/OUTPT VISIT, EST, LEVL V, 40-54 MIN: ICD-10-PCS | Mod: S$GLB,,, | Performed by: INTERNAL MEDICINE

## 2023-08-04 PROCEDURE — 3078F PR MOST RECENT DIASTOLIC BLOOD PRESSURE < 80 MM HG: ICD-10-PCS | Mod: CPTII,S$GLB,, | Performed by: INTERNAL MEDICINE

## 2023-08-04 PROCEDURE — 93005 RHYTHM STRIP: ICD-10-PCS | Mod: S$GLB,,, | Performed by: INTERNAL MEDICINE

## 2023-08-04 PROCEDURE — 3074F PR MOST RECENT SYSTOLIC BLOOD PRESSURE < 130 MM HG: ICD-10-PCS | Mod: CPTII,S$GLB,, | Performed by: INTERNAL MEDICINE

## 2023-08-04 PROCEDURE — 1101F PT FALLS ASSESS-DOCD LE1/YR: CPT | Mod: CPTII,S$GLB,, | Performed by: INTERNAL MEDICINE

## 2023-08-04 PROCEDURE — 1111F PR DISCHARGE MEDS RECONCILED W/ CURRENT OUTPATIENT MED LIST: ICD-10-PCS | Mod: CPTII,S$GLB,, | Performed by: INTERNAL MEDICINE

## 2023-08-04 PROCEDURE — 99999 PR PBB SHADOW E&M-EST. PATIENT-LVL III: ICD-10-PCS | Mod: PBBFAC,,, | Performed by: INTERNAL MEDICINE

## 2023-08-04 PROCEDURE — 93010 ELECTROCARDIOGRAM REPORT: CPT | Mod: S$GLB,,, | Performed by: INTERNAL MEDICINE

## 2023-08-04 PROCEDURE — 1160F RVW MEDS BY RX/DR IN RCRD: CPT | Mod: CPTII,S$GLB,, | Performed by: INTERNAL MEDICINE

## 2023-08-04 PROCEDURE — 1126F AMNT PAIN NOTED NONE PRSNT: CPT | Mod: CPTII,S$GLB,, | Performed by: INTERNAL MEDICINE

## 2023-08-04 PROCEDURE — 3074F SYST BP LT 130 MM HG: CPT | Mod: CPTII,S$GLB,, | Performed by: INTERNAL MEDICINE

## 2023-08-04 PROCEDURE — 1111F DSCHRG MED/CURRENT MED MERGE: CPT | Mod: CPTII,S$GLB,, | Performed by: INTERNAL MEDICINE

## 2023-08-04 PROCEDURE — 93005 ELECTROCARDIOGRAM TRACING: CPT | Mod: S$GLB,,, | Performed by: INTERNAL MEDICINE

## 2023-08-04 PROCEDURE — 99999 PR PBB SHADOW E&M-EST. PATIENT-LVL III: CPT | Mod: PBBFAC,,, | Performed by: INTERNAL MEDICINE

## 2023-08-04 PROCEDURE — 1101F PR PT FALLS ASSESS DOC 0-1 FALLS W/OUT INJ PAST YR: ICD-10-PCS | Mod: CPTII,S$GLB,, | Performed by: INTERNAL MEDICINE

## 2023-08-04 PROCEDURE — 1159F PR MEDICATION LIST DOCUMENTED IN MEDICAL RECORD: ICD-10-PCS | Mod: CPTII,S$GLB,, | Performed by: INTERNAL MEDICINE

## 2023-08-04 PROCEDURE — 99215 OFFICE O/P EST HI 40 MIN: CPT | Mod: S$GLB,,, | Performed by: INTERNAL MEDICINE

## 2023-08-04 PROCEDURE — 3288F PR FALLS RISK ASSESSMENT DOCUMENTED: ICD-10-PCS | Mod: CPTII,S$GLB,, | Performed by: INTERNAL MEDICINE

## 2023-08-04 PROCEDURE — 93010 RHYTHM STRIP: ICD-10-PCS | Mod: S$GLB,,, | Performed by: INTERNAL MEDICINE

## 2023-08-04 PROCEDURE — 1126F PR PAIN SEVERITY QUANTIFIED, NO PAIN PRESENT: ICD-10-PCS | Mod: CPTII,S$GLB,, | Performed by: INTERNAL MEDICINE

## 2023-08-04 RX ORDER — AMIODARONE HYDROCHLORIDE 200 MG/1
200 TABLET ORAL DAILY
Qty: 90 TABLET | Refills: 3 | Status: SHIPPED | OUTPATIENT
Start: 2023-08-04 | End: 2023-10-16 | Stop reason: SDUPTHER

## 2023-08-04 NOTE — PROGRESS NOTES
HISTORY:    77-year-old male with a history of anterior wall myocardial infarction in 1996 status post emergent CABG x3 at that time followed by multiple PCIs the last of which was in 2010, AICD placement 2010 with generator exchange in 2017, CVA 2021, hypertension, hyperlipidemia, and obstructive sleep apnea presenting for follow-up evaluation.    Pt had been doing well from a CV standpoint for the better part of a decade until he presented to [place] in July '23 with VT. Underwent CV w restoration of SR. Taken to the cath lab without any e/o ACS lesion. Underwent pLCX PCI. Discharged home and returned again with VT requiring CV again. Started on amiodarone and discharged.    Feels well at this time. No CP or SOB. Has been taking it easy since dc. A bit light headed at times. No orthopnea or le edema.     Tolerates aspirin, plavix 75x1, atorvastatin 80x1, metoprolol succinate 50x1 (reduced in mid summer 2021 from 100 mg to 50 mg daily), sacubitril-valsartan 24-26x2, spironolactone 25x1, furosemide 40x1, and amiodarone.  Dc med changes included the addition of amiodarone, increase in furosemide, and reduction of sacubitril-valsartan.     Inpatient charts reviewed.    PHYSICAL EXAM:    Vitals:    08/04/23 1120   BP: (!) 100/59   Pulse: 76       NAD, A+Ox3.  No jvd, no bruit.  RRR nml s1,s2. No murmurs.  CTA B no wheezes or crackles.  2+ B radial and 1+ B DP/PT. No edema.    LABS/STUDIES (imaging reviewed during clinic visit):    July 2023 CBC and CMP are unremarkable.  .  Troponin peak 17.  2022 LDL is 63 and HDL 40. Triglycerides are 63.   EKG July 28th 2023 a paced with a nonspecific intraventricular conduction block that is old.  Inferior ST depressions with posterolateral ST elevations post VT. Admission ecg July 28th VT. Admission ecg July 24th VT.    AICD interrogation June 2023 demonstrates normal function with 36% RA pacing and <0.1% RV pacing. No evidence of tachyarrhythmias or shocks. TTE July 2023  mildly enlarged LV with eccentric hypertrophy and severely decreased systolic function.  LVEF 15 20%.  Grade 2 diastology.  Moderate MR.  CVP 3.  2020 study with moderate LV enlargement and a severely decreased ejection fraction at 20-25%.  Global HK with apical akinesis.  Normal RV size and function.  No significant valve disease.  IVC pressure 3. Similar to 2018 echo.  Nuclear stress test 2018 demonstrates no LVEF 23% with no evidence of ischemia.  There is a fixed defect in the anterior septum, anterior, apical, and inferoapical walls.  Cardiac catheterization with a patent left main.  Flush occlusion of the ostial LAD.  70% proximal, calcified left circ disease.  Two patent OM vessels without significant stenosis.  Patent distal circ.  Patent RCA system.  Carotid September 2021 demonstrates no significant stenosis.  Patent SVG to LAD also filling a D1 branch with significant proximal disease in that branch.  Unclear culprit.  Status post POBA of proximal left circ.  CT head August 2021 demonstrates no acute abnormalities.  Chronic microvascular disease noted.  CTA Head September 2021 demonstrates remote left thalamic infarction. Right cavernous carotid aneuyrysm measuring 1.5cm. No significant carotid stenosis. 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    ASSESSMENT & PLAN:    1. Cardiomyopathy, ischemic    2. Chronic combined systolic and diastolic CHF, NYHA class 2    3. Coronary artery disease involving native coronary artery of native heart without angina pectoris    4. Essential (primary) hypertension    5. Ventricular tachycardia    6. Hx of CABG    7. Hx of myocardial infarction    8. ICD (implantable cardioverter-defibrillator) in place    9. Other hyperlipidemia                The patient has a long history of ischemic cardiomyopathy with severely depressed LVEF status post AICD. Had done well until recent presentation for VT. No ACS lesion was identified by cath. Trop was 17 the day  after shock. Could have lost an SVG, but likely related to scar. Regardless no interventional targets for revasc at this time.     Will continue current regimen of metoprolol succinate 50 mg daily, sacubitril-valsartan 24-26 mg, and spironolactone 25 mg daily. Now on furosemide 40x1.      Chronic ischemic heart disease. Asymptomatic at this time. Aspirin/clopidogrel.     VT on amiodarone now. Has evaluation scheduled with Dr. Tucker today.     LDL at goal on atorvastatin 80 mg.       Follow up in about 3 months (around 11/4/2023).      Lonnie Heller MD

## 2023-08-04 NOTE — PHYSICIAN QUERY
PT Name: Tristen Blanc  MR #: [id number]     DOCUMENTATION CLARIFICATION     CDS/: Ashli Marina RN CDIS             Contact information: [email or web address]    This form is a permanent document in the medical record.     Query Date: August 4, 2023    By submitting this query, we are merely seeking further clarification of documentation.  Please utilize your independent clinical judgment when addressing the question(s) below.    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  The estimated ejection fraction is 15%.  Grade II left ventricular diastolic dysfunction.  There are segmental left ventricular wall motion abnormalities.  There is abnormal septal wall motion consistent with post-operative status.  Normal right ventricular size with normal right ventricular systolic function.  Moderate left atrial enlargement.  Moderate mitral regurgitation.  The estimated PA systolic pressure is 21 mmHg.  Normal central venous pressure (3 mmHg). ECHO 7/25     Radiology findings     x Subjective/Objective Respiratory Conditions Lungs:  BL crackles present on RA H&P     Recent/Current MI      Heart Transplant, LVAD     x Edema, JVD Extremities: extremities normal, atraumatic, no cyanosis or edema H&P     Ascites     x Diuretics/Meds furosemide tablet 40 mg  Dose: 40 mg  Freq: Daily Route: Oral  Start: 07/29/23 0900 End: 07/30/23 1539 MAR     Other Treatment      Other       Heart failure is a clinical diagnosis which includes symptomatic fluid retention, elevated intracardiac pressures, and/or the  inability of the heart to deliver adequate blood flow.    Heart Failure with reduced Ejection Fraction (HFrEF) or Systolic Heart Failure (loses ability to contract normally, EF is <40%)    Heart Failure with preserved Ejection Fraction (HFpEF) or Diastolic Heart Failure (stiff ventricles, does not relax properly, EF is >50%)     Heart Failure with Combined Systolic and Diastolic Failure (stiff ventricles, does not relax properly and EF is <50%)    Mid-range or mildly reduced ejection fraction (HFmrEF) is classified as systolic heart failure.  Congestive heart failure with a recovered EF is classified as Diastolic Heart Failure.  Common clues to acute exacerbation:  Rapidly progressive symptoms (w/in 2 weeks of presentation), using IV diuretics, using supplemental O2, pulmonary edema on Xray, new or worsening pleural effusion, +JVD or other signs of volume overload, MI w/in 4 weeks, and/or BNP >500  The clinical guidelines noted are only system guidelines, and do not replace the providers clinical judgment.    Provider, please clarify the TYPE of heart failure    [   ]  Chronic Systolic Heart Failure (HFrEF or HFmrEF) - preexisting and stable   [x   ]  Chronic Combined Systolic and Diastolic Heart Failure - pre-existing and stable   [   ]  Other (please specify): ___________________________________           Please document in your progress notes daily for the duration of treatment until resolved and include in your discharge summary.    References:  American Heart Association editorial staff. (2017, May). Ejection Fraction Heart Failure Measurement. American Heart Association. https://www.heart.org/en/health-topics/heart-failure/diagnosing-heart-failure/ejection-fraction-heart-failure-measurement#:~:text=Ejection%20fraction%20(EF)%20is%20a,pushed%20out%20with%20each%20heartbeat  SHEREEN Harris (2020, December 15). Heart failure with preserved ejection fraction: Clinical manifestations and diagnosis. UpToDate.  https://www.[employer].com/contents/heart-failure-with-preserved-ejection-fraction-clinical-manifestations-and-diagnosis.  ICD-10-CM/PCS Coding Clinic Third Quarter ICD-10, Effective with discharges: September 8, 2020 [facility] § Heart failure with mid-range or mildly reduced ejection fraction (2020).  ICD-10-CM/PCS Coding Clinic Third Quarter ICD-10, Effective with discharges: September 8, 2020 [facility] § Heart failure with recovered ejection fraction (2020).  Form No. 78747

## 2023-08-04 NOTE — PROGRESS NOTES
Subjective:    Patient ID:  Tristen Blanc is a 77 y.o. male who presents for evaluation of VT    Primary Cardiologist: Lonnie Heller MD    HPI  Prior Hx:  I had the pleasure of seeing Mr. Blanc in our electrophysiology clinic in follow-up for ICD care. As you are aware he is a pleasant 77 year-old man with chronic ischemic cardiomyopathy with acute MI in 1996 complicated by cardiac arrest with persistent systolic heart failure with a LVEF of 20-25% and dual chamber ICD implantation in 2010 with a generator change in 2017 by Dr. Loza. He reports no history of ICD shocks. He presented to establish care of his ICD in 7/2020.  He presented for  yearly follow-up 7/2023 and had no complaints.    Interim Hx:  Mr. Blanc was admitted twice to [facility] in the past 2 weeks with VT storm, sometimes responsive to ATP and other times not. VT TCL was 320ms. Underwent coronary angiogram on 7/26/2023 with Dr. Fermin and was found to have a patent SVG-LAD with ISR of LCx stent that was treated with balloon angioplasty and then failed attempt to treat an LAD diagonal branch. He reports he instantly felt better (less shortness of breath). He presented with further VT after that hospitalization and was initiated on amiodarone. TCL was 320ms. In general they responded to ATP therapy however took until the 4th-6th ATP sequence to work (ramp). He presents for evaluation. VT ECG shows RB, V5 transition, mid rightward axis. ECHO showed LVEF of 15%.    In-clinic device check notes stable lead parameters with 98% RA and 0% RV pacing and no arrhythmias. Estimated battery longevity of 2  years.    My interpretation of today's in clinic ECG is atrial paced with an IVCD of 150ms.      Review of Systems   Constitutional: Negative for fever and malaise/fatigue.   HENT:  Negative for congestion and sore throat.    Eyes:  Negative for blurred vision and visual disturbance.   Cardiovascular:  Negative for chest pain, dyspnea on  exertion, irregular heartbeat, near-syncope, palpitations and syncope.   Respiratory:  Negative for cough and shortness of breath.    Hematologic/Lymphatic: Negative for bleeding problem. Does not bruise/bleed easily.   Skin: Negative.    Musculoskeletal: Negative.    Gastrointestinal:  Negative for bloating, abdominal pain, hematochezia and melena.   Neurological:  Negative for focal weakness and weakness.   Psychiatric/Behavioral: Negative.          Objective:    Physical Exam  Vitals reviewed.   Constitutional:       General: He is not in acute distress.     Appearance: He is well-developed. He is not diaphoretic.   HENT:      Head: Normocephalic and atraumatic.   Eyes:      General:         Right eye: No discharge.         Left eye: No discharge.      Conjunctiva/sclera: Conjunctivae normal.   Cardiovascular:      Rate and Rhythm: Normal rate and regular rhythm.      Heart sounds: No murmur heard.     No friction rub. No gallop.   Pulmonary:      Effort: Pulmonary effort is normal. No respiratory distress.      Breath sounds: Normal breath sounds. No wheezing or rales.   Abdominal:      General: Bowel sounds are normal. There is no distension.      Palpations: Abdomen is soft.      Tenderness: There is no abdominal tenderness.   Musculoskeletal:      Cervical back: Neck supple.   Skin:     General: Skin is warm and dry.   Neurological:      Mental Status: He is alert and oriented to person, place, and time.   Psychiatric:         Behavior: Behavior normal.         Thought Content: Thought content normal.         Judgment: Judgment normal.           Assessment:       1. Ventricular tachycardia    2. Cardiomyopathy, ischemic    3. Coronary artery disease of bypass graft of native heart with stable angina pectoris    4. ICD (implantable cardioverter-defibrillator) in place    5. Chronic combined systolic and diastolic CHF, NYHA class 2    6. JERONIMO on CPAP    7. Sustained ventricular tachycardia         Plan:       In  summary, Mr. Blanc is a pleasant 77 year-old man with chronic ischemic cardiomyopathy with acute MI in 1996 complicated by cardiac arrest with persistent systolic heart failure with a LVEF of 20-25% and dual chamber ICD implantation in 2010 with a generator change in 2017 by Dr. Loza now with recent MMVT consistent with a mid-lateral wall LV exit. He is on amiodarone now. Device reprogrammed to Ramp ATP only rather than Burst then Ramp as they generally only responded to fast Ramp ATP.  Discussed potential ablation. Continue remote monitoring.     Spoke with Dr. Heller who reviewed the cath films. No targets for any revascularization so will not get a PET stress test.    RTC in 3 months.    Thank you for allowing me to participate in the care of this patient. Please do not hesitate to call me with any questions or concerns.    Salazar Tucker MD, PhD  Cardiac Electrophysiology

## 2023-08-09 ENCOUNTER — CLINICAL SUPPORT (OUTPATIENT)
Dept: CARDIOLOGY | Facility: HOSPITAL | Age: 78
End: 2023-08-09
Payer: MEDICARE

## 2023-08-09 DIAGNOSIS — Z95.810 PRESENCE OF AUTOMATIC (IMPLANTABLE) CARDIAC DEFIBRILLATOR: ICD-10-CM

## 2023-08-09 PROCEDURE — 93296 REM INTERROG EVL PM/IDS: CPT | Performed by: INTERNAL MEDICINE

## 2023-08-23 ENCOUNTER — PATIENT MESSAGE (OUTPATIENT)
Dept: CARDIOLOGY | Facility: CLINIC | Age: 78
End: 2023-08-23
Payer: MEDICARE

## 2023-08-25 ENCOUNTER — PATIENT MESSAGE (OUTPATIENT)
Dept: CARDIOLOGY | Facility: CLINIC | Age: 78
End: 2023-08-25
Payer: MEDICARE

## 2023-08-25 DIAGNOSIS — I21.4 NSTEMI (NON-ST ELEVATED MYOCARDIAL INFARCTION): Primary | ICD-10-CM

## 2023-08-28 ENCOUNTER — TELEPHONE (OUTPATIENT)
Dept: CARDIAC REHAB | Facility: CLINIC | Age: 78
End: 2023-08-28
Payer: MEDICARE

## 2023-08-28 NOTE — LETTER
August 28, 2023    Tristen Blanc  [address] [facility] [address]             [facility]  [address] [facility]  [place] [address]  Phone: [phone]                                  [facility]   [address] [facility]   [place] [address]  [phone]         [facility]   [address] [place] [address] [phone]         [facility]    [address] [place] [address] [address] [place] [id number] [phone]   Re: Tristen Blanc  Clinic number: [id number]    Dear Mr. Blanc:    You were recently admitted to an [facility] facility for cardiac (heart) problem.  Your physician has referred you to [facility].  Cardiac Rehab Phase 2 is an educational and exercise program, conducted in a outpatient setting, proven to help reduce your risk for recurrent heart events.    Cardiac rehab has two major parts:    1. Exercise training to help you achieve cardiovascular fitness while learning how to exercise safely and improve muscle strength and endurance.  Your exercise prescription will be based on the results of the cardiopulmonary stress test (CPX) which will be done before entering the program and at completion.  2. Education, counseling and training to help you understand your heart condition and find ways to reduce your risk of future heart problems.  The cardiac rehab team will help you learn how to cope with the stress of adjusting to a new lifestyle and to deal with your fears about the future.    Phase 2 is a 36-session program, meeting 3 times a week for 12 weeks.  Each session consists of an hour of exercise and half-hour dedicated to the educational topic of the day.  Class days vary per location.  Please contact your nearest facility for details.    Through cardiac rehab you will learn:  About your heart condition, medical therapies, and medication  Risk factors in y our lifestyle contributing to heart  disease  New strategies to modify your risk factors  About a healthy diet that can lower your blood cholesterol, control weight, help prevent or control high blood pressure, and diabetes  How to stop smoking  How to manage stress    If you are interested in getting started, call the [facility] of your choosing.     Sincerely,     [facility]

## 2023-08-28 NOTE — TELEPHONE ENCOUNTER
Letter regarding Phase II cardiac rehab was sent to patient.  Will contact patient in 2 weeks to see if interested.  Also, information letter sent to [facility].  Janes Snyder, RN  Cardiac Rehab Nurse

## 2023-08-30 ENCOUNTER — TELEPHONE (OUTPATIENT)
Dept: CARDIAC REHAB | Facility: CLINIC | Age: 78
End: 2023-08-30
Payer: MEDICARE

## 2023-08-30 NOTE — TELEPHONE ENCOUNTER
----- Message from Ariela Marsh sent at 8/29/2023  3:03 PM CDT -----  Regarding: Appt Needed  Pt would to schedule appt.     Contact @ [phone]    Thanks

## 2023-09-01 ENCOUNTER — TELEPHONE (OUTPATIENT)
Dept: CARDIAC REHAB | Facility: CLINIC | Age: 78
End: 2023-09-01
Payer: MEDICARE

## 2023-09-01 NOTE — TELEPHONE ENCOUNTER
----- Message from Suzette Hong sent at 8/31/2023 12:44 PM CDT -----  Regarding: Missed call  Cassi [phone] pt wife returning missed call in ref to a referral the pt received.    Thanks

## 2023-09-01 NOTE — TELEPHONE ENCOUNTER
Spoke with Mrs. Blanc re: cardiac rehab. Pt is interested. Will send order for insurance authorization and contact pt for scheduling after that.

## 2023-09-13 ENCOUNTER — TELEPHONE (OUTPATIENT)
Dept: CARDIAC REHAB | Facility: CLINIC | Age: 78
End: 2023-09-13
Payer: MEDICARE

## 2023-10-16 DIAGNOSIS — I47.20 VENTRICULAR TACHYCARDIA: ICD-10-CM

## 2023-10-16 RX ORDER — AMIODARONE HYDROCHLORIDE 200 MG/1
200 TABLET ORAL DAILY
Qty: 90 TABLET | Refills: 3 | Status: SHIPPED | OUTPATIENT
Start: 2023-10-16

## 2023-10-25 DIAGNOSIS — I50.42 CHRONIC COMBINED SYSTOLIC AND DIASTOLIC CHF, NYHA CLASS 2: ICD-10-CM

## 2023-10-25 DIAGNOSIS — I25.5 CARDIOMYOPATHY, ISCHEMIC: Chronic | ICD-10-CM

## 2023-10-25 DIAGNOSIS — I25.118 CORONARY ARTERY DISEASE OF NATIVE ARTERY OF NATIVE HEART WITH STABLE ANGINA PECTORIS: ICD-10-CM

## 2023-10-26 RX ORDER — SPIRONOLACTONE 25 MG/1
TABLET ORAL
Qty: 90 TABLET | Refills: 3 | Status: SHIPPED | OUTPATIENT
Start: 2023-10-26

## 2023-11-02 ENCOUNTER — OFFICE VISIT (OUTPATIENT)
Dept: CARDIOLOGY | Facility: CLINIC | Age: 78
End: 2023-11-02
Payer: MEDICARE

## 2023-11-02 VITALS
HEART RATE: 65 BPM | SYSTOLIC BLOOD PRESSURE: 105 MMHG | HEIGHT: 65 IN | BODY MASS INDEX: 27.32 KG/M2 | DIASTOLIC BLOOD PRESSURE: 61 MMHG | WEIGHT: 164 LBS

## 2023-11-02 DIAGNOSIS — I10 ESSENTIAL (PRIMARY) HYPERTENSION: ICD-10-CM

## 2023-11-02 DIAGNOSIS — I25.5 CARDIOMYOPATHY, ISCHEMIC: Primary | Chronic | ICD-10-CM

## 2023-11-02 DIAGNOSIS — E78.49 OTHER HYPERLIPIDEMIA: ICD-10-CM

## 2023-11-02 DIAGNOSIS — Z95.810 ICD (IMPLANTABLE CARDIOVERTER-DEFIBRILLATOR) IN PLACE: ICD-10-CM

## 2023-11-02 DIAGNOSIS — I25.10 CORONARY ARTERY DISEASE INVOLVING NATIVE CORONARY ARTERY OF NATIVE HEART WITHOUT ANGINA PECTORIS: ICD-10-CM

## 2023-11-02 DIAGNOSIS — Z95.1 HX OF CABG: ICD-10-CM

## 2023-11-02 DIAGNOSIS — I47.20 VENTRICULAR TACHYCARDIA: ICD-10-CM

## 2023-11-02 PROCEDURE — 99999 PR PBB SHADOW E&M-EST. PATIENT-LVL III: ICD-10-PCS | Mod: PBBFAC,,, | Performed by: INTERNAL MEDICINE

## 2023-11-02 PROCEDURE — 99999 PR PBB SHADOW E&M-EST. PATIENT-LVL III: CPT | Mod: PBBFAC,,, | Performed by: INTERNAL MEDICINE

## 2023-11-02 PROCEDURE — 3078F PR MOST RECENT DIASTOLIC BLOOD PRESSURE < 80 MM HG: ICD-10-PCS | Mod: CPTII,S$GLB,, | Performed by: INTERNAL MEDICINE

## 2023-11-02 PROCEDURE — 3288F PR FALLS RISK ASSESSMENT DOCUMENTED: ICD-10-PCS | Mod: CPTII,S$GLB,, | Performed by: INTERNAL MEDICINE

## 2023-11-02 PROCEDURE — 3074F SYST BP LT 130 MM HG: CPT | Mod: CPTII,S$GLB,, | Performed by: INTERNAL MEDICINE

## 2023-11-02 PROCEDURE — 1159F PR MEDICATION LIST DOCUMENTED IN MEDICAL RECORD: ICD-10-PCS | Mod: CPTII,S$GLB,, | Performed by: INTERNAL MEDICINE

## 2023-11-02 PROCEDURE — 1126F PR PAIN SEVERITY QUANTIFIED, NO PAIN PRESENT: ICD-10-PCS | Mod: CPTII,S$GLB,, | Performed by: INTERNAL MEDICINE

## 2023-11-02 PROCEDURE — 1126F AMNT PAIN NOTED NONE PRSNT: CPT | Mod: CPTII,S$GLB,, | Performed by: INTERNAL MEDICINE

## 2023-11-02 PROCEDURE — 1101F PT FALLS ASSESS-DOCD LE1/YR: CPT | Mod: CPTII,S$GLB,, | Performed by: INTERNAL MEDICINE

## 2023-11-02 PROCEDURE — 3288F FALL RISK ASSESSMENT DOCD: CPT | Mod: CPTII,S$GLB,, | Performed by: INTERNAL MEDICINE

## 2023-11-02 PROCEDURE — 3074F PR MOST RECENT SYSTOLIC BLOOD PRESSURE < 130 MM HG: ICD-10-PCS | Mod: CPTII,S$GLB,, | Performed by: INTERNAL MEDICINE

## 2023-11-02 PROCEDURE — 99214 OFFICE O/P EST MOD 30 MIN: CPT | Mod: S$GLB,,, | Performed by: INTERNAL MEDICINE

## 2023-11-02 PROCEDURE — 1101F PR PT FALLS ASSESS DOC 0-1 FALLS W/OUT INJ PAST YR: ICD-10-PCS | Mod: CPTII,S$GLB,, | Performed by: INTERNAL MEDICINE

## 2023-11-02 PROCEDURE — 99214 PR OFFICE/OUTPT VISIT, EST, LEVL IV, 30-39 MIN: ICD-10-PCS | Mod: S$GLB,,, | Performed by: INTERNAL MEDICINE

## 2023-11-02 PROCEDURE — 1160F RVW MEDS BY RX/DR IN RCRD: CPT | Mod: CPTII,S$GLB,, | Performed by: INTERNAL MEDICINE

## 2023-11-02 PROCEDURE — 1160F PR REVIEW ALL MEDS BY PRESCRIBER/CLIN PHARMACIST DOCUMENTED: ICD-10-PCS | Mod: CPTII,S$GLB,, | Performed by: INTERNAL MEDICINE

## 2023-11-02 PROCEDURE — 1159F MED LIST DOCD IN RCRD: CPT | Mod: CPTII,S$GLB,, | Performed by: INTERNAL MEDICINE

## 2023-11-02 PROCEDURE — 3078F DIAST BP <80 MM HG: CPT | Mod: CPTII,S$GLB,, | Performed by: INTERNAL MEDICINE

## 2023-11-02 NOTE — PROGRESS NOTES
HISTORY:    77-year-old male with a history of anterior wall myocardial infarction in 1996 status post emergent CABG x3 at that time followed by multiple PCIs the last of which was in 2010, AICD placement 2010 with generator exchange in 2017, CVA 2021, hypertension, hyperlipidemia, and obstructive sleep apnea presenting for follow-up evaluation.    Pt had been doing well from a CV standpoint for the better part of a decade until he presented to [facility] in July '23 with VT. Underwent CV w restoration of SR. Taken to the cath lab without any e/o ACS lesion. Underwent pLCX PCI. Discharged home and returned again with VT requiring CV again. Started on amiodarone and discharged.    Feels well since. No CP or SOB. No light headedness or syncope. No orthopnea or le edema.     Tolerates aspirin 81x1, plavix 75x1, atorvastatin 80x1, metoprolol succinate 50x1, sacubitril-valsartan 24-26x2, spironolactone 25x1, furosemide 40x1, and amiodarone.        PHYSICAL EXAM:    Vitals:    11/02/23 1135   BP: 105/61   Pulse: 65       NAD, A+Ox3.  No jvd, no bruit.  RRR nml s1,s2. No murmurs.  CTA B no wheezes or crackles.  2+ B radial and 1+ B DP/PT. No edema.    LABS/STUDIES (imaging reviewed during clinic visit):    July 2023 CBC and CMP are unremarkable.  .  Troponin peak 17.  2022 LDL is 63 and HDL 40. Triglycerides are 63.   EKG July 28th 2023 a paced with a nonspecific intraventricular conduction block that is old.  Inferior ST depressions with posterolateral ST elevations post VT. Admission ecg July 28th VT. Admission ecg July 24th VT.    AICD interrogation September 2023 100% RA pacing/0% RV pacing. No VT.   TTE July 2023 mildly enlarged LV with eccentric hypertrophy and severely decreased systolic function.  LVEF 15-20%.  Grade 2 diastology.  Moderate MR.  CVP 3.  2020 study with moderate LV enlargement and a severely decreased ejection fraction at 20-25%.  Global HK with apical akinesis.  Normal RV size and function.   No significant valve disease.  IVC pressure 3. Similar to 2018 echo.  Nuclear stress test 2018 demonstrates no LVEF 23% with no evidence of ischemia.  There is a fixed defect in the anterior septum, anterior, apical, and inferoapical walls.  Cardiac catheterization July '23 with a patent left main.  Flush occlusion of the ostial LAD.  70% proximal, calcified left circ disease.  Two patent OM vessels without significant stenosis.  Patent distal circ.  Patent RCA system.  Patent SVG to LAD also filling a D1 branch with significant proximal disease in that branch.  Unclear culprit.  Status post POBA of proximal left circ.  Carotid September 2021 demonstrates no significant stenosis.  CT head August 2021 demonstrates no acute abnormalities.  Chronic microvascular disease noted.  CTA Head September 2021 demonstrates remote left thalamic infarction. Right cavernous carotid aneuyrysm measuring 1.5cm. No significant carotid stenosis. 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    ASSESSMENT & PLAN:    1. Cardiomyopathy, ischemic    2. Coronary artery disease involving native coronary artery of native heart without angina pectoris    3. Hx of CABG    4. ICD (implantable cardioverter-defibrillator) in place    5. Other hyperlipidemia    6. Ventricular tachycardia    7. Essential (primary) hypertension            Orders Placed This Encounter    Basic Metabolic Panel    BNP    Echo    empagliflozin (JARDIANCE) 10 mg tablet        The patient has a long history of ischemic cardiomyopathy with severely depressed LVEF status post AICD. Had done well until recent presentation for VT in July '23. No ACS lesion was identified by cath. Trop was 17 the day after shock. Could have lost an SVG, but likely related to scar. Regardless no interventional targets for revasc at this time.     Will continue current regimen of metoprolol succinate 50 x 1, sacubitril-valsartan 24-26 x 2, and spironolactone 25 mg daily. Okay to  decrease furosemide to 20x1 and start empagliflozin 10x1. Volume status favorable.      Chronic ischemic heart disease. Asymptomatic at this time. Aspirin/clopidogrel.     VT on amiodarone now. Has evaluation scheduled with Dr. Tucker later this week. No arrhythmias on AICD interrogation.      LDL at goal on atorvastatin 80 mg.       Follow up in about 3 months (around 2/2/2024).      Lonnie Heller MD

## 2023-11-03 ENCOUNTER — OFFICE VISIT (OUTPATIENT)
Dept: CARDIOLOGY | Facility: CLINIC | Age: 78
End: 2023-11-03
Payer: MEDICARE

## 2023-11-03 VITALS
WEIGHT: 164 LBS | DIASTOLIC BLOOD PRESSURE: 70 MMHG | HEART RATE: 70 BPM | SYSTOLIC BLOOD PRESSURE: 130 MMHG | BODY MASS INDEX: 27.32 KG/M2 | HEIGHT: 65 IN

## 2023-11-03 DIAGNOSIS — Z79.899 AT RISK FOR AMIODARONE TOXICITY WITH LONG TERM USE: ICD-10-CM

## 2023-11-03 DIAGNOSIS — I48.0 PAROXYSMAL ATRIAL FIBRILLATION: ICD-10-CM

## 2023-11-03 DIAGNOSIS — I47.20 VENTRICULAR TACHYCARDIA: ICD-10-CM

## 2023-11-03 DIAGNOSIS — I25.5 CARDIOMYOPATHY, ISCHEMIC: Chronic | ICD-10-CM

## 2023-11-03 DIAGNOSIS — I50.42 CHRONIC COMBINED SYSTOLIC AND DIASTOLIC CHF, NYHA CLASS 2: ICD-10-CM

## 2023-11-03 DIAGNOSIS — G47.33 OSA ON CPAP: ICD-10-CM

## 2023-11-03 DIAGNOSIS — I25.10 CORONARY ARTERY DISEASE INVOLVING NATIVE CORONARY ARTERY OF NATIVE HEART WITHOUT ANGINA PECTORIS: ICD-10-CM

## 2023-11-03 DIAGNOSIS — Z91.89 AT RISK FOR AMIODARONE TOXICITY WITH LONG TERM USE: ICD-10-CM

## 2023-11-03 DIAGNOSIS — I10 ESSENTIAL (PRIMARY) HYPERTENSION: ICD-10-CM

## 2023-11-03 DIAGNOSIS — I25.708 CORONARY ARTERY DISEASE OF BYPASS GRAFT OF NATIVE HEART WITH STABLE ANGINA PECTORIS: ICD-10-CM

## 2023-11-03 DIAGNOSIS — I25.2 HX OF MYOCARDIAL INFARCTION: ICD-10-CM

## 2023-11-03 DIAGNOSIS — Z95.810 ICD (IMPLANTABLE CARDIOVERTER-DEFIBRILLATOR) IN PLACE: Primary | ICD-10-CM

## 2023-11-03 PROCEDURE — 1101F PR PT FALLS ASSESS DOC 0-1 FALLS W/OUT INJ PAST YR: ICD-10-PCS | Mod: CPTII,S$GLB,, | Performed by: INTERNAL MEDICINE

## 2023-11-03 PROCEDURE — 1159F PR MEDICATION LIST DOCUMENTED IN MEDICAL RECORD: ICD-10-PCS | Mod: CPTII,S$GLB,, | Performed by: INTERNAL MEDICINE

## 2023-11-03 PROCEDURE — 1160F RVW MEDS BY RX/DR IN RCRD: CPT | Mod: CPTII,S$GLB,, | Performed by: INTERNAL MEDICINE

## 2023-11-03 PROCEDURE — 99999 PR PBB SHADOW E&M-EST. PATIENT-LVL III: CPT | Mod: PBBFAC,,, | Performed by: INTERNAL MEDICINE

## 2023-11-03 PROCEDURE — 99214 OFFICE O/P EST MOD 30 MIN: CPT | Mod: S$GLB,,, | Performed by: INTERNAL MEDICINE

## 2023-11-03 PROCEDURE — 3078F DIAST BP <80 MM HG: CPT | Mod: CPTII,S$GLB,, | Performed by: INTERNAL MEDICINE

## 2023-11-03 PROCEDURE — 99999 PR PBB SHADOW E&M-EST. PATIENT-LVL III: ICD-10-PCS | Mod: PBBFAC,,, | Performed by: INTERNAL MEDICINE

## 2023-11-03 PROCEDURE — 93010 RHYTHM STRIP: ICD-10-PCS | Mod: S$GLB,,, | Performed by: INTERNAL MEDICINE

## 2023-11-03 PROCEDURE — 1101F PT FALLS ASSESS-DOCD LE1/YR: CPT | Mod: CPTII,S$GLB,, | Performed by: INTERNAL MEDICINE

## 2023-11-03 PROCEDURE — 3075F SYST BP GE 130 - 139MM HG: CPT | Mod: CPTII,S$GLB,, | Performed by: INTERNAL MEDICINE

## 2023-11-03 PROCEDURE — 1126F AMNT PAIN NOTED NONE PRSNT: CPT | Mod: CPTII,S$GLB,, | Performed by: INTERNAL MEDICINE

## 2023-11-03 PROCEDURE — 93005 ELECTROCARDIOGRAM TRACING: CPT | Mod: S$GLB,,, | Performed by: INTERNAL MEDICINE

## 2023-11-03 PROCEDURE — 3288F FALL RISK ASSESSMENT DOCD: CPT | Mod: CPTII,S$GLB,, | Performed by: INTERNAL MEDICINE

## 2023-11-03 PROCEDURE — 99214 PR OFFICE/OUTPT VISIT, EST, LEVL IV, 30-39 MIN: ICD-10-PCS | Mod: S$GLB,,, | Performed by: INTERNAL MEDICINE

## 2023-11-03 PROCEDURE — 3075F PR MOST RECENT SYSTOLIC BLOOD PRESS GE 130-139MM HG: ICD-10-PCS | Mod: CPTII,S$GLB,, | Performed by: INTERNAL MEDICINE

## 2023-11-03 PROCEDURE — 1126F PR PAIN SEVERITY QUANTIFIED, NO PAIN PRESENT: ICD-10-PCS | Mod: CPTII,S$GLB,, | Performed by: INTERNAL MEDICINE

## 2023-11-03 PROCEDURE — 93010 ELECTROCARDIOGRAM REPORT: CPT | Mod: S$GLB,,, | Performed by: INTERNAL MEDICINE

## 2023-11-03 PROCEDURE — 1159F MED LIST DOCD IN RCRD: CPT | Mod: CPTII,S$GLB,, | Performed by: INTERNAL MEDICINE

## 2023-11-03 PROCEDURE — 3288F PR FALLS RISK ASSESSMENT DOCUMENTED: ICD-10-PCS | Mod: CPTII,S$GLB,, | Performed by: INTERNAL MEDICINE

## 2023-11-03 PROCEDURE — 93005 RHYTHM STRIP: ICD-10-PCS | Mod: S$GLB,,, | Performed by: INTERNAL MEDICINE

## 2023-11-03 PROCEDURE — 1160F PR REVIEW ALL MEDS BY PRESCRIBER/CLIN PHARMACIST DOCUMENTED: ICD-10-PCS | Mod: CPTII,S$GLB,, | Performed by: INTERNAL MEDICINE

## 2023-11-03 PROCEDURE — 3078F PR MOST RECENT DIASTOLIC BLOOD PRESSURE < 80 MM HG: ICD-10-PCS | Mod: CPTII,S$GLB,, | Performed by: INTERNAL MEDICINE

## 2023-11-03 NOTE — PROGRESS NOTES
Subjective:    Patient ID:  Tristen Blanc is a 78 y.o. male who presents for evaluation of Congestive Heart Failure and VT    Primary Cardiologist: Lonnie Heller MD    HPI  Prior Hx:  I had the pleasure of seeing Mr. Blanc in our electrophysiology clinic in follow-up for ICD care. As you are aware he is a pleasant 77 year-old man with chronic ischemic cardiomyopathy with acute MI in 1996 complicated by cardiac arrest with persistent systolic heart failure with a LVEF of 20-25% and dual chamber ICD implantation in 2010 with a generator change in 2017 by Dr. Loza. He reports no history of ICD shocks. He presented to establish care of his ICD in 7/2020.  He presented for  yearly follow-up 7/2023 and had no complaints.      8/2023: Mr. Blanc was admitted twice to [facility] in the past 2 weeks with VT storm, sometimes responsive to ATP and other times not. VT TCL was 320ms. Underwent coronary angiogram on 7/26/2023 with Dr. Fermin and was found to have a patent SVG-LAD with ISR of LCx stent that was treated with balloon angioplasty and then failed attempt to treat an LAD diagonal branch. He reports he instantly felt better (less shortness of breath). He presented with further VT after that hospitalization and was initiated on amiodarone. TCL was 320ms. In general they responded to ATP therapy however took until the 4th-6th ATP sequence to work (ramp). He presents for evaluation. VT ECG shows RB, V5 transition, mid rightward axis. ECHO showed LVEF of 15%. Discussed eventual possible ablation.    In-clinic device check notes stable lead parameters with 98% RA and 0% RV pacing and no arrhythmias. Estimated battery longevity of 2  years.    Interim Hx:  Mr. Blanc returns for follow-up. No shocks. Feeling well. Recent remote interrogation noted no arrhythmias, 2.2 years of battery longevity.    My interpretation of today's in clinic ECG is atrial paced with an IVCD of 150ms.      Review of Systems    Constitutional: Negative for fever and malaise/fatigue.   HENT:  Negative for congestion and sore throat.    Eyes:  Negative for blurred vision and visual disturbance.   Cardiovascular:  Negative for chest pain, dyspnea on exertion, irregular heartbeat, near-syncope, palpitations and syncope.   Respiratory:  Negative for cough and shortness of breath.    Hematologic/Lymphatic: Negative for bleeding problem. Does not bruise/bleed easily.   Skin: Negative.    Musculoskeletal: Negative.    Gastrointestinal:  Negative for bloating, abdominal pain, hematochezia and melena.   Neurological:  Negative for focal weakness and weakness.   Psychiatric/Behavioral: Negative.          Objective:    Physical Exam  Vitals reviewed.   Constitutional:       General: He is not in acute distress.     Appearance: He is well-developed. He is not diaphoretic.   HENT:      Head: Normocephalic and atraumatic.   Eyes:      General:         Right eye: No discharge.         Left eye: No discharge.      Conjunctiva/sclera: Conjunctivae normal.   Cardiovascular:      Rate and Rhythm: Normal rate and regular rhythm.      Heart sounds: No murmur heard.     No friction rub. No gallop.   Pulmonary:      Effort: Pulmonary effort is normal. No respiratory distress.      Breath sounds: Normal breath sounds. No wheezing or rales.   Abdominal:      General: Bowel sounds are normal. There is no distension.      Palpations: Abdomen is soft.      Tenderness: There is no abdominal tenderness.   Musculoskeletal:      Cervical back: Neck supple.   Skin:     General: Skin is warm and dry.   Neurological:      Mental Status: He is alert and oriented to person, place, and time.   Psychiatric:         Behavior: Behavior normal.         Thought Content: Thought content normal.         Judgment: Judgment normal.           Assessment:       1. ICD (implantable cardioverter-defibrillator) in place    2. Essential (primary) hypertension    3. Coronary artery disease  involving native coronary artery of native heart without angina pectoris    4. Coronary artery disease of bypass graft of native heart with stable angina pectoris    5. Cardiomyopathy, ischemic    6. Hx of myocardial infarction    7. Chronic combined systolic and diastolic CHF, NYHA class 2    8. Ventricular tachycardia    9. JERONIMO on CPAP    10. At risk for amiodarone toxicity with long term use         Plan:       In summary, Mr. Blanc is a pleasant 78 year-old man with chronic ischemic cardiomyopathy with acute MI in 1996 complicated by cardiac arrest with persistent systolic heart failure with a LVEF of 20-25% and dual chamber ICD implantation in 2010 with a generator change in 2017 by Dr. Loza now with recent MMVT consistent with a mid-lateral wall LV exit. He is on amiodarone now. Device reprogrammed to Ramp ATP only rather than Burst then Ramp as they generally only responded to fast Ramp ATP.  Continue 200mg daily of amiodarone. Will check LFTs/TSH. Will get CXR at next visit.    RTC in 6 months.    Thank you for allowing me to participate in the care of this patient. Please do not hesitate to call me with any questions or concerns.    Salazar Tucker MD, PhD  Cardiac Electrophysiology

## 2023-11-07 ENCOUNTER — CLINICAL SUPPORT (OUTPATIENT)
Dept: CARDIOLOGY | Facility: HOSPITAL | Age: 78
End: 2023-11-07
Payer: MEDICARE

## 2023-11-07 DIAGNOSIS — Z95.810 PRESENCE OF AUTOMATIC (IMPLANTABLE) CARDIAC DEFIBRILLATOR: ICD-10-CM

## 2023-11-07 PROCEDURE — 93296 REM INTERROG EVL PM/IDS: CPT | Performed by: INTERNAL MEDICINE

## 2023-11-07 PROCEDURE — 93295 DEV INTERROG REMOTE 1/2/MLT: CPT | Mod: ,,, | Performed by: INTERNAL MEDICINE

## 2023-11-07 PROCEDURE — 93295 CARDIAC DEVICE CHECK - REMOTE: ICD-10-PCS | Mod: ,,, | Performed by: INTERNAL MEDICINE

## 2023-11-08 ENCOUNTER — TELEPHONE (OUTPATIENT)
Dept: NEUROLOGY | Facility: CLINIC | Age: 78
End: 2023-11-08
Payer: MEDICARE

## 2023-11-08 NOTE — TELEPHONE ENCOUNTER
Patient returned my call. Spoke with patient regarding appointment scheduled on the 21 of December, explained to patient's spouse that appointment needs to be rescheduled due to Mitali Varela NP not being available during that time. Patient's spouse v/u and rescheduled appointment to November 30 at 2 pm with a nurse visit and 2:30 pm with Mitali Varela NP.

## 2023-11-10 DIAGNOSIS — I48.0 PAROXYSMAL ATRIAL FIBRILLATION: Primary | ICD-10-CM

## 2023-11-10 DIAGNOSIS — E78.5 HYPERLIPIDEMIA, UNSPECIFIED HYPERLIPIDEMIA TYPE: ICD-10-CM

## 2023-11-10 RX ORDER — ATORVASTATIN CALCIUM 80 MG/1
TABLET, FILM COATED ORAL
Qty: 90 TABLET | Refills: 3 | OUTPATIENT
Start: 2023-11-10

## 2023-11-23 DIAGNOSIS — E78.5 HYPERLIPIDEMIA, UNSPECIFIED HYPERLIPIDEMIA TYPE: ICD-10-CM

## 2023-11-24 RX ORDER — ATORVASTATIN CALCIUM 80 MG/1
80 TABLET, FILM COATED ORAL NIGHTLY
Qty: 90 TABLET | Refills: 3 | Status: SHIPPED | OUTPATIENT
Start: 2023-11-24

## 2023-12-15 ENCOUNTER — HOSPITAL ENCOUNTER (OUTPATIENT)
Dept: CARDIOLOGY | Facility: HOSPITAL | Age: 78
Discharge: HOME | End: 2023-12-15
Attending: INTERNAL MEDICINE
Payer: MEDICARE

## 2023-12-15 VITALS — WEIGHT: 159 LBS | BODY MASS INDEX: 26.49 KG/M2 | HEIGHT: 65 IN

## 2023-12-15 DIAGNOSIS — I25.5 CARDIOMYOPATHY, ISCHEMIC: Chronic | ICD-10-CM

## 2023-12-15 PROCEDURE — C8929 TTE W OR WO FOL WCON,DOPPLER: HCPCS

## 2023-12-15 PROCEDURE — 25500020 PHARM REV CODE 255: Performed by: INTERNAL MEDICINE

## 2023-12-15 RX ADMIN — HUMAN ALBUMIN MICROSPHERES AND PERFLUTREN 0.11 MG: 10; .22 INJECTION, SOLUTION INTRAVENOUS at 12:12

## 2023-12-16 LAB
AV INDEX (PROSTH): 0.59
AV MEAN GRADIENT: 3 MMHG
AV PEAK GRADIENT: 6 MMHG
AV VALVE AREA BY VELOCITY RATIO: 2.22 CM²
AV VALVE AREA: 2.44 CM²
AV VELOCITY RATIO: 0.53
BSA FOR ECHO PROCEDURE: 1.82 M2
CV ECHO LV RWT: 0.24 CM
DOP CALC AO PEAK VEL: 1.18 M/S
DOP CALC AO VTI: 24.2 CM
DOP CALC LVOT AREA: 4.2 CM2
DOP CALC LVOT DIAMETER: 2.3 CM
DOP CALC LVOT PEAK VEL: 0.63 M/S
DOP CALC LVOT STROKE VOLUME: 58.97 CM3
DOP CALC MV VTI: 28.8 CM
DOP CALCLVOT PEAK VEL VTI: 14.2 CM
E WAVE DECELERATION TIME: 212.49 MSEC
E/A RATIO: 0.74
E/E' RATIO: 14.44 M/S
ECHO LV POSTERIOR WALL: 0.79 CM (ref 0.6–1.1)
FRACTIONAL SHORTENING: 10 % (ref 28–44)
INTERVENTRICULAR SEPTUM: 0.8 CM (ref 0.6–1.1)
IVC DIAMETER: 1.81 CM
LA MAJOR: 4.74 CM
LA MINOR: 5.35 CM
LA WIDTH: 3.2 CM
LEFT ATRIUM SIZE: 4.64 CM
LEFT ATRIUM VOLUME INDEX MOD: 21.8 ML/M2
LEFT ATRIUM VOLUME INDEX: 35.4 ML/M2
LEFT ATRIUM VOLUME MOD: 39.08 CM3
LEFT ATRIUM VOLUME: 63.44 CM3
LEFT INTERNAL DIMENSION IN SYSTOLE: 6.03 CM (ref 2.1–4)
LEFT VENTRICLE DIASTOLIC VOLUME INDEX: 128.46 ML/M2
LEFT VENTRICLE DIASTOLIC VOLUME: 229.94 ML
LEFT VENTRICLE MASS INDEX: 125 G/M2
LEFT VENTRICLE SYSTOLIC VOLUME INDEX: 101.7 ML/M2
LEFT VENTRICLE SYSTOLIC VOLUME: 181.96 ML
LEFT VENTRICULAR INTERNAL DIMENSION IN DIASTOLE: 6.68 CM (ref 3.5–6)
LEFT VENTRICULAR MASS: 223.19 G
LV LATERAL E/E' RATIO: 9.29 M/S
LV SEPTAL E/E' RATIO: 32.5 M/S
LVOT MG: 0.98 MMHG
LVOT MV: 0.47 CM/S
MV PEAK A VEL: 0.88 M/S
MV PEAK E VEL: 0.65 M/S
MV PEAK GRADIENT: 5 MMHG
MV STENOSIS PRESSURE HALF TIME: 61.62 MS
MV VALVE AREA BY CONTINUITY EQUATION: 2.05 CM2
MV VALVE AREA P 1/2 METHOD: 3.57 CM2
OHS LV EJECTION FRACTION SIMPSONS BIPLANE MOD: 15 %
PISA MRMAX VEL: 4.36 M/S
PISA TR MAX VEL: 2.91 M/S
PULM VEIN S/D RATIO: 0.98
PV PEAK D VEL: 0.4 M/S
PV PEAK S VEL: 0.39 M/S
RA MAJOR: 4.04 CM
RA PRESSURE ESTIMATED: 3 MMHG
RA WIDTH: 3.13 CM
RV TB RVSP: 6 MMHG
RV TISSUE DOPPLER FREE WALL SYSTOLIC VELOCITY 1 (APICAL 4 CHAMBER VIEW): 7.13 CM/S
TDI LATERAL: 0.07 M/S
TDI SEPTAL: 0.02 M/S
TDI: 0.05 M/S
TR MAX PG: 34 MMHG
TRICUSPID ANNULAR PLANE SYSTOLIC EXCURSION: 1.51 CM
TV REST PULMONARY ARTERY PRESSURE: 37 MMHG
Z-SCORE OF LEFT VENTRICULAR DIMENSION IN END DIASTOLE: 3
Z-SCORE OF LEFT VENTRICULAR DIMENSION IN END SYSTOLE: 5.37

## 2023-12-21 ENCOUNTER — PATIENT MESSAGE (OUTPATIENT)
Dept: CARDIOLOGY | Facility: CLINIC | Age: 78
End: 2023-12-21
Payer: MEDICARE

## 2023-12-28 ENCOUNTER — PATIENT MESSAGE (OUTPATIENT)
Dept: CARDIOLOGY | Facility: CLINIC | Age: 78
End: 2023-12-28
Payer: MEDICARE

## 2024-02-28 ENCOUNTER — CLINICAL SUPPORT (OUTPATIENT)
Dept: CARDIOLOGY | Facility: HOSPITAL | Age: 79
End: 2024-02-28
Attending: INTERNAL MEDICINE
Payer: MEDICARE

## 2024-02-28 ENCOUNTER — CLINICAL SUPPORT (OUTPATIENT)
Dept: CARDIOLOGY | Facility: HOSPITAL | Age: 79
End: 2024-02-28
Payer: MEDICARE

## 2024-02-28 DIAGNOSIS — Z95.810 PRESENCE OF AUTOMATIC (IMPLANTABLE) CARDIAC DEFIBRILLATOR: ICD-10-CM

## 2024-02-28 LAB
OHS CV AF BURDEN PERCENT: < 1
OHS CV DC REMOTE DEVICE TYPE: NORMAL
OHS CV ICD SHOCK: NO
OHS CV RV PACING PERCENT: 0.05 %

## 2024-02-28 PROCEDURE — 93296 REM INTERROG EVL PM/IDS: CPT | Performed by: INTERNAL MEDICINE

## 2024-02-28 PROCEDURE — 93295 DEV INTERROG REMOTE 1/2/MLT: CPT | Mod: ,,, | Performed by: INTERNAL MEDICINE

## 2024-04-06 ENCOUNTER — HOSPITAL ENCOUNTER (INPATIENT)
Facility: HOSPITAL | Age: 79
LOS: 3 days | Discharge: HOME | DRG: 291 | End: 2024-04-10
Attending: EMERGENCY MEDICINE | Admitting: FAMILY MEDICINE
Payer: MEDICARE

## 2024-04-06 DIAGNOSIS — J18.9 PNEUMONIA: ICD-10-CM

## 2024-04-06 DIAGNOSIS — R06.02 SHORTNESS OF BREATH: ICD-10-CM

## 2024-04-06 DIAGNOSIS — I25.5 CARDIOMYOPATHY, ISCHEMIC: Primary | Chronic | ICD-10-CM

## 2024-04-06 PROBLEM — R79.89 ELEVATED TROPONIN: Status: ACTIVE | Noted: 2024-04-06

## 2024-04-06 LAB
ALBUMIN SERPL BCP-MCNC: 2.6 G/DL (ref 3.5–5.2)
ALP SERPL-CCNC: 222 U/L (ref 55–135)
ALT SERPL W/O P-5'-P-CCNC: 58 U/L (ref 10–44)
ANION GAP SERPL CALC-SCNC: 13 MMOL/L (ref 8–16)
ANISOCYTOSIS BLD QL SMEAR: SLIGHT
AST SERPL-CCNC: 71 U/L (ref 10–40)
BASOPHILS # BLD AUTO: 0.14 K/UL (ref 0–0.2)
BASOPHILS NFR BLD: 1.3 % (ref 0–1.9)
BILIRUB SERPL-MCNC: 0.4 MG/DL (ref 0.1–1)
BNP SERPL-MCNC: 1125 PG/ML (ref 0–99)
BUN SERPL-MCNC: 21 MG/DL (ref 8–23)
CALCIUM SERPL-MCNC: 8.9 MG/DL (ref 8.7–10.5)
CHLORIDE SERPL-SCNC: 107 MMOL/L (ref 95–110)
CO2 SERPL-SCNC: 27 MMOL/L (ref 23–29)
CREAT SERPL-MCNC: 1 MG/DL (ref 0.5–1.4)
D DIMER PPP IA.FEU-MCNC: 1.87 MG/L FEU
DIFFERENTIAL METHOD BLD: ABNORMAL
EOSINOPHIL # BLD AUTO: 3.3 K/UL (ref 0–0.5)
EOSINOPHIL NFR BLD: 30.4 % (ref 0–8)
ERYTHROCYTE [DISTWIDTH] IN BLOOD BY AUTOMATED COUNT: 16.3 % (ref 11.5–14.5)
EST. GFR  (NO RACE VARIABLE): >60 ML/MIN/1.73 M^2
ESTIMATED AVG GLUCOSE: 100 MG/DL (ref 68–131)
GLUCOSE SERPL-MCNC: 101 MG/DL (ref 70–110)
HBA1C MFR BLD: 5.1 % (ref 4–5.6)
HCT VFR BLD AUTO: 38.2 % (ref 40–54)
HGB BLD-MCNC: 11.8 G/DL (ref 14–18)
HYPOCHROMIA BLD QL SMEAR: ABNORMAL
IMM GRANULOCYTES # BLD AUTO: 0.02 K/UL (ref 0–0.04)
IMM GRANULOCYTES NFR BLD AUTO: 0.2 % (ref 0–0.5)
INFLUENZA A, MOLECULAR: NEGATIVE
INFLUENZA B, MOLECULAR: NEGATIVE
LYMPHOCYTES # BLD AUTO: 1.2 K/UL (ref 1–4.8)
LYMPHOCYTES NFR BLD: 10.8 % (ref 18–48)
MAGNESIUM SERPL-MCNC: 2.3 MG/DL (ref 1.6–2.6)
MCH RBC QN AUTO: 27.6 PG (ref 27–31)
MCHC RBC AUTO-ENTMCNC: 30.9 G/DL (ref 32–36)
MCV RBC AUTO: 90 FL (ref 82–98)
MONOCYTES # BLD AUTO: 1 K/UL (ref 0.3–1)
MONOCYTES NFR BLD: 9.5 % (ref 4–15)
NEUTROPHILS # BLD AUTO: 5.2 K/UL (ref 1.8–7.7)
NEUTROPHILS NFR BLD: 47.8 % (ref 38–73)
NRBC BLD-RTO: 0 /100 WBC
OVALOCYTES BLD QL SMEAR: ABNORMAL
PLATELET # BLD AUTO: 432 K/UL (ref 150–450)
PLATELET BLD QL SMEAR: ABNORMAL
PMV BLD AUTO: 8.6 FL (ref 9.2–12.9)
POIKILOCYTOSIS BLD QL SMEAR: SLIGHT
POLYCHROMASIA BLD QL SMEAR: ABNORMAL
POTASSIUM SERPL-SCNC: 3.8 MMOL/L (ref 3.5–5.1)
PROT SERPL-MCNC: 6.6 G/DL (ref 6–8.4)
RBC # BLD AUTO: 4.27 M/UL (ref 4.6–6.2)
SARS-COV-2 RDRP RESP QL NAA+PROBE: NEGATIVE
SODIUM SERPL-SCNC: 147 MMOL/L (ref 136–145)
SPECIMEN SOURCE: NORMAL
TROPONIN I SERPL DL<=0.01 NG/ML-MCNC: 0.51 NG/ML (ref 0–0.03)
WBC # BLD AUTO: 10.84 K/UL (ref 3.9–12.7)

## 2024-04-06 PROCEDURE — 96365 THER/PROPH/DIAG IV INF INIT: CPT

## 2024-04-06 PROCEDURE — 25000003 PHARM REV CODE 250: Performed by: FAMILY MEDICINE

## 2024-04-06 PROCEDURE — 99900035 HC TECH TIME PER 15 MIN (STAT)

## 2024-04-06 PROCEDURE — 99285 EMERGENCY DEPT VISIT HI MDM: CPT | Mod: 25

## 2024-04-06 PROCEDURE — 85025 COMPLETE CBC W/AUTO DIFF WBC: CPT | Performed by: EMERGENCY MEDICINE

## 2024-04-06 PROCEDURE — 83036 HEMOGLOBIN GLYCOSYLATED A1C: CPT | Performed by: EMERGENCY MEDICINE

## 2024-04-06 PROCEDURE — 87502 INFLUENZA DNA AMP PROBE: CPT | Performed by: EMERGENCY MEDICINE

## 2024-04-06 PROCEDURE — G0378 HOSPITAL OBSERVATION PER HR: HCPCS

## 2024-04-06 PROCEDURE — U0002 COVID-19 LAB TEST NON-CDC: HCPCS | Performed by: EMERGENCY MEDICINE

## 2024-04-06 PROCEDURE — 63700000 PHARM REV CODE 250 ALT 637 W/O HCPCS: Performed by: EMERGENCY MEDICINE

## 2024-04-06 PROCEDURE — 93010 ELECTROCARDIOGRAM REPORT: CPT | Mod: ,,, | Performed by: STUDENT IN AN ORGANIZED HEALTH CARE EDUCATION/TRAINING PROGRAM

## 2024-04-06 PROCEDURE — 80053 COMPREHEN METABOLIC PANEL: CPT | Performed by: EMERGENCY MEDICINE

## 2024-04-06 PROCEDURE — 96376 TX/PRO/DX INJ SAME DRUG ADON: CPT

## 2024-04-06 PROCEDURE — 83880 ASSAY OF NATRIURETIC PEPTIDE: CPT | Performed by: EMERGENCY MEDICINE

## 2024-04-06 PROCEDURE — 96372 THER/PROPH/DIAG INJ SC/IM: CPT | Performed by: FAMILY MEDICINE

## 2024-04-06 PROCEDURE — 83735 ASSAY OF MAGNESIUM: CPT | Performed by: EMERGENCY MEDICINE

## 2024-04-06 PROCEDURE — 63600175 PHARM REV CODE 636 W HCPCS: Performed by: EMERGENCY MEDICINE

## 2024-04-06 PROCEDURE — 63600175 PHARM REV CODE 636 W HCPCS: Performed by: FAMILY MEDICINE

## 2024-04-06 PROCEDURE — 94761 N-INVAS EAR/PLS OXIMETRY MLT: CPT

## 2024-04-06 PROCEDURE — 25000003 PHARM REV CODE 250: Performed by: EMERGENCY MEDICINE

## 2024-04-06 PROCEDURE — 84484 ASSAY OF TROPONIN QUANT: CPT | Performed by: EMERGENCY MEDICINE

## 2024-04-06 PROCEDURE — 96375 TX/PRO/DX INJ NEW DRUG ADDON: CPT

## 2024-04-06 PROCEDURE — 85379 FIBRIN DEGRADATION QUANT: CPT | Performed by: EMERGENCY MEDICINE

## 2024-04-06 PROCEDURE — 93005 ELECTROCARDIOGRAM TRACING: CPT

## 2024-04-06 PROCEDURE — 27000221 HC OXYGEN, UP TO 24 HOURS

## 2024-04-06 RX ORDER — METOPROLOL SUCCINATE 50 MG/1
50 TABLET, EXTENDED RELEASE ORAL DAILY
Status: DISCONTINUED | OUTPATIENT
Start: 2024-04-07 | End: 2024-04-09

## 2024-04-06 RX ORDER — SPIRONOLACTONE 25 MG/1
25 TABLET ORAL DAILY
Status: DISCONTINUED | OUTPATIENT
Start: 2024-04-07 | End: 2024-04-09

## 2024-04-06 RX ORDER — ENOXAPARIN SODIUM 100 MG/ML
40 INJECTION SUBCUTANEOUS EVERY 24 HOURS
Status: DISCONTINUED | OUTPATIENT
Start: 2024-04-06 | End: 2024-04-10 | Stop reason: HOSPADM

## 2024-04-06 RX ORDER — FUROSEMIDE 10 MG/ML
40 INJECTION INTRAMUSCULAR; INTRAVENOUS
Status: COMPLETED | OUTPATIENT
Start: 2024-04-06 | End: 2024-04-06

## 2024-04-06 RX ORDER — ONDANSETRON 8 MG/1
8 TABLET, ORALLY DISINTEGRATING ORAL EVERY 8 HOURS PRN
Status: DISCONTINUED | OUTPATIENT
Start: 2024-04-06 | End: 2024-04-10 | Stop reason: HOSPADM

## 2024-04-06 RX ORDER — IPRATROPIUM BROMIDE AND ALBUTEROL SULFATE 2.5; .5 MG/3ML; MG/3ML
3 SOLUTION RESPIRATORY (INHALATION) EVERY 6 HOURS PRN
Status: DISCONTINUED | OUTPATIENT
Start: 2024-04-06 | End: 2024-04-10 | Stop reason: HOSPADM

## 2024-04-06 RX ORDER — ASPIRIN 81 MG/1
81 TABLET ORAL DAILY
Status: DISCONTINUED | OUTPATIENT
Start: 2024-04-07 | End: 2024-04-10 | Stop reason: HOSPADM

## 2024-04-06 RX ORDER — TALC
6 POWDER (GRAM) TOPICAL NIGHTLY PRN
Status: DISCONTINUED | OUTPATIENT
Start: 2024-04-06 | End: 2024-04-10 | Stop reason: HOSPADM

## 2024-04-06 RX ORDER — ATORVASTATIN CALCIUM 40 MG/1
80 TABLET, FILM COATED ORAL NIGHTLY
Status: DISCONTINUED | OUTPATIENT
Start: 2024-04-06 | End: 2024-04-10 | Stop reason: HOSPADM

## 2024-04-06 RX ORDER — CLOPIDOGREL BISULFATE 75 MG/1
75 TABLET ORAL EVERY EVENING
Status: DISCONTINUED | OUTPATIENT
Start: 2024-04-06 | End: 2024-04-10 | Stop reason: HOSPADM

## 2024-04-06 RX ORDER — FUROSEMIDE 10 MG/ML
40 INJECTION INTRAMUSCULAR; INTRAVENOUS EVERY 12 HOURS
Status: DISCONTINUED | OUTPATIENT
Start: 2024-04-06 | End: 2024-04-08

## 2024-04-06 RX ORDER — AZITHROMYCIN 250 MG/1
500 TABLET, FILM COATED ORAL
Status: COMPLETED | OUTPATIENT
Start: 2024-04-06 | End: 2024-04-06

## 2024-04-06 RX ORDER — SODIUM CHLORIDE 0.9 % (FLUSH) 0.9 %
10 SYRINGE (ML) INJECTION EVERY 12 HOURS PRN
Status: DISCONTINUED | OUTPATIENT
Start: 2024-04-06 | End: 2024-04-10 | Stop reason: HOSPADM

## 2024-04-06 RX ORDER — FUROSEMIDE 10 MG/ML
40 INJECTION INTRAMUSCULAR; INTRAVENOUS
Status: DISCONTINUED | OUTPATIENT
Start: 2024-04-06 | End: 2024-04-06

## 2024-04-06 RX ORDER — SODIUM CHLORIDE 0.9 % (FLUSH) 0.9 %
10 SYRINGE (ML) INJECTION
Status: DISCONTINUED | OUTPATIENT
Start: 2024-04-06 | End: 2024-04-10 | Stop reason: HOSPADM

## 2024-04-06 RX ORDER — AMIODARONE HYDROCHLORIDE 200 MG/1
200 TABLET ORAL DAILY
Status: DISCONTINUED | OUTPATIENT
Start: 2024-04-07 | End: 2024-04-10 | Stop reason: HOSPADM

## 2024-04-06 RX ADMIN — FUROSEMIDE 40 MG: 10 INJECTION, SOLUTION INTRAVENOUS at 10:04

## 2024-04-06 RX ADMIN — CLOPIDOGREL BISULFATE 75 MG: 75 TABLET ORAL at 05:04

## 2024-04-06 RX ADMIN — ENOXAPARIN SODIUM 40 MG: 40 INJECTION SUBCUTANEOUS at 05:04

## 2024-04-06 RX ADMIN — SACUBITRIL AND VALSARTAN 1 TABLET: 24; 26 TABLET, FILM COATED ORAL at 10:04

## 2024-04-06 RX ADMIN — CEFTRIAXONE SODIUM 1 G: 1 INJECTION, POWDER, FOR SOLUTION INTRAMUSCULAR; INTRAVENOUS at 03:04

## 2024-04-06 RX ADMIN — FUROSEMIDE 40 MG: 10 INJECTION, SOLUTION INTRAVENOUS at 03:04

## 2024-04-06 RX ADMIN — AZITHROMYCIN DIHYDRATE 500 MG: 250 TABLET ORAL at 03:04

## 2024-04-06 RX ADMIN — ATORVASTATIN CALCIUM 80 MG: 40 TABLET, FILM COATED ORAL at 10:04

## 2024-04-06 NOTE — H&P
"[facility]  History & Physical    Patient Name: Tristen Blanc  MRN: [id number]  Patient Class: OP- Observation  Admission Date: 4/6/2024  Attending Physician: Kayleen Bowling MD  Primary Care Provider: Alan Patterson MD         Patient information was obtained from patient, spouse/SO, and ER records.     Subjective:     Principal Problem:Pneumonia    Chief Complaint:   Chief Complaint   Patient presents with    Shortness of Breath     Pt presents to ED today via [facility] from home c/o SOB onset last night states " I was afraid to go to sleep" pt reports wife made him come to the ED   Denies CP or SOB at this time          HPI: Tristen Blanc is a 77 yo M with PMH of coronary disease, congestive heart failure with depressed ejection fraction, dyslipidemia, BPH, arthritis, dementia, sleep apnea, and s/p hip fracture 1/9/24 brought in by EMS from home with complaint of 4 days history of progressive shortness of breath that is worse yesterday night. There is associated leg swelling, cough weakness, and PND,.  Denies fever, chills, diaphoresis, dysuria, abdominal distention.   History provided by patient and spouse at the bedside  BNP 1125, troponin 0.510, WBC 10.84, H/H 11.8/38.2, sodium 147, potassium 3.8, CXR with concerns for pulmonary edema/pneumonia.  Patient started on IV Lasix and IV ceftriaxone in the ED.        Past Medical History:   Diagnosis Date    Allergy     Arthritis     Cardiomyopathy     ischemic    CHF (congestive heart failure)     Coronary artery disease     s/p CABG    General anesthetics causing adverse effect in therapeutic use     Hyperlipidemia     Hypertension     ICD (implantable cardioverter-defibrillator) in place 5/3/2013    Sleep apnea with use of continuous positive airway pressure (CPAP)        Past Surgical History:   Procedure Laterality Date    CARDIAC CATHETERIZATION  January 2011    CARDIAC DEFIBRILLATOR PLACEMENT      " COLONOSCOPY      CORONARY ANGIOPLASTY  1/3/2011    Last stent was in LMCA, Cx. Had previous stentsd    CORONARY ARTERY BYPASS GRAFT  06/1996    bypass of 2 vessels    INSERT / REPLACE / REMOVE PACEMAKER      AICD; generator change in 2/10/17    JOINT REPLACEMENT Right 12/08/1991    total hip replacement    LEFT HEART CATHETERIZATION Left 7/26/2023    Procedure: Left heart cath;  Surgeon: Kaycee Fermin MD;  Location: [facility] CATH LAB/EP;  Service: Cardiology;  Laterality: Left;    SINUS SURGERY         Review of patient's allergies indicates:  No Known Allergies    No current facility-administered medications on file prior to encounter.     Current Outpatient Medications on File Prior to Encounter   Medication Sig    amiodarone (PACERONE) 200 MG Tab Take 1 tablet (200 mg total) by mouth once daily.    aspirin (ECOTRIN) 81 MG EC tablet Take 81 mg by mouth once daily.      atorvastatin (LIPITOR) 80 MG tablet Take 1 tablet (80 mg total) by mouth every evening.    clopidogreL (PLAVIX) 75 mg tablet TAKE 1 TABLET(75 MG) BY MOUTH EVERY EVENING (Patient taking differently: Take 75 mg by mouth every evening.)    coenzyme Q10 200 mg capsule Take 200 mg by mouth once daily.    FOLIC ACID/MV,FE,OTHER MIN (CENTRUM ORAL) Take 1 tablet by mouth every morning.     furosemide (LASIX) 20 MG tablet Take 2 tablets (40 mg total) by mouth once daily. (Patient taking differently: Take 20 mg by mouth once daily.)    metoprolol succinate (TOPROL-XL) 50 MG 24 hr tablet TAKE 1 TABLET(50 MG) BY MOUTH EVERY DAY (Patient taking differently: 50 mg once daily.)    montelukast (SINGULAIR) 10 mg tablet Take 1 tablet (10 mg total) by mouth daily as needed.    nitroGLYCERIN (NITROSTAT) 0.4 MG SL tablet ONE TABLET UNDER TONGUE AS NEEDED FOR CHEST PAIN. TAKE EVERY 5 MINUTES AS NEEDED (Patient taking differently: Place 0.4 mg under the tongue every 5 (five) minutes as needed for Chest pain.)    sacubitriL-valsartan (ENTRESTO) 24-26 mg per tablet Take 1  tablet by mouth 2 (two) times daily.    spironolactone (ALDACTONE) 25 MG tablet TAKE 1 TABLET(25 MG) BY MOUTH EVERY DAY    [DISCONTINUED] empagliflozin (JARDIANCE) 10 mg tablet Take 1 tablet (10 mg total) by mouth once daily.    [DISCONTINUED] fluticasone (FLONASE) 50 mcg/actuation nasal spray 1 spray by Nasal route as needed.      Family History       Problem Relation (Age of Onset)    Heart attack Father, Paternal Uncle, Maternal Aunt, Paternal Aunt    Stroke Mother          Tobacco Use    Smoking status: Every Day     Current packs/day: 1.00     Average packs/day: 1 pack/day for 58.3 years (58.3 ttl pk-yrs)     Types: Cigarettes     Start date: 1966    Smokeless tobacco: Never    Tobacco comments:     SAYS TRIED EVERYTHING INCLUDING HYPNOSIOS AND DRUGS; NOT INTERESTED IN SMOKING PROGRAM HERE   Substance and Sexual Activity    Alcohol use: Yes     Comment: social; rare    Drug use: No    Sexual activity: Yes     Partners: Female     Review of Systems   Constitutional:  Positive for activity change. Negative for chills, diaphoresis and fever.   HENT:  Negative for congestion.    Eyes:  Negative for photophobia and visual disturbance.   Respiratory:  Positive for cough and shortness of breath.    Cardiovascular:  Positive for leg swelling.   Gastrointestinal:  Negative for abdominal distention, abdominal pain, nausea and vomiting.   Endocrine: Negative for polydipsia and polyphagia.   Genitourinary:  Negative for dysuria.   Skin:  Negative for color change and wound.   Neurological:  Negative for dizziness and numbness.   Psychiatric/Behavioral:  Negative for agitation and confusion.      Objective:     Vital Signs (Most Recent):  Temp: 99.3 °F (37.4 °C) (04/06/24 1345)  Pulse: 71 (04/06/24 1345)  Resp: 18 (04/06/24 1345)  BP: (!) 134/90 (04/06/24 1345)  SpO2: 96 % (04/06/24 1345) Vital Signs (24h Range):  Temp:  [99.3 °F (37.4 °C)] 99.3 °F (37.4 °C)  Pulse:  [71] 71  Resp:  [18] 18  SpO2:  [96 %] 96 %  BP:  "(134)/(90) 134/90        There is no height or weight on file to calculate BMI.     Physical Exam  HENT:      Head: Normocephalic and atraumatic.      Nose: No congestion or rhinorrhea.      Mouth/Throat:      Pharynx: No oropharyngeal exudate or posterior oropharyngeal erythema.   Eyes:      Extraocular Movements: Extraocular movements intact.      Pupils: Pupils are equal, round, and reactive to light.   Cardiovascular:      Rate and Rhythm: Normal rate.   Pulmonary:      Breath sounds: Rales present.   Abdominal:      General: There is distension.      Palpations: Abdomen is soft.   Musculoskeletal:         General: Normal range of motion.      Cervical back: Normal range of motion.      Right lower leg: Edema present.      Left lower leg: Edema present.      Comments: + 3 edema, extending to the back   Skin:     General: Skin is warm.      Capillary Refill: Capillary refill takes less than 2 seconds.   Neurological:      Mental Status: He is alert and oriented to person, place, and time.              CRANIAL NERVES     CN III, IV, VI   Pupils are equal, round, and reactive to light.       Significant Labs: A1C: No results for input(s): "HGBA1C" in the last 4320 hours.  ABGs: No results for input(s): "PH", "PCO2", "HCO3", "POCSATURATED", "BE", "TOTALHB", "COHB", "METHB", "O2HB", "POCFIO2", "PO2" in the last 48 hours.  Blood Culture: No results for input(s): "LABBLOO" in the last 48 hours.  CBC:   Recent Labs   Lab 04/06/24  1407   WBC 10.84   HGB 11.8*   HCT 38.2*        CMP:   Recent Labs   Lab 04/06/24  1407   *   K 3.8      CO2 27      BUN 21   CREATININE 1.0   CALCIUM 8.9   PROT 6.6   ALBUMIN 2.6*   BILITOT 0.4   ALKPHOS 222*   AST 71*   ALT 58*   ANIONGAP 13     Lactic Acid: No results for input(s): "LACTATE" in the last 48 hours.  Lipase: No results for input(s): "LIPASE" in the last 48 hours.  Lipid Panel: No results for input(s): "CHOL", "HDL", "LDLCALC", "TRIG", "CHOLHDL" in the " "last 48 hours.  Magnesium:   Recent Labs   Lab 04/06/24  1407   MG 2.3     Troponin:   Recent Labs   Lab 04/06/24  1407   TROPONINI 0.510*     TSH: No results for input(s): "TSH" in the last 4320 hours.  Urine Culture: No results for input(s): "LABURIN" in the last 48 hours.  Urine Studies: No results for input(s): "COLORU", "APPEARANCEUA", "PHUR", "SPECGRAV", "PROTEINUA", "GLUCUA", "KETONESU", "BILIRUBINUA", "OCCULTUA", "NITRITE", "UROBILINOGEN", "LEUKOCYTESUR", "RBCUA", "WBCUA", "BACTERIA", "SQUAMEPITHEL", "HYALINECASTS" in the last 48 hours.    Invalid input(s): "WRIGHTSUR"    Significant Imaging: I have reviewed all pertinent imaging results/findings within the past 24 hours.  Assessment/Plan:     * Pneumonia    CXR:  Patchy perihilar increased attenuation bilaterally worse on the right concerning for multifocal pneumonia or edema   CTX started in ED- continue  Add Azithromycin    Elevated troponin    Likely due to demand ischemia  monitor    H/O: stroke  Lipitor and ASA      Chronic combined systolic and diastolic CHF, NYHA class 2  Patient is identified as having Combined Systolic and Diastolic heart failure that is Acute on chronic. CHF is currently uncontrolled due to Continued edema of extremities and Rales/crackles on pulmonary exam. Latest ECHO performed and demonstrates- Results for orders placed during the hospital encounter of 12/15/23    Echo    Interpretation Summary    Left Ventricle: The left ventricle is dilated. There is eccentric hypertrophy. Regional wall motion abnormalities present. See diagram for wall motion findings. Septal motion is consistent with post-operative status. There is reduced systolic function. Biplane (2D) method of discs ejection fraction is 15%.    Grade II diastolic dysfunction.    Right Ventricle: Normal right ventricular cavity size. Systolic function is reduced.TAPSE is 1.51 cm.    Left Atrium: Left atrium is mildly dilated. The left atrium volume index is 35.4 mL/m2.   "  Right Atrium: Right atrium is dilated.    Mitral Valve: There is moderate regurgitation.    Pulmonary Artery: The estimated pulmonary artery systolic pressure is 37 mmHg.    IVC/SVC: Normal venous pressure at 3 mmHg.  . Continue Beta Blocker, Furosemide, Aldactone, and ARNI and monitor clinical status closely. Monitor on telemetry. Patient is on CHF pathway.  Monitor strict Is&Os and daily weights.  Place on fluid restriction of 1 L. Cardiology has not been consulted. Continue to stress to patient importance of self efficacy and  on diet for CHF. Last BNP reviewed- and noted below   Recent Labs   Lab 04/06/24  1407   BNP 1,125*        CXR with pulmonary edema  Continue OV Lasix  Strict input/output    Coronary artery disease involving native coronary artery of native heart without angina pectoris  CAD (coronary artery disease) of artery bypass graft  ICD (implantable cardioverter-defibrillator) in place  Patient with known CAD s/p CABG, an s/p stent, which is controlled Will continue ASA, Plavix, and Statin and monitor for S/Sx of angina/ACS. Continue to monitor on telemetry.     Essential (primary) hypertension  Chronic, controlled. Latest blood pressure and vitals reviewed-     Temp:  [99.3 °F (37.4 °C)]   Pulse:  [71]   Resp:  [18]   BP: (134)/(90)   SpO2:  [96 %] .   Home meds for hypertension were reviewed and noted below.   Hypertension Medications               furosemide (LASIX) 20 MG tablet Take 2 tablets (40 mg total) by mouth once daily.    metoprolol succinate (TOPROL-XL) 50 MG 24 hr tablet TAKE 1 TABLET(50 MG) BY MOUTH EVERY DAY    nitroGLYCERIN (NITROSTAT) 0.4 MG SL tablet ONE TABLET UNDER TONGUE AS NEEDED FOR CHEST PAIN. TAKE EVERY 5 MINUTES AS NEEDED    sacubitriL-valsartan (ENTRESTO) 24-26 mg per tablet Take 1 tablet by mouth 2 (two) times daily.    spironolactone (ALDACTONE) 25 MG tablet TAKE 1 TABLET(25 MG) BY MOUTH EVERY DAY            While in the hospital, will manage blood pressure as  follows; Continue home antihypertensive regimen    Will utilize p.r.n. blood pressure medication only if patient's blood pressure greater than 140/90 and he develops symptoms such as worsening chest pain or shortness of breath.    JERONIMO on CPAP    No longer use CPAP.   Patient stated he does not want to use it  Patient ope to try it again    Other hyperlipidemia    Lipitor      VTE Risk Mitigation (From admission, onward)           Ordered     enoxaparin injection 40 mg  Daily         04/06/24 1520     IP VTE HIGH RISK PATIENT  Once         04/06/24 1520     Place sequential compression device  Until discontinued         04/06/24 1520                       On 04/06/2024, patient should be placed in hospital observation services under my care.             Kayleen Bowling MD  Department of Hospital Medicine  [place] - Emergency Dept

## 2024-04-06 NOTE — PHARMACY MED REC
"  [facility]           Pharmacy  Admission Medication History     The home medication history was taken by Deja Streeter.      Medication history obtained from Medications listed below were obtained from: Patient/family    Based on information gathered for medication list, you may go to "Admission" then "Reconcile Home Medications" tabs to review and/or act upon those items.     The home medication list has been updated by the Pharmacy department.   Please read ALL comments highlighted in yellow.   Please address this information as you see fit.    Feel free to contact us if you have any questions or require assistance.    The medications listed below were removed from the home medication list.  Please reorder if appropriate:    Patient reports NOT TAKING the following medication(s):  Flonase nasal  Jardiance 10mg  Singulair 10mg      No current facility-administered medications on file prior to encounter.     Current Outpatient Medications on File Prior to Encounter   Medication Sig Dispense Refill    amiodarone (PACERONE) 200 MG Tab Take 1 tablet (200 mg total) by mouth once daily. 90 tablet 3    aspirin (ECOTRIN) 81 MG EC tablet Take 81 mg by mouth once daily.        atorvastatin (LIPITOR) 80 MG tablet Take 1 tablet (80 mg total) by mouth every evening. 90 tablet 3    clopidogreL (PLAVIX) 75 mg tablet TAKE 1 TABLET(75 MG) BY MOUTH EVERY EVENING (Patient taking differently: Take 75 mg by mouth every evening.) 90 tablet 3    coenzyme Q10 200 mg capsule Take 200 mg by mouth once daily. 90 capsule 3    FOLIC ACID/MV,FE,OTHER MIN (CENTRUM ORAL) Take 1 tablet by mouth every morning.       furosemide (LASIX) 20 MG tablet Take 2 tablets (40 mg total) by mouth once daily. (Patient taking differently: Take 20 mg by mouth once daily.) 60 tablet 11    metoprolol succinate (TOPROL-XL) 50 MG 24 hr tablet TAKE 1 TABLET(50 MG) BY MOUTH EVERY DAY (Patient taking differently: 50 mg once daily.) 90 tablet 3 "    nitroGLYCERIN (NITROSTAT) 0.4 MG SL tablet ONE TABLET UNDER TONGUE AS NEEDED FOR CHEST PAIN. TAKE EVERY 5 MINUTES AS NEEDED (Patient taking differently: Place 0.4 mg under the tongue every 5 (five) minutes as needed for Chest pain.) 25 tablet 6    sacubitriL-valsartan (ENTRESTO) 24-26 mg per tablet Take 1 tablet by mouth 2 (two) times daily. 60 tablet 11    spironolactone (ALDACTONE) 25 MG tablet TAKE 1 TABLET(25 MG) BY MOUTH EVERY DAY (Patient taking differently: Take 25 mg by mouth once daily.) 90 tablet 3       Please address this information as you see fit.  Feel free to contact us if you have any questions or require assistance.    Deja Streeter  [phone]                .

## 2024-04-06 NOTE — HPI
uGanacoTristen is a 79 yo M with PMH of coronary disease, congestive heart failure with depressed ejection fraction, dyslipidemia, BPH, arthritis, dementia, sleep apnea, and s/p hip fracture 1/9/24 brought in by EMS from home with complaint of 4 days history of progressive shortness of breath that is worse yesterday night. There is associated leg swelling, cough weakness, and PND,.  Denies fever, chills, diaphoresis, dysuria, abdominal distention.   History provided by patient and spouse at the bedside  BNP 1125, troponin 0.510, WBC 10.84, H/H 11.8/38.2, sodium 147, potassium 3.8, CXR with concerns for pulmonary edema/pneumonia.  Patient started on IV Lasix and IV ceftriaxone in the ED.

## 2024-04-06 NOTE — ASSESSMENT & PLAN NOTE
Chronic, controlled. Latest blood pressure and vitals reviewed-     Temp:  [99.3 °F (37.4 °C)]   Pulse:  [71]   Resp:  [18]   BP: (134)/(90)   SpO2:  [96 %] .   Home meds for hypertension were reviewed and noted below.   Hypertension Medications               furosemide (LASIX) 20 MG tablet Take 2 tablets (40 mg total) by mouth once daily.    metoprolol succinate (TOPROL-XL) 50 MG 24 hr tablet TAKE 1 TABLET(50 MG) BY MOUTH EVERY DAY    nitroGLYCERIN (NITROSTAT) 0.4 MG SL tablet ONE TABLET UNDER TONGUE AS NEEDED FOR CHEST PAIN. TAKE EVERY 5 MINUTES AS NEEDED    sacubitriL-valsartan (ENTRESTO) 24-26 mg per tablet Take 1 tablet by mouth 2 (two) times daily.    spironolactone (ALDACTONE) 25 MG tablet TAKE 1 TABLET(25 MG) BY MOUTH EVERY DAY            While in the hospital, will manage blood pressure as follows; Continue home antihypertensive regimen    Will utilize p.r.n. blood pressure medication only if patient's blood pressure greater than 140/90 and he develops symptoms such as worsening chest pain or shortness of breath.

## 2024-04-06 NOTE — PROGRESS NOTES
VIRTUAL NURSE: Cued into patient's room. Permission received per patient to turn camera to view patient. Introduced as VN that will be working with floor nurse this shift. Educated the patient and his spouse on VN's role in patient care. Plan of care reviewed with patient. Informed patient that staff will round on them every 2 hours but to use call light for any other needs they may have. Also informed of fall risk and fall precautions. Patient verbalized understanding. Call light within reach; bed siderails up x2. Opportunity given for questions and questions answered. Admission assessment questions completed. Patient denies complaints or any needs at this time.   04/06/24 1716   Admission   Initial VN Admission Questions Complete   Communication Issues? None   Shift   Virtual Nurse - Patient Verbalized Approval Of Camera Use   Safety/Activity   Patient Rounds bed in low position;placement of personal items at bedside;visualized patient;call light in patient/parent reach   Safety Promotion/Fall Prevention assistive device/personal item within reach;Fall Risk reviewed with patient/family;side rails raised x 2;instructed to call staff for mobility   Positioning   Body Position position maintained   Head of Bed (HOB) Positioning HOB at 30-45 degrees   Pain/Comfort/Sleep   Preferred Pain Scale number (Numeric Rating Pain Scale)   Pain Rating (0-10): Rest 0

## 2024-04-06 NOTE — ASSESSMENT & PLAN NOTE
Patient is identified as having Combined Systolic and Diastolic heart failure that is Acute on chronic. CHF is currently uncontrolled due to Continued edema of extremities and Rales/crackles on pulmonary exam. Latest ECHO performed and demonstrates- Results for orders placed during the hospital encounter of 12/15/23    Echo    Interpretation Summary    Left Ventricle: The left ventricle is dilated. There is eccentric hypertrophy. Regional wall motion abnormalities present. See diagram for wall motion findings. Septal motion is consistent with post-operative status. There is reduced systolic function. Biplane (2D) method of discs ejection fraction is 15%.    Grade II diastolic dysfunction.    Right Ventricle: Normal right ventricular cavity size. Systolic function is reduced.TAPSE is 1.51 cm.    Left Atrium: Left atrium is mildly dilated. The left atrium volume index is 35.4 mL/m2.    Right Atrium: Right atrium is dilated.    Mitral Valve: There is moderate regurgitation.    Pulmonary Artery: The estimated pulmonary artery systolic pressure is 37 mmHg.    IVC/SVC: Normal venous pressure at 3 mmHg.  . Continue Beta Blocker, Furosemide, Aldactone, and ARNI and monitor clinical status closely. Monitor on telemetry. Patient is on CHF pathway.  Monitor strict Is&Os and daily weights.  Place on fluid restriction of 1 L. Cardiology has not been consulted. Continue to stress to patient importance of self efficacy and  on diet for CHF. Last BNP reviewed- and noted below   Recent Labs   Lab 04/06/24  1407   BNP 1,125*        CXR with pulmonary edema  Continue OV Lasix  Strict input/output

## 2024-04-06 NOTE — ED NOTES
Pt presents to ED for SOB with exertion, worsens when laying down;  Edema to BLE, is compliant with home medications, recent hip surgery with physical therapy, pt states swelling is not worsened since procedure. Otherwise, pt denies CP, left defibrillator noted.

## 2024-04-06 NOTE — ASSESSMENT & PLAN NOTE
CAD (coronary artery disease) of artery bypass graft  ICD (implantable cardioverter-defibrillator) in place  Patient with known CAD s/p CABG, an s/p stent, which is controlled Will continue ASA, Plavix, and Statin and monitor for S/Sx of angina/ACS. Continue to monitor on telemetry.

## 2024-04-06 NOTE — ED PROVIDER NOTES
"Encounter Date: 4/6/2024       History     Chief Complaint   Patient presents with    Shortness of Breath     Pt presents to ED today via [facility] from home c/o SOB onset last night states " I was afraid to go to sleep" pt reports wife made him come to the ED   Denies CP or SOB at this time        79 yo M w PMHx including coronary disease, congestive heart failure with depressed ejection fraction, dyslipidemia, BPH, arthritis, dementia and hip fracture 1/9/24 brought in by EMS from home with complaint of shortness of breath.  Patient says that yesterday evening he had onset of shortness of breath that has persisted into today so he called EMS.  Says it is worse when lying flat. EMS reports that patient was satting well on room air and able to ambulate with his walker.  Patient reports that his wife gives him off medications and thinks he took his lasix this morning.  He denies fever, chills, cough. He says that his legs are chronically swollen with no recent change. Wife later arrived at bedside and says the dyspnea has been present for about 3 days and worsened last night. Says that she gave him every medication this morning except Lasix since they were coming to the ED. Says that he has had a slightly increased cough with clear sputum over the past few days. Decreased ability to progress with physical therapy this week due to generalized fatigue.     The history is provided by the patient and the EMS personnel.     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 Had previous stentsd    CORONARY ARTERY BYPASS GRAFT  1996    bypass of 2 vessels    INSERT / REPLACE / REMOVE PACEMAKER      AICD; generator change in 2/10/17    JOINT REPLACEMENT Right 1991    total hip replacement    LEFT HEART CATHETERIZATION Left 2023    Procedure: Left heart cath;  Surgeon: Kaycee Fermin MD;  Location: [facility] CATH LAB/EP;  Service: Cardiology;  Laterality: Left;    SINUS SURGERY       Family History   Problem Relation Age of Onset    Stroke Mother     Heart attack Father     Heart attack Paternal Uncle         2 uncles  of heart attcks    Heart attack Maternal Aunt         2 aunts one  from AMI asnd the other  od complications later on.    Heart attack Paternal Aunt         3 out of 4  at late age of AMI     Social History     Tobacco Use    Smoking status: Every Day     Current packs/day: 1.00     Average packs/day: 1 pack/day for 58.3 years (58.3 ttl pk-yrs)     Types: Cigarettes     Start date:     Smokeless tobacco: Never    Tobacco comments:     SAYS TRIED EVERYTHING INCLUDING HYPNOSIOS AND DRUGS; NOT INTERESTED IN SMOKING PROGRAM HERE   Substance Use Topics    Alcohol use: Yes     Comment: social; rare    Drug use: No     Review of Systems    Physical Exam     Initial Vitals [24 1345]   BP Pulse Resp Temp SpO2   (!) 134/90 71 18 99.3 °F (37.4 °C) 96 %      MAP       --         Physical Exam    Constitutional: He appears well-developed and well-nourished. He is not diaphoretic. No distress.   HENT:   Head: Normocephalic and atraumatic.   Eyes: Conjunctivae and EOM are normal.   Neck: Neck supple.   Normal range of motion.  Cardiovascular:  Normal rate.           Pulmonary/Chest: No respiratory distress.   Course breath sounds.    Abdominal: Abdomen is soft. He exhibits no  distension. There is no abdominal tenderness.   Musculoskeletal:         General: Edema (2+ pitting bilaterally, chronic) present. Normal range of motion.      Cervical back: Normal range of motion and neck supple.     Neurological: He is alert and oriented to person, place, and time.   Skin: Skin is warm and dry.   Psychiatric: He has a normal mood and affect.         ED Course   ED US Echo    Date/Time: 4/6/2024 2:03 PM    Performed by: Deidra Rondon MD  Authorized by: Deidra Rondon MD    Indication:  Dyspnea  Identified Structures:     The pericardial sac, myocardium, and 4 chambers were identified with the following echocardiographic windows:  Parasternal long axis, Parasternal short axis and Apical 4-chamber  Findings:     Pericardial Effusion:  Absent    Left Ventricle Ejection Fraction:  Severely reduced (<35%)  IVC:     Gross [place] (RV:LV):  Normal    Impression:  Diminished LV function  ED US Lung    Date/Time: 4/6/2024 2:04 PM    Performed by: Deidra Rondon MD  Authorized by: Deidra Rondon MD    Indication:  Dyspnea  Identified Structures:  The thoracic cavities and diaphragm were examined  Lung sliding:  Present  Pleural effusion:  Absent  B-lines:  Absent (3 or less)  Lung sliding:  Present  Pleural effusion:  Absent  B-lines:  Absent (3 or less)   Normal thoracic evaluation, no pneumothorax or edema    Labs Reviewed   CBC W/ AUTO DIFFERENTIAL - Abnormal; Notable for the following components:       Result Value    RBC 4.27 (*)     Hemoglobin 11.8 (*)     Hematocrit 38.2 (*)     MCHC 30.9 (*)     RDW 16.3 (*)     MPV 8.6 (*)     Eos # 3.3 (*)     Lymph % 10.8 (*)     Eosinophil % 30.4 (*)     All other components within normal limits   COMPREHENSIVE METABOLIC PANEL - Abnormal; Notable for the following components:    Sodium 147 (*)     Albumin 2.6 (*)     Alkaline Phosphatase 222 (*)     AST 71 (*)     ALT 58 (*)     All other components within normal limits   TROPONIN I  - Abnormal; Notable for the following components:    Troponin I 0.510 (*)     All other components within normal limits   B-TYPE NATRIURETIC PEPTIDE - Abnormal; Notable for the following components:    BNP 1,125 (*)     All other components within normal limits   D DIMER, QUANTITATIVE - Abnormal; Notable for the following components:    D-Dimer 1.87 (*)     All other components within normal limits   INFLUENZA A & B BY MOLECULAR   MAGNESIUM   SARS-COV-2 RNA AMPLIFICATION, QUAL     EKG Readings: (Independently Interpreted)   Initial Reading: No STEMI. Previous EKG: Compared with most recent EKG Rhythm: Paced Rhythm. Heart Rate: 68. Ectopy: No Ectopy. Conduction: Normal. Axis: Right Axis Deviation. Other Findings: Prolonged QT Interval. Clinical Impression: Paced Rhythm       Imaging Results              X-Ray Chest AP Portable (Final result)  Result time 04/06/24 14:21:44      Final result by Bernadette Mosley MD (04/06/24 14:21:44)                   Impression:      Patchy perihilar increased attenuation bilaterally worse on the right concerning for multifocal pneumonia or edema.      Electronically signed by: Bernadette Mosley MD  Date:    04/06/2024  Time:    14:21               Narrative:    EXAMINATION:  XR CHEST AP PORTABLE    CLINICAL HISTORY:  CHF;    TECHNIQUE:  Single frontal view of the chest was performed.    COMPARISON:  07/28/2023    FINDINGS:  The cardiac silhouette is normal in size.  Patchy perihilar areas of increased attenuation noted bilaterally concerning for an infectious process/multifocal pneumonia, pulmonary edema not excluded.  No pleural effusion.  No pneumothorax.  The osseous structures appear normal.                                       Medications   cefTRIAXone (Rocephin) 1 g in dextrose 5 % in water (D5W) 100 mL IVPB (MB+) (has no administration in time range)   azithromycin tablet 500 mg (has no administration in time range)   furosemide injection 40 mg (has no administration  in time range)     Medical Decision Making  78-year-old male with past medical history as above brought in by EMS from home with complaint of shortness a breath, reports onset yesterday evening.  Upon arrival patient is afebrile, satting comfortably on room air in no distress.  Bedside ultrasound without evidence of pulmonary edema.  Differential includes but isn't limited to CHF, COPD, PE, anemia, renal failure.  X-ray concerning for multifocal pneumonia versus edema.  Shared decision-making with patient and spouse at bedside regarding obs admit vs discharge given indeterminate risk criteria, they prefer admission.  Ordered ceftriaxone, azithromycin.  Discussed with [facility] Medicine service and admitted for further monitoring and management of pneumonia.     Amount and/or Complexity of Data Reviewed  Independent Historian: EMS     Details: As documented in HPI   External Data Reviewed: radiology.     Details: Prior TTE reviewed   Labs: ordered. Decision-making details documented in ED Course.  Radiology: ordered and independent interpretation performed.  ECG/medicine tests: ordered and independent interpretation performed.    Risk  OTC drugs.  Prescription drug management.  Decision regarding hospitalization.               ED Course as of 04/06/24 1508   Sat Apr 06, 2024   1336 11/2023 TTE There is reduced systolic function. Biplane (2D) method of discs ejection fraction is 15%. [AT]   1337 2/5/24 bilateral LE US from [facility]: IMPRESSION:   NO EVIDENCE FOR LOWER EXTREMITY DVT.    [AT]   1430 WBC: 10.84  Normal [AT]   1430 Hemoglobin(!): 11.8  Improved from prior [AT]   1435 CXR Impression:   Patchy perihilar increased attenuation bilaterally worse on the right concerning for multifocal pneumonia or edema.   [AT]   1438 SARS-CoV-2 RNA, Amplification, Qual: Negative [AT]   1443 D-Dimer(!): 1.87  Elevated, although now that CXR has resulted pna seems more likely  [AT]   1446 BUN: 21 [AT]   1446 Creatinine:  1.0  Normal  [AT]   1447 PSI/PORT score 87, Risk Class III, 0.9-2.8% mortality [AT]   1449 Troponin I(!): 0.510  Elevated but down from prior of 2.1  [AT]   1458 BNP(!): 1,125  Elevated compared with prior [AT]      ED Course User Index  [AT] Deidra Rondon MD                           Clinical Impression:  Final diagnoses:  [R06.02] Shortness of breath  [J18.9] Pneumonia          ED Disposition Condition    Observation Stable                Deidra Rondon MD  04/06/24 6775

## 2024-04-06 NOTE — ASSESSMENT & PLAN NOTE
CXR:  Patchy perihilar increased attenuation bilaterally worse on the right concerning for multifocal pneumonia or edema   CTX started in ED- continue  Add Azithromycin

## 2024-04-06 NOTE — SUBJECTIVE & OBJECTIVE
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 Had previous stentsd    CORONARY ARTERY BYPASS GRAFT  06/1996    bypass of 2 vessels    INSERT / REPLACE / REMOVE PACEMAKER      AICD; generator change in 2/10/17    JOINT REPLACEMENT Right 12/08/1991    total hip replacement    LEFT HEART CATHETERIZATION Left 7/26/2023    Procedure: Left heart cath;  Surgeon: Kaycee Fermin MD;  Location: [facility] CATH LAB/EP;  Service: Cardiology;  Laterality: Left;    SINUS SURGERY         Review of patient's allergies indicates:  No Known Allergies    No current facility-administered medications on file prior to encounter.     Current Outpatient Medications on File Prior to Encounter   Medication Sig    amiodarone (PACERONE) 200 MG Tab Take 1 tablet (200 mg total) by mouth once daily.    aspirin (ECOTRIN) 81 MG EC tablet Take 81 mg by mouth once daily.      atorvastatin (LIPITOR) 80 MG tablet Take 1 tablet (80 mg total) by mouth every evening.    clopidogreL (PLAVIX) 75 mg tablet TAKE 1 TABLET(75 MG) BY MOUTH EVERY EVENING (Patient taking differently: Take 75 mg by mouth every evening.)    coenzyme Q10 200 mg capsule Take 200 mg by mouth once daily.    FOLIC ACID/MV,FE,OTHER MIN (CENTRUM ORAL) Take 1 tablet by mouth every morning.     furosemide (LASIX) 20 MG tablet Take 2 tablets (40 mg total) by mouth once daily. (Patient taking differently: Take 20 mg by mouth  once daily.)    metoprolol succinate (TOPROL-XL) 50 MG 24 hr tablet TAKE 1 TABLET(50 MG) BY MOUTH EVERY DAY (Patient taking differently: 50 mg once daily.)    montelukast (SINGULAIR) 10 mg tablet Take 1 tablet (10 mg total) by mouth daily as needed.    nitroGLYCERIN (NITROSTAT) 0.4 MG SL tablet ONE TABLET UNDER TONGUE AS NEEDED FOR CHEST PAIN. TAKE EVERY 5 MINUTES AS NEEDED (Patient taking differently: Place 0.4 mg under the tongue every 5 (five) minutes as needed for Chest pain.)    sacubitriL-valsartan (ENTRESTO) 24-26 mg per tablet Take 1 tablet by mouth 2 (two) times daily.    spironolactone (ALDACTONE) 25 MG tablet TAKE 1 TABLET(25 MG) BY MOUTH EVERY DAY    [DISCONTINUED] empagliflozin (JARDIANCE) 10 mg tablet Take 1 tablet (10 mg total) by mouth once daily.    [DISCONTINUED] fluticasone (FLONASE) 50 mcg/actuation nasal spray 1 spray by Nasal route as needed.      Family History       Problem Relation (Age of Onset)    Heart attack Father, Paternal Uncle, Maternal Aunt, Paternal Aunt    Stroke Mother          Tobacco Use    Smoking status: Every Day     Current packs/day: 1.00     Average packs/day: 1 pack/day for 58.3 years (58.3 ttl pk-yrs)     Types: Cigarettes     Start date: 1966    Smokeless tobacco: Never    Tobacco comments:     SAYS TRIED EVERYTHING INCLUDING HYPNOSIOS AND DRUGS; NOT INTERESTED IN SMOKING PROGRAM HERE   Substance and Sexual Activity    Alcohol use: Yes     Comment: social; rare    Drug use: No    Sexual activity: Yes     Partners: Female     Review of Systems   Constitutional:  Positive for activity change. Negative for chills, diaphoresis and fever.   HENT:  Negative for congestion.    Eyes:  Negative for photophobia and visual disturbance.   Respiratory:  Positive for cough and shortness of breath.    Cardiovascular:  Positive for leg swelling.   Gastrointestinal:  Negative for abdominal distention, abdominal pain, nausea and vomiting.   Endocrine: Negative for polydipsia and  "polyphagia.   Genitourinary:  Negative for dysuria.   Skin:  Negative for color change and wound.   Neurological:  Negative for dizziness and numbness.   Psychiatric/Behavioral:  Negative for agitation and confusion.      Objective:     Vital Signs (Most Recent):  Temp: 99.3 °F (37.4 °C) (04/06/24 1345)  Pulse: 71 (04/06/24 1345)  Resp: 18 (04/06/24 1345)  BP: (!) 134/90 (04/06/24 1345)  SpO2: 96 % (04/06/24 1345) Vital Signs (24h Range):  Temp:  [99.3 °F (37.4 °C)] 99.3 °F (37.4 °C)  Pulse:  [71] 71  Resp:  [18] 18  SpO2:  [96 %] 96 %  BP: (134)/(90) 134/90        There is no height or weight on file to calculate BMI.     Physical Exam  HENT:      Head: Normocephalic and atraumatic.      Nose: No congestion or rhinorrhea.      Mouth/Throat:      Pharynx: No oropharyngeal exudate or posterior oropharyngeal erythema.   Eyes:      Extraocular Movements: Extraocular movements intact.      Pupils: Pupils are equal, round, and reactive to light.   Cardiovascular:      Rate and Rhythm: Normal rate.   Pulmonary:      Breath sounds: Rales present.   Abdominal:      General: There is distension.      Palpations: Abdomen is soft.   Musculoskeletal:         General: Normal range of motion.      Cervical back: Normal range of motion.      Right lower leg: Edema present.      Left lower leg: Edema present.      Comments: + 3 edema, extending to the back   Skin:     General: Skin is warm.      Capillary Refill: Capillary refill takes less than 2 seconds.   Neurological:      Mental Status: He is alert and oriented to person, place, and time.              CRANIAL NERVES     CN III, IV, VI   Pupils are equal, round, and reactive to light.       Significant Labs: A1C: No results for input(s): "HGBA1C" in the last 4320 hours.  ABGs: No results for input(s): "PH", "PCO2", "HCO3", "POCSATURATED", "BE", "TOTALHB", "COHB", "METHB", "O2HB", "POCFIO2", "PO2" in the last 48 hours.  Blood Culture: No results for input(s): "LABBLOO" in the " "last 48 hours.  CBC:   Recent Labs   Lab 04/06/24  1407   WBC 10.84   HGB 11.8*   HCT 38.2*        CMP:   Recent Labs   Lab 04/06/24  1407   *   K 3.8      CO2 27      BUN 21   CREATININE 1.0   CALCIUM 8.9   PROT 6.6   ALBUMIN 2.6*   BILITOT 0.4   ALKPHOS 222*   AST 71*   ALT 58*   ANIONGAP 13     Lactic Acid: No results for input(s): "LACTATE" in the last 48 hours.  Lipase: No results for input(s): "LIPASE" in the last 48 hours.  Lipid Panel: No results for input(s): "CHOL", "HDL", "LDLCALC", "TRIG", "CHOLHDL" in the last 48 hours.  Magnesium:   Recent Labs   Lab 04/06/24  1407   MG 2.3     Troponin:   Recent Labs   Lab 04/06/24  1407   TROPONINI 0.510*     TSH: No results for input(s): "TSH" in the last 4320 hours.  Urine Culture: No results for input(s): "LABURIN" in the last 48 hours.  Urine Studies: No results for input(s): "COLORU", "APPEARANCEUA", "PHUR", "SPECGRAV", "PROTEINUA", "GLUCUA", "KETONESU", "BILIRUBINUA", "OCCULTUA", "NITRITE", "UROBILINOGEN", "LEUKOCYTESUR", "RBCUA", "WBCUA", "BACTERIA", "SQUAMEPITHEL", "HYALINECASTS" in the last 48 hours.    Invalid input(s): "WRIGHTSUR"    Significant Imaging: I have reviewed all pertinent imaging results/findings within the past 24 hours.  "

## 2024-04-07 LAB
ANION GAP SERPL CALC-SCNC: 13 MMOL/L (ref 8–16)
ANION GAP SERPL CALC-SCNC: 13 MMOL/L (ref 8–16)
BASOPHILS # BLD AUTO: 0.16 K/UL (ref 0–0.2)
BASOPHILS NFR BLD: 1.5 % (ref 0–1.9)
BUN SERPL-MCNC: 17 MG/DL (ref 8–23)
BUN SERPL-MCNC: 17 MG/DL (ref 8–23)
CALCIUM SERPL-MCNC: 8.5 MG/DL (ref 8.7–10.5)
CALCIUM SERPL-MCNC: 8.5 MG/DL (ref 8.7–10.5)
CHLORIDE SERPL-SCNC: 104 MMOL/L (ref 95–110)
CHLORIDE SERPL-SCNC: 104 MMOL/L (ref 95–110)
CO2 SERPL-SCNC: 28 MMOL/L (ref 23–29)
CO2 SERPL-SCNC: 28 MMOL/L (ref 23–29)
CREAT SERPL-MCNC: 1 MG/DL (ref 0.5–1.4)
CREAT SERPL-MCNC: 1 MG/DL (ref 0.5–1.4)
DIFFERENTIAL METHOD BLD: ABNORMAL
EOSINOPHIL # BLD AUTO: 3.3 K/UL (ref 0–0.5)
EOSINOPHIL NFR BLD: 31.1 % (ref 0–8)
ERYTHROCYTE [DISTWIDTH] IN BLOOD BY AUTOMATED COUNT: 16.2 % (ref 11.5–14.5)
EST. GFR  (NO RACE VARIABLE): >60 ML/MIN/1.73 M^2
EST. GFR  (NO RACE VARIABLE): >60 ML/MIN/1.73 M^2
GLUCOSE SERPL-MCNC: 97 MG/DL (ref 70–110)
GLUCOSE SERPL-MCNC: 97 MG/DL (ref 70–110)
HCT VFR BLD AUTO: 35.3 % (ref 40–54)
HGB BLD-MCNC: 11 G/DL (ref 14–18)
IMM GRANULOCYTES # BLD AUTO: 0.02 K/UL (ref 0–0.04)
IMM GRANULOCYTES NFR BLD AUTO: 0.2 % (ref 0–0.5)
LYMPHOCYTES # BLD AUTO: 1.4 K/UL (ref 1–4.8)
LYMPHOCYTES NFR BLD: 13.5 % (ref 18–48)
MAGNESIUM SERPL-MCNC: 2.2 MG/DL (ref 1.6–2.6)
MCH RBC QN AUTO: 27.7 PG (ref 27–31)
MCHC RBC AUTO-ENTMCNC: 31.2 G/DL (ref 32–36)
MCV RBC AUTO: 89 FL (ref 82–98)
MONOCYTES # BLD AUTO: 1.1 K/UL (ref 0.3–1)
MONOCYTES NFR BLD: 10.3 % (ref 4–15)
NEUTROPHILS # BLD AUTO: 4.6 K/UL (ref 1.8–7.7)
NEUTROPHILS NFR BLD: 43.4 % (ref 38–73)
NRBC BLD-RTO: 0 /100 WBC
PHOSPHATE SERPL-MCNC: 3.5 MG/DL (ref 2.7–4.5)
PLATELET # BLD AUTO: 388 K/UL (ref 150–450)
PMV BLD AUTO: 9 FL (ref 9.2–12.9)
POTASSIUM SERPL-SCNC: 3.6 MMOL/L (ref 3.5–5.1)
POTASSIUM SERPL-SCNC: 3.6 MMOL/L (ref 3.5–5.1)
RBC # BLD AUTO: 3.97 M/UL (ref 4.6–6.2)
SODIUM SERPL-SCNC: 145 MMOL/L (ref 136–145)
SODIUM SERPL-SCNC: 145 MMOL/L (ref 136–145)
WBC # BLD AUTO: 10.55 K/UL (ref 3.9–12.7)

## 2024-04-07 PROCEDURE — 63600175 PHARM REV CODE 636 W HCPCS: Performed by: FAMILY MEDICINE

## 2024-04-07 PROCEDURE — 80048 BASIC METABOLIC PNL TOTAL CA: CPT | Performed by: FAMILY MEDICINE

## 2024-04-07 PROCEDURE — 94640 AIRWAY INHALATION TREATMENT: CPT

## 2024-04-07 PROCEDURE — 36415 COLL VENOUS BLD VENIPUNCTURE: CPT | Performed by: FAMILY MEDICINE

## 2024-04-07 PROCEDURE — 99900035 HC TECH TIME PER 15 MIN (STAT)

## 2024-04-07 PROCEDURE — 11000001 HC ACUTE MED/SURG PRIVATE ROOM

## 2024-04-07 PROCEDURE — 25000003 PHARM REV CODE 250: Performed by: FAMILY MEDICINE

## 2024-04-07 PROCEDURE — 84100 ASSAY OF PHOSPHORUS: CPT | Performed by: FAMILY MEDICINE

## 2024-04-07 PROCEDURE — 97535 SELF CARE MNGMENT TRAINING: CPT

## 2024-04-07 PROCEDURE — 85025 COMPLETE CBC W/AUTO DIFF WBC: CPT | Performed by: FAMILY MEDICINE

## 2024-04-07 PROCEDURE — 96376 TX/PRO/DX INJ SAME DRUG ADON: CPT

## 2024-04-07 PROCEDURE — 97162 PT EVAL MOD COMPLEX 30 MIN: CPT

## 2024-04-07 PROCEDURE — 25000242 PHARM REV CODE 250 ALT 637 W/ HCPCS: Performed by: FAMILY MEDICINE

## 2024-04-07 PROCEDURE — 97165 OT EVAL LOW COMPLEX 30 MIN: CPT

## 2024-04-07 PROCEDURE — 97530 THERAPEUTIC ACTIVITIES: CPT

## 2024-04-07 PROCEDURE — 94761 N-INVAS EAR/PLS OXIMETRY MLT: CPT

## 2024-04-07 PROCEDURE — 83735 ASSAY OF MAGNESIUM: CPT | Performed by: FAMILY MEDICINE

## 2024-04-07 PROCEDURE — 27000221 HC OXYGEN, UP TO 24 HOURS

## 2024-04-07 RX ADMIN — POTASSIUM BICARBONATE 25 MEQ: 978 TABLET, EFFERVESCENT ORAL at 09:04

## 2024-04-07 RX ADMIN — SACUBITRIL AND VALSARTAN 1 TABLET: 24; 26 TABLET, FILM COATED ORAL at 09:04

## 2024-04-07 RX ADMIN — CEFTRIAXONE SODIUM 2 G: 2 INJECTION, POWDER, FOR SOLUTION INTRAMUSCULAR; INTRAVENOUS at 03:04

## 2024-04-07 RX ADMIN — ENOXAPARIN SODIUM 40 MG: 40 INJECTION SUBCUTANEOUS at 04:04

## 2024-04-07 RX ADMIN — SPIRONOLACTONE 25 MG: 25 TABLET, FILM COATED ORAL at 09:04

## 2024-04-07 RX ADMIN — ATORVASTATIN CALCIUM 80 MG: 40 TABLET, FILM COATED ORAL at 09:04

## 2024-04-07 RX ADMIN — AZITHROMYCIN MONOHYDRATE 500 MG: 500 INJECTION, POWDER, LYOPHILIZED, FOR SOLUTION INTRAVENOUS at 04:04

## 2024-04-07 RX ADMIN — AMIODARONE HYDROCHLORIDE 200 MG: 200 TABLET ORAL at 09:04

## 2024-04-07 RX ADMIN — FUROSEMIDE 40 MG: 10 INJECTION, SOLUTION INTRAVENOUS at 09:04

## 2024-04-07 RX ADMIN — CLOPIDOGREL BISULFATE 75 MG: 75 TABLET ORAL at 05:04

## 2024-04-07 RX ADMIN — METOPROLOL SUCCINATE 50 MG: 50 TABLET, EXTENDED RELEASE ORAL at 09:04

## 2024-04-07 RX ADMIN — ASPIRIN 81 MG: 81 TABLET, COATED ORAL at 09:04

## 2024-04-07 RX ADMIN — IPRATROPIUM BROMIDE AND ALBUTEROL SULFATE 3 ML: .5; 3 SOLUTION RESPIRATORY (INHALATION) at 11:04

## 2024-04-07 NOTE — PLAN OF CARE
Problem: Physical Therapy  Goal: Physical Therapy Goal  Description: Goals to be met by: 24     Patient will increase functional independence with mobility by performin. Supine to sit with [place]  2. Sit to supine with [place]  3. Sit to stand transfer with Modified [place] w/RW  4. Gait  x 200 feet with Modified [place] using Rolling Walker.     Outcome: Ongoing, Progressing     PT evaluation completed. At baseline, pt's wife reports pt with hx of memory impairments and mild bouts of confusion. Wife reports pt sleeps in a lazy boy and requires assist to perform supine to sit and sit to supine. Wife provides SBA for patient during all functional mobility with use of RW. Pt WBAT to RLE with posterior hip precautions d/t recent hip replacement.  Pt is near or at his functional baseline, but would continue to benefit while admitted inpatient. Pt is safe to DC home once medically cleared.

## 2024-04-07 NOTE — SUBJECTIVE & OBJECTIVE
Interval History: awake and alert.     Still fluid overload on exam   Diuresing- continue IV Lasix  Inpatient Upgrade Note    Tristen Blanc has warranted treatment spanning two or more midnights of hospital level care for the management of pneumonia and heart failure. He continues to require IV diuresis, daily labs, further testing/imaging, monitoring of vital signs, and medication adjustments. His condition is also complicated by the following comorbidities: Coronary Artery Disease, Hypertension, Chronic kidney disease, and Heart failure.         Review of Systems   Constitutional:  Positive for activity change. Negative for chills, diaphoresis and fever.   Respiratory:  Negative for cough and shortness of breath.    Cardiovascular:  Negative for leg swelling.   Gastrointestinal:  Negative for abdominal distention, abdominal pain, nausea and vomiting.   Genitourinary:  Negative for dysuria.   Skin:  Negative for color change and wound.   Neurological:  Negative for dizziness and numbness.   Psychiatric/Behavioral:  Negative for agitation and confusion.      Objective:     Vital Signs (Most Recent):  Temp: 97.9 °F (36.6 °C) (04/07/24 0737)  Pulse: 63 (04/07/24 0737)  Resp: 19 (04/07/24 0737)  BP: (!) 126/58 (04/07/24 0737)  SpO2: (!) 94 % (04/07/24 0737) Vital Signs (24h Range):  Temp:  [97.9 °F (36.6 °C)-99.3 °F (37.4 °C)] 97.9 °F (36.6 °C)  Pulse:  [62-71] 63  Resp:  [18-20] 19  SpO2:  [93 %-98 %] 94 %  BP: (108-134)/(53-90) 126/58     Weight: 78.5 kg (173 lb 1 oz)  Body mass index is 28.8 kg/m².    Intake/Output Summary (Last 24 hours) at 4/7/2024 1011  Last data filed at 4/7/2024 0507  Gross per 24 hour   Intake 1060 ml   Output 1850 ml   Net -790 ml         Physical Exam  HENT:      Head: Normocephalic and atraumatic.   Cardiovascular:      Rate and Rhythm: Normal rate.   Pulmonary:      Breath sounds: Rales present.   Abdominal:      General: There is distension.      Palpations: Abdomen is soft.  "  Musculoskeletal:         General: Normal range of motion.      Cervical back: Normal range of motion.      Right lower leg: Edema present.      Left lower leg: Edema present.      Comments: + 3 edema, extending to the back   Skin:     General: Skin is warm.      Capillary Refill: Capillary refill takes less than 2 seconds.   Neurological:      Mental Status: He is alert and oriented to person, place, and time.         Significant Labs: A1C:   Recent Labs   Lab 04/06/24  1407   HGBA1C 5.1     ABGs: No results for input(s): "PH", "PCO2", "HCO3", "POCSATURATED", "BE", "TOTALHB", "COHB", "METHB", "O2HB", "POCFIO2", "PO2" in the last 48 hours.  Blood Culture: No results for input(s): "LABBLOO" in the last 48 hours.  CBC:   Recent Labs   Lab 04/06/24  1407 04/07/24  0241   WBC 10.84 10.55   HGB 11.8* 11.0*   HCT 38.2* 35.3*    388     CMP:   Recent Labs   Lab 04/06/24  1407 04/07/24  0241   * 145  145   K 3.8 3.6  3.6    104  104   CO2 27 28  28    97  97   BUN 21 17  17   CREATININE 1.0 1.0  1.0   CALCIUM 8.9 8.5*  8.5*   PROT 6.6  --    ALBUMIN 2.6*  --    BILITOT 0.4  --    ALKPHOS 222*  --    AST 71*  --    ALT 58*  --    ANIONGAP 13 13  13     Coagulation: No results for input(s): "PT", "INR", "APTT" in the last 48 hours.  Lactic Acid: No results for input(s): "LACTATE" in the last 48 hours.  Lipase: No results for input(s): "LIPASE" in the last 48 hours.  Lipid Panel: No results for input(s): "CHOL", "HDL", "LDLCALC", "TRIG", "CHOLHDL" in the last 48 hours.  Magnesium:   Recent Labs   Lab 04/06/24  1407 04/07/24  0241   MG 2.3 2.2     Troponin:   Recent Labs   Lab 04/06/24  1407   TROPONINI 0.510*     TSH: No results for input(s): "TSH" in the last 4320 hours.  Urine Culture: No results for input(s): "LABURIN" in the last 48 hours.  Urine Studies: No results for input(s): "COLORU", "APPEARANCEUA", "PHUR", "SPECGRAV", "PROTEINUA", "GLUCUA", "KETONESU", "BILIRUBINUA", "OCCULTUA", " ""NITRITE", "UROBILINOGEN", "LEUKOCYTESUR", "RBCUA", "WBCUA", "BACTERIA", "SQUAMEPITHEL", "HYALINECASTS" in the last 48 hours.    Invalid input(s): "DELANEYSUR"    Significant Imaging: I have reviewed all pertinent imaging results/findings within the past 24 hours.  "

## 2024-04-07 NOTE — PROGRESS NOTES
[facility] Medicine  Progress Note    Patient Name: Tristen Blanc  MRN: [id number]  Patient Class: OP- Observation   Admission Date: 4/6/2024  Length of Stay: 0 days  Attending Physician: Kayleen Bowling*  Primary Care Provider: Alan Patterson MD        Subjective:     Principal Problem:Pneumonia        HPI:  Tristen Blanc is a 77 yo M with PMH of coronary disease, congestive heart failure with depressed ejection fraction, dyslipidemia, BPH, arthritis, dementia, sleep apnea, and s/p hip fracture 1/9/24 brought in by EMS from home with complaint of 4 days history of progressive shortness of breath that is worse yesterday night. There is associated leg swelling, cough weakness, and PND,.  Denies fever, chills, diaphoresis, dysuria, abdominal distention.   History provided by patient and spouse at the bedside  BNP 1125, troponin 0.510, WBC 10.84, H/H 11.8/38.2, sodium 147, potassium 3.8, CXR with concerns for pulmonary edema/pneumonia.  Patient started on IV Lasix and IV ceftriaxone in the ED.        Overview/Hospital Course:  No notes on file    Interval History: awake and alert.     Still fluid overload on exam   Diuresing- continue IV Lasix  Inpatient Upgrade Note    Tristen Blanc has warranted treatment spanning two or more midnights of hospital level care for the management of pneumonia and heart failure. He continues to require IV diuresis, daily labs, further testing/imaging, monitoring of vital signs, and medication adjustments. His condition is also complicated by the following comorbidities: Coronary Artery Disease, Hypertension, Chronic kidney disease, and Heart failure.         Review of Systems   Constitutional:  Positive for activity change. Negative for chills, diaphoresis and fever.   Respiratory:  Negative for cough and shortness of breath.    Cardiovascular:  Negative for leg swelling.   Gastrointestinal:  Negative for abdominal distention, abdominal  "pain, nausea and vomiting.   Genitourinary:  Negative for dysuria.   Skin:  Negative for color change and wound.   Neurological:  Negative for dizziness and numbness.   Psychiatric/Behavioral:  Negative for agitation and confusion.      Objective:     Vital Signs (Most Recent):  Temp: 97.9 °F (36.6 °C) (04/07/24 0737)  Pulse: 63 (04/07/24 0737)  Resp: 19 (04/07/24 0737)  BP: (!) 126/58 (04/07/24 0737)  SpO2: (!) 94 % (04/07/24 0737) Vital Signs (24h Range):  Temp:  [97.9 °F (36.6 °C)-99.3 °F (37.4 °C)] 97.9 °F (36.6 °C)  Pulse:  [62-71] 63  Resp:  [18-20] 19  SpO2:  [93 %-98 %] 94 %  BP: (108-134)/(53-90) 126/58     Weight: 78.5 kg (173 lb 1 oz)  Body mass index is 28.8 kg/m².    Intake/Output Summary (Last 24 hours) at 4/7/2024 1011  Last data filed at 4/7/2024 0507  Gross per 24 hour   Intake 1060 ml   Output 1850 ml   Net -790 ml         Physical Exam  HENT:      Head: Normocephalic and atraumatic.   Cardiovascular:      Rate and Rhythm: Normal rate.   Pulmonary:      Breath sounds: Rales present.   Abdominal:      General: There is distension.      Palpations: Abdomen is soft.   Musculoskeletal:         General: Normal range of motion.      Cervical back: Normal range of motion.      Right lower leg: Edema present.      Left lower leg: Edema present.      Comments: + 3 edema, extending to the back   Skin:     General: Skin is warm.      Capillary Refill: Capillary refill takes less than 2 seconds.   Neurological:      Mental Status: He is alert and oriented to person, place, and time.         Significant Labs: A1C:   Recent Labs   Lab 04/06/24  1407   HGBA1C 5.1     ABGs: No results for input(s): "PH", "PCO2", "HCO3", "POCSATURATED", "BE", "TOTALHB", "COHB", "METHB", "O2HB", "POCFIO2", "PO2" in the last 48 hours.  Blood Culture: No results for input(s): "LABBLOO" in the last 48 hours.  CBC:   Recent Labs   Lab 04/06/24  1407 04/07/24  0241   WBC 10.84 10.55   HGB 11.8* 11.0*   HCT 38.2* 35.3*    388 " "    CMP:   Recent Labs   Lab 04/06/24  1407 04/07/24  0241   * 145  145   K 3.8 3.6  3.6    104  104   CO2 27 28  28    97  97   BUN 21 17  17   CREATININE 1.0 1.0  1.0   CALCIUM 8.9 8.5*  8.5*   PROT 6.6  --    ALBUMIN 2.6*  --    BILITOT 0.4  --    ALKPHOS 222*  --    AST 71*  --    ALT 58*  --    ANIONGAP 13 13  13     Coagulation: No results for input(s): "PT", "INR", "APTT" in the last 48 hours.  Lactic Acid: No results for input(s): "LACTATE" in the last 48 hours.  Lipase: No results for input(s): "LIPASE" in the last 48 hours.  Lipid Panel: No results for input(s): "CHOL", "HDL", "LDLCALC", "TRIG", "CHOLHDL" in the last 48 hours.  Magnesium:   Recent Labs   Lab 04/06/24  1407 04/07/24  0241   MG 2.3 2.2     Troponin:   Recent Labs   Lab 04/06/24  1407   TROPONINI 0.510*     TSH: No results for input(s): "TSH" in the last 4320 hours.  Urine Culture: No results for input(s): "LABURIN" in the last 48 hours.  Urine Studies: No results for input(s): "COLORU", "APPEARANCEUA", "PHUR", "SPECGRAV", "PROTEINUA", "GLUCUA", "KETONESU", "BILIRUBINUA", "OCCULTUA", "NITRITE", "UROBILINOGEN", "LEUKOCYTESUR", "RBCUA", "WBCUA", "BACTERIA", "SQUAMEPITHEL", "HYALINECASTS" in the last 48 hours.    Invalid input(s): "WRIGHTSUR"    Significant Imaging: I have reviewed all pertinent imaging results/findings within the past 24 hours.    Assessment/Plan:      * Pneumonia    CXR:  Patchy perihilar increased attenuation bilaterally worse on the right concerning for multifocal pneumonia or edema   CTX started in ED- continue  Add Azithromycin    Elevated troponin    Likely due to demand ischemia  monitor    H/O: stroke  Lipitor and ASA      Chronic combined systolic and diastolic CHF, NYHA class 2  Patient is identified as having Combined Systolic and Diastolic heart failure that is Acute on chronic. CHF is currently uncontrolled due to Continued edema of extremities and Rales/crackles on pulmonary exam. " Latest ECHO performed and demonstrates- Results for orders placed during the hospital encounter of 12/15/23    Echo    Interpretation Summary    Left Ventricle: The left ventricle is dilated. There is eccentric hypertrophy. Regional wall motion abnormalities present. See diagram for wall motion findings. Septal motion is consistent with post-operative status. There is reduced systolic function. Biplane (2D) method of discs ejection fraction is 15%.    Grade II diastolic dysfunction.    Right Ventricle: Normal right ventricular cavity size. Systolic function is reduced.TAPSE is 1.51 cm.    Left Atrium: Left atrium is mildly dilated. The left atrium volume index is 35.4 mL/m2.    Right Atrium: Right atrium is dilated.    Mitral Valve: There is moderate regurgitation.    Pulmonary Artery: The estimated pulmonary artery systolic pressure is 37 mmHg.    IVC/SVC: Normal venous pressure at 3 mmHg.  . Continue Beta Blocker, Furosemide, Aldactone, and ARNI and monitor clinical status closely. Monitor on telemetry. Patient is on CHF pathway.  Monitor strict Is&Os and daily weights.  Place on fluid restriction of 1 L. Cardiology has not been consulted. Continue to stress to patient importance of self efficacy and  on diet for CHF. Last BNP reviewed- and noted below   Recent Labs   Lab 04/06/24  1407   BNP 1,125*        CXR with pulmonary edema  Continue OV Lasix  Strict input/output    Coronary artery disease involving native coronary artery of native heart without angina pectoris  CAD (coronary artery disease) of artery bypass graft  ICD (implantable cardioverter-defibrillator) in place  Patient with known CAD s/p CABG, an s/p stent, which is controlled Will continue ASA, Plavix, and Statin and monitor for S/Sx of angina/ACS. Continue to monitor on telemetry.     Essential (primary) hypertension  Chronic, controlled. Latest blood pressure and vitals reviewed-     Temp:  [99.3 °F (37.4 °C)]   Pulse:  [71]   Resp:  [18]    BP: (134)/(90)   SpO2:  [96 %] .   Home meds for hypertension were reviewed and noted below.   Hypertension Medications               furosemide (LASIX) 20 MG tablet Take 2 tablets (40 mg total) by mouth once daily.    metoprolol succinate (TOPROL-XL) 50 MG 24 hr tablet TAKE 1 TABLET(50 MG) BY MOUTH EVERY DAY    nitroGLYCERIN (NITROSTAT) 0.4 MG SL tablet ONE TABLET UNDER TONGUE AS NEEDED FOR CHEST PAIN. TAKE EVERY 5 MINUTES AS NEEDED    sacubitriL-valsartan (ENTRESTO) 24-26 mg per tablet Take 1 tablet by mouth 2 (two) times daily.    spironolactone (ALDACTONE) 25 MG tablet TAKE 1 TABLET(25 MG) BY MOUTH EVERY DAY            While in the hospital, will manage blood pressure as follows; Continue home antihypertensive regimen    Will utilize p.r.n. blood pressure medication only if patient's blood pressure greater than 140/90 and he develops symptoms such as worsening chest pain or shortness of breath.    JERONIMO on CPAP    No longer use CPAP.   Patient stated he does not want to use it  Patient ope to try it again    Other hyperlipidemia    Lipitor      VTE Risk Mitigation (From admission, onward)           Ordered     enoxaparin injection 40 mg  Daily         04/06/24 1520     IP VTE HIGH RISK PATIENT  Once         04/06/24 1520     Place sequential compression device  Until discontinued         04/06/24 1520                    Discharge Planning   ANTHONY:      Code Status: Full Code   Is the patient medically ready for discharge?:     Reason for patient still in hospital (select all that apply): Patient trending condition               Kayleen Bowling MD  Department of Hospital Medicine   [place] [facility]

## 2024-04-07 NOTE — PLAN OF CARE
Problem: Occupational Therapy  Goal: Occupational Therapy Goal  Description: Goals to be met by:4/21/2024     Patient will increase functional independence with ADLs by performing:    UE Dressing with Modified [place].  LE Dressing with Stand-by Assistance and Assistive Devices as needed.  Grooming while standing with Supervision.  Toileting from toilet with Supervision for hygiene and clothing management.   Toilet transfer to toilet with Supervision.    Outcome: Ongoing, Progressing

## 2024-04-07 NOTE — PT/OT/SLP EVAL
Physical Therapy Evaluation    Patient Name:  Tristen Blanc   MRN:  [id number]    Recommendations:     Discharge Recommendations: Low Intensity Therapy (resume OP PT)   Discharge Equipment Recommendations: none   Barriers to discharge: None    Assessment:     Tristen Blanc is a 78 y.o. male admitted with a medical diagnosis of Pneumonia.  He presents with the following impairments/functional limitations: weakness, impaired endurance, impaired functional mobility, gait instability, impaired balance, decreased lower extremity function, pain, impaired cardiopulmonary response to activity. PT evaluation completed. At baseline, pt's wife reports pt with hx of memory impairments and mild bouts of confusion. Wife reports pt sleeps in a lazy boy and requires assist to perform supine to sit and sit to supine. Wife provides SBA for patient during all functional mobility with use of RW. Pt WBAT to RLE with posterior hip precautions d/t recent hip replacement.  Pt is near or at his functional baseline, but would continue to benefit while admitted inpatient. Pt is safe to DC home once medically cleared.     Rehab Prognosis: Good; patient would benefit from acute skilled PT services to address these deficits and reach maximum level of function.    Recent Surgery: * No surgery found *      Plan:     During this hospitalization, patient to be seen 3 x/week to address the identified rehab impairments via gait training, therapeutic activities, therapeutic exercises, neuromuscular re-education and progress toward the following goals:    Plan of Care Expires:  04/21/24    Subjective     Chief Complaint: weakness   Patient/Family Comments/goals: to go home   Pain/Comfort:  Pain Rating 1: 3/10  Location 1: chest  Pain Addressed 1: Nurse notified  Pain Rating Post-Intervention 1: 3/10  Location 2: chest  Pain Addressed 2: Nurse notified    Patients cultural, spiritual, Baptism conflicts given the current situation:       Living Environment:  Pt lives in a H with wife, wife home and able to assist pt as needed. Wife assists pt with intermittent bed mobility and provides SBA for patient when transferring and ambulating HH distances with RW.   Prior to admission, patients level of function was SBA for HH distances with RW following recent hip surgery.   Equipment used at home: walker, rolling.  DME owned (not currently used): none.  Upon discharge, patient will have assistance from wife.    Objective:     Communicated with RN prior to session.  Patient found supine with telemetry, PureWick, oxygen  upon PT entry to room.    General Precautions: Standard, fall  Orthopedic Precautions:RLE weight bearing as tolerated, RLE posterior precautions   Braces: N/A  Respiratory Status: Nasal cannula, flow 1 L/min  Attempted to wean patient from 1 L NC, however pt desaturated to 89% on RA, placed pt back on 1 L.   Exams:  Cognitive Exam:  Patient is oriented to Person, Place, and Time, short term memory loss and memory impairments present at baseline.   Postural Exam:  Patient presented with the following abnormalities:    -       Rounded shoulders  Sensation: -       Intact  RLE ROM: WFL, within limits of posterior hip precautions  RLE Strength: WFL, within limits of posterior hip precautions  LLE ROM: WFL  LLE Strength: WFL    Functional Mobility:  Bed Mobility:     Supine to Sit: contact guard assistance, vc's and education to adhere to posterior hip precautions.   Sit to Supine: stand by assistance, vc's and education to adhere to posterior hip precautions.   Transfers:     Sit to Stand:  stand by assistance with rolling walker  Gait: x 15 ft RW sba, decreased amanda noted. Increased R hip pain reported with wbing to RLE.   Balance: Good static and dynamic standing balance noted with RW. Good static and dynamic sitting balance noted at eob.       AM-PAC 6 CLICK MOBILITY  Total Score:19       Treatment & Education:  Patient educated on  posterior hip precautions. Pt sat eob x 10 minutes independently. Overall, pt reports intermittent chest pain 3/10 with activity, no increase with breathing, however, seems related to exertion. RN notified. Patient able to ambulate x 15 ft with RW and SBA with cues for posterior hip precautions. Educated family on upright tolerance as well as PLB at eob to assist with improving O2 saturation, however, unable to increase past 89% therefor 1 L NC placed back on patient.     Patient left supine with all lines intact, call button in reach, and RN notified. Bed alarm on.     GOALS:   Multidisciplinary Problems       Physical Therapy Goals          Problem: Physical Therapy    Goal Priority Disciplines Outcome Goal Variances Interventions   Physical Therapy Goal     PT, PT/OT Ongoing, Progressing     Description: Goals to be met by: 24     Patient will increase functional independence with mobility by performin. Supine to sit with [place]  2. Sit to supine with [place]  3. Sit to stand transfer with Modified [place] w/RW  4. Gait  x 200 feet with Modified [place] using Rolling Walker.                          History:     Past Medical History:   Diagnosis Date    Allergy     Arthritis     Cardiomyopathy     ischemic    CHF (congestive heart failure)     Coronary artery disease     s/p CABG    General anesthetics causing adverse effect in therapeutic use     Hyperlipidemia     Hypertension     ICD (implantable cardioverter-defibrillator) in place 5/3/2013    Sleep apnea with use of continuous positive airway pressure (CPAP)        Past Surgical History:   Procedure Laterality Date    CARDIAC CATHETERIZATION  2011    CARDIAC DEFIBRILLATOR PLACEMENT      COLONOSCOPY      CORONARY ANGIOPLASTY  1/3/2011    Last stent was in LMCA, Cx. Had previous stentsd    CORONARY ARTERY BYPASS GRAFT  1996    bypass of 2 vessels    INSERT / REPLACE / REMOVE PACEMAKER      AICD; generator change in  2/10/17    JOINT REPLACEMENT Right 12/08/1991    total hip replacement    LEFT HEART CATHETERIZATION Left 7/26/2023    Procedure: Left heart cath;  Surgeon: Kaycee Fermin MD;  Location: [facility] CATH LAB/EP;  Service: Cardiology;  Laterality: Left;    SINUS SURGERY         Time Tracking:     PT Received On: 04/07/24  PT Start Time: 0959     PT Stop Time: 1030  PT Total Time (min): 31     Billable Minutes: Evaluation 15 and Therapeutic Activity 16      04/07/2024

## 2024-04-07 NOTE — CONSULTS
RD providing remote coverage.   Consulted for education on fluid and salt restriction   - attached clinical reference materials handouts to discharge documents.    On site RD to provide in-person education as warranted,   education materials to be provided with discharge instructions.      Please re-consult as needed.  Thank you.   Ivory Payne, RDN, LDN

## 2024-04-07 NOTE — PT/OT/SLP EVAL
Occupational Therapy   Evaluation, tx    Name: Tristen Blanc  MRN: [id number]  Admitting Diagnosis: Pneumonia  Recent Surgery: * No surgery found *    Diagnoses of Shortness of breath and Pneumonia were pertinent to this visit.   Pre-op Diagnosis: * No surgery found *       Recommendations:     Discharge Recommendations: Low Intensity Therapy  Discharge Equipment Recommendations:   (sock aid(wife to purchase) and new walker glide caps)  Barriers to discharge:  None    Assessment:     Tristen Blanc is a 78 y.o. male with a medical diagnosis of Pneumonia.  He presents with  Performance deficits affecting function: weakness, impaired endurance, impaired self care skills, impaired functional mobility, gait instability, decreased lower extremity function, decreased safety awareness, impaired cognition, impaired cardiopulmonary response to activity, orthopedic precautions.      Pt. has recent hx of R Total Hip revision and has posterior hip precautions and has memory deficits from a previous stroke. Pt needed minimal vc as reminders of his hip precautions. Spouse was present and provided pt;'s history to clinician. Pt performed bed mobility SBA supine>sit, sit<>stand SBA with RW with VC for HP, Ambulated SBA with RW to sink and around bed to BS chair. Pt preformed g/hygiene SBA standing inside RW at sink.. he perform BS chair t/f with SBA with RW. Spouse was educated in above and discussed reccommended sock aid and new walker glide caps for pt's RW because tennis balls on walker are worn down and unsafe. She verbalized understanding. Pt would benefit from Low Intensity Therapy at discharge. Continue with OT POC.     Rehab Prognosis: Good; patient would benefit from acute skilled OT services to address these deficits and reach maximum level of function.       Plan:     Patient to be seen 3 x/week to address the above listed problems via self-care/home management, therapeutic activities, therapeutic  exercises  Plan of Care Expires: 05/07/24  Plan of Care Reviewed with:      Subjective     Chief Complaint: none  Patient/Family Comments/goals: pt agreeable.    Occupational Profile:  Living Environment: pt lives with his wife in a SSH with wife; t/s combo with TTB.   Previous level of function: Indep-min A for LE dressing; ambulated SBA with RW; goes to OPPT. Wife drives, cooks. Cleans.  Roles and Routines: OPPT 3x/wk,   Equipment Used at Home: walker, rolling, bath bench, raised toilet (pt has a reacher, LH sponge and  shoe horn)  Assistance upon Discharge: spouse    Pain/Comfort:  Pain Rating 1: 0/10  Pain Rating Post-Intervention 1: 0/10    Patients cultural, spiritual, Church conflicts given the current situation: no    Objective:     Communicated with: nurse prior to session.  Patient found HOB elevated with oxygen, telemetry, bed alarm, PureWick upon OT entry to room.    General Precautions: Standard, fall (fluid restriction)  Orthopedic Precautions: RLE weight bearing as tolerated, RLE posterior precautions ((R) THR (revision) 1/25/2024)  Braces: N/A  Respiratory Status: Nasal cannula, flow 1 L/min    Occupational Performance:    Bed Mobility:    Patient completed Scooting/Bridging with stand by assistance and with side rail  Patient completed Supine to Sit with stand by assistance and with side rail    Functional Mobility/Transfers:  Patient completed Sit <> Stand Transfer with stand by assistance and vc for HP  with  rolling walker   Patient completed Bed <> Chair Transfer using Step Transfer technique with stand by assistance with rolling walker  Functional Mobility: ambulated SBA with RW to sink back to bed and then ambulated longer distance around the bed to bs chair. Vc for upright posture with inconsistent follow through.    Activities of Daily Living:  Feeding:  independence .  Grooming: stand by assistance washed face and brushed teeth standing inside RW at sink  Lower Body Dressing:  total assistance socks  Toileting: nt pt wearing purewick      Cognitive/Visual Perceptual:  Cognitive/Psychosocial Skills:     -       Oriented to: Person, Place, Time, and Situation   -       Follows Commands/attention:Follows one-step commands  -       Communication: confused speech at times  -       Memory: Impaired STM and Poor immediate recall  -       Safety awareness/insight to disability: impaired   -       Mood/Affect/Coping skills/emotional control: Cooperative  Visual/Perceptual:      -Intact grossly    Physical Exam:  Balance:    -       sitting: good  dynamic: fair due to posterior hip precautions  standing: fair plus  dynamic: fair  Postural examination/scapula alignment:    -       Rounded shoulders  Dominant hand:    -       right  Upper Extremity Range of Motion:     -       Right Upper Extremity: WFL  -       Left Upper Extremity: WFL  Upper Extremity Strength:    -       Right Upper Extremity: WFL  -       Left Upper Extremity: WFL   Strength:    -       Right Upper Extremity: WFL  -       Left Upper Extremity: WFL    AMPAC 6 Click ADL:  AMPAC Total Score: 20    Treatment & Education:  Purpose of OT and POC  Self care and fx mobility as stated above.  Safe ADLs and min vc for posterior hip precautions.  Adaptive device recommendations:sock aid  and purchase place options provided. and new walker glide caps recommended-defer to PT for appropriate ones.  Educated pt in the Impt of sitting up in chair; goal for 1 hr sitting today.  Call staff for BTB assist.    Patient left up in chair with all lines intact, call button in reach, nurse notified, and spouse present    GOALS:   Multidisciplinary Problems       Occupational Therapy Goals          Problem: Occupational Therapy    Goal Priority Disciplines Outcome Interventions   Occupational Therapy Goal     OT, PT/OT Ongoing, Progressing    Description: Goals to be met by:4/21/2024     Patient will increase functional independence with ADLs by  performing:    UE Dressing with Modified [place].  LE Dressing with Stand-by Assistance and Assistive Devices as needed.  Grooming while standing with Supervision.  Toileting from toilet with Supervision for hygiene and clothing management.   Toilet transfer to toilet with Supervision.                         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 Had previous stentsd    CORONARY ARTERY BYPASS GRAFT  06/1996    bypass of 2 vessels    INSERT / REPLACE / REMOVE PACEMAKER      AICD; generator change in 2/10/17    JOINT REPLACEMENT Right 12/08/1991    total hip replacement    LEFT HEART CATHETERIZATION Left 7/26/2023    Procedure: Left heart cath;  Surgeon: Kaycee Fermin MD;  Location: [facility] CATH LAB/EP;  Service: Cardiology;  Laterality: Left;    SINUS SURGERY         Time Tracking:     OT Date of Treatment: 04/07/24  OT Start Time: 1205  OT Stop Time: 1246  OT Total Time (min): 41 min    Billable Minutes:Evaluation 10  Self Care/Home Management 16  Therapeutic Activity 15  Total Time 41    4/7/2024

## 2024-04-08 ENCOUNTER — CLINICAL SUPPORT (OUTPATIENT)
Dept: SMOKING CESSATION | Facility: CLINIC | Age: 79
End: 2024-04-08

## 2024-04-08 DIAGNOSIS — F17.210 CIGARETTE SMOKER: Primary | ICD-10-CM

## 2024-04-08 PROBLEM — I50.9 CHF EXACERBATION: Status: ACTIVE | Noted: 2024-04-08

## 2024-04-08 LAB
ANION GAP SERPL CALC-SCNC: 14 MMOL/L (ref 8–16)
ANION GAP SERPL CALC-SCNC: 14 MMOL/L (ref 8–16)
ANISOCYTOSIS BLD QL SMEAR: SLIGHT
BASOPHILS # BLD AUTO: ABNORMAL K/UL (ref 0–0.2)
BASOPHILS NFR BLD: 0 % (ref 0–1.9)
BUN SERPL-MCNC: 20 MG/DL (ref 8–23)
BUN SERPL-MCNC: 20 MG/DL (ref 8–23)
CALCIUM SERPL-MCNC: 8 MG/DL (ref 8.7–10.5)
CALCIUM SERPL-MCNC: 8 MG/DL (ref 8.7–10.5)
CHLORIDE SERPL-SCNC: 104 MMOL/L (ref 95–110)
CHLORIDE SERPL-SCNC: 104 MMOL/L (ref 95–110)
CO2 SERPL-SCNC: 21 MMOL/L (ref 23–29)
CO2 SERPL-SCNC: 21 MMOL/L (ref 23–29)
CREAT SERPL-MCNC: 1.1 MG/DL (ref 0.5–1.4)
CREAT SERPL-MCNC: 1.1 MG/DL (ref 0.5–1.4)
DIFFERENTIAL METHOD BLD: ABNORMAL
EOSINOPHIL # BLD AUTO: ABNORMAL K/UL (ref 0–0.5)
EOSINOPHIL NFR BLD: 36 % (ref 0–8)
ERYTHROCYTE [DISTWIDTH] IN BLOOD BY AUTOMATED COUNT: 16.6 % (ref 11.5–14.5)
EST. GFR  (NO RACE VARIABLE): >60 ML/MIN/1.73 M^2
EST. GFR  (NO RACE VARIABLE): >60 ML/MIN/1.73 M^2
GLUCOSE SERPL-MCNC: 90 MG/DL (ref 70–110)
GLUCOSE SERPL-MCNC: 90 MG/DL (ref 70–110)
HCT VFR BLD AUTO: 36.4 % (ref 40–54)
HGB BLD-MCNC: 11.2 G/DL (ref 14–18)
HYPOCHROMIA BLD QL SMEAR: ABNORMAL
IMM GRANULOCYTES # BLD AUTO: ABNORMAL K/UL (ref 0–0.04)
IMM GRANULOCYTES NFR BLD AUTO: ABNORMAL % (ref 0–0.5)
LYMPHOCYTES # BLD AUTO: ABNORMAL K/UL (ref 1–4.8)
LYMPHOCYTES NFR BLD: 6 % (ref 18–48)
MAGNESIUM SERPL-MCNC: 2.2 MG/DL (ref 1.6–2.6)
MCH RBC QN AUTO: 27.5 PG (ref 27–31)
MCHC RBC AUTO-ENTMCNC: 30.8 G/DL (ref 32–36)
MCV RBC AUTO: 89 FL (ref 82–98)
MONOCYTES # BLD AUTO: ABNORMAL K/UL (ref 0.3–1)
MONOCYTES NFR BLD: 12 % (ref 4–15)
NEUTROPHILS # BLD AUTO: ABNORMAL K/UL (ref 1.8–7.7)
NEUTROPHILS NFR BLD: 44 % (ref 38–73)
NEUTS BAND NFR BLD MANUAL: 2 %
NRBC BLD-RTO: 0 /100 WBC
OHS QRS DURATION: 146 MS
OHS QTC CALCULATION: 529 MS
OVALOCYTES BLD QL SMEAR: ABNORMAL
PHOSPHATE SERPL-MCNC: 3.4 MG/DL (ref 2.7–4.5)
PLATELET # BLD AUTO: 342 K/UL (ref 150–450)
PLATELET BLD QL SMEAR: ABNORMAL
PMV BLD AUTO: 9 FL (ref 9.2–12.9)
POIKILOCYTOSIS BLD QL SMEAR: SLIGHT
POLYCHROMASIA BLD QL SMEAR: ABNORMAL
POTASSIUM SERPL-SCNC: 4.9 MMOL/L (ref 3.5–5.1)
POTASSIUM SERPL-SCNC: 4.9 MMOL/L (ref 3.5–5.1)
RBC # BLD AUTO: 4.07 M/UL (ref 4.6–6.2)
SODIUM SERPL-SCNC: 139 MMOL/L (ref 136–145)
SODIUM SERPL-SCNC: 139 MMOL/L (ref 136–145)
TARGETS BLD QL SMEAR: ABNORMAL
TROPONIN I SERPL DL<=0.01 NG/ML-MCNC: 0.26 NG/ML (ref 0–0.03)
WBC # BLD AUTO: 9.77 K/UL (ref 3.9–12.7)

## 2024-04-08 PROCEDURE — 80048 BASIC METABOLIC PNL TOTAL CA: CPT | Performed by: FAMILY MEDICINE

## 2024-04-08 PROCEDURE — 85027 COMPLETE CBC AUTOMATED: CPT | Performed by: FAMILY MEDICINE

## 2024-04-08 PROCEDURE — 99222 1ST HOSP IP/OBS MODERATE 55: CPT | Mod: ,,,

## 2024-04-08 PROCEDURE — 84484 ASSAY OF TROPONIN QUANT: CPT

## 2024-04-08 PROCEDURE — 63600175 PHARM REV CODE 636 W HCPCS: Performed by: FAMILY MEDICINE

## 2024-04-08 PROCEDURE — 85007 BL SMEAR W/DIFF WBC COUNT: CPT | Performed by: FAMILY MEDICINE

## 2024-04-08 PROCEDURE — 84100 ASSAY OF PHOSPHORUS: CPT | Performed by: FAMILY MEDICINE

## 2024-04-08 PROCEDURE — 83735 ASSAY OF MAGNESIUM: CPT | Performed by: FAMILY MEDICINE

## 2024-04-08 PROCEDURE — 36415 COLL VENOUS BLD VENIPUNCTURE: CPT

## 2024-04-08 PROCEDURE — 99900035 HC TECH TIME PER 15 MIN (STAT)

## 2024-04-08 PROCEDURE — 97530 THERAPEUTIC ACTIVITIES: CPT | Mod: CO

## 2024-04-08 PROCEDURE — 63700000 PHARM REV CODE 250 ALT 637 W/O HCPCS: Performed by: FAMILY MEDICINE

## 2024-04-08 PROCEDURE — 36415 COLL VENOUS BLD VENIPUNCTURE: CPT | Performed by: FAMILY MEDICINE

## 2024-04-08 PROCEDURE — 94761 N-INVAS EAR/PLS OXIMETRY MLT: CPT

## 2024-04-08 PROCEDURE — 11000001 HC ACUTE MED/SURG PRIVATE ROOM

## 2024-04-08 PROCEDURE — 99406 BEHAV CHNG SMOKING 3-10 MIN: CPT | Mod: S$GLB,,,

## 2024-04-08 PROCEDURE — 27000221 HC OXYGEN, UP TO 24 HOURS

## 2024-04-08 PROCEDURE — 25000003 PHARM REV CODE 250: Performed by: FAMILY MEDICINE

## 2024-04-08 RX ORDER — FUROSEMIDE 10 MG/ML
60 INJECTION INTRAMUSCULAR; INTRAVENOUS EVERY 12 HOURS
Status: DISCONTINUED | OUTPATIENT
Start: 2024-04-08 | End: 2024-04-09

## 2024-04-08 RX ORDER — AZITHROMYCIN 250 MG/1
250 TABLET, FILM COATED ORAL DAILY
Status: COMPLETED | OUTPATIENT
Start: 2024-04-08 | End: 2024-04-10

## 2024-04-08 RX ADMIN — ASPIRIN 81 MG: 81 TABLET, COATED ORAL at 08:04

## 2024-04-08 RX ADMIN — FUROSEMIDE 60 MG: 10 INJECTION, SOLUTION INTRAMUSCULAR; INTRAVENOUS at 09:04

## 2024-04-08 RX ADMIN — CEFTRIAXONE SODIUM 2 G: 2 INJECTION, POWDER, FOR SOLUTION INTRAMUSCULAR; INTRAVENOUS at 04:04

## 2024-04-08 RX ADMIN — FUROSEMIDE 60 MG: 10 INJECTION, SOLUTION INTRAMUSCULAR; INTRAVENOUS at 08:04

## 2024-04-08 RX ADMIN — AZITHROMYCIN DIHYDRATE 250 MG: 250 TABLET ORAL at 10:04

## 2024-04-08 RX ADMIN — AMIODARONE HYDROCHLORIDE 200 MG: 200 TABLET ORAL at 08:04

## 2024-04-08 RX ADMIN — SACUBITRIL AND VALSARTAN 1 TABLET: 24; 26 TABLET, FILM COATED ORAL at 08:04

## 2024-04-08 RX ADMIN — ENOXAPARIN SODIUM 40 MG: 40 INJECTION SUBCUTANEOUS at 04:04

## 2024-04-08 RX ADMIN — CLOPIDOGREL BISULFATE 75 MG: 75 TABLET ORAL at 05:04

## 2024-04-08 RX ADMIN — METOPROLOL SUCCINATE 50 MG: 50 TABLET, EXTENDED RELEASE ORAL at 08:04

## 2024-04-08 RX ADMIN — SPIRONOLACTONE 25 MG: 25 TABLET, FILM COATED ORAL at 08:04

## 2024-04-08 RX ADMIN — ATORVASTATIN CALCIUM 80 MG: 40 TABLET, FILM COATED ORAL at 09:04

## 2024-04-08 RX ADMIN — SACUBITRIL AND VALSARTAN 1 TABLET: 24; 26 TABLET, FILM COATED ORAL at 09:04

## 2024-04-08 NOTE — PLAN OF CARE
[facility] - Telemetry  Initial Discharge Assessment       Primary Care Provider: Alan Patterson MD    Admission Diagnosis: Shortness of breath [R06.02]  Pneumonia [J18.9]    Admission Date: 4/6/2024  Expected Discharge Date: 4/9/2024    Consult: cards & PT/OT    Payor: HU[employer] MANAGED MEDICARE / Plan: HU[employer] MEDICARE HMO / Product Type: Capitation /     Extended Emergency Contact Information  Primary Emergency Contact: Cassi Blanc  Address: [address] [place] [address] [facility]  Home Phone: [phone]  Mobile Phone: [phone]  Relation: Spouse  Preferred language: English   needed? No    Discharge Plan A: (P) Other (OP Rehab at [facility])  Discharge Plan B: (P) [employer] [facility]      [id number] - [place] - [address] AT [facility] [address]  [address]  [place] [address]  Phone: [phone] Fax: [phone]    [facility]  [address] [facility] [address]  Phone: [phone] Fax: [phone]      Initial Assessment (most recent)       Adult Discharge Assessment - 04/08/24 0950          Discharge Assessment    Assessment Type Discharge Planning Assessment (P)      Confirmed/corrected address, phone number and insurance Yes (P)      Confirmed Demographics Correct on Facesheet (P)      Source of Information patient;family (P)    spouseCassi ([phone])    Communicated ANTHONY with patient/caregiver Date not available/Unable to determine (P)      People in Home spouse (P)    spouseCassi ([phone])    Do you expect to return to your current living situation? Yes (P)      Do you have help at home or someone to help you manage your care at home? Yes (P)      Prior to hospitilization cognitive status: Not Oriented to Time (P)    hx: dementia    Current cognitive status: Not Oriented to Time (P)    hx: dementia    Equipment Currently Used at Home shower  chair;walker, rolling;bedside commode;raised toilet;blood pressure machine;BIPAP;other (see comments) (P)    pox; does does not currently use the BIPAP    Readmission within 30 days? No (P)      Patient currently being followed by outpatient case management? No (P)      Do you currently have service(s) that help you manage your care at home? No (P)      Do you take prescription medications? Yes (P)      Do you have prescription coverage? Yes (P)      Do you have any problems affording any of your prescribed medications? No (P)      Is the patient taking medications as prescribed? yes (P)      How do you get to doctors appointments? family or friend will provide (P)      Are you on dialysis? No (P)      Do you take coumadin? No (P)      Discharge Plan A Other (P)    OP Rehab at [facility]    Discharge Plan B Home Health (P)      DME Needed Upon Discharge  other (see comments) (P)    tbd    Discharge Plan discussed with: Patient;Spouse/sig other (P)         Physical Activity    On average, how many days per week do you engage in moderate to strenuous exercise (like a brisk walk)? 3 days (P)      On average, how many minutes do you engage in exercise at this level? 60 min (P)         Financial Resource Strain    How hard is it for you to pay for the very basics like food, housing, medical care, and heating? Not hard at all (P)         Housing Stability    In the last 12 months, was there a time when you were not able to pay the mortgage or rent on time? No (P)      In the last 12 months, was there a time when you did not have a steady place to sleep or slept in a shelter (including now)? No (P)         Transportation Needs    In the past 12 months, has lack of transportation kept you from medical appointments or from getting medications? No (P)      In the past 12 months, has lack of transportation kept you from meetings, work, or from getting things needed for daily living? No (P)         Food  Insecurity    Within the past 12 months, you worried that your food would run out before you got the money to buy more. Never true (P)      Within the past 12 months, the food you bought just didn't last and you didn't have money to get more. Never true (P)         Stress    Do you feel stress - tense, restless, nervous, or anxious, or unable to sleep at night because your mind is troubled all the time - these days? Not at all (P)         Social Connections    In a typical week, how many times do you talk on the phone with family, friends, or neighbors? More than three times a week (P)      How often do you get together with friends or relatives? More than three times a week (P)      How often do you attend Zoroastrianism or Synagogue services? Never (P)      Do you belong to any clubs or organizations such as Zoroastrianism groups, unions, fraternal or athletic groups, or school groups? No (P)      How often do you attend meetings of the clubs or organizations you belong to? Never (P)      Are you , , , , never , or living with a partner?  (P)         Alcohol Use    Q1: How often do you have a drink containing alcohol? Monthly or less (P)      Q2: How many drinks containing alcohol do you have on a typical day when you are drinking? 1 or 2 (P)      Q3: How often do you have six or more drinks on one occasion? Never (P)                       1000  Patient resting quietly in bed when CM rounded. No family present. Patient was admitted with pneumonia & is being followed by cards & PT/OT. Pox 94% on 1L O2 via NC this AM. No respiratory distress noted at this time.     Patient lives with his spouse, Cassi Blanc, has equipment to assist with ADLs, & denied the need for assistance with transportation at time of discharge. Pt requested that this CM review discharge planning assessment with Cassi due to pt being forgetful.     Previously scheduled appts with Dr Lonnie Heller (cards) on 4/12/2024  at 0920 & Dr Zayra Grajeda on 4/17/2024 at 1000 noted. Information added to the pt's discharge paperwork.     MARY was informed by Cassi ([phone]) via phone that the pt has a total hip replacement done at [facility] earlier this year, discharge to [facility], then home with , & is currently participating in out-pt rehab at [facility]. Cassi stated she will provide this CM with the  company name at a later time.     CM updated patient's whiteboard with CM name & contact information.        04/08/24 1030   Rounds   Attendance Nurse ;Provider   Discharge Plan A Other  (op-rehab)   Why the patient remains in the hospital Requires continued medical care     1030  CM was informed by Dr Velasco that the pt is not medically stable to discharge home today due to continued diuresis needed. Orders for IV lasix, IV azithromycin, & IV ceftriaxone noted.     1220  Pt awake & alert sitting in the recliner with spouse, Cassi Blanc, at the bedside when CM rounded. Cassi stated that the pt has a scheduled appt with Dr Cameron Adams (ortho surg at [facility]) on 4/16/2024 at 1100. Information added to the pt's discharge paperwork. CM informed nurse Apty of pt's need for O2 humidification.     1400  Voicemail message left for Dr Adams's ([phone]) informing of the pt's hospitalization. Awaiting return call. CM informed Gena ([phone]) w/[facility] of the pt's hospitalization. Gena requesting that this CM instruct the pt's spouse to call following discharge to rescheduled out-pt rehab appointments. Information added to the pt's discharge paperwork.      Will continue to follow.

## 2024-04-08 NOTE — ASSESSMENT & PLAN NOTE
-s/p dual chamber ICD implantation in 2010 with a generator change in 2017 by Dr. 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

## 2024-04-08 NOTE — SUBJECTIVE & OBJECTIVE
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 Had previous stentsd    CORONARY ARTERY BYPASS GRAFT  06/1996    bypass of 2 vessels    INSERT / REPLACE / REMOVE PACEMAKER      AICD; generator change in 2/10/17    JOINT REPLACEMENT Right 12/08/1991    total hip replacement    LEFT HEART CATHETERIZATION Left 7/26/2023    Procedure: Left heart cath;  Surgeon: Kaycee Fermin MD;  Location: [facility] CATH LAB/EP;  Service: Cardiology;  Laterality: Left;    SINUS SURGERY         Review of patient's allergies indicates:  No Known Allergies    No current facility-administered medications on file prior to encounter.     Current Outpatient Medications on File Prior to Encounter   Medication Sig    amiodarone (PACERONE) 200 MG Tab Take 1 tablet (200 mg total) by mouth once daily.    aspirin (ECOTRIN) 81 MG EC tablet Take 81 mg by mouth once daily.      atorvastatin (LIPITOR) 80 MG tablet Take 1 tablet (80 mg total) by mouth every evening.    clopidogreL (PLAVIX) 75 mg tablet TAKE 1 TABLET(75 MG) BY MOUTH EVERY EVENING (Patient taking differently: Take 75 mg by mouth every evening.)    coenzyme Q10 200 mg capsule Take 200 mg by mouth once daily.    FOLIC ACID/MV,FE,OTHER MIN (CENTRUM ORAL) Take 1 tablet by mouth every morning.     furosemide (LASIX) 20 MG tablet Take 2 tablets (40 mg total) by mouth once daily. (Patient taking differently: Take 20 mg by mouth  once daily.)    metoprolol succinate (TOPROL-XL) 50 MG 24 hr tablet TAKE 1 TABLET(50 MG) BY MOUTH EVERY DAY (Patient taking differently: 50 mg once daily.)    nitroGLYCERIN (NITROSTAT) 0.4 MG SL tablet ONE TABLET UNDER TONGUE AS NEEDED FOR CHEST PAIN. TAKE EVERY 5 MINUTES AS NEEDED (Patient taking differently: Place 0.4 mg under the tongue every 5 (five) minutes as needed for Chest pain.)    sacubitriL-valsartan (ENTRESTO) 24-26 mg per tablet Take 1 tablet by mouth 2 (two) times daily.    spironolactone (ALDACTONE) 25 MG tablet TAKE 1 TABLET(25 MG) BY MOUTH EVERY DAY (Patient taking differently: Take 25 mg by mouth once daily.)     Family History       Problem Relation (Age of Onset)    Heart attack Father, Paternal Uncle, Maternal Aunt, Paternal Aunt    Stroke Mother          Tobacco Use    Smoking status: Every Day     Current packs/day: 1.00     Average packs/day: 1 pack/day for 58.3 years (58.3 ttl pk-yrs)     Types: Cigarettes     Start date: 1966    Smokeless tobacco: Never    Tobacco comments:     SAYS TRIED EVERYTHING INCLUDING HYPNOSIOS AND DRUGS; NOT INTERESTED IN SMOKING PROGRAM HERE   Substance and Sexual Activity    Alcohol use: Yes     Comment: social; rare    Drug use: No    Sexual activity: Yes     Partners: Female     Review of Systems   Constitutional: Positive for decreased appetite and malaise/fatigue. Negative for chills, diaphoresis, weight gain and weight loss.   Cardiovascular:  Positive for dyspnea on exertion, leg swelling and orthopnea. Negative for chest pain, claudication, irregular heartbeat, near-syncope, palpitations, paroxysmal nocturnal dyspnea and syncope.   Respiratory:  Positive for cough and shortness of breath. Negative for hemoptysis and snoring.    Gastrointestinal:  Negative for bloating, abdominal pain, nausea and vomiting.   Neurological:  Negative for light-headedness and weakness.     Objective:     Vital Signs (Most Recent):  Temp: 98.1 °F (36.7 °C) (04/08/24  0745)  Pulse: 60 (04/08/24 0745)  Resp: 18 (04/08/24 0745)  BP: (!) 118/57 (04/08/24 0745)  SpO2: (!) 94 % (04/08/24 0745) Vital Signs (24h Range):  Temp:  [97.9 °F (36.6 °C)-98.4 °F (36.9 °C)] 98.1 °F (36.7 °C)  Pulse:  [58-72] 60  Resp:  [16-19] 18  SpO2:  [92 %-98 %] 94 %  BP: (101-118)/(51-57) 118/57     Weight: 78.5 kg (173 lb 1 oz)  Body mass index is 28.8 kg/m².    SpO2: (!) 94 %         Intake/Output Summary (Last 24 hours) at 4/8/2024 1002  Last data filed at 4/7/2024 2335  Gross per 24 hour   Intake --   Output 1300 ml   Net -1300 ml       Lines/Drains/Airways       Peripheral Intravenous Line  Duration                  Peripheral IV - Single Lumen 04/06/24 1412 20 G Anterior;Distal;Left Upper Arm 1 day                     Physical Exam  Vitals and nursing note reviewed.   Constitutional:       Appearance: Normal appearance.   Neck:      Vascular: JVD present.   Cardiovascular:      Rate and Rhythm: Normal rate and regular rhythm.      Heart sounds: No murmur heard.     No gallop.   Pulmonary:      Effort: Pulmonary effort is normal.      Breath sounds: Rales present.   Abdominal:      General: Bowel sounds are normal. There is no distension.      Palpations: Abdomen is soft.      Tenderness: There is no abdominal tenderness.   Musculoskeletal:      Right lower leg: Edema present.      Left lower leg: Edema present.   Skin:     General: Skin is warm and dry.   Neurological:      Mental Status: He is alert and oriented to person, place, and time. Mental status is at baseline.          Significant Labs: BMP:   Recent Labs   Lab 04/06/24  1407 04/07/24  0241 04/08/24  0449    97  97 90  90   * 145  145 139  139   K 3.8 3.6  3.6 4.9  4.9    104  104 104  104   CO2 27 28  28 21*  21*   BUN 21 17  17 20  20   CREATININE 1.0 1.0  1.0 1.1  1.1   CALCIUM 8.9 8.5*  8.5* 8.0*  8.0*   MG 2.3 2.2 2.2   , CMP   Recent Labs   Lab 04/06/24  1407 04/07/24  0241 04/08/24  0449   *  "145  145 139  139   K 3.8 3.6  3.6 4.9  4.9    104  104 104  104   CO2 27 28  28 21*  21*    97  97 90  90   BUN 21 17  17 20  20   CREATININE 1.0 1.0  1.0 1.1  1.1   CALCIUM 8.9 8.5*  8.5* 8.0*  8.0*   PROT 6.6  --   --    ALBUMIN 2.6*  --   --    BILITOT 0.4  --   --    ALKPHOS 222*  --   --    AST 71*  --   --    ALT 58*  --   --    ANIONGAP 13 13  13 14  14   , CBC   Recent Labs   Lab 04/06/24  1407 04/07/24  0241 04/08/24  0449   WBC 10.84 10.55 9.77   HGB 11.8* 11.0* 11.2*   HCT 38.2* 35.3* 36.4*    388 342   , INR No results for input(s): "INR", "PROTIME" in the last 48 hours., Lipid Panel No results for input(s): "CHOL", "HDL", "LDLCALC", "TRIG", "CHOLHDL" in the last 48 hours., Troponin   Recent Labs   Lab 04/06/24  1407   TROPONINI 0.510*   , and All pertinent lab results from the last 24 hours have been reviewed.    Significant Imaging: Echocardiogram: 2D echo with color flow doppler:   Results for orders placed or performed during the hospital encounter of 05/02/18   2D echo with color flow doppler   Result Value Ref Range    EF + QEF 25 (A) 55 - 65    Mitral Valve Regurgitation TRIVIAL     Diastolic Dysfunction Yes (A)     Est. PA Systolic Pressure 26.81     Tricuspid Valve Regurgitation TRIVIAL     Narrative    Date of Procedure: 05/02/2018        TEST DESCRIPTION   Technical Quality: This study was performed in conjunction with a 3ml intravenous injection of Optison contrast agent.     Aorta: The aortic root is normal in size, measuring 2.8 cm at sinotubular junction and 3.2 cm at Sinuses of Valsalva. The proximal ascending aorta is normal in size, measuring 3.0 cm across.     Left Atrium: The left atrial volume index is mildly enlarged, measuring 41.38 cc/m2.     Left Ventricle: The left ventricle is moderately enlarged, with an end-diastolic diameter of 5.7 cm, and an end-systolic diameter of 4.7 cm. LV wall thickness is normal, with the septum and the posterior " wall each measuring 1.0 cm across. Relative wall   thickness was normal at 0.35, and the LV mass index was increased at 137.7 g/m2 consistent with eccentric left ventricular hypertrophy. The apex is akinetic.   There is global hypokinesis. Left ventricular systolic function appears severely depressed. Visually estimated ejection fraction is 25-30%. The LV Doppler derived stroke volume equals 78.0 ccs.     Diastolic indices: E wave velocity 0.6 m/s, E/A ratio 0.5,  msec., E/e' ratio(avg) 9. There is diastolic dysfunction secondary to relaxation abnormality.     Right Atrium: The right atrium is normal in size, measuring 3.9 cm in length and 3.5 cm in width in the apical view.     Right Ventricle: The right ventricle is normal in size measuring 3.6 cm at the base in the apical right ventricle-focused view. Global right ventricular systolic function appears low normal. Tricuspid annular plane systolic excursion (TAPSE) is 1.4 cm.   Tissue Doppler-derived tricuspid annular peak systolic velocity (S prime) is 9.4 cm/s. The estimated PA systolic pressure is 27 mmHg.     Aortic Valve:  Aortic valve is normal in structure with normal leaflet mobility.     Mitral Valve:  There is trivial mitral regurgitation. Mitral valve is normal in structure with normal leaflet mobility.     Tricuspid Valve:  There is trivial tricuspid regurgitation. Tricuspid valve is normal in structure with normal leaflet mobility.     Pulmonary Valve:  Pulmonary valve is normal in structure with normal leaflet mobility.     IVC: IVC is normal in size and collapses > 50% with a sniff, suggesting normal right atrial pressure of 3 mmHg.     Intracavitary: There is no evidence of pericardial effusion, intracavity mass, thrombi, or vegetation.         CONCLUSIONS     1 - Moderate left ventricular enlargement.     2 - Severely depressed left ventricular systolic function (EF 25-30%).     3 - Impaired LV relaxation, normal LAP (grade 1 diastolic  dysfunction).     4 - Low normal right ventricular systolic function .     5 - The estimated PA systolic pressure is 27 mmHg.     6 - Trivial mitral regurgitation.     7 - Trivial tricuspid regurgitation.     8 - Mild left atrial enlargement.             This document has been electronically    SIGNED BY: Teodora Bowie MD On: 05/02/2018 15:09    and Transthoracic echo (TTE) complete (Cupid Only):   Results for orders placed or performed during the hospital encounter of 12/15/23   Echo   Result Value Ref Range    BSA 1.82 m2    Gandhi's Biplane MOD Ejection Fraction 15 %    LVOT stroke volume 58.97 cm3    LVIDd 6.68 (A) 3.5 - 6.0 cm    LV Systolic Volume 181.96 mL    LV Systolic Volume Index 101.7 mL/m2    LVIDs 6.03 (A) 2.1 - 4.0 cm    LV Diastolic Volume 229.94 mL    LV Diastolic Volume Index 128.46 mL/m2    IVS 0.80 0.6 - 1.1 cm    LVOT diameter 2.30 cm    LVOT area 4.2 cm2    FS 10 (A) 28 - 44 %    Left Ventricle Relative Wall Thickness 0.24 cm    Posterior Wall 0.79 0.6 - 1.1 cm    LV mass 223.19 g    LV Mass Index 125 g/m2    MV Peak E Silas 0.65 m/s    TDI LATERAL 0.07 m/s    TDI SEPTAL 0.02 m/s    E/E' ratio 14.44 m/s    MV Peak A Silas 0.88 m/s    TR Max Silas 2.91 m/s    E/A ratio 0.74     E wave deceleration time 212.49 msec    LV SEPTAL E/E' RATIO 32.50 m/s    LV LATERAL E/E' RATIO 9.29 m/s    PV Peak S Silas 0.39 m/s    PV Peak D Silas 0.40 m/s    Pulm vein S/D ratio 0.98     LVOT peak silas 0.63 m/s    Left Ventricular Outflow Tract Mean Velocity 0.47 cm/s    Left Ventricular Outflow Tract Mean Gradient 0.98 mmHg    RV S' 7.13 cm/s    TAPSE 1.51 cm    LA size 4.64 cm    Left Atrium Minor Axis 5.35 cm    Left Atrium Major Axis 4.74 cm    LA volume (mod) 39.08 cm3    LA Volume Index (Mod) 21.8 mL/m2    RA Major Axis 4.04 cm    RA Width 3.13 cm    AV mean gradient 3 mmHg    AV peak gradient 6 mmHg    Ao peak silas 1.18 m/s    Ao VTI 24.20 cm    LVOT peak VTI 14.20 cm    AV valve area 2.44 cm²    AV Velocity Ratio  0.53     AV index (prosthetic) 0.59     DEBORAH by Velocity Ratio 2.22 cm²    Mr max félix 4.36 m/s    MV peak gradient 5 mmHg    MV stenosis pressure 1/2 time 61.62 ms    MV valve area p 1/2 method 3.57 cm2    MV valve area by continuity eq 2.05 cm2    MV VTI 28.8 cm    Triscuspid Valve Regurgitation Peak Gradient 34 mmHg    IVC diameter 1.81 cm    Mean e' 0.05 m/s    ZLVIDS 5.37     ZLVIDD 3.00     LA Volume Index 35.4 mL/m2    LA volume 63.44 cm3    LA WIDTH 3.2 cm    TV resting pulmonary artery pressure 37 mmHg    RV TB RVSP 6 mmHg    Est. RA pres 3 mmHg    Narrative      Left Ventricle: The left ventricle is dilated. There is eccentric   hypertrophy. Regional wall motion abnormalities present. See diagram for   wall motion findings. Septal motion is consistent with post-operative   status. There is reduced systolic function. Biplane (2D) method of discs   ejection fraction is 15%.    Grade II diastolic dysfunction.    Right Ventricle: Normal right ventricular cavity size. Systolic   function is reduced.TAPSE is 1.51 cm.    Left Atrium: Left atrium is mildly dilated. The left atrium volume   index is 35.4 mL/m2.    Right Atrium: Right atrium is dilated.    Mitral Valve: There is moderate regurgitation.    Pulmonary Artery: The estimated pulmonary artery systolic pressure is   37 mmHg.    IVC/SVC: Normal venous pressure at 3 mmHg.

## 2024-04-08 NOTE — HPI
79yo M with hx of anterior wall MI in 1996 s/p CABG x 3, followed by multiple PCIs, AICD placement in 2010 (with generator exchange 2017), CVA 2021, VT s/p CV x2 (July 2023), HFrEF, HTN, HLD, JERONIMO, and hip fracture 01/2024, present to ED with 4d hx of worsening SOB. Also endorses leg swelling, cough, weakness, and orthopnea. Patient denies CP, palpitations, pre-syncope/syncope, PND.    BNP 1125, trop 0.510; CXR with concern for pulmonary edema/PNA. Patient started on IV Lasix and IV cedtriaxone in ED.

## 2024-04-08 NOTE — PROGRESS NOTES
Pt is a former 2 to 3 pk/day cigarettes smoker x 58 yrs. He states that he cut down to approximately 1 pk/day, but states that he doesn't really inhale, and that he only takes a few puffs out of each cigarette. Education provided on the risks of continued (even light) smoking as well as the benefits of quitting, however, pt declines referral to Ambulatory Smoking Cessation clinic at this time. Handout provided. Strongly urged pt to consider a quit attempt and to contact clinic if additional smoking cessation resources are needed.

## 2024-04-08 NOTE — PT/OT/SLP PROGRESS
"Occupational Therapy   Treatment    Name: Tristen Blanc  MRN: [id number]  Admitting Diagnosis:  CHF exacerbation       Recommendations:     Discharge Recommendations: Low Intensity Therapy  Discharge Equipment Recommendations:   (sock aid(wife to purchase) and new walker glide caps)  Barriers to discharge:  None    Assessment:     Tristen Blanc is a 78 y.o. male with a medical diagnosis of CHF exacerbation.  Performance deficits affecting function are weakness, impaired endurance, impaired self care skills, impaired functional mobility, gait instability, impaired balance, decreased coordination, decreased upper extremity function, decreased lower extremity function, impaired cardiopulmonary response to activity, orthopedic precautions, impaired coordination, decreased ROM.     Rehab Prognosis:  Good; patient would benefit from acute skilled OT services to address these deficits and reach maximum level of function.       Plan:     Patient to be seen 3 x/week to address the above listed problems via self-care/home management, therapeutic activities, therapeutic exercises  Plan of Care Expires: 05/07/24  Plan of Care Reviewed with: patient    Subjective     Chief Complaint: "I'm short of breath just doing that!"  Patient/Family Comments/goals: return to PLOF  Pain/Comfort:  Pain Rating 1: 0/10  Pain Rating Post-Intervention 1: 0/10    Objective:     Communicated with: Paty castillo prior to session.  Patient found HOB elevated with bed alarm, telemetry, PureWick upon OT entry to room.    General Precautions: Standard, fall    Orthopedic Precautions:RLE weight bearing as tolerated, RLE posterior precautions ((R) THR (revision) 1/25/2024)  Braces: N/A  Respiratory Status: Room air - RT present in room and reports O2 recently turned off (SpO2 92% RA) and reported O2 can be put back on if SOB    Bed Mobility:    Patient completed Scooting/Bridging with contact guard assistance  Patient completed Supine to Sit " with contact guard assistance and with side rail     Functional Mobility/Transfers:  Patient completed Sit <> Stand Transfer with contact guard assistance  with  rolling walker   Patient completed Bed <> Chair Transfer using Step Transfer technique with contact guard assistance with rolling walker    Activities of Daily Living:  Feeding:  supervision set-up; chair level    [facility] 6 Click ADL: 20    Treatment & Education:  Educated on purpose/role of OT  Required significantly increased time, effort and VCs to transition to EOB sitting  Reported SOB and increased WOB during transitional tasks; educated on PLB technique and NC donned at 1L O2  Instructed in stand and transfer to chair; flexed posture; VCs for postural awareness and RW mgmt/proximity  Set-up for lunch; reports not hungry, encouraged to increase PO intake, especially protein    Patient left up in chair with all lines intact, call button in reach, nsg notified, and wife present    GOALS:   Multidisciplinary Problems       Occupational Therapy Goals          Problem: Occupational Therapy    Goal Priority Disciplines Outcome Interventions   Occupational Therapy Goal     OT, PT/OT Ongoing, Progressing    Description: Goals to be met by:4/21/2024     Patient will increase functional independence with ADLs by performing:    UE Dressing with Modified [place].  LE Dressing with Stand-by Assistance and Assistive Devices as needed.  Grooming while standing with Supervision.  Toileting from toilet with Supervision for hygiene and clothing management.   Toilet transfer to toilet with Supervision.                         Time Tracking:     OT Date of Treatment: 04/08/24  OT Start Time: 1127  OT Stop Time: 1153  OT Total Time (min): 26 min    Billable Minutes:Therapeutic Activity 26    OT/ARCADIO: ARCADIO     Number of ARCADIO visits since last OT visit: 1    4/8/2024

## 2024-04-08 NOTE — HOSPITAL COURSE
4/8/2024: Patient seen in room, wife at bedside. Patient endorses 4d worsening SOB with associated leg swelling, cough, weakness, and orthopnea. Grossly fluid overloaded on exam, currently receiving lasix 40mg IV BID. Hemodynamically stable. On 2L O2 NC.   4/9/2024 Remains on IV Lasix 60 BID with 1.5Lout overnight HR and BP stable. CBC and BMP WNL. OOB to chair today and feeling better   4/10/2024 On oral Bumex with 700cc documented out overnight. Sats stable on RA. Ambulating in room with no issues per patient. SBP reported down to 90s yesterday with ?dizziness. CBC and BMP stable

## 2024-04-08 NOTE — CONSULTS
[facility] - Telemetry  Cardiology  Consult Note    Patient Name: Tristen Blanc  MRN: [id number]  Admission Date: 4/6/2024  Hospital Length of Stay: 1 days  Code Status: Full Code   Attending Provider: Kayleen Bowling*   Consulting Provider: Addison Coley DNP  Primary Care Physician: Alan Patterson MD  Principal Problem:CHF exacerbation    Patient information was obtained from patient, spouse/SO, past medical records, and ER records.     Inpatient consult to [facility]  Consult performed by: Addison Coley DNP  Consult ordered by: Kayleen Bowling MD        Subjective:     Chief Complaint:  SOB     HPI:   79yo M with hx of anterior wall MI in 1996 s/p CABG x 3, followed by multiple PCIs, AICD placement in 2010 (with generator exchange 2017), CVA 2021, VT s/p CV x2 (July 2023), HFrEF, HTN, HLD, JEORNIMO, and hip fracture 01/2024, present to ED with 4d hx of worsening SOB. Also endorses leg swelling, cough, weakness, and orthopnea. Patient denies CP, palpitations, pre-syncope/syncope, PND.    BNP 1125, trop 0.510; CXR with concern for pulmonary edema/PNA. Patient started on IV Lasix and IV cedtriaxone in ED.       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 Had previous stentsd    CORONARY ARTERY BYPASS GRAFT  06/1996    bypass of 2 vessels    INSERT / REPLACE / REMOVE PACEMAKER      AICD; generator change in 2/10/17     JOINT REPLACEMENT Right 12/08/1991    total hip replacement    LEFT HEART CATHETERIZATION Left 7/26/2023    Procedure: Left heart cath;  Surgeon: Kaycee Fermin MD;  Location: [facility] CATH LAB/EP;  Service: Cardiology;  Laterality: Left;    SINUS SURGERY         Review of patient's allergies indicates:  No Known Allergies    No current facility-administered medications on file prior to encounter.     Current Outpatient Medications on File Prior to Encounter   Medication Sig    amiodarone (PACERONE) 200 MG Tab Take 1 tablet (200 mg total) by mouth once daily.    aspirin (ECOTRIN) 81 MG EC tablet Take 81 mg by mouth once daily.      atorvastatin (LIPITOR) 80 MG tablet Take 1 tablet (80 mg total) by mouth every evening.    clopidogreL (PLAVIX) 75 mg tablet TAKE 1 TABLET(75 MG) BY MOUTH EVERY EVENING (Patient taking differently: Take 75 mg by mouth every evening.)    coenzyme Q10 200 mg capsule Take 200 mg by mouth once daily.    FOLIC ACID/MV,FE,OTHER MIN (CENTRUM ORAL) Take 1 tablet by mouth every morning.     furosemide (LASIX) 20 MG tablet Take 2 tablets (40 mg total) by mouth once daily. (Patient taking differently: Take 20 mg by mouth once daily.)    metoprolol succinate (TOPROL-XL) 50 MG 24 hr tablet TAKE 1 TABLET(50 MG) BY MOUTH EVERY DAY (Patient taking differently: 50 mg once daily.)    nitroGLYCERIN (NITROSTAT) 0.4 MG SL tablet ONE TABLET UNDER TONGUE AS NEEDED FOR CHEST PAIN. TAKE EVERY 5 MINUTES AS NEEDED (Patient taking differently: Place 0.4 mg under the tongue every 5 (five) minutes as needed for Chest pain.)    sacubitriL-valsartan (ENTRESTO) 24-26 mg per tablet Take 1 tablet by mouth 2 (two) times daily.    spironolactone (ALDACTONE) 25 MG tablet TAKE 1 TABLET(25 MG) BY MOUTH EVERY DAY (Patient taking differently: Take 25 mg by mouth once daily.)     Family History       Problem Relation (Age of Onset)    Heart attack Father, Paternal Uncle, Maternal Aunt, Paternal Aunt    Stroke Mother           Tobacco Use    Smoking status: Every Day     Current packs/day: 1.00     Average packs/day: 1 pack/day for 58.3 years (58.3 ttl pk-yrs)     Types: Cigarettes     Start date: 1966    Smokeless tobacco: Never    Tobacco comments:     SAYS TRIED EVERYTHING INCLUDING HYPNOSIOS AND DRUGS; NOT INTERESTED IN SMOKING PROGRAM HERE   Substance and Sexual Activity    Alcohol use: Yes     Comment: social; rare    Drug use: No    Sexual activity: Yes     Partners: Female     Review of Systems   Constitutional: Positive for decreased appetite and malaise/fatigue. Negative for chills, diaphoresis, weight gain and weight loss.   Cardiovascular:  Positive for dyspnea on exertion, leg swelling and orthopnea. Negative for chest pain, claudication, irregular heartbeat, near-syncope, palpitations, paroxysmal nocturnal dyspnea and syncope.   Respiratory:  Positive for cough and shortness of breath. Negative for hemoptysis and snoring.    Gastrointestinal:  Negative for bloating, abdominal pain, nausea and vomiting.   Neurological:  Negative for light-headedness and weakness.     Objective:     Vital Signs (Most Recent):  Temp: 98.1 °F (36.7 °C) (04/08/24 0745)  Pulse: 60 (04/08/24 0745)  Resp: 18 (04/08/24 0745)  BP: (!) 118/57 (04/08/24 0745)  SpO2: (!) 94 % (04/08/24 0745) Vital Signs (24h Range):  Temp:  [97.9 °F (36.6 °C)-98.4 °F (36.9 °C)] 98.1 °F (36.7 °C)  Pulse:  [58-72] 60  Resp:  [16-19] 18  SpO2:  [92 %-98 %] 94 %  BP: (101-118)/(51-57) 118/57     Weight: 78.5 kg (173 lb 1 oz)  Body mass index is 28.8 kg/m².    SpO2: (!) 94 %         Intake/Output Summary (Last 24 hours) at 4/8/2024 1002  Last data filed at 4/7/2024 2335  Gross per 24 hour   Intake --   Output 1300 ml   Net -1300 ml       Lines/Drains/Airways       Peripheral Intravenous Line  Duration                  Peripheral IV - Single Lumen 04/06/24 1412 20 G Anterior;Distal;Left Upper Arm 1 day                     Physical Exam  Vitals and nursing note reviewed.  "  Constitutional:       Appearance: Normal appearance.   Neck:      Vascular: JVD present.   Cardiovascular:      Rate and Rhythm: Normal rate and regular rhythm.      Heart sounds: No murmur heard.     No gallop.   Pulmonary:      Effort: Pulmonary effort is normal.      Breath sounds: Rales present.   Abdominal:      General: Bowel sounds are normal. There is no distension.      Palpations: Abdomen is soft.      Tenderness: There is no abdominal tenderness.   Musculoskeletal:      Right lower leg: Edema present.      Left lower leg: Edema present.   Skin:     General: Skin is warm and dry.   Neurological:      Mental Status: He is alert and oriented to person, place, and time. Mental status is at baseline.          Significant Labs: BMP:   Recent Labs   Lab 04/06/24  1407 04/07/24 0241 04/08/24 0449    97  97 90  90   * 145  145 139  139   K 3.8 3.6  3.6 4.9  4.9    104  104 104  104   CO2 27 28  28 21*  21*   BUN 21 17  17 20  20   CREATININE 1.0 1.0  1.0 1.1  1.1   CALCIUM 8.9 8.5*  8.5* 8.0*  8.0*   MG 2.3 2.2 2.2   , CMP   Recent Labs   Lab 04/06/24  1407 04/07/24 0241 04/08/24 0449   * 145  145 139  139   K 3.8 3.6  3.6 4.9  4.9    104  104 104  104   CO2 27 28  28 21*  21*    97  97 90  90   BUN 21 17  17 20  20   CREATININE 1.0 1.0  1.0 1.1  1.1   CALCIUM 8.9 8.5*  8.5* 8.0*  8.0*   PROT 6.6  --   --    ALBUMIN 2.6*  --   --    BILITOT 0.4  --   --    ALKPHOS 222*  --   --    AST 71*  --   --    ALT 58*  --   --    ANIONGAP 13 13  13 14  14   , CBC   Recent Labs   Lab 04/06/24  1407 04/07/24  0241 04/08/24 0449   WBC 10.84 10.55 9.77   HGB 11.8* 11.0* 11.2*   HCT 38.2* 35.3* 36.4*    388 342   , INR No results for input(s): "INR", "PROTIME" in the last 48 hours., Lipid Panel No results for input(s): "CHOL", "HDL", "LDLCALC", "TRIG", "CHOLHDL" in the last 48 hours., Troponin   Recent Labs   Lab 04/06/24  1407   TROPONINI " 0.510*   , and All pertinent lab results from the last 24 hours have been reviewed.    Significant Imaging: Echocardiogram: 2D echo with color flow doppler:   Results for orders placed or performed during the hospital encounter of 05/02/18   2D echo with color flow doppler   Result Value Ref Range    EF + QEF 25 (A) 55 - 65    Mitral Valve Regurgitation TRIVIAL     Diastolic Dysfunction Yes (A)     Est. PA Systolic Pressure 26.81     Tricuspid Valve Regurgitation TRIVIAL     Narrative    Date of Procedure: 05/02/2018        TEST DESCRIPTION   Technical Quality: This study was performed in conjunction with a 3ml intravenous injection of Optison contrast agent.     Aorta: The aortic root is normal in size, measuring 2.8 cm at sinotubular junction and 3.2 cm at Sinuses of Valsalva. The proximal ascending aorta is normal in size, measuring 3.0 cm across.     Left Atrium: The left atrial volume index is mildly enlarged, measuring 41.38 cc/m2.     Left Ventricle: The left ventricle is moderately enlarged, with an end-diastolic diameter of 5.7 cm, and an end-systolic diameter of 4.7 cm. LV wall thickness is normal, with the septum and the posterior wall each measuring 1.0 cm across. Relative wall   thickness was normal at 0.35, and the LV mass index was increased at 137.7 g/m2 consistent with eccentric left ventricular hypertrophy. The apex is akinetic.   There is global hypokinesis. Left ventricular systolic function appears severely depressed. Visually estimated ejection fraction is 25-30%. The LV Doppler derived stroke volume equals 78.0 ccs.     Diastolic indices: E wave velocity 0.6 m/s, E/A ratio 0.5,  msec., E/e' ratio(avg) 9. There is diastolic dysfunction secondary to relaxation abnormality.     Right Atrium: The right atrium is normal in size, measuring 3.9 cm in length and 3.5 cm in width in the apical view.     Right Ventricle: The right ventricle is normal in size measuring 3.6 cm at the base in the  apical right ventricle-focused view. Global right ventricular systolic function appears low normal. Tricuspid annular plane systolic excursion (TAPSE) is 1.4 cm.   Tissue Doppler-derived tricuspid annular peak systolic velocity (S prime) is 9.4 cm/s. The estimated PA systolic pressure is 27 mmHg.     Aortic Valve:  Aortic valve is normal in structure with normal leaflet mobility.     Mitral Valve:  There is trivial mitral regurgitation. Mitral valve is normal in structure with normal leaflet mobility.     Tricuspid Valve:  There is trivial tricuspid regurgitation. Tricuspid valve is normal in structure with normal leaflet mobility.     Pulmonary Valve:  Pulmonary valve is normal in structure with normal leaflet mobility.     IVC: IVC is normal in size and collapses > 50% with a sniff, suggesting normal right atrial pressure of 3 mmHg.     Intracavitary: There is no evidence of pericardial effusion, intracavity mass, thrombi, or vegetation.         CONCLUSIONS     1 - Moderate left ventricular enlargement.     2 - Severely depressed left ventricular systolic function (EF 25-30%).     3 - Impaired LV relaxation, normal LAP (grade 1 diastolic dysfunction).     4 - Low normal right ventricular systolic function .     5 - The estimated PA systolic pressure is 27 mmHg.     6 - Trivial mitral regurgitation.     7 - Trivial tricuspid regurgitation.     8 - Mild left atrial enlargement.             This document has been electronically    SIGNED BY: Teodora Bowie MD On: 05/02/2018 15:09    and Transthoracic echo (TTE) complete ([facility] Only):   Results for orders placed or performed during the hospital encounter of 12/15/23   Echo   Result Value Ref Range    BSA 1.82 m2    Gandhi's Biplane MOD Ejection Fraction 15 %    LVOT stroke volume 58.97 cm3    LVIDd 6.68 (A) 3.5 - 6.0 cm    LV Systolic Volume 181.96 mL    LV Systolic Volume Index 101.7 mL/m2    LVIDs 6.03 (A) 2.1 - 4.0 cm    LV Diastolic Volume 229.94 mL    LV  Diastolic Volume Index 128.46 mL/m2    IVS 0.80 0.6 - 1.1 cm    LVOT diameter 2.30 cm    LVOT area 4.2 cm2    FS 10 (A) 28 - 44 %    Left Ventricle Relative Wall Thickness 0.24 cm    Posterior Wall 0.79 0.6 - 1.1 cm    LV mass 223.19 g    LV Mass Index 125 g/m2    MV Peak E Silas 0.65 m/s    TDI LATERAL 0.07 m/s    TDI SEPTAL 0.02 m/s    E/E' ratio 14.44 m/s    MV Peak A Silas 0.88 m/s    TR Max Silas 2.91 m/s    E/A ratio 0.74     E wave deceleration time 212.49 msec    LV SEPTAL E/E' RATIO 32.50 m/s    LV LATERAL E/E' RATIO 9.29 m/s    PV Peak S Silas 0.39 m/s    PV Peak D Silas 0.40 m/s    Pulm vein S/D ratio 0.98     LVOT peak silas 0.63 m/s    Left Ventricular Outflow Tract Mean Velocity 0.47 cm/s    Left Ventricular Outflow Tract Mean Gradient 0.98 mmHg    RV S' 7.13 cm/s    TAPSE 1.51 cm    LA size 4.64 cm    Left Atrium Minor Axis 5.35 cm    Left Atrium Major Axis 4.74 cm    LA volume (mod) 39.08 cm3    LA Volume Index (Mod) 21.8 mL/m2    RA Major Axis 4.04 cm    RA Width 3.13 cm    AV mean gradient 3 mmHg    AV peak gradient 6 mmHg    Ao peak silas 1.18 m/s    Ao VTI 24.20 cm    LVOT peak VTI 14.20 cm    AV valve area 2.44 cm²    AV Velocity Ratio 0.53     AV index (prosthetic) 0.59     DEBORAH by Velocity Ratio 2.22 cm²    Mr max silas 4.36 m/s    MV peak gradient 5 mmHg    MV stenosis pressure 1/2 time 61.62 ms    MV valve area p 1/2 method 3.57 cm2    MV valve area by continuity eq 2.05 cm2    MV VTI 28.8 cm    Triscuspid Valve Regurgitation Peak Gradient 34 mmHg    IVC diameter 1.81 cm    Mean e' 0.05 m/s    ZLVIDS 5.37     ZLVIDD 3.00     LA Volume Index 35.4 mL/m2    LA volume 63.44 cm3    LA WIDTH 3.2 cm    TV resting pulmonary artery pressure 37 mmHg    RV TB RVSP 6 mmHg    Est. RA pres 3 mmHg    Narrative      Left Ventricle: The left ventricle is dilated. There is eccentric   hypertrophy. Regional wall motion abnormalities present. See diagram for   wall motion findings. Septal motion is consistent with  post-operative   status. There is reduced systolic function. Biplane (2D) method of discs   ejection fraction is 15%.    Grade II diastolic dysfunction.    Right Ventricle: Normal right ventricular cavity size. Systolic   function is reduced.TAPSE is 1.51 cm.    Left Atrium: Left atrium is mildly dilated. The left atrium volume   index is 35.4 mL/m2.    Right Atrium: Right atrium is dilated.    Mitral Valve: There is moderate regurgitation.    Pulmonary Artery: The estimated pulmonary artery systolic pressure is   37 mmHg.    IVC/SVC: Normal venous pressure at 3 mmHg.       Assessment and Plan:     * CHF exacerbation  -see CHF below    Elevated troponin  -1x troponin 4/6/2024 0.51  -likely demand related in the setting of fluid overload/ acute CHF  -will order repeat trop r/o ACS    Chronic combined systolic and diastolic CHF, NYHA class 2  -grossly fluid overloaded on exam, HFrEF as per echo below  -will increase lasix to 40mg IV TID; continue IV diuresis until euvolemic  -was taking 20mg PO lasix daily at home; will likely need increased PO regimen proir to discharge  -continue GDMT on BB, entresto, aldactone    Echo 12/15/2023    Interpretation Summary    Left Ventricle: The left ventricle is dilated. There is eccentric hypertrophy. Regional wall motion abnormalities present. See diagram for wall motion findings. Septal motion is consistent with post-operative status. There is reduced systolic function. Biplane (2D) method of discs ejection fraction is 15%.    Grade II diastolic dysfunction.    Right Ventricle: Normal right ventricular cavity size. Systolic function is reduced.TAPSE is 1.51 cm.    Left Atrium: Left atrium is mildly dilated. The left atrium volume index is 35.4 mL/m2.    Right Atrium: Right atrium is dilated.    Mitral Valve: There is moderate regurgitation.    Pulmonary Artery: The estimated pulmonary artery systolic pressure is 37 mmHg.    IVC/SVC: Normal venous pressure at 3 mmHg.    Recent  Labs   Lab 04/06/24  1407   BNP 1,125*       ICD (implantable cardioverter-defibrillator) in place  -s/p dual chamber ICD implantation in 2010 with a generator change in 2017 by Dr. Loza  -hx of VT storm 07/2023, started on amio, stable  -continue PO amio  -continue continuous cardiac monitoring          Essential (primary) hypertension  -well controlled  -continue GDMT    JERONIMO on CPAP  -continue CPAP QHS    Other hyperlipidemia  -continue statin, goal LDLc < 70      CAD (coronary artery disease) of artery bypass graft  -CAD s/p CABG x 3 1996 followed by multiple PCIs  -last [facility] 07/2023 as below  -denies CP, palpitations, syncope/pre-syncope, PND; SOB likely 2/2 fluid overload/ CHF  -continue asa, plavix    [facility] 07/2023  Findings  Co-dominant  LM: Mild stenosis  LAD: Occluded at the ostium  Lcx: 80-90% ostial Lcx (ISR)  RCA: Patent stent proximal and mid RCA     Bypass Graft  - SVG to LAD filling prox LAD retrograde and into diagonal branch which has 2 serial 90% stenosis. Patent LAD  - LIMA and AUGUSTUS visualized and noted not utilized     LVEDP 21     Intervention  - sp IVUS guided POBA proximal Lcx with 3.5 NC and 4.0 NC balloon  - Attempted diagonal intervention via SVG graft. Able to wire into diagonal branch (via retrograde wiring in the LAD), but unable to deliver balloon        VTE Risk Mitigation (From admission, onward)           Ordered     enoxaparin injection 40 mg  Daily         04/06/24 1520     IP VTE HIGH RISK PATIENT  Once         04/06/24 1520     Place sequential compression device  Until discontinued         04/06/24 1520                    Thank you for your consult. I will follow-up with patient. Please contact us if you have any additional questions.    Addison Coley, ELI  Cardiology   [place] - Telemetry

## 2024-04-08 NOTE — ASSESSMENT & PLAN NOTE
Patient is identified as having Combined Systolic and Diastolic heart failure that is Acute on chronic. CHF is currently uncontrolled due to Continued edema of extremities, Dyspnea not returned to baseline after 1 doses of IV diuretic, >3 pillow orthopnea, Rales/crackles on pulmonary exam, and Pulmonary edema/pleural effusion on CXR. Latest ECHO performed and demonstrates- Results for orders placed during the hospital encounter of 12/15/23    Echo    Interpretation Summary    Left Ventricle: The left ventricle is dilated. There is eccentric hypertrophy. Regional wall motion abnormalities present. See diagram for wall motion findings. Septal motion is consistent with post-operative status. There is reduced systolic function. Biplane (2D) method of discs ejection fraction is 15%.    Grade II diastolic dysfunction.    Right Ventricle: Normal right ventricular cavity size. Systolic function is reduced.TAPSE is 1.51 cm.    Left Atrium: Left atrium is mildly dilated. The left atrium volume index is 35.4 mL/m2.    Right Atrium: Right atrium is dilated.    Mitral Valve: There is moderate regurgitation.    Pulmonary Artery: The estimated pulmonary artery systolic pressure is 37 mmHg.    IVC/SVC: Normal venous pressure at 3 mmHg.  . Continue Beta Blocker, Furosemide, Aldactone, and ARNI and monitor clinical status closely. Monitor on telemetry. Patient is on CHF pathway.  Monitor strict Is&Os and daily weights.  Place on fluid restriction of 1 L. Cardiology has been consulted. Continue to stress to patient importance of self efficacy and  on diet for CHF. Last BNP reviewed- and noted below   Recent Labs   Lab 04/06/24  1407   BNP 1,125*

## 2024-04-08 NOTE — ASSESSMENT & PLAN NOTE
-grossly fluid overloaded on exam, HFrEF as per echo below  -will increase lasix to 40mg IV TID; continue IV diuresis until euvolemic  -was taking 20mg PO lasix daily at home; will likely need increased PO regimen proir to discharge  -continue GDMT on BB, entresto, aldactone    Echo 12/15/2023    Interpretation Summary    Left Ventricle: The left ventricle is dilated. There is eccentric hypertrophy. Regional wall motion abnormalities present. See diagram for wall motion findings. Septal motion is consistent with post-operative status. There is reduced systolic function. Biplane (2D) method of discs ejection fraction is 15%.    Grade II diastolic dysfunction.    Right Ventricle: Normal right ventricular cavity size. Systolic function is reduced.TAPSE is 1.51 cm.    Left Atrium: Left atrium is mildly dilated. The left atrium volume index is 35.4 mL/m2.    Right Atrium: Right atrium is dilated.    Mitral Valve: There is moderate regurgitation.    Pulmonary Artery: The estimated pulmonary artery systolic pressure is 37 mmHg.    IVC/SVC: Normal venous pressure at 3 mmHg.    Recent Labs   Lab 04/06/24  1407   BNP 1,125*

## 2024-04-08 NOTE — ASSESSMENT & PLAN NOTE
-1x troponin 4/6/2024 0.51  -likely demand related in the setting of fluid overload/ acute CHF  -will order repeat trop r/o ACS

## 2024-04-08 NOTE — ASSESSMENT & PLAN NOTE
-CAD s/p CABG x 3 1996 followed by multiple PCIs  -last [facility] 07/2023 as below  -denies CP, palpitations, syncope/pre-syncope, PND; SOB likely 2/2 fluid overload/ CHF  -continue asa, plavix    [facility] 07/2023  Findings  Co-dominant  LM: Mild stenosis  LAD: Occluded at the ostium  Lcx: 80-90% ostial Lcx (ISR)  RCA: Patent stent proximal and mid RCA     Bypass Graft  - SVG to LAD filling prox LAD retrograde and into diagonal branch which has 2 serial 90% stenosis. Patent LAD  - LIMA and AUGUSTUS visualized and noted not utilized     LVEDP 21     Intervention  - sp IVUS guided POBA proximal Lcx with 3.5 NC and 4.0 NC balloon  - Attempted diagonal intervention via SVG graft. Able to wire into diagonal branch (via retrograde wiring in the LAD), but unable to deliver balloon

## 2024-04-08 NOTE — SUBJECTIVE & OBJECTIVE
Interval History: awake and alert,   Still fluid overload on exam, diuresing slow trending -up titrate IV Lasix to 60 mg b.i.d.  Updated spouse at the bedside questions and concerns were answered    Tristen Blanc has warranted treatment spanning two or more midnights of hospital level care for the management of pneumonia and heart failure. He continues to require IV diuresis, daily labs, further testing/imaging, monitoring of vital signs, and medication adjustments. His condition is also complicated by the following comorbidities: Coronary Artery Disease, Hypertension, Chronic kidney disease, and Heart failure.         Review of Systems   Constitutional:  Positive for activity change. Negative for chills, diaphoresis and fever.   Respiratory:  Negative for cough and shortness of breath.    Cardiovascular:  Negative for leg swelling.   Gastrointestinal:  Negative for abdominal distention, abdominal pain, nausea and vomiting.   Genitourinary:  Negative for dysuria.   Skin:  Negative for color change and wound.   Neurological:  Negative for dizziness and numbness.   Psychiatric/Behavioral:  Negative for agitation and confusion.      Objective:     Vital Signs (Most Recent):  Temp: 98.1 °F (36.7 °C) (04/08/24 0745)  Pulse: 60 (04/08/24 0745)  Resp: 18 (04/08/24 0745)  BP: (!) 118/57 (04/08/24 0745)  SpO2: (!) 94 % (04/08/24 0745) Vital Signs (24h Range):  Temp:  [97.9 °F (36.6 °C)-98.4 °F (36.9 °C)] 98.1 °F (36.7 °C)  Pulse:  [58-72] 60  Resp:  [16-19] 18  SpO2:  [92 %-98 %] 94 %  BP: (101-118)/(51-57) 118/57     Weight: 78.5 kg (173 lb 1 oz)  Body mass index is 28.8 kg/m².    Intake/Output Summary (Last 24 hours) at 4/8/2024 0885  Last data filed at 4/7/2024 2335  Gross per 24 hour   Intake --   Output 1300 ml   Net -1300 ml           Physical Exam  HENT:      Head: Normocephalic and atraumatic.   Cardiovascular:      Rate and Rhythm: Normal rate.   Pulmonary:      Breath sounds: Rales present.   Abdominal:       "General: There is distension.      Palpations: Abdomen is soft.   Musculoskeletal:         General: Normal range of motion.      Cervical back: Normal range of motion.      Right lower leg: Edema present.      Left lower leg: Edema present.      Comments: + 3 edema, extending to the back   Skin:     General: Skin is warm.      Capillary Refill: Capillary refill takes less than 2 seconds.   Neurological:      Mental Status: He is alert and oriented to person, place, and time.         Significant Labs: A1C:   Recent Labs   Lab 04/06/24  1407   HGBA1C 5.1       ABGs: No results for input(s): "PH", "PCO2", "HCO3", "POCSATURATED", "BE", "TOTALHB", "COHB", "METHB", "O2HB", "POCFIO2", "PO2" in the last 48 hours.  Blood Culture: No results for input(s): "LABBLOO" in the last 48 hours.  CBC:   Recent Labs   Lab 04/06/24  1407 04/07/24  0241 04/08/24  0449   WBC 10.84 10.55 9.77   HGB 11.8* 11.0* 11.2*   HCT 38.2* 35.3* 36.4*    388 342       CMP:   Recent Labs   Lab 04/06/24  1407 04/07/24  0241 04/08/24  0449   * 145  145 139  139   K 3.8 3.6  3.6 4.9  4.9    104  104 104  104   CO2 27 28  28 21*  21*    97  97 90  90   BUN 21 17  17 20  20   CREATININE 1.0 1.0  1.0 1.1  1.1   CALCIUM 8.9 8.5*  8.5* 8.0*  8.0*   PROT 6.6  --   --    ALBUMIN 2.6*  --   --    BILITOT 0.4  --   --    ALKPHOS 222*  --   --    AST 71*  --   --    ALT 58*  --   --    ANIONGAP 13 13  13 14  14       Coagulation: No results for input(s): "PT", "INR", "APTT" in the last 48 hours.  Lactic Acid: No results for input(s): "LACTATE" in the last 48 hours.  Lipase: No results for input(s): "LIPASE" in the last 48 hours.  Lipid Panel: No results for input(s): "CHOL", "HDL", "LDLCALC", "TRIG", "CHOLHDL" in the last 48 hours.  Magnesium:   Recent Labs   Lab 04/06/24  1407 04/07/24  0241 04/08/24  0449   MG 2.3 2.2 2.2       Troponin:   Recent Labs   Lab 04/06/24  1407   TROPONINI 0.510*       TSH: No results for " "input(s): "TSH" in the last 4320 hours.  Urine Culture: No results for input(s): "LABURIN" in the last 48 hours.  Urine Studies: No results for input(s): "COLORU", "APPEARANCEUA", "PHUR", "SPECGRAV", "PROTEINUA", "GLUCUA", "KETONESU", "BILIRUBINUA", "OCCULTUA", "NITRITE", "UROBILINOGEN", "LEUKOCYTESUR", "RBCUA", "WBCUA", "BACTERIA", "SQUAMEPITHEL", "HYALINECASTS" in the last 48 hours.    Invalid input(s): "WRIGHTSUR"    Significant Imaging: I have reviewed all pertinent imaging results/findings within the past 24 hours.  "

## 2024-04-08 NOTE — PROGRESS NOTES
[facility] Medicine  Progress Note    Patient Name: Tristen Blanc  MRN: [id number]  Patient Class: IP- Inpatient   Admission Date: 4/6/2024  Length of Stay: 1 days  Attending Physician: Kayleen Bowling*  Primary Care Provider: Alan Patterson MD        Subjective:     Principal Problem:Pneumonia        HPI:  Tristen Blanc is a 79 yo M with PMH of coronary disease, congestive heart failure with depressed ejection fraction, dyslipidemia, BPH, arthritis, dementia, sleep apnea, and s/p hip fracture 1/9/24 brought in by EMS from home with complaint of 4 days history of progressive shortness of breath that is worse yesterday night. There is associated leg swelling, cough weakness, and PND,.  Denies fever, chills, diaphoresis, dysuria, abdominal distention.   History provided by patient and spouse at the bedside  BNP 1125, troponin 0.510, WBC 10.84, H/H 11.8/38.2, sodium 147, potassium 3.8, CXR with concerns for pulmonary edema/pneumonia.  Patient started on IV Lasix and IV ceftriaxone in the ED.        Overview/Hospital Course:  No notes on file    Interval History: awake and alert,   Still fluid overload on exam, diuresing slow trending -up titrate IV Lasix to 60 mg b.i.d.  Updated spouse at the bedside questions and concerns were answered    Tristen Blanc has warranted treatment spanning two or more midnights of hospital level care for the management of pneumonia and heart failure. He continues to require IV diuresis, daily labs, further testing/imaging, monitoring of vital signs, and medication adjustments. His condition is also complicated by the following comorbidities: Coronary Artery Disease, Hypertension, Chronic kidney disease, and Heart failure.         Review of Systems   Constitutional:  Positive for activity change. Negative for chills, diaphoresis and fever.   Respiratory:  Negative for cough and shortness of breath.    Cardiovascular:  Negative for leg  "swelling.   Gastrointestinal:  Negative for abdominal distention, abdominal pain, nausea and vomiting.   Genitourinary:  Negative for dysuria.   Skin:  Negative for color change and wound.   Neurological:  Negative for dizziness and numbness.   Psychiatric/Behavioral:  Negative for agitation and confusion.      Objective:     Vital Signs (Most Recent):  Temp: 98.1 °F (36.7 °C) (04/08/24 0745)  Pulse: 60 (04/08/24 0745)  Resp: 18 (04/08/24 0745)  BP: (!) 118/57 (04/08/24 0745)  SpO2: (!) 94 % (04/08/24 0745) Vital Signs (24h Range):  Temp:  [97.9 °F (36.6 °C)-98.4 °F (36.9 °C)] 98.1 °F (36.7 °C)  Pulse:  [58-72] 60  Resp:  [16-19] 18  SpO2:  [92 %-98 %] 94 %  BP: (101-118)/(51-57) 118/57     Weight: 78.5 kg (173 lb 1 oz)  Body mass index is 28.8 kg/m².    Intake/Output Summary (Last 24 hours) at 4/8/2024 0847  Last data filed at 4/7/2024 2335  Gross per 24 hour   Intake --   Output 1300 ml   Net -1300 ml           Physical Exam  HENT:      Head: Normocephalic and atraumatic.   Cardiovascular:      Rate and Rhythm: Normal rate.   Pulmonary:      Breath sounds: Rales present.   Abdominal:      General: There is distension.      Palpations: Abdomen is soft.   Musculoskeletal:         General: Normal range of motion.      Cervical back: Normal range of motion.      Right lower leg: Edema present.      Left lower leg: Edema present.      Comments: + 3 edema, extending to the back   Skin:     General: Skin is warm.      Capillary Refill: Capillary refill takes less than 2 seconds.   Neurological:      Mental Status: He is alert and oriented to person, place, and time.         Significant Labs: A1C:   Recent Labs   Lab 04/06/24  1407   HGBA1C 5.1       ABGs: No results for input(s): "PH", "PCO2", "HCO3", "POCSATURATED", "BE", "TOTALHB", "COHB", "METHB", "O2HB", "POCFIO2", "PO2" in the last 48 hours.  Blood Culture: No results for input(s): "LABBLOO" in the last 48 hours.  CBC:   Recent Labs   Lab 04/06/24  1407 " "04/07/24  0241 04/08/24  0449   WBC 10.84 10.55 9.77   HGB 11.8* 11.0* 11.2*   HCT 38.2* 35.3* 36.4*    388 342       CMP:   Recent Labs   Lab 04/06/24  1407 04/07/24  0241 04/08/24  0449   * 145  145 139  139   K 3.8 3.6  3.6 4.9  4.9    104  104 104  104   CO2 27 28  28 21*  21*    97  97 90  90   BUN 21 17  17 20  20   CREATININE 1.0 1.0  1.0 1.1  1.1   CALCIUM 8.9 8.5*  8.5* 8.0*  8.0*   PROT 6.6  --   --    ALBUMIN 2.6*  --   --    BILITOT 0.4  --   --    ALKPHOS 222*  --   --    AST 71*  --   --    ALT 58*  --   --    ANIONGAP 13 13  13 14  14       Coagulation: No results for input(s): "PT", "INR", "APTT" in the last 48 hours.  Lactic Acid: No results for input(s): "LACTATE" in the last 48 hours.  Lipase: No results for input(s): "LIPASE" in the last 48 hours.  Lipid Panel: No results for input(s): "CHOL", "HDL", "LDLCALC", "TRIG", "CHOLHDL" in the last 48 hours.  Magnesium:   Recent Labs   Lab 04/06/24  1407 04/07/24  0241 04/08/24  0449   MG 2.3 2.2 2.2       Troponin:   Recent Labs   Lab 04/06/24  1407   TROPONINI 0.510*       TSH: No results for input(s): "TSH" in the last 4320 hours.  Urine Culture: No results for input(s): "LABURIN" in the last 48 hours.  Urine Studies: No results for input(s): "COLORU", "APPEARANCEUA", "PHUR", "SPECGRAV", "PROTEINUA", "GLUCUA", "KETONESU", "BILIRUBINUA", "OCCULTUA", "NITRITE", "UROBILINOGEN", "LEUKOCYTESUR", "RBCUA", "WBCUA", "BACTERIA", "SQUAMEPITHEL", "HYALINECASTS" in the last 48 hours.    Invalid input(s): "WRIGHTSUR"    Significant Imaging: I have reviewed all pertinent imaging results/findings within the past 24 hours.    Assessment/Plan:      * CHF exacerbation  Patient is identified as having Combined Systolic and Diastolic heart failure that is Acute on chronic. CHF is currently uncontrolled due to Continued edema of extremities, Dyspnea not returned to baseline after 1 doses of IV diuretic, >3 pillow orthopnea, " Rales/crackles on pulmonary exam, and Pulmonary edema/pleural effusion on CXR. Latest ECHO performed and demonstrates- Results for orders placed during the hospital encounter of 12/15/23    Echo    Interpretation Summary    Left Ventricle: The left ventricle is dilated. There is eccentric hypertrophy. Regional wall motion abnormalities present. See diagram for wall motion findings. Septal motion is consistent with post-operative status. There is reduced systolic function. Biplane (2D) method of discs ejection fraction is 15%.    Grade II diastolic dysfunction.    Right Ventricle: Normal right ventricular cavity size. Systolic function is reduced.TAPSE is 1.51 cm.    Left Atrium: Left atrium is mildly dilated. The left atrium volume index is 35.4 mL/m2.    Right Atrium: Right atrium is dilated.    Mitral Valve: There is moderate regurgitation.    Pulmonary Artery: The estimated pulmonary artery systolic pressure is 37 mmHg.    IVC/SVC: Normal venous pressure at 3 mmHg.  . Continue Beta Blocker, Furosemide, Aldactone, and ARNI and monitor clinical status closely. Monitor on telemetry. Patient is on CHF pathway.  Monitor strict Is&Os and daily weights.  Place on fluid restriction of 1 L. Cardiology has been consulted. Continue to stress to patient importance of self efficacy and  on diet for CHF. Last BNP reviewed- and noted below   Recent Labs   Lab 04/06/24  1407   BNP 1,125*       Elevated troponin    Likely due to demand ischemia  monitor    Pneumonia    CXR:  Patchy perihilar increased attenuation bilaterally worse on the right concerning for multifocal pneumonia or edema   CTX started in ED- continue  Add Azithromycin    H/O: stroke  Lipitor and ASA      Chronic combined systolic and diastolic CHF, NYHA class 2  Patient is identified as having Combined Systolic and Diastolic heart failure that is Acute on chronic. CHF is currently uncontrolled due to Continued edema of extremities and Rales/crackles on  pulmonary exam. Latest ECHO performed and demonstrates- Results for orders placed during the hospital encounter of 12/15/23    Echo    Interpretation Summary    Left Ventricle: The left ventricle is dilated. There is eccentric hypertrophy. Regional wall motion abnormalities present. See diagram for wall motion findings. Septal motion is consistent with post-operative status. There is reduced systolic function. Biplane (2D) method of discs ejection fraction is 15%.    Grade II diastolic dysfunction.    Right Ventricle: Normal right ventricular cavity size. Systolic function is reduced.TAPSE is 1.51 cm.    Left Atrium: Left atrium is mildly dilated. The left atrium volume index is 35.4 mL/m2.    Right Atrium: Right atrium is dilated.    Mitral Valve: There is moderate regurgitation.    Pulmonary Artery: The estimated pulmonary artery systolic pressure is 37 mmHg.    IVC/SVC: Normal venous pressure at 3 mmHg.  . Continue Beta Blocker, Furosemide, Aldactone, and ARNI and monitor clinical status closely. Monitor on telemetry. Patient is on CHF pathway.  Monitor strict Is&Os and daily weights.  Place on fluid restriction of 1 L. Cardiology has not been consulted. Continue to stress to patient importance of self efficacy and  on diet for CHF. Last BNP reviewed- and noted below   Recent Labs   Lab 04/06/24  1407   BNP 1,125*        CXR with pulmonary edema  Continue OV Lasix  Strict input/output    Coronary artery disease involving native coronary artery of native heart without angina pectoris  CAD (coronary artery disease) of artery bypass graft  ICD (implantable cardioverter-defibrillator) in place  Patient with known CAD s/p CABG, an s/p stent, which is controlled Will continue ASA, Plavix, and Statin and monitor for S/Sx of angina/ACS. Continue to monitor on telemetry.     Essential (primary) hypertension  Chronic, controlled. Latest blood pressure and vitals reviewed-     Temp:  [99.3 °F (37.4 °C)]   Pulse:   [71]   Resp:  [18]   BP: (134)/(90)   SpO2:  [96 %] .   Home meds for hypertension were reviewed and noted below.   Hypertension Medications               furosemide (LASIX) 20 MG tablet Take 2 tablets (40 mg total) by mouth once daily.    metoprolol succinate (TOPROL-XL) 50 MG 24 hr tablet TAKE 1 TABLET(50 MG) BY MOUTH EVERY DAY    nitroGLYCERIN (NITROSTAT) 0.4 MG SL tablet ONE TABLET UNDER TONGUE AS NEEDED FOR CHEST PAIN. TAKE EVERY 5 MINUTES AS NEEDED    sacubitriL-valsartan (ENTRESTO) 24-26 mg per tablet Take 1 tablet by mouth 2 (two) times daily.    spironolactone (ALDACTONE) 25 MG tablet TAKE 1 TABLET(25 MG) BY MOUTH EVERY DAY            While in the hospital, will manage blood pressure as follows; Continue home antihypertensive regimen    Will utilize p.r.n. blood pressure medication only if patient's blood pressure greater than 140/90 and he develops symptoms such as worsening chest pain or shortness of breath.    JERONIMO on CPAP    No longer use CPAP.   Patient stated he does not want to use it  Patient ope to try it again    Other hyperlipidemia    Lipitor      VTE Risk Mitigation (From admission, onward)           Ordered     enoxaparin injection 40 mg  Daily         04/06/24 1520     IP VTE HIGH RISK PATIENT  Once         04/06/24 1520     Place sequential compression device  Until discontinued         04/06/24 1520                    Discharge Planning   ANTHONY:      Code Status: Full Code   Is the patient medically ready for discharge?:     Reason for patient still in hospital (select all that apply): Patient trending condition                     Kayleen Bowling MD  Department of Hospital Medicine   [facility]

## 2024-04-09 LAB
ANION GAP SERPL CALC-SCNC: 11 MMOL/L (ref 8–16)
ANION GAP SERPL CALC-SCNC: 11 MMOL/L (ref 8–16)
ANISOCYTOSIS BLD QL SMEAR: SLIGHT
BASOPHILS NFR BLD: 1 % (ref 0–1.9)
BUN SERPL-MCNC: 23 MG/DL (ref 8–23)
BUN SERPL-MCNC: 23 MG/DL (ref 8–23)
CALCIUM SERPL-MCNC: 8.3 MG/DL (ref 8.7–10.5)
CALCIUM SERPL-MCNC: 8.3 MG/DL (ref 8.7–10.5)
CHLORIDE SERPL-SCNC: 99 MMOL/L (ref 95–110)
CHLORIDE SERPL-SCNC: 99 MMOL/L (ref 95–110)
CO2 SERPL-SCNC: 29 MMOL/L (ref 23–29)
CO2 SERPL-SCNC: 29 MMOL/L (ref 23–29)
CREAT SERPL-MCNC: 1 MG/DL (ref 0.5–1.4)
CREAT SERPL-MCNC: 1 MG/DL (ref 0.5–1.4)
DIFFERENTIAL METHOD BLD: ABNORMAL
EOSINOPHIL NFR BLD: 28 % (ref 0–8)
ERYTHROCYTE [DISTWIDTH] IN BLOOD BY AUTOMATED COUNT: 16.1 % (ref 11.5–14.5)
EST. GFR  (NO RACE VARIABLE): >60 ML/MIN/1.73 M^2
EST. GFR  (NO RACE VARIABLE): >60 ML/MIN/1.73 M^2
GLUCOSE SERPL-MCNC: 94 MG/DL (ref 70–110)
GLUCOSE SERPL-MCNC: 94 MG/DL (ref 70–110)
HCT VFR BLD AUTO: 35.2 % (ref 40–54)
HGB BLD-MCNC: 11.2 G/DL (ref 14–18)
HYPOCHROMIA BLD QL SMEAR: ABNORMAL
IMM GRANULOCYTES # BLD AUTO: ABNORMAL K/UL (ref 0–0.04)
IMM GRANULOCYTES NFR BLD AUTO: ABNORMAL % (ref 0–0.5)
LYMPHOCYTES NFR BLD: 19 % (ref 18–48)
MAGNESIUM SERPL-MCNC: 2.1 MG/DL (ref 1.6–2.6)
MCH RBC QN AUTO: 27.7 PG (ref 27–31)
MCHC RBC AUTO-ENTMCNC: 31.8 G/DL (ref 32–36)
MCV RBC AUTO: 87 FL (ref 82–98)
MONOCYTES NFR BLD: 6 % (ref 4–15)
NEUTROPHILS NFR BLD: 46 % (ref 38–73)
NRBC BLD-RTO: 0 /100 WBC
OVALOCYTES BLD QL SMEAR: ABNORMAL
PHOSPHATE SERPL-MCNC: 2.9 MG/DL (ref 2.7–4.5)
PLATELET # BLD AUTO: 344 K/UL (ref 150–450)
PLATELET BLD QL SMEAR: ABNORMAL
PMV BLD AUTO: 9 FL (ref 9.2–12.9)
POIKILOCYTOSIS BLD QL SMEAR: SLIGHT
POTASSIUM SERPL-SCNC: 3.5 MMOL/L (ref 3.5–5.1)
POTASSIUM SERPL-SCNC: 3.5 MMOL/L (ref 3.5–5.1)
RBC # BLD AUTO: 4.04 M/UL (ref 4.6–6.2)
SODIUM SERPL-SCNC: 139 MMOL/L (ref 136–145)
SODIUM SERPL-SCNC: 139 MMOL/L (ref 136–145)
WBC # BLD AUTO: 8.69 K/UL (ref 3.9–12.7)

## 2024-04-09 PROCEDURE — 97535 SELF CARE MNGMENT TRAINING: CPT | Mod: CO

## 2024-04-09 PROCEDURE — 83735 ASSAY OF MAGNESIUM: CPT | Performed by: FAMILY MEDICINE

## 2024-04-09 PROCEDURE — 99900035 HC TECH TIME PER 15 MIN (STAT)

## 2024-04-09 PROCEDURE — 11000001 HC ACUTE MED/SURG PRIVATE ROOM

## 2024-04-09 PROCEDURE — 80048 BASIC METABOLIC PNL TOTAL CA: CPT | Performed by: FAMILY MEDICINE

## 2024-04-09 PROCEDURE — 97530 THERAPEUTIC ACTIVITIES: CPT | Mod: CQ

## 2024-04-09 PROCEDURE — 97116 GAIT TRAINING THERAPY: CPT | Mod: CQ

## 2024-04-09 PROCEDURE — 97530 THERAPEUTIC ACTIVITIES: CPT | Mod: CO

## 2024-04-09 PROCEDURE — 84100 ASSAY OF PHOSPHORUS: CPT | Performed by: FAMILY MEDICINE

## 2024-04-09 PROCEDURE — 63700000 PHARM REV CODE 250 ALT 637 W/O HCPCS: Performed by: FAMILY MEDICINE

## 2024-04-09 PROCEDURE — 85027 COMPLETE CBC AUTOMATED: CPT | Performed by: FAMILY MEDICINE

## 2024-04-09 PROCEDURE — 25000003 PHARM REV CODE 250: Performed by: FAMILY MEDICINE

## 2024-04-09 PROCEDURE — 63600175 PHARM REV CODE 636 W HCPCS: Performed by: FAMILY MEDICINE

## 2024-04-09 PROCEDURE — 94761 N-INVAS EAR/PLS OXIMETRY MLT: CPT

## 2024-04-09 PROCEDURE — 94660 CPAP INITIATION&MGMT: CPT

## 2024-04-09 PROCEDURE — 85007 BL SMEAR W/DIFF WBC COUNT: CPT | Performed by: FAMILY MEDICINE

## 2024-04-09 PROCEDURE — 36415 COLL VENOUS BLD VENIPUNCTURE: CPT | Performed by: FAMILY MEDICINE

## 2024-04-09 PROCEDURE — 99233 SBSQ HOSP IP/OBS HIGH 50: CPT | Mod: ,,, | Performed by: NURSE PRACTITIONER

## 2024-04-09 RX ORDER — METOPROLOL SUCCINATE 25 MG/1
25 TABLET, EXTENDED RELEASE ORAL DAILY
Status: DISCONTINUED | OUTPATIENT
Start: 2024-04-10 | End: 2024-04-10 | Stop reason: HOSPADM

## 2024-04-09 RX ORDER — BUMETANIDE 1 MG/1
1 TABLET ORAL 2 TIMES DAILY
Status: DISCONTINUED | OUTPATIENT
Start: 2024-04-09 | End: 2024-04-10

## 2024-04-09 RX ADMIN — SACUBITRIL AND VALSARTAN 1 TABLET: 24; 26 TABLET, FILM COATED ORAL at 08:04

## 2024-04-09 RX ADMIN — ATORVASTATIN CALCIUM 80 MG: 40 TABLET, FILM COATED ORAL at 08:04

## 2024-04-09 RX ADMIN — ASPIRIN 81 MG: 81 TABLET, COATED ORAL at 10:04

## 2024-04-09 RX ADMIN — CLOPIDOGREL BISULFATE 75 MG: 75 TABLET ORAL at 06:04

## 2024-04-09 RX ADMIN — ENOXAPARIN SODIUM 40 MG: 40 INJECTION SUBCUTANEOUS at 05:04

## 2024-04-09 RX ADMIN — AZITHROMYCIN DIHYDRATE 250 MG: 250 TABLET ORAL at 10:04

## 2024-04-09 RX ADMIN — CEFTRIAXONE SODIUM 2 G: 2 INJECTION, POWDER, FOR SOLUTION INTRAMUSCULAR; INTRAVENOUS at 03:04

## 2024-04-09 NOTE — ASSESSMENT & PLAN NOTE
-1x troponin 4/6/2024 0.51 down to 0.263  - related to demand etiology in the setting of fluid overload/ acute CHF  - no need to trend troponin further

## 2024-04-09 NOTE — PT/OT/SLP PROGRESS
"Occupational Therapy   Treatment    Name: Tristen Blanc  MRN: [id number]  Admitting Diagnosis:  CHF exacerbation       Recommendations:     Discharge Recommendations: Low Intensity Therapy  Discharge Equipment Recommendations:   (sock aid(wife to purchase) and new walker glide caps)  Barriers to discharge:  None    Assessment:     Tristen Blanc is a 78 y.o. male with a medical diagnosis of CHF exacerbation.  Performance deficits affecting function are weakness, impaired endurance, impaired self care skills, impaired functional mobility, gait instability, impaired balance, decreased coordination, decreased upper extremity function, decreased lower extremity function, decreased safety awareness, impaired coordination, decreased ROM.     Rehab Prognosis:  Good; patient would benefit from acute skilled OT services to address these deficits and reach maximum level of function.       Plan:     Patient to be seen 3 x/week to address the above listed problems via self-care/home management, therapeutic activities, therapeutic exercises  Plan of Care Expires: 05/07/24  Plan of Care Reviewed with: patient    Subjective     Chief Complaint: "that dizziness scared me"  Patient/Family Comments/goals: return to PLOF  Pain/Comfort:  Pain Rating 1: 0/10  Pain Rating Post-Intervention 1: 0/10    Objective:     Communicated with: nurse, Crest prior to session.  Patient found up in chair with telemetryJeremie upon OT entry to room.    General Precautions: Standard, fall    Orthopedic Precautions:RLE weight bearing as tolerated, RLE posterior precautions  Braces: N/A  Respiratory Status: Room air    Functional Mobility/Transfers:  Patient completed Sit <> Stand Transfer with contact guard assistance  with  rolling walker   Functional Mobility: in room using RW /c CGA    Activities of Daily Living:  Grooming: set-up and CGA for balance/safety in stance    Toileting: jeremie      [facility] 6 Click ADL: 20    Treatment & " Education:  Reports up in chair x 2 hours already; prefers over bed  Patient instructed in sit > stand using RW; see above  Ambulated to sink; slow pace and flexed posture  Able to wash hands and face; when brushing teeth he reported worsening dizziness, so stepped back to EOB to sit for safety and rest break  BP readings:  Sitting EOB:  113/56, HR 68  Standin/54, HR 72  After gait:  104/50, HR 65  Returned to chair and BLEs elevated; educated on APs from chair level to assist with BP boost  Nsg sent secure chat re: BP readings and patient feeling dizzy    Patient left up in chair with all lines intact and call button in reach    GOALS:   Multidisciplinary Problems       Occupational Therapy Goals          Problem: Occupational Therapy    Goal Priority Disciplines Outcome Interventions   Occupational Therapy Goal     OT, PT/OT Ongoing, Progressing    Description: Goals to be met by:2024     Patient will increase functional independence with ADLs by performing:    UE Dressing with Modified Omer.  LE Dressing with Stand-by Assistance and Assistive Devices as needed.  Grooming while standing with Supervision.  Toileting from toilet with Supervision for hygiene and clothing management.   Toilet transfer to toilet with Supervision.                         Time Tracking:     OT Date of Treatment: 24  OT Start Time: 938  OT Stop Time:   OT Total Time (min): 38 min    Billable Minutes:Self Care/Home Management 23  Therapeutic Activity 15    OT/ARCADIO: ARCADIO     Number of ARCADIO visits since last OT visit: 2    2024

## 2024-04-09 NOTE — ASSESSMENT & PLAN NOTE
- SBP 100s overnight  - reports of SBP down to 90s this AM with dizziness- AM meds held  - goal BP less than 130/80  - BP well  controlled  - continue GDMT

## 2024-04-09 NOTE — PLAN OF CARE
Problem: Adult Inpatient Plan of Care  Goal: Plan of Care Review  Outcome: Ongoing, Progressing     Problem: Fluid Imbalance (Pneumonia)  Goal: Fluid Balance  Outcome: Ongoing, Progressing     Problem: Obstructive Sleep Apnea Risk or Actual Comorbidity Management  Goal: Unobstructed Breathing During Sleep  Outcome: Ongoing, Progressing     Problem: Hypertension Comorbidity  Goal: Blood Pressure in Desired Range  Outcome: Ongoing, Progressing

## 2024-04-09 NOTE — PROGRESS NOTES
[facility] Medicine  Progress Note    Patient Name: Tristen Blanc  MRN: [id number]  Patient Class: IP- Inpatient   Admission Date: 4/6/2024  Length of Stay: 2 days  Attending Physician: Anurag Angelo MD  Primary Care Provider: Alan Patterson MD        Subjective:     Principal Problem:CHF exacerbation        HPI:  Tristen Blanc is a 77 yo M with PMH of coronary disease, congestive heart failure with depressed ejection fraction, dyslipidemia, BPH, arthritis, dementia, sleep apnea, and s/p hip fracture 1/9/24 brought in by EMS from home with complaint of 4 days history of progressive shortness of breath that is worse yesterday night. There is associated leg swelling, cough weakness, and PND,.  Denies fever, chills, diaphoresis, dysuria, abdominal distention.   History provided by patient and spouse at the bedside  BNP 1125, troponin 0.510, WBC 10.84, H/H 11.8/38.2, sodium 147, potassium 3.8, CXR with concerns for pulmonary edema/pneumonia.  Patient started on IV Lasix and IV ceftriaxone in the ED.        Overview/Hospital Course:  No notes on file    Interval History: Seen and examined at bedside, up in chair, hypotensive with PT today , moderate dyspnea with exertion . No chest pain fever falls.     Review of Systems   All other systems reviewed and are negative.    Objective:     Vital Signs (Most Recent):  Temp: 97.9 °F (36.6 °C) (04/09/24 1153)  Pulse: 66 (04/09/24 1206)  Resp: 18 (04/09/24 1153)  BP: (!) 108/53 (04/09/24 1153)  SpO2: (!) 92 % (04/09/24 1153) Vital Signs (24h Range):  Temp:  [97.9 °F (36.6 °C)-98.9 °F (37.2 °C)] 97.9 °F (36.6 °C)  Pulse:  [58-66] 66  Resp:  [18-20] 18  SpO2:  [91 %-95 %] 92 %  BP: ()/(48-58) 108/53     Weight: 78.5 kg (173 lb 1 oz)  Body mass index is 28.8 kg/m².    Intake/Output Summary (Last 24 hours) at 4/9/2024 1310  Last data filed at 4/9/2024 0800  Gross per 24 hour   Intake 925 ml   Output 1550 ml   Net -625 ml         Physical  Exam  Constitutional:       Appearance: He is ill-appearing.   HENT:      Head: Normocephalic.      Nose: Nose normal.      Mouth/Throat:      Mouth: Mucous membranes are moist.   Eyes:      Pupils: Pupils are equal, round, and reactive to light.   Cardiovascular:      Rate and Rhythm: Normal rate.      Pulses: Normal pulses.   Pulmonary:      Effort: Pulmonary effort is normal. No respiratory distress.      Breath sounds: Rales present.   Abdominal:      General: Abdomen is flat.   Musculoskeletal:         General: Normal range of motion.   Skin:     General: Skin is warm.      Capillary Refill: Capillary refill takes less than 2 seconds.             Significant Labs: All pertinent labs within the past 24 hours have been reviewed.    Significant Imaging: I have reviewed all pertinent imaging results/findings within the past 24 hours.    Assessment/Plan:      * CHF exacerbation  Patient is identified as having Combined Systolic and Diastolic heart failure that is Acute on chronic. CHF is currently uncontrolled due to Continued edema of extremities, Dyspnea not returned to baseline after 1 doses of IV diuretic, >3 pillow orthopnea, Rales/crackles on pulmonary exam, and Pulmonary edema/pleural effusion on CXR. Latest ECHO performed and demonstrates- Results for orders placed during the hospital encounter of 12/15/23    Echo    Interpretation Summary    Left Ventricle: The left ventricle is dilated. There is eccentric hypertrophy. Regional wall motion abnormalities present. See diagram for wall motion findings. Septal motion is consistent with post-operative status. There is reduced systolic function. Biplane (2D) method of discs ejection fraction is 15%.    Grade II diastolic dysfunction.    Right Ventricle: Normal right ventricular cavity size. Systolic function is reduced.TAPSE is 1.51 cm.    Left Atrium: Left atrium is mildly dilated. The left atrium volume index is 35.4 mL/m2.    Right Atrium: Right atrium is  dilated.    Mitral Valve: There is moderate regurgitation.    Pulmonary Artery: The estimated pulmonary artery systolic pressure is 37 mmHg.    IVC/SVC: Normal venous pressure at 3 mmHg.  . Continue Beta Blocker, Furosemide, Aldactone, and ARNI and monitor clinical status closely. Monitor on telemetry. Patient is on CHF pathway.  Monitor strict Is&Os and daily weights.  Place on fluid restriction of 1 L. Cardiology has been consulted. Continue to stress to patient importance of self efficacy and  on diet for CHF. Last BNP reviewed- and noted below   Recent Labs   Lab 04/06/24  1407   BNP 1,125*       Elevated troponin    Likely due to demand ischemia  monitor    Pneumonia    CXR:  Patchy perihilar increased attenuation bilaterally worse on the right concerning for multifocal pneumonia or edema   CTX started in ED- continue  Add Azithromycin    H/O: stroke  Lipitor and ASA      Chronic combined systolic and diastolic CHF, NYHA class 2  Patient is identified as having Combined Systolic and Diastolic heart failure that is Acute on chronic. CHF is currently uncontrolled due to Continued edema of extremities and Rales/crackles on pulmonary exam. Latest ECHO performed and demonstrates- Results for orders placed during the hospital encounter of 12/15/23    Echo    Interpretation Summary    Left Ventricle: The left ventricle is dilated. There is eccentric hypertrophy. Regional wall motion abnormalities present. See diagram for wall motion findings. Septal motion is consistent with post-operative status. There is reduced systolic function. Biplane (2D) method of discs ejection fraction is 15%.    Grade II diastolic dysfunction.    Right Ventricle: Normal right ventricular cavity size. Systolic function is reduced.TAPSE is 1.51 cm.    Left Atrium: Left atrium is mildly dilated. The left atrium volume index is 35.4 mL/m2.    Right Atrium: Right atrium is dilated.    Mitral Valve: There is moderate regurgitation.     Pulmonary Artery: The estimated pulmonary artery systolic pressure is 37 mmHg.    IVC/SVC: Normal venous pressure at 3 mmHg.  . Continue Beta Blocker, Furosemide, Aldactone, and ARNI and monitor clinical status closely. Monitor on telemetry. Patient is on CHF pathway.  Monitor strict Is&Os and daily weights.  Place on fluid restriction of 1 L. Cardiology has not been consulted. Continue to stress to patient importance of self efficacy and  on diet for CHF. Last BNP reviewed- and noted below   Recent Labs   Lab 04/06/24  1407   BNP 1,125*        CXR with pulmonary edema  Continue OV Lasix  Strict input/output    Coronary artery disease involving native coronary artery of native heart without angina pectoris  CAD (coronary artery disease) of artery bypass graft  ICD (implantable cardioverter-defibrillator) in place  Patient with known CAD s/p CABG, an s/p stent, which is controlled Will continue ASA, Plavix, and Statin and monitor for S/Sx of angina/ACS. Continue to monitor on telemetry.     Essential (primary) hypertension  Chronic, controlled. Latest blood pressure and vitals reviewed-     Temp:  [99.3 °F (37.4 °C)]   Pulse:  [71]   Resp:  [18]   BP: (134)/(90)   SpO2:  [96 %] .   Home meds for hypertension were reviewed and noted below.   Hypertension Medications               furosemide (LASIX) 20 MG tablet Take 2 tablets (40 mg total) by mouth once daily.    metoprolol succinate (TOPROL-XL) 50 MG 24 hr tablet TAKE 1 TABLET(50 MG) BY MOUTH EVERY DAY    nitroGLYCERIN (NITROSTAT) 0.4 MG SL tablet ONE TABLET UNDER TONGUE AS NEEDED FOR CHEST PAIN. TAKE EVERY 5 MINUTES AS NEEDED    sacubitriL-valsartan (ENTRESTO) 24-26 mg per tablet Take 1 tablet by mouth 2 (two) times daily.    spironolactone (ALDACTONE) 25 MG tablet TAKE 1 TABLET(25 MG) BY MOUTH EVERY DAY            While in the hospital, will manage blood pressure as follows; Continue home antihypertensive regimen    Will utilize p.r.n. blood pressure  medication only if patient's blood pressure greater than 140/90 and he develops symptoms such as worsening chest pain or shortness of breath.    JERONIMO on CPAP    No longer use CPAP.   Patient stated he does not want to use it  Patient ope to try it again    Other hyperlipidemia    Lipitor    Cardiomyopathy, ischemic    GDMT as tolerated for bp and symptoms, hold bp medications currently given SBP 90 - 100s. Decreased Toprol , aldactone and stopped TID lasix in favor of po Bumex 1mg po BID.     CAD (coronary artery disease) of artery bypass graft  Patient with known CAD s/p stent placement and CABG, which is uncontrolled Will continue ASA, Plavix, and Statin and monitor for S/Sx of angina/ACS. Continue to monitor on telemetry.       VTE Risk Mitigation (From admission, onward)           Ordered     enoxaparin injection 40 mg  Daily         04/06/24 1520     IP VTE HIGH RISK PATIENT  Once         04/06/24 1520     Place sequential compression device  Until discontinued         04/06/24 1520                    Discharge Planning   ANTHONY: 4/9/2024     Code Status: Full Code   Is the patient medically ready for discharge?:     Reason for patient still in hospital (select all that apply): Patient unstable  Discharge Plan A: Other (op-rehab)                  Anurag Angelo MD  Department of Hospital Medicine   [facility]

## 2024-04-09 NOTE — PROGRESS NOTES
[facility] - Telemetry  Cardiology  Progress Note    Patient Name: Tristen Blanc  MRN: [id number]  Admission Date: 4/6/2024  Hospital Length of Stay: 2 days  Code Status: Full Code   Attending Physician: Anurag Angelo MD   Primary Care Physician: Alan Patterson MD  Expected Discharge Date: 4/9/2024  Principal Problem:CHF exacerbation    Subjective:     Hospital Course:   4/8/2024: Patient seen in room, wife at bedside. Patient endorses 4d worsening SOB with associated leg swelling, cough, weakness, and orthopnea. Grossly fluid overloaded on exam, currently receiving lasix 40mg IV BID. Hemodynamically stable. On 2L O2 NC.   4/9/2024 Remains on IV Lasix 60 BID with 1.5Lout overnight HR and BP stable. CBC and BMP WNL. OOB to chair today and feeling better         Review of Systems   Constitutional: Negative for chills, decreased appetite, diaphoresis, fever, malaise/fatigue, weight gain and weight loss.   Cardiovascular:  Positive for leg swelling. Negative for chest pain, claudication, dyspnea on exertion, irregular heartbeat, near-syncope, orthopnea, palpitations and paroxysmal nocturnal dyspnea.   Respiratory:  Negative for cough, shortness of breath, snoring, sputum production and wheezing.    Endocrine: Negative for cold intolerance, heat intolerance, polydipsia, polyphagia and polyuria.   Skin:  Negative for color change, dry skin, itching, nail changes and poor wound healing.   Musculoskeletal:  Negative for back pain, gout, joint pain and joint swelling.   Gastrointestinal:  Negative for bloating, abdominal pain, constipation, diarrhea, hematemesis, hematochezia, melena, nausea and vomiting.   Genitourinary:  Negative for dysuria, hematuria and nocturia.   Neurological:  Negative for dizziness, headaches, light-headedness, numbness, paresthesias and weakness.   Psychiatric/Behavioral:  Negative for altered mental status, depression and memory loss.      Objective:     Vital Signs (Most Recent):  Temp:  98.2 °F (36.8 °C) (04/09/24 0739)  Pulse: 63 (04/09/24 1026)  Resp: 18 (04/09/24 0739)  BP: (!) 94/48 (04/09/24 1026)  SpO2: (!) 93 % (04/09/24 1026) Vital Signs (24h Range):  Temp:  [98 °F (36.7 °C)-98.9 °F (37.2 °C)] 98.2 °F (36.8 °C)  Pulse:  [58-64] 63  Resp:  [18-20] 18  SpO2:  [91 %-96 %] 93 %  BP: ()/(48-58) 94/48     Weight: 78.5 kg (173 lb 1 oz)  Body mass index is 28.8 kg/m².     SpO2: (!) 93 %         Intake/Output Summary (Last 24 hours) at 4/9/2024 1035  Last data filed at 4/9/2024 0800  Gross per 24 hour   Intake 925 ml   Output 1550 ml   Net -625 ml       Lines/Drains/Airways       Peripheral Intravenous Line  Duration                  Peripheral IV - Single Lumen 04/06/24 1412 20 G Anterior;Distal;Left Antecubital 2 days                       Physical Exam  Constitutional:       General: He is not in acute distress.     Appearance: He is well-developed.   Cardiovascular:      Rate and Rhythm: Normal rate and regular rhythm.      Heart sounds: No murmur heard.     No gallop.   Pulmonary:      Effort: Pulmonary effort is normal. No respiratory distress.      Breath sounds: Normal breath sounds. No wheezing.   Abdominal:      General: Bowel sounds are normal. There is no distension.      Palpations: Abdomen is soft.      Tenderness: There is no abdominal tenderness.   Musculoskeletal:      Right lower leg: Edema present.      Left lower leg: Edema present.   Skin:     General: Skin is warm and dry.   Neurological:      Mental Status: He is alert and oriented to person, place, and time.            Significant Labs: BMP:   Recent Labs   Lab 04/08/24  0449 04/09/24  0505   GLU 90  90 94  94     139 139  139   K 4.9  4.9 3.5  3.5     104 99  99   CO2 21*  21* 29  29   BUN 20  20 23  23   CREATININE 1.1  1.1 1.0  1.0   CALCIUM 8.0*  8.0* 8.3*  8.3*   MG 2.2 2.1    and CBC   Recent Labs   Lab 04/08/24  0449 04/09/24  0506   WBC 9.77 8.69   HGB 11.2* 11.2*   HCT 36.4* 35.2*     344       Significant Imaging: Echocardiogram: Transthoracic echo (TTE) complete ([facility] Only):   Results for orders placed or performed during the hospital encounter of 12/15/23   Echo   Result Value Ref Range    BSA 1.82 m2    Gandhi's Biplane MOD Ejection Fraction 15 %    LVOT stroke volume 58.97 cm3    LVIDd 6.68 (A) 3.5 - 6.0 cm    LV Systolic Volume 181.96 mL    LV Systolic Volume Index 101.7 mL/m2    LVIDs 6.03 (A) 2.1 - 4.0 cm    LV Diastolic Volume 229.94 mL    LV Diastolic Volume Index 128.46 mL/m2    IVS 0.80 0.6 - 1.1 cm    LVOT diameter 2.30 cm    LVOT area 4.2 cm2    FS 10 (A) 28 - 44 %    Left Ventricle Relative Wall Thickness 0.24 cm    Posterior Wall 0.79 0.6 - 1.1 cm    LV mass 223.19 g    LV Mass Index 125 g/m2    MV Peak E Silas 0.65 m/s    TDI LATERAL 0.07 m/s    TDI SEPTAL 0.02 m/s    E/E' ratio 14.44 m/s    MV Peak A Silas 0.88 m/s    TR Max Silas 2.91 m/s    E/A ratio 0.74     E wave deceleration time 212.49 msec    LV SEPTAL E/E' RATIO 32.50 m/s    LV LATERAL E/E' RATIO 9.29 m/s    PV Peak S Silas 0.39 m/s    PV Peak D Silas 0.40 m/s    Pulm vein S/D ratio 0.98     LVOT peak silas 0.63 m/s    Left Ventricular Outflow Tract Mean Velocity 0.47 cm/s    Left Ventricular Outflow Tract Mean Gradient 0.98 mmHg    RV S' 7.13 cm/s    TAPSE 1.51 cm    LA size 4.64 cm    Left Atrium Minor Axis 5.35 cm    Left Atrium Major Axis 4.74 cm    LA volume (mod) 39.08 cm3    LA Volume Index (Mod) 21.8 mL/m2    RA Major Axis 4.04 cm    RA Width 3.13 cm    AV mean gradient 3 mmHg    AV peak gradient 6 mmHg    Ao peak silas 1.18 m/s    Ao VTI 24.20 cm    LVOT peak VTI 14.20 cm    AV valve area 2.44 cm²    AV Velocity Ratio 0.53     AV index (prosthetic) 0.59     DEBORAH by Velocity Ratio 2.22 cm²    Mr max silas 4.36 m/s    MV peak gradient 5 mmHg    MV stenosis pressure 1/2 time 61.62 ms    MV valve area p 1/2 method 3.57 cm2    MV valve area by continuity eq 2.05 cm2    MV VTI 28.8 cm    Triscuspid Valve Regurgitation  Peak Gradient 34 mmHg    IVC diameter 1.81 cm    Mean e' 0.05 m/s    ZLVIDS 5.37     ZLVIDD 3.00     LA Volume Index 35.4 mL/m2    LA volume 63.44 cm3    LA WIDTH 3.2 cm    TV resting pulmonary artery pressure 37 mmHg    RV TB RVSP 6 mmHg    Est. RA pres 3 mmHg    Narrative      Left Ventricle: The left ventricle is dilated. There is eccentric   hypertrophy. Regional wall motion abnormalities present. See diagram for   wall motion findings. Septal motion is consistent with post-operative   status. There is reduced systolic function. Biplane (2D) method of discs   ejection fraction is 15%.    Grade II diastolic dysfunction.    Right Ventricle: Normal right ventricular cavity size. Systolic   function is reduced.TAPSE is 1.51 cm.    Left Atrium: Left atrium is mildly dilated. The left atrium volume   index is 35.4 mL/m2.    Right Atrium: Right atrium is dilated.    Mitral Valve: There is moderate regurgitation.    Pulmonary Artery: The estimated pulmonary artery systolic pressure is   37 mmHg.    IVC/SVC: Normal venous pressure at 3 mmHg.       Assessment and Plan:     Brief HPI: Seen on AM rounds while resting in bedside chair with wife at the bedside. Reports feeling better since admission.Discussed POC as detailed below-verbalized understanding and agrees with POC      * CHF exacerbation  - as detailed under ADHF     Elevated troponin  -1x troponin 4/6/2024 0.51 down to 0.263  - related to demand etiology in the setting of fluid overload/ acute CHF  - no need to trend troponin further     Chronic combined systolic and diastolic CHF, NYHA class 2  - volume overloaded upon admission and aggressive diuresis started with IV lasix with up titration yesterday  - 1.5L out overnight; negative 2.8L since admission   - LE edema remains but improved; reports SOB improving; will ambulate today and if SOB resolved then transition to oral diuretics with Bumex 1mg po BID  - on Entresto, Aldcatone and Toprol XL as as an outpatient  and continued while admitted;  meds held this AM due to SBP down to 90s and complaints of dizziness-do not suspect overdiuresis as this time  - will monitor BP; not uncommon for SBP to run 90s-100s with EF 15% and okay with lower BPs as long as patient asymptomatic   - continue GDMT on BB, entresto, aldactone    Echo 12/15/2023    Interpretation Summary    Left Ventricle: The left ventricle is dilated. There is eccentric hypertrophy. Regional wall motion abnormalities present. See diagram for wall motion findings. Septal motion is consistent with post-operative status. There is reduced systolic function. Biplane (2D) method of discs ejection fraction is 15%.    Grade II diastolic dysfunction.    Right Ventricle: Normal right ventricular cavity size. Systolic function is reduced.TAPSE is 1.51 cm.    Left Atrium: Left atrium is mildly dilated. The left atrium volume index is 35.4 mL/m2.    Right Atrium: Right atrium is dilated.    Mitral Valve: There is moderate regurgitation.    Pulmonary Artery: The estimated pulmonary artery systolic pressure is 37 mmHg.    IVC/SVC: Normal venous pressure at 3 mmHg.    Recent Labs   Lab 04/06/24  1407   BNP 1,125*         ICD (implantable cardioverter-defibrillator) in place  -s/p dual chamber ICD implantation in 2010 with a generator change in 2017 by Dr. Loza  -hx of VT storm 07/2023, started on amio, stable  -continue PO amio  -continue continuous cardiac monitoring          Essential (primary) hypertension  - SBP 100s overnight  - reports of SBP down to 90s this AM with dizziness- AM meds held  - goal BP less than 130/80  - BP well  controlled  - continue GDMT    JERONIMO on CPAP  -continue CPAP QHS    Other hyperlipidemia  -continue statin, goal LDLc < 70      CAD (coronary artery disease) of artery bypass graft  -CAD s/p CABG x 3 1996 followed by multiple PCIs  -last C 07/2023 as below  -denies CP, palpitations, syncope/pre-syncope, PND; SOB likely 2/2 fluid overload/  CHF  -continue asa, plavix    [facility] 07/2023  Findings  Co-dominant  LM: Mild stenosis  LAD: Occluded at the ostium  Lcx: 80-90% ostial Lcx (ISR)  RCA: Patent stent proximal and mid RCA     Bypass Graft  - SVG to LAD filling prox LAD retrograde and into diagonal branch which has 2 serial 90% stenosis. Patent LAD  - LIMA and AUGUSTUS visualized and noted not utilized     LVEDP 21     Intervention  - sp IVUS guided POBA proximal Lcx with 3.5 NC and 4.0 NC balloon  - Attempted diagonal intervention via SVG graft. Able to wire into diagonal branch (via retrograde wiring in the LAD), but unable to deliver balloon        VTE Risk Mitigation (From admission, onward)           Ordered     enoxaparin injection 40 mg  Daily         04/06/24 1520     IP VTE HIGH RISK PATIENT  Once         04/06/24 1520     Place sequential compression device  Until discontinued         04/06/24 1520                    MELO Medina, ANP  Cardiology  [place] - Telemetry

## 2024-04-09 NOTE — PLAN OF CARE
Problem: Physical Therapy  Goal: Physical Therapy Goal  Description: Goals to be met by: 24     Patient will increase functional independence with mobility by performin. Supine to sit with [place]  2. Sit to supine with [place]  3. Sit to stand transfer with Modified [place] w/RW  4. Gait  x 200 feet with Modified [place] using Rolling Walker.     Outcome: Ongoing, Progressing

## 2024-04-09 NOTE — SUBJECTIVE & OBJECTIVE
Interval History: Seen and examined at bedside, up in chair, hypotensive with PT today , moderate dyspnea with exertion . No chest pain fever falls.     Review of Systems   All other systems reviewed and are negative.    Objective:     Vital Signs (Most Recent):  Temp: 97.9 °F (36.6 °C) (04/09/24 1153)  Pulse: 66 (04/09/24 1206)  Resp: 18 (04/09/24 1153)  BP: (!) 108/53 (04/09/24 1153)  SpO2: (!) 92 % (04/09/24 1153) Vital Signs (24h Range):  Temp:  [97.9 °F (36.6 °C)-98.9 °F (37.2 °C)] 97.9 °F (36.6 °C)  Pulse:  [58-66] 66  Resp:  [18-20] 18  SpO2:  [91 %-95 %] 92 %  BP: ()/(48-58) 108/53     Weight: 78.5 kg (173 lb 1 oz)  Body mass index is 28.8 kg/m².    Intake/Output Summary (Last 24 hours) at 4/9/2024 1310  Last data filed at 4/9/2024 0800  Gross per 24 hour   Intake 925 ml   Output 1550 ml   Net -625 ml         Physical Exam  Constitutional:       Appearance: He is ill-appearing.   HENT:      Head: Normocephalic.      Nose: Nose normal.      Mouth/Throat:      Mouth: Mucous membranes are moist.   Eyes:      Pupils: Pupils are equal, round, and reactive to light.   Cardiovascular:      Rate and Rhythm: Normal rate.      Pulses: Normal pulses.   Pulmonary:      Effort: Pulmonary effort is normal. No respiratory distress.      Breath sounds: Rales present.   Abdominal:      General: Abdomen is flat.   Musculoskeletal:         General: Normal range of motion.   Skin:     General: Skin is warm.      Capillary Refill: Capillary refill takes less than 2 seconds.             Significant Labs: All pertinent labs within the past 24 hours have been reviewed.    Significant Imaging: I have reviewed all pertinent imaging results/findings within the past 24 hours.

## 2024-04-09 NOTE — ASSESSMENT & PLAN NOTE
GDMT as tolerated for bp and symptoms, hold bp medications currently given SBP 90 - 100s. Decreased Toprol , aldactone and stopped TID lasix in favor of po Bumex 1mg po BID.

## 2024-04-09 NOTE — PT/OT/SLP PROGRESS
Physical Therapy Treatment    Patient Name:  Tristen Blanc   MRN:  [id number]    Recommendations:     Discharge Recommendations: Low Intensity Therapy  Discharge Equipment Recommendations: none  Barriers to discharge:  decreased mobility,strength and endurance    Assessment:     Tristen Blanc is a 78 y.o. male admitted with a medical diagnosis of CHF exacerbation.  He presents with the following impairments/functional limitations: weakness, impaired endurance, impaired functional mobility, gait instability, impaired balance, decreased lower extremity function, impaired skin, orthopedic precautions, visual deficits,pt with increased gait and requires some assistance with mobility at this time,pt will benefit from low intensity therapy upon discharge  .    Rehab Prognosis: Good; patient would benefit from acute skilled PT services to address these deficits and reach maximum level of function.    Recent Surgery: * No surgery found *      Plan:     During this hospitalization, patient to be seen 3 x/week to address the identified rehab impairments via gait training, therapeutic activities, therapeutic exercises, neuromuscular re-education and progress toward the following goals:    Plan of Care Expires:  04/21/24    Subjective     Chief Complaint: n/a  Patient/Family Comments/goals: pt agreeable to rx.  Pain/Comfort:  Pain Rating 1: 0/10      Objective:     Communicated with nsg prior to session.  Patient found up in chair with PureWick, peripheral IV, telemetry upon PT entry to room.     General Precautions: Standard, fall  Orthopedic Precautions: RLE weight bearing as tolerated, RLE posterior precautions  Braces: N/A  Respiratory Status: Room air     Functional Mobility:  Transfers:     Sit to Stand:  contact guard assistance with rolling walker  Gait: amb ~45' with RW and CGA/Min A  Balance: fair standing balance with RW      AM-PAC 6 CLICK MOBILITY  Turning over in bed (including adjusting bedclothes,  sheets and blankets)?: 4  Sitting down on and standing up from a chair with arms (e.g., wheelchair, bedside commode, etc.): 3  Moving from lying on back to sitting on the side of the bed?: 3  Moving to and from a bed to a chair (including a wheelchair)?: 3  Need to walk in hospital room?: 3  Climbing 3-5 steps with a railing?: 3  Basic Mobility Total Score: 19       Treatment & Education: reviewed pt's hip precautions and pt safety,some lightheadedness noted.      Patient left up in chair with all lines intact, call button in reach, and MD present..    GOALS: see general POC  Multidisciplinary Problems       Physical Therapy Goals          Problem: Physical Therapy    Goal Priority Disciplines Outcome Goal Variances Interventions   Physical Therapy Goal     PT, PT/OT Ongoing, Progressing     Description: Goals to be met by: 24     Patient will increase functional independence with mobility by performin. Supine to sit with [place]  2. Sit to supine with [place]  3. Sit to stand transfer with Modified [place] w/RW  4. Gait  x 200 feet with Modified [place] using Rolling Walker.                          Time Tracking:     PT Received On: 24  PT Start Time: 1121     PT Stop Time: 1145  PT Total Time (min): 24 min     Billable Minutes: Gait Training 14 and Therapeutic Activity 10    Treatment Type: Treatment  PT/PTA: PTA     Number of PTA visits since last PT visit: 2024

## 2024-04-09 NOTE — PLAN OF CARE
04/09/24 1100   Rounds   Attendance Provider;Nurse    Discharge Plan A Other  (OP Rehab)   Why the patient remains in the hospital Requires continued medical care       1100  CM was informed by Dr Angelo that the pt is not medically stable to discharge home today. BP 94/48 this AM. IV lasix discontinued.     1145  Patient awake & alert sitting in recliner when CM rounded. No family at the bedside. Patient was admitted with pneumonia and CHF & continues to be followed by cards & PT/OT. Pox 93% on RA this AM.     CM informed the pt that [facility] and Dr Adams's (ortho surg) office were notified of the pt's hospitalization. Pt verbalized appreciation.     1210  CM was informed by YUAN Luis that the pt will be able to resume OP Rehab following discharge.       Will continue to follow.

## 2024-04-09 NOTE — ASSESSMENT & PLAN NOTE
- volume overloaded upon admission and aggressive diuresis started with IV lasix with up titration yesterday  - 1.5L out overnight; negative 2.8L since admission   - LE edema remains but improved; reports SOB improving; will ambulate today and if SOB resolved then transition to oral diuretics with Bumex 1mg po BID  - on Entresto, Aldcatone and Toprol XL as as an outpatient and continued while admitted;  meds held this AM due to SBP down to 90s and complaints of dizziness-do not suspect overdiuresis as this time  - will monitor BP; not uncommon for SBP to run 90s-100s with EF 15% and okay with lower BPs as long as patient asymptomatic   - continue GDMT on BB, entresto, aldactone    Echo 12/15/2023    Interpretation Summary    Left Ventricle: The left ventricle is dilated. There is eccentric hypertrophy. Regional wall motion abnormalities present. See diagram for wall motion findings. Septal motion is consistent with post-operative status. There is reduced systolic function. Biplane (2D) method of discs ejection fraction is 15%.    Grade II diastolic dysfunction.    Right Ventricle: Normal right ventricular cavity size. Systolic function is reduced.TAPSE is 1.51 cm.    Left Atrium: Left atrium is mildly dilated. The left atrium volume index is 35.4 mL/m2.    Right Atrium: Right atrium is dilated.    Mitral Valve: There is moderate regurgitation.    Pulmonary Artery: The estimated pulmonary artery systolic pressure is 37 mmHg.    IVC/SVC: Normal venous pressure at 3 mmHg.    Recent Labs   Lab 04/06/24  1407   BNP 1,125*

## 2024-04-09 NOTE — ASSESSMENT & PLAN NOTE
Patient with known CAD s/p stent placement and CABG, which is uncontrolled Will continue ASA, Plavix, and Statin and monitor for S/Sx of angina/ACS. Continue to monitor on telemetry.

## 2024-04-09 NOTE — SUBJECTIVE & OBJECTIVE
Review of Systems   Constitutional: Negative for chills, decreased appetite, diaphoresis, fever, malaise/fatigue, weight gain and weight loss.   Cardiovascular:  Positive for leg swelling. Negative for chest pain, claudication, dyspnea on exertion, irregular heartbeat, near-syncope, orthopnea, palpitations and paroxysmal nocturnal dyspnea.   Respiratory:  Negative for cough, shortness of breath, snoring, sputum production and wheezing.    Endocrine: Negative for cold intolerance, heat intolerance, polydipsia, polyphagia and polyuria.   Skin:  Negative for color change, dry skin, itching, nail changes and poor wound healing.   Musculoskeletal:  Negative for back pain, gout, joint pain and joint swelling.   Gastrointestinal:  Negative for bloating, abdominal pain, constipation, diarrhea, hematemesis, hematochezia, melena, nausea and vomiting.   Genitourinary:  Negative for dysuria, hematuria and nocturia.   Neurological:  Negative for dizziness, headaches, light-headedness, numbness, paresthesias and weakness.   Psychiatric/Behavioral:  Negative for altered mental status, depression and memory loss.      Objective:     Vital Signs (Most Recent):  Temp: 98.2 °F (36.8 °C) (04/09/24 0739)  Pulse: 63 (04/09/24 1026)  Resp: 18 (04/09/24 0739)  BP: (!) 94/48 (04/09/24 1026)  SpO2: (!) 93 % (04/09/24 1026) Vital Signs (24h Range):  Temp:  [98 °F (36.7 °C)-98.9 °F (37.2 °C)] 98.2 °F (36.8 °C)  Pulse:  [58-64] 63  Resp:  [18-20] 18  SpO2:  [91 %-96 %] 93 %  BP: ()/(48-58) 94/48     Weight: 78.5 kg (173 lb 1 oz)  Body mass index is 28.8 kg/m².     SpO2: (!) 93 %         Intake/Output Summary (Last 24 hours) at 4/9/2024 1035  Last data filed at 4/9/2024 0800  Gross per 24 hour   Intake 925 ml   Output 1550 ml   Net -625 ml       Lines/Drains/Airways       Peripheral Intravenous Line  Duration                  Peripheral IV - Single Lumen 04/06/24 1412 20 G Anterior;Distal;Left Antecubital 2 days                        Physical Exam  Constitutional:       General: He is not in acute distress.     Appearance: He is well-developed.   Cardiovascular:      Rate and Rhythm: Normal rate and regular rhythm.      Heart sounds: No murmur heard.     No gallop.   Pulmonary:      Effort: Pulmonary effort is normal. No respiratory distress.      Breath sounds: Normal breath sounds. No wheezing.   Abdominal:      General: Bowel sounds are normal. There is no distension.      Palpations: Abdomen is soft.      Tenderness: There is no abdominal tenderness.   Musculoskeletal:      Right lower leg: Edema present.      Left lower leg: Edema present.   Skin:     General: Skin is warm and dry.   Neurological:      Mental Status: He is alert and oriented to person, place, and time.            Significant Labs: BMP:   Recent Labs   Lab 04/08/24  0449 04/09/24  0505   GLU 90  90 94  94     139 139  139   K 4.9  4.9 3.5  3.5     104 99  99   CO2 21*  21* 29  29   BUN 20  20 23  23   CREATININE 1.1  1.1 1.0  1.0   CALCIUM 8.0*  8.0* 8.3*  8.3*   MG 2.2 2.1    and CBC   Recent Labs   Lab 04/08/24 0449 04/09/24  0506   WBC 9.77 8.69   HGB 11.2* 11.2*   HCT 36.4* 35.2*    344       Significant Imaging: Echocardiogram: Transthoracic echo (TTE) complete ([facility] Only):   Results for orders placed or performed during the hospital encounter of 12/15/23   Echo   Result Value Ref Range    BSA 1.82 m2    Gandhi's Biplane MOD Ejection Fraction 15 %    LVOT stroke volume 58.97 cm3    LVIDd 6.68 (A) 3.5 - 6.0 cm    LV Systolic Volume 181.96 mL    LV Systolic Volume Index 101.7 mL/m2    LVIDs 6.03 (A) 2.1 - 4.0 cm    LV Diastolic Volume 229.94 mL    LV Diastolic Volume Index 128.46 mL/m2    IVS 0.80 0.6 - 1.1 cm    LVOT diameter 2.30 cm    LVOT area 4.2 cm2    FS 10 (A) 28 - 44 %    Left Ventricle Relative Wall Thickness 0.24 cm    Posterior Wall 0.79 0.6 - 1.1 cm    LV mass 223.19 g    LV Mass Index 125 g/m2    MV Peak E Silas 0.65 m/s     TDI LATERAL 0.07 m/s    TDI SEPTAL 0.02 m/s    E/E' ratio 14.44 m/s    MV Peak A Silas 0.88 m/s    TR Max Silas 2.91 m/s    E/A ratio 0.74     E wave deceleration time 212.49 msec    LV SEPTAL E/E' RATIO 32.50 m/s    LV LATERAL E/E' RATIO 9.29 m/s    PV Peak S Silas 0.39 m/s    PV Peak D Silas 0.40 m/s    Pulm vein S/D ratio 0.98     LVOT peak silas 0.63 m/s    Left Ventricular Outflow Tract Mean Velocity 0.47 cm/s    Left Ventricular Outflow Tract Mean Gradient 0.98 mmHg    RV S' 7.13 cm/s    TAPSE 1.51 cm    LA size 4.64 cm    Left Atrium Minor Axis 5.35 cm    Left Atrium Major Axis 4.74 cm    LA volume (mod) 39.08 cm3    LA Volume Index (Mod) 21.8 mL/m2    RA Major Axis 4.04 cm    RA Width 3.13 cm    AV mean gradient 3 mmHg    AV peak gradient 6 mmHg    Ao peak silas 1.18 m/s    Ao VTI 24.20 cm    LVOT peak VTI 14.20 cm    AV valve area 2.44 cm²    AV Velocity Ratio 0.53     AV index (prosthetic) 0.59     DEBORAH by Velocity Ratio 2.22 cm²    Mr max silas 4.36 m/s    MV peak gradient 5 mmHg    MV stenosis pressure 1/2 time 61.62 ms    MV valve area p 1/2 method 3.57 cm2    MV valve area by continuity eq 2.05 cm2    MV VTI 28.8 cm    Triscuspid Valve Regurgitation Peak Gradient 34 mmHg    IVC diameter 1.81 cm    Mean e' 0.05 m/s    ZLVIDS 5.37     ZLVIDD 3.00     LA Volume Index 35.4 mL/m2    LA volume 63.44 cm3    LA WIDTH 3.2 cm    TV resting pulmonary artery pressure 37 mmHg    RV TB RVSP 6 mmHg    Est. RA pres 3 mmHg    Narrative      Left Ventricle: The left ventricle is dilated. There is eccentric   hypertrophy. Regional wall motion abnormalities present. See diagram for   wall motion findings. Septal motion is consistent with post-operative   status. There is reduced systolic function. Biplane (2D) method of discs   ejection fraction is 15%.    Grade II diastolic dysfunction.    Right Ventricle: Normal right ventricular cavity size. Systolic   function is reduced.TAPSE is 1.51 cm.    Left Atrium: Left atrium is mildly  dilated. The left atrium volume   index is 35.4 mL/m2.    Right Atrium: Right atrium is dilated.    Mitral Valve: There is moderate regurgitation.    Pulmonary Artery: The estimated pulmonary artery systolic pressure is   37 mmHg.    IVC/SVC: Normal venous pressure at 3 mmHg.

## 2024-04-09 NOTE — ASSESSMENT & PLAN NOTE
-CAD s/p CABG x 3 1996 followed by multiple PCIs  -last [facility] 07/2023 as below  -denies CP, palpitations, syncope/pre-syncope, PND; SOB likely 2/2 fluid overload/ CHF  -continue asa, plavix    [facility] 07/2023  Findings  Co-dominant  LM: Mild stenosis  LAD: Occluded at the ostium  Lcx: 80-90% ostial Lcx (ISR)  RCA: Patent stent proximal and mid RCA     Bypass Graft  - SVG to LAD filling prox LAD retrograde and into diagonal branch which has 2 serial 90% stenosis. Patent LAD  - LIMA and AUGUSTUS visualized and noted not utilized     LVEDP 21     Intervention  - sp IVUS guided POBA proximal Lcx with 3.5 NC and 4.0 NC balloon  - Attempted diagonal intervention via SVG graft. Able to wire into diagonal branch (via retrograde wiring in the LAD), but unable to deliver balloon

## 2024-04-10 VITALS
TEMPERATURE: 98 F | HEIGHT: 65 IN | SYSTOLIC BLOOD PRESSURE: 107 MMHG | DIASTOLIC BLOOD PRESSURE: 53 MMHG | WEIGHT: 173.06 LBS | BODY MASS INDEX: 28.83 KG/M2 | HEART RATE: 66 BPM | OXYGEN SATURATION: 95 % | RESPIRATION RATE: 18 BRPM

## 2024-04-10 LAB
ANION GAP SERPL CALC-SCNC: 10 MMOL/L (ref 8–16)
ANION GAP SERPL CALC-SCNC: 10 MMOL/L (ref 8–16)
ANISOCYTOSIS BLD QL SMEAR: SLIGHT
BASOPHILS # BLD AUTO: ABNORMAL K/UL (ref 0–0.2)
BASOPHILS NFR BLD: 2 % (ref 0–1.9)
BUN SERPL-MCNC: 20 MG/DL (ref 8–23)
BUN SERPL-MCNC: 20 MG/DL (ref 8–23)
CALCIUM SERPL-MCNC: 8.5 MG/DL (ref 8.7–10.5)
CALCIUM SERPL-MCNC: 8.5 MG/DL (ref 8.7–10.5)
CHLORIDE SERPL-SCNC: 99 MMOL/L (ref 95–110)
CHLORIDE SERPL-SCNC: 99 MMOL/L (ref 95–110)
CO2 SERPL-SCNC: 28 MMOL/L (ref 23–29)
CO2 SERPL-SCNC: 28 MMOL/L (ref 23–29)
CREAT SERPL-MCNC: 1 MG/DL (ref 0.5–1.4)
CREAT SERPL-MCNC: 1 MG/DL (ref 0.5–1.4)
DIFFERENTIAL METHOD BLD: ABNORMAL
EOSINOPHIL # BLD AUTO: ABNORMAL K/UL (ref 0–0.5)
EOSINOPHIL NFR BLD: 34 % (ref 0–8)
ERYTHROCYTE [DISTWIDTH] IN BLOOD BY AUTOMATED COUNT: 16.2 % (ref 11.5–14.5)
EST. GFR  (NO RACE VARIABLE): >60 ML/MIN/1.73 M^2
EST. GFR  (NO RACE VARIABLE): >60 ML/MIN/1.73 M^2
GLUCOSE SERPL-MCNC: 96 MG/DL (ref 70–110)
GLUCOSE SERPL-MCNC: 96 MG/DL (ref 70–110)
HCT VFR BLD AUTO: 34.8 % (ref 40–54)
HGB BLD-MCNC: 10.9 G/DL (ref 14–18)
HYPOCHROMIA BLD QL SMEAR: ABNORMAL
IMM GRANULOCYTES # BLD AUTO: ABNORMAL K/UL (ref 0–0.04)
IMM GRANULOCYTES NFR BLD AUTO: ABNORMAL % (ref 0–0.5)
LYMPHOCYTES # BLD AUTO: ABNORMAL K/UL (ref 1–4.8)
LYMPHOCYTES NFR BLD: 16 % (ref 18–48)
MAGNESIUM SERPL-MCNC: 2.2 MG/DL (ref 1.6–2.6)
MCH RBC QN AUTO: 27.7 PG (ref 27–31)
MCHC RBC AUTO-ENTMCNC: 31.3 G/DL (ref 32–36)
MCV RBC AUTO: 88 FL (ref 82–98)
MONOCYTES # BLD AUTO: ABNORMAL K/UL (ref 0.3–1)
MONOCYTES NFR BLD: 0 % (ref 4–15)
NEUTROPHILS # BLD AUTO: ABNORMAL K/UL (ref 1.8–7.7)
NEUTROPHILS NFR BLD: 48 % (ref 38–73)
NRBC BLD-RTO: 0 /100 WBC
OVALOCYTES BLD QL SMEAR: ABNORMAL
PHOSPHATE SERPL-MCNC: 2.4 MG/DL (ref 2.7–4.5)
PLATELET # BLD AUTO: 338 K/UL (ref 150–450)
PLATELET BLD QL SMEAR: ABNORMAL
PMV BLD AUTO: 9.1 FL (ref 9.2–12.9)
POIKILOCYTOSIS BLD QL SMEAR: SLIGHT
POLYCHROMASIA BLD QL SMEAR: ABNORMAL
POTASSIUM SERPL-SCNC: 4.5 MMOL/L (ref 3.5–5.1)
POTASSIUM SERPL-SCNC: 4.5 MMOL/L (ref 3.5–5.1)
RBC # BLD AUTO: 3.94 M/UL (ref 4.6–6.2)
SODIUM SERPL-SCNC: 137 MMOL/L (ref 136–145)
SODIUM SERPL-SCNC: 137 MMOL/L (ref 136–145)
TARGETS BLD QL SMEAR: ABNORMAL
WBC # BLD AUTO: 8.34 K/UL (ref 3.9–12.7)

## 2024-04-10 PROCEDURE — 63700000 PHARM REV CODE 250 ALT 637 W/O HCPCS: Performed by: FAMILY MEDICINE

## 2024-04-10 PROCEDURE — 85027 COMPLETE CBC AUTOMATED: CPT | Performed by: FAMILY MEDICINE

## 2024-04-10 PROCEDURE — 25000003 PHARM REV CODE 250: Performed by: INTERNAL MEDICINE

## 2024-04-10 PROCEDURE — 36415 COLL VENOUS BLD VENIPUNCTURE: CPT | Performed by: FAMILY MEDICINE

## 2024-04-10 PROCEDURE — 99233 SBSQ HOSP IP/OBS HIGH 50: CPT | Mod: ,,, | Performed by: NURSE PRACTITIONER

## 2024-04-10 PROCEDURE — 25000003 PHARM REV CODE 250: Performed by: FAMILY MEDICINE

## 2024-04-10 PROCEDURE — 80048 BASIC METABOLIC PNL TOTAL CA: CPT | Performed by: FAMILY MEDICINE

## 2024-04-10 PROCEDURE — 99900035 HC TECH TIME PER 15 MIN (STAT)

## 2024-04-10 PROCEDURE — 84100 ASSAY OF PHOSPHORUS: CPT | Performed by: FAMILY MEDICINE

## 2024-04-10 PROCEDURE — 85007 BL SMEAR W/DIFF WBC COUNT: CPT | Performed by: FAMILY MEDICINE

## 2024-04-10 PROCEDURE — 63600175 PHARM REV CODE 636 W HCPCS: Performed by: FAMILY MEDICINE

## 2024-04-10 PROCEDURE — 94761 N-INVAS EAR/PLS OXIMETRY MLT: CPT

## 2024-04-10 PROCEDURE — 83735 ASSAY OF MAGNESIUM: CPT | Performed by: FAMILY MEDICINE

## 2024-04-10 RX ORDER — BUMETANIDE 1 MG/1
TABLET ORAL
Qty: 30 TABLET | Refills: 11 | Status: SHIPPED | OUTPATIENT
Start: 2024-04-10 | End: 2024-04-10 | Stop reason: HOSPADM

## 2024-04-10 RX ORDER — BUMETANIDE 1 MG/1
1 TABLET ORAL DAILY
Status: DISCONTINUED | OUTPATIENT
Start: 2024-04-11 | End: 2024-04-10 | Stop reason: HOSPADM

## 2024-04-10 RX ORDER — METOPROLOL SUCCINATE 25 MG/1
25 TABLET, EXTENDED RELEASE ORAL DAILY
Qty: 90 TABLET | Refills: 3 | Status: SHIPPED | OUTPATIENT
Start: 2024-04-11 | End: 2025-04-11

## 2024-04-10 RX ORDER — CEFDINIR 300 MG/1
300 CAPSULE ORAL 2 TIMES DAILY
Qty: 20 CAPSULE | Refills: 0 | Status: SHIPPED | OUTPATIENT
Start: 2024-04-10 | End: 2024-04-20

## 2024-04-10 RX ORDER — BUMETANIDE 1 MG/1
1 TABLET ORAL DAILY
Qty: 30 TABLET | Refills: 11 | Status: SHIPPED | OUTPATIENT
Start: 2024-04-11 | End: 2025-04-11

## 2024-04-10 RX ORDER — SPIRONOLACTONE 25 MG/1
12.5 TABLET ORAL DAILY
Qty: 15 TABLET | Refills: 11 | Status: SHIPPED | OUTPATIENT
Start: 2024-04-11 | End: 2025-04-11

## 2024-04-10 RX ADMIN — CEFTRIAXONE SODIUM 2 G: 2 INJECTION, POWDER, FOR SOLUTION INTRAMUSCULAR; INTRAVENOUS at 02:04

## 2024-04-10 RX ADMIN — BUMETANIDE 1 MG: 1 TABLET ORAL at 08:04

## 2024-04-10 RX ADMIN — SPIRONOLACTONE 12.5 MG: 25 TABLET ORAL at 08:04

## 2024-04-10 RX ADMIN — AZITHROMYCIN DIHYDRATE 250 MG: 250 TABLET ORAL at 08:04

## 2024-04-10 RX ADMIN — SACUBITRIL AND VALSARTAN 1 TABLET: 24; 26 TABLET, FILM COATED ORAL at 08:04

## 2024-04-10 RX ADMIN — CLOPIDOGREL BISULFATE 75 MG: 75 TABLET ORAL at 05:04

## 2024-04-10 RX ADMIN — AMIODARONE HYDROCHLORIDE 200 MG: 200 TABLET ORAL at 08:04

## 2024-04-10 RX ADMIN — METOPROLOL SUCCINATE 25 MG: 25 TABLET, EXTENDED RELEASE ORAL at 08:04

## 2024-04-10 RX ADMIN — ASPIRIN 81 MG: 81 TABLET, COATED ORAL at 08:04

## 2024-04-10 NOTE — ASSESSMENT & PLAN NOTE
- volume overloaded upon admission and aggressive diuresis started with IV lasix with up titration; transitioned to oral Bumex yesterday  - 700cc out overnight; negative 3.5 since admission   - LE edema remains but improved; reports SOB improving; will repeat ambulation  today   - continue Entresto, Aldcatone and Toprol XL at home doses; goal SBP 90s-100s with MAPs 60s   - wife reported noncompliance with CPAP at home previously ?etiology of ADHF; will use Bumexi 1mg po daily upon discharge with instructions to take additional dose of increased SOB or weight gain of 2-3lbs in 24 hours Echo 12/15/2023    Interpretation Summary    Left Ventricle: The left ventricle is dilated. There is eccentric hypertrophy. Regional wall motion abnormalities present. See diagram for wall motion findings. Septal motion is consistent with post-operative status. There is reduced systolic function. Biplane (2D) method of discs ejection fraction is 15%.    Grade II diastolic dysfunction.    Right Ventricle: Normal right ventricular cavity size. Systolic function is reduced.TAPSE is 1.51 cm.    Left Atrium: Left atrium is mildly dilated. The left atrium volume index is 35.4 mL/m2.    Right Atrium: Right atrium is dilated.    Mitral Valve: There is moderate regurgitation.    Pulmonary Artery: The estimated pulmonary artery systolic pressure is 37 mmHg.    IVC/SVC: Normal venous pressure at 3 mmHg.    Recent Labs   Lab 04/06/24  1407   BNP 1,125*

## 2024-04-10 NOTE — HOSPITAL COURSE
CHF exacerbation  Patient is identified as having Combined Systolic and Diastolic heart failure that is Acute on chronic. CHF is currently uncontrolled due to Continued edema of extremities, Dyspnea not returned to baseline after 1 doses of IV diuretic, >3 pillow orthopnea, Rales/crackles on pulmonary exam, and Pulmonary edema/pleural effusion on CXR. Latest ECHO performed and demonstrates- Results for orders placed during the hospital encounter of 12/15/23     Echo     Interpretation Summary    Left Ventricle: The left ventricle is dilated. There is eccentric hypertrophy. Regional wall motion abnormalities present. See diagram for wall motion findings. Septal motion is consistent with post-operative status. There is reduced systolic function. Biplane (2D) method of discs ejection fraction is 15%.    Grade II diastolic dysfunction.    Right Ventricle: Normal right ventricular cavity size. Systolic function is reduced.TAPSE is 1.51 cm.    Left Atrium: Left atrium is mildly dilated. The left atrium volume index is 35.4 mL/m2.    Right Atrium: Right atrium is dilated.    Mitral Valve: There is moderate regurgitation.    Pulmonary Artery: The estimated pulmonary artery systolic pressure is 37 mmHg.    IVC/SVC: Normal venous pressure at 3 mmHg.  . Continue Beta Blocker, Furosemide, Aldactone, and ARNI and monitor clinical status closely. Monitor on telemetry. Patient is on CHF pathway.  Monitor strict Is&Os and daily weights.  Place on fluid restriction of 1 L. Cardiology has been consulted. Continue to stress to patient importance of self efficacy and  on diet for CHF. Last BNP reviewed- and noted below       Recent Labs   Lab 04/06/24  1407   BNP 1,125*         Elevated troponin     Likely due to demand ischemia  monitor     Pneumonia     CXR:  Patchy perihilar increased attenuation bilaterally worse on the right concerning for multifocal pneumonia or edema   CTX started in ED- continue  Add Azithromycin      H/O: stroke  Lipitor and ASA        Chronic combined systolic and diastolic CHF, NYHA class 2  Patient is identified as having Combined Systolic and Diastolic heart failure that is Acute on chronic. CHF is currently uncontrolled due to Continued edema of extremities and Rales/crackles on pulmonary exam. Latest ECHO performed and demonstrates- Results for orders placed during the hospital encounter of 12/15/23     Echo     Interpretation Summary    Left Ventricle: The left ventricle is dilated. There is eccentric hypertrophy. Regional wall motion abnormalities present. See diagram for wall motion findings. Septal motion is consistent with post-operative status. There is reduced systolic function. Biplane (2D) method of discs ejection fraction is 15%.    Grade II diastolic dysfunction.    Right Ventricle: Normal right ventricular cavity size. Systolic function is reduced.TAPSE is 1.51 cm.    Left Atrium: Left atrium is mildly dilated. The left atrium volume index is 35.4 mL/m2.    Right Atrium: Right atrium is dilated.    Mitral Valve: There is moderate regurgitation.    Pulmonary Artery: The estimated pulmonary artery systolic pressure is 37 mmHg.    IVC/SVC: Normal venous pressure at 3 mmHg.  . Continue Beta Blocker, Furosemide, Aldactone, and ARNI and monitor clinical status closely. Monitor on telemetry. Patient is on CHF pathway.  Monitor strict Is&Os and daily weights.  Place on fluid restriction of 1 L. Cardiology has not been consulted. Continue to stress to patient importance of self efficacy and  on diet for CHF. Last BNP reviewed- and noted below       Recent Labs   Lab 04/06/24  1407   BNP 1,125*         CXR with pulmonary edema  Continue OV Lasix  Strict input/output     Coronary artery disease involving native coronary artery of native heart without angina pectoris  CAD (coronary artery disease) of artery bypass graft  ICD (implantable cardioverter-defibrillator) in place  Patient with known CAD  s/p CABG, an s/p stent, which is controlled Will continue ASA, Plavix, and Statin and monitor for S/Sx of angina/ACS. Continue to monitor on telemetry.      Essential (primary) hypertension  Chronic, controlled. Latest blood pressure and vitals reviewed-      Temp:  [99.3 °F (37.4 °C)]   Pulse:  [71]   Resp:  [18]   BP: (134)/(90)   SpO2:  [96 %] .   Home meds for hypertension were reviewed and noted below.   Hypertension Medications                    furosemide (LASIX) 20 MG tablet Take 2 tablets (40 mg total) by mouth once daily.     metoprolol succinate (TOPROL-XL) 50 MG 24 hr tablet TAKE 1 TABLET(50 MG) BY MOUTH EVERY DAY     nitroGLYCERIN (NITROSTAT) 0.4 MG SL tablet ONE TABLET UNDER TONGUE AS NEEDED FOR CHEST PAIN. TAKE EVERY 5 MINUTES AS NEEDED     sacubitriL-valsartan (ENTRESTO) 24-26 mg per tablet Take 1 tablet by mouth 2 (two) times daily.     spironolactone (ALDACTONE) 25 MG tablet TAKE 1 TABLET(25 MG) BY MOUTH EVERY DAY                While in the hospital, will manage blood pressure as follows; Continue home antihypertensive regimen     Will utilize p.r.n. blood pressure medication only if patient's blood pressure greater than 140/90 and he develops symptoms such as worsening chest pain or shortness of breath.     JERONIMO on CPAP     No longer use CPAP.   Patient stated he does not want to use it  Patient ope to try it again     Other hyperlipidemia     Lipitor     Cardiomyopathy, ischemic     GDMT as tolerated for bp and symptoms, hold bp medications currently given SBP 90 - 100s. Decreased Toprol , aldactone and stopped TID lasix in favor of po Bumex 1mg po BID.      CAD (coronary artery disease) of artery bypass graft  Patient with known CAD s/p stent placement and CABG, which is uncontrolled Will continue ASA, Plavix, and Statin and monitor for S/Sx of angina/ACS. Continue to monitor on telemetry.        We decreased dosage of medications of GDMT for HFrEF , and added Cefdinir 300 mg po BID for  completion of PNA treatment

## 2024-04-10 NOTE — PROGRESS NOTES
[facility] - Telemetry  Cardiology  Progress Note    Patient Name: Tristen lBanc  MRN: [id number]  Admission Date: 4/6/2024  Hospital Length of Stay: 3 days  Code Status: Full Code   Attending Physician: Anurag Angelo MD   Primary Care Physician: Alan Patterson MD  Expected Discharge Date: 4/12/2024  Principal Problem:CHF exacerbation    Subjective:     Hospital Course:   4/8/2024: Patient seen in room, wife at bedside. Patient endorses 4d worsening SOB with associated leg swelling, cough, weakness, and orthopnea. Grossly fluid overloaded on exam, currently receiving lasix 40mg IV BID. Hemodynamically stable. On 2L O2 NC.   4/9/2024 Remains on IV Lasix 60 BID with 1.5Lout overnight HR and BP stable. CBC and BMP WNL. OOB to chair today and feeling better   4/10/2024 On oral Bumex with 700cc documented out overnight. Sats stable on RA. Ambulating in room with no issues per patient. SBP reported down to 90s yesterday with ?dizziness. CBC and BMP stable           Review of Systems   Constitutional: Negative for chills, decreased appetite, diaphoresis, fever, malaise/fatigue, weight gain and weight loss.   Cardiovascular:  Positive for dyspnea on exertion (improving) and leg swelling. Negative for chest pain, claudication, irregular heartbeat, near-syncope, orthopnea, palpitations and paroxysmal nocturnal dyspnea.   Respiratory:  Negative for cough, shortness of breath, snoring, sputum production and wheezing.    Endocrine: Negative for cold intolerance, heat intolerance, polydipsia, polyphagia and polyuria.   Skin:  Negative for color change, dry skin, itching, nail changes and poor wound healing.   Musculoskeletal:  Negative for back pain, gout, joint pain and joint swelling.   Gastrointestinal:  Negative for bloating, abdominal pain, constipation, diarrhea, hematemesis, hematochezia, melena, nausea and vomiting.   Genitourinary:  Negative for dysuria, hematuria and nocturia.   Neurological:  Negative for  dizziness, headaches, light-headedness, numbness, paresthesias and weakness.   Psychiatric/Behavioral:  Negative for altered mental status, depression and memory loss.      Objective:     Vital Signs (Most Recent):  Temp: 97.9 °F (36.6 °C) (04/10/24 0732)  Pulse: 71 (04/10/24 0732)  Resp: 18 (04/10/24 0732)  BP: 129/64 (04/10/24 0732)  SpO2: (!) 93 % (04/10/24 0921) Vital Signs (24h Range):  Temp:  [97.9 °F (36.6 °C)-98.7 °F (37.1 °C)] 97.9 °F (36.6 °C)  Pulse:  [62-71] 71  Resp:  [16-18] 18  SpO2:  [92 %-94 %] 93 %  BP: (106-129)/(53-64) 129/64     Weight: 78.5 kg (173 lb 1 oz)  Body mass index is 28.8 kg/m².     SpO2: (!) 93 %         Intake/Output Summary (Last 24 hours) at 4/10/2024 1047  Last data filed at 4/10/2024 0559  Gross per 24 hour   Intake --   Output 700 ml   Net -700 ml       Lines/Drains/Airways       Peripheral Intravenous Line  Duration                  Peripheral IV - Single Lumen 04/06/24 1412 20 G Anterior;Distal;Left Antecubital 3 days                       Physical Exam  Constitutional:       General: He is not in acute distress.     Appearance: He is well-developed.   Neck:      Vascular: No JVD.   Cardiovascular:      Rate and Rhythm: Normal rate and regular rhythm.      Heart sounds: No murmur heard.     No gallop.   Pulmonary:      Effort: Pulmonary effort is normal. No respiratory distress.      Breath sounds: Examination of the right-lower field reveals rales. Examination of the left-lower field reveals rales. Rales present. No wheezing.   Abdominal:      General: Bowel sounds are normal. There is no distension.      Palpations: Abdomen is soft.      Tenderness: There is no abdominal tenderness.   Musculoskeletal:      Cervical back: Normal range of motion and neck supple.      Right lower leg: Edema present.      Left lower leg: Edema present.   Skin:     General: Skin is warm and dry.   Neurological:      Mental Status: He is alert and oriented to person, place, and time.    Psychiatric:         Behavior: Behavior normal.         Thought Content: Thought content normal.         Judgment: Judgment normal.            Significant Labs: BMP:   Recent Labs   Lab 04/09/24  0505 04/10/24  0445   GLU 94  94 96  96     139 137  137   K 3.5  3.5 4.5  4.5   CL 99  99 99  99   CO2 29  29 28  28   BUN 23  23 20  20   CREATININE 1.0  1.0 1.0  1.0   CALCIUM 8.3*  8.3* 8.5*  8.5*   MG 2.1 2.2    and CBC   Recent Labs   Lab 04/09/24  0506 04/10/24  0445   WBC 8.69 8.34   HGB 11.2* 10.9*   HCT 35.2* 34.8*    338       Significant Imaging: Echocardiogram: Transthoracic echo (TTE) complete (Cupid Only):   Results for orders placed or performed during the hospital encounter of 12/15/23   Echo   Result Value Ref Range    BSA 1.82 m2    Gandhi's Biplane MOD Ejection Fraction 15 %    LVOT stroke volume 58.97 cm3    LVIDd 6.68 (A) 3.5 - 6.0 cm    LV Systolic Volume 181.96 mL    LV Systolic Volume Index 101.7 mL/m2    LVIDs 6.03 (A) 2.1 - 4.0 cm    LV Diastolic Volume 229.94 mL    LV Diastolic Volume Index 128.46 mL/m2    IVS 0.80 0.6 - 1.1 cm    LVOT diameter 2.30 cm    LVOT area 4.2 cm2    FS 10 (A) 28 - 44 %    Left Ventricle Relative Wall Thickness 0.24 cm    Posterior Wall 0.79 0.6 - 1.1 cm    LV mass 223.19 g    LV Mass Index 125 g/m2    MV Peak E Silas 0.65 m/s    TDI LATERAL 0.07 m/s    TDI SEPTAL 0.02 m/s    E/E' ratio 14.44 m/s    MV Peak A Silas 0.88 m/s    TR Max Silas 2.91 m/s    E/A ratio 0.74     E wave deceleration time 212.49 msec    LV SEPTAL E/E' RATIO 32.50 m/s    LV LATERAL E/E' RATIO 9.29 m/s    PV Peak S Silas 0.39 m/s    PV Peak D Silas 0.40 m/s    Pulm vein S/D ratio 0.98     LVOT peak silas 0.63 m/s    Left Ventricular Outflow Tract Mean Velocity 0.47 cm/s    Left Ventricular Outflow Tract Mean Gradient 0.98 mmHg    RV S' 7.13 cm/s    TAPSE 1.51 cm    LA size 4.64 cm    Left Atrium Minor Axis 5.35 cm    Left Atrium Major Axis 4.74 cm    LA volume (mod) 39.08 cm3     LA Volume Index (Mod) 21.8 mL/m2    RA Major Axis 4.04 cm    RA Width 3.13 cm    AV mean gradient 3 mmHg    AV peak gradient 6 mmHg    Ao peak félix 1.18 m/s    Ao VTI 24.20 cm    LVOT peak VTI 14.20 cm    AV valve area 2.44 cm²    AV Velocity Ratio 0.53     AV index (prosthetic) 0.59     DEBORAH by Velocity Ratio 2.22 cm²    Mr max félix 4.36 m/s    MV peak gradient 5 mmHg    MV stenosis pressure 1/2 time 61.62 ms    MV valve area p 1/2 method 3.57 cm2    MV valve area by continuity eq 2.05 cm2    MV VTI 28.8 cm    Triscuspid Valve Regurgitation Peak Gradient 34 mmHg    IVC diameter 1.81 cm    Mean e' 0.05 m/s    ZLVIDS 5.37     ZLVIDD 3.00     LA Volume Index 35.4 mL/m2    LA volume 63.44 cm3    LA WIDTH 3.2 cm    TV resting pulmonary artery pressure 37 mmHg    RV TB RVSP 6 mmHg    Est. RA pres 3 mmHg    Narrative      Left Ventricle: The left ventricle is dilated. There is eccentric   hypertrophy. Regional wall motion abnormalities present. See diagram for   wall motion findings. Septal motion is consistent with post-operative   status. There is reduced systolic function. Biplane (2D) method of discs   ejection fraction is 15%.    Grade II diastolic dysfunction.    Right Ventricle: Normal right ventricular cavity size. Systolic   function is reduced.TAPSE is 1.51 cm.    Left Atrium: Left atrium is mildly dilated. The left atrium volume   index is 35.4 mL/m2.    Right Atrium: Right atrium is dilated.    Mitral Valve: There is moderate regurgitation.    Pulmonary Artery: The estimated pulmonary artery systolic pressure is   37 mmHg.    IVC/SVC: Normal venous pressure at 3 mmHg.       Assessment and Plan:     Brief HPI: Seen this morning on AM rounds with Dr. Jerez while sitting in bedside chair with wife at the bedside. Reports feeling better. Discussed POC as detailed below-verbalized understanding and agrees with POC      * CHF exacerbation  - as detailed under ADHF     Elevated troponin  -1x troponin 4/6/2024 0.51  down to 0.263  - related to demand etiology in the setting of fluid overload/ acute CHF  - no need to trend troponin further     Chronic combined systolic and diastolic CHF, NYHA class 2  - volume overloaded upon admission and aggressive diuresis started with IV lasix with up titration; transitioned to oral Bumex yesterday  - 700cc out overnight; negative 3.5 since admission   - LE edema remains but improved; reports SOB improving; will repeat ambulation  today   - continue Entresto, Aldcatone and Toprol XL at home doses; goal SBP 90s-100s with MAPs 60s   - wife reported noncompliance with CPAP at home previously ?etiology of ADHF; will use Bumexi 1mg po daily upon discharge with instructions to take additional dose of increased SOB or weight gain of 2-3lbs in 24 hours Echo 12/15/2023    Interpretation Summary    Left Ventricle: The left ventricle is dilated. There is eccentric hypertrophy. Regional wall motion abnormalities present. See diagram for wall motion findings. Septal motion is consistent with post-operative status. There is reduced systolic function. Biplane (2D) method of discs ejection fraction is 15%.    Grade II diastolic dysfunction.    Right Ventricle: Normal right ventricular cavity size. Systolic function is reduced.TAPSE is 1.51 cm.    Left Atrium: Left atrium is mildly dilated. The left atrium volume index is 35.4 mL/m2.    Right Atrium: Right atrium is dilated.    Mitral Valve: There is moderate regurgitation.    Pulmonary Artery: The estimated pulmonary artery systolic pressure is 37 mmHg.    IVC/SVC: Normal venous pressure at 3 mmHg.    Recent Labs   Lab 04/06/24  1407   BNP 1,125*         ICD (implantable cardioverter-defibrillator) in place  -s/p dual chamber ICD implantation in 2010 with a generator change in 2017 by Dr. Loza  -hx of VT storm 07/2023, started on amio, stable  -continue PO amio  -continue continuous cardiac monitoring          Essential (primary) hypertension  - SBP  100s-120s with MAPs 80s-90s overnight  - reports of SBP down to 90s this AM with dizziness- AM meds held yesterday   - originally goal BP stated less than 130/80 but ideally goal is more 100mmHg   - BP well  controlled  - continue GDMT    JERONIMO on CPAP  -continue CPAP QHS    Other hyperlipidemia  -continue statin, goal LDLc < 70      CAD (coronary artery disease) of artery bypass graft  -CAD s/p CABG x 3 1996 followed by multiple PCIs  -last [facility] 07/2023 as below  -denies CP, palpitations, syncope/pre-syncope, PND; SOB likely 2/2 fluid overload/ CHF  -continue asa, plavix    [facility] 07/2023  Findings  Co-dominant  LM: Mild stenosis  LAD: Occluded at the ostium  Lcx: 80-90% ostial Lcx (ISR)  RCA: Patent stent proximal and mid RCA     Bypass Graft  - SVG to LAD filling prox LAD retrograde and into diagonal branch which has 2 serial 90% stenosis. Patent LAD  - LIMA and AUGUSTUS visualized and noted not utilized     LVEDP 21     Intervention  - sp IVUS guided POBA proximal Lcx with 3.5 NC and 4.0 NC balloon  - Attempted diagonal intervention via SVG graft. Able to wire into diagonal branch (via retrograde wiring in the LAD), but unable to deliver balloon        VTE Risk Mitigation (From admission, onward)           Ordered     enoxaparin injection 40 mg  Daily         04/06/24 1520     IP VTE HIGH RISK PATIENT  Once         04/06/24 1520     Place sequential compression device  Until discontinued         04/06/24 1520                    MELO Medina, ANP  Cardiology  [facility] - Telemetry

## 2024-04-10 NOTE — SUBJECTIVE & OBJECTIVE
Review of Systems   Constitutional: Negative for chills, decreased appetite, diaphoresis, fever, malaise/fatigue, weight gain and weight loss.   Cardiovascular:  Positive for dyspnea on exertion (improving) and leg swelling. Negative for chest pain, claudication, irregular heartbeat, near-syncope, orthopnea, palpitations and paroxysmal nocturnal dyspnea.   Respiratory:  Negative for cough, shortness of breath, snoring, sputum production and wheezing.    Endocrine: Negative for cold intolerance, heat intolerance, polydipsia, polyphagia and polyuria.   Skin:  Negative for color change, dry skin, itching, nail changes and poor wound healing.   Musculoskeletal:  Negative for back pain, gout, joint pain and joint swelling.   Gastrointestinal:  Negative for bloating, abdominal pain, constipation, diarrhea, hematemesis, hematochezia, melena, nausea and vomiting.   Genitourinary:  Negative for dysuria, hematuria and nocturia.   Neurological:  Negative for dizziness, headaches, light-headedness, numbness, paresthesias and weakness.   Psychiatric/Behavioral:  Negative for altered mental status, depression and memory loss.      Objective:     Vital Signs (Most Recent):  Temp: 97.9 °F (36.6 °C) (04/10/24 0732)  Pulse: 71 (04/10/24 0732)  Resp: 18 (04/10/24 0732)  BP: 129/64 (04/10/24 0732)  SpO2: (!) 93 % (04/10/24 0921) Vital Signs (24h Range):  Temp:  [97.9 °F (36.6 °C)-98.7 °F (37.1 °C)] 97.9 °F (36.6 °C)  Pulse:  [62-71] 71  Resp:  [16-18] 18  SpO2:  [92 %-94 %] 93 %  BP: (106-129)/(53-64) 129/64     Weight: 78.5 kg (173 lb 1 oz)  Body mass index is 28.8 kg/m².     SpO2: (!) 93 %         Intake/Output Summary (Last 24 hours) at 4/10/2024 1043  Last data filed at 4/10/2024 0559  Gross per 24 hour   Intake --   Output 700 ml   Net -700 ml       Lines/Drains/Airways       Peripheral Intravenous Line  Duration                  Peripheral IV - Single Lumen 04/06/24 1412 20 G Anterior;Distal;Left Antecubital 3 days                        Physical Exam  Constitutional:       General: He is not in acute distress.     Appearance: He is well-developed.   Neck:      Vascular: No JVD.   Cardiovascular:      Rate and Rhythm: Normal rate and regular rhythm.      Heart sounds: No murmur heard.     No gallop.   Pulmonary:      Effort: Pulmonary effort is normal. No respiratory distress.      Breath sounds: Examination of the right-lower field reveals rales. Examination of the left-lower field reveals rales. Rales present. No wheezing.   Abdominal:      General: Bowel sounds are normal. There is no distension.      Palpations: Abdomen is soft.      Tenderness: There is no abdominal tenderness.   Musculoskeletal:      Cervical back: Normal range of motion and neck supple.      Right lower leg: Edema present.      Left lower leg: Edema present.   Skin:     General: Skin is warm and dry.   Neurological:      Mental Status: He is alert and oriented to person, place, and time.   Psychiatric:         Behavior: Behavior normal.         Thought Content: Thought content normal.         Judgment: Judgment normal.            Significant Labs: BMP:   Recent Labs   Lab 04/09/24  0505 04/10/24  0445   GLU 94  94 96  96     139 137  137   K 3.5  3.5 4.5  4.5   CL 99  99 99  99   CO2 29  29 28  28   BUN 23  23 20  20   CREATININE 1.0  1.0 1.0  1.0   CALCIUM 8.3*  8.3* 8.5*  8.5*   MG 2.1 2.2    and CBC   Recent Labs   Lab 04/09/24  0506 04/10/24  0445   WBC 8.69 8.34   HGB 11.2* 10.9*   HCT 35.2* 34.8*    338       Significant Imaging: Echocardiogram: Transthoracic echo (TTE) complete (Cupid Only):   Results for orders placed or performed during the hospital encounter of 12/15/23   Echo   Result Value Ref Range    BSA 1.82 m2    Gandhi's Biplane MOD Ejection Fraction 15 %    LVOT stroke volume 58.97 cm3    LVIDd 6.68 (A) 3.5 - 6.0 cm    LV Systolic Volume 181.96 mL    LV Systolic Volume Index 101.7 mL/m2    LVIDs 6.03 (A) 2.1 - 4.0  cm    LV Diastolic Volume 229.94 mL    LV Diastolic Volume Index 128.46 mL/m2    IVS 0.80 0.6 - 1.1 cm    LVOT diameter 2.30 cm    LVOT area 4.2 cm2    FS 10 (A) 28 - 44 %    Left Ventricle Relative Wall Thickness 0.24 cm    Posterior Wall 0.79 0.6 - 1.1 cm    LV mass 223.19 g    LV Mass Index 125 g/m2    MV Peak E Silas 0.65 m/s    TDI LATERAL 0.07 m/s    TDI SEPTAL 0.02 m/s    E/E' ratio 14.44 m/s    MV Peak A Silas 0.88 m/s    TR Max Silas 2.91 m/s    E/A ratio 0.74     E wave deceleration time 212.49 msec    LV SEPTAL E/E' RATIO 32.50 m/s    LV LATERAL E/E' RATIO 9.29 m/s    PV Peak S Silas 0.39 m/s    PV Peak D Silas 0.40 m/s    Pulm vein S/D ratio 0.98     LVOT peak silas 0.63 m/s    Left Ventricular Outflow Tract Mean Velocity 0.47 cm/s    Left Ventricular Outflow Tract Mean Gradient 0.98 mmHg    RV S' 7.13 cm/s    TAPSE 1.51 cm    LA size 4.64 cm    Left Atrium Minor Axis 5.35 cm    Left Atrium Major Axis 4.74 cm    LA volume (mod) 39.08 cm3    LA Volume Index (Mod) 21.8 mL/m2    RA Major Axis 4.04 cm    RA Width 3.13 cm    AV mean gradient 3 mmHg    AV peak gradient 6 mmHg    Ao peak silas 1.18 m/s    Ao VTI 24.20 cm    LVOT peak VTI 14.20 cm    AV valve area 2.44 cm²    AV Velocity Ratio 0.53     AV index (prosthetic) 0.59     DEBORAH by Velocity Ratio 2.22 cm²    Mr max silas 4.36 m/s    MV peak gradient 5 mmHg    MV stenosis pressure 1/2 time 61.62 ms    MV valve area p 1/2 method 3.57 cm2    MV valve area by continuity eq 2.05 cm2    MV VTI 28.8 cm    Triscuspid Valve Regurgitation Peak Gradient 34 mmHg    IVC diameter 1.81 cm    Mean e' 0.05 m/s    ZLVIDS 5.37     ZLVIDD 3.00     LA Volume Index 35.4 mL/m2    LA volume 63.44 cm3    LA WIDTH 3.2 cm    TV resting pulmonary artery pressure 37 mmHg    RV TB RVSP 6 mmHg    Est. RA pres 3 mmHg    Narrative      Left Ventricle: The left ventricle is dilated. There is eccentric   hypertrophy. Regional wall motion abnormalities present. See diagram for   wall motion findings.  Septal motion is consistent with post-operative   status. There is reduced systolic function. Biplane (2D) method of discs   ejection fraction is 15%.    Grade II diastolic dysfunction.    Right Ventricle: Normal right ventricular cavity size. Systolic   function is reduced.TAPSE is 1.51 cm.    Left Atrium: Left atrium is mildly dilated. The left atrium volume   index is 35.4 mL/m2.    Right Atrium: Right atrium is dilated.    Mitral Valve: There is moderate regurgitation.    Pulmonary Artery: The estimated pulmonary artery systolic pressure is   37 mmHg.    IVC/SVC: Normal venous pressure at 3 mmHg.

## 2024-04-10 NOTE — ASSESSMENT & PLAN NOTE
- SBP 100s-120s with MAPs 80s-90s overnight  - reports of SBP down to 90s this AM with dizziness- AM meds held yesterday   - originally goal BP stated less than 130/80 but ideally goal is more 100mmHg   - BP well  controlled  - continue GDMT

## 2024-04-10 NOTE — DISCHARGE SUMMARY
[facility] Medicine  Discharge Summary      Patient Name: Tristen Blanc  MRN: [id number]  KATHRIN: [id number]  Patient Class: IP- Inpatient  Admission Date: 4/6/2024  Hospital Length of Stay: 3 days  Discharge Date and Time:  04/10/2024 12:10 PM  Attending Physician: Anurag Angelo MD   Discharging Provider: Anurag Angelo MD  Primary Care Provider: Alan Patterson MD    Primary Care Team: Networked reference to record PCT     HPI:   Tristen Blanc is a 79 yo M with PMH of coronary disease, congestive heart failure with depressed ejection fraction, dyslipidemia, BPH, arthritis, dementia, sleep apnea, and s/p hip fracture 1/9/24 brought in by EMS from home with complaint of 4 days history of progressive shortness of breath that is worse yesterday night. There is associated leg swelling, cough weakness, and PND,.  Denies fever, chills, diaphoresis, dysuria, abdominal distention.   History provided by patient and spouse at the bedside  BNP 1125, troponin 0.510, WBC 10.84, H/H 11.8/38.2, sodium 147, potassium 3.8, CXR with concerns for pulmonary edema/pneumonia.  Patient started on IV Lasix and IV ceftriaxone in the ED.        * No surgery found *      Hospital Course:   CHF exacerbation  Patient is identified as having Combined Systolic and Diastolic heart failure that is Acute on chronic. CHF is currently uncontrolled due to Continued edema of extremities, Dyspnea not returned to baseline after 1 doses of IV diuretic, >3 pillow orthopnea, Rales/crackles on pulmonary exam, and Pulmonary edema/pleural effusion on CXR. Latest ECHO performed and demonstrates- Results for orders placed during the hospital encounter of 12/15/23     Echo     Interpretation Summary    Left Ventricle: The left ventricle is dilated. There is eccentric hypertrophy. Regional wall motion abnormalities present. See diagram for wall motion findings. Septal motion is consistent with post-operative status. There is  reduced systolic function. Biplane (2D) method of discs ejection fraction is 15%.    Grade II diastolic dysfunction.    Right Ventricle: Normal right ventricular cavity size. Systolic function is reduced.TAPSE is 1.51 cm.    Left Atrium: Left atrium is mildly dilated. The left atrium volume index is 35.4 mL/m2.    Right Atrium: Right atrium is dilated.    Mitral Valve: There is moderate regurgitation.    Pulmonary Artery: The estimated pulmonary artery systolic pressure is 37 mmHg.    IVC/SVC: Normal venous pressure at 3 mmHg.  . Continue Beta Blocker, Furosemide, Aldactone, and ARNI and monitor clinical status closely. Monitor on telemetry. Patient is on CHF pathway.  Monitor strict Is&Os and daily weights.  Place on fluid restriction of 1 L. Cardiology has been consulted. Continue to stress to patient importance of self efficacy and  on diet for CHF. Last BNP reviewed- and noted below       Recent Labs   Lab 04/06/24  1407   BNP 1,125*         Elevated troponin     Likely due to demand ischemia  monitor     Pneumonia     CXR:  Patchy perihilar increased attenuation bilaterally worse on the right concerning for multifocal pneumonia or edema   CTX started in ED- continue  Add Azithromycin     H/O: stroke  Lipitor and ASA        Chronic combined systolic and diastolic CHF, NYHA class 2  Patient is identified as having Combined Systolic and Diastolic heart failure that is Acute on chronic. CHF is currently uncontrolled due to Continued edema of extremities and Rales/crackles on pulmonary exam. Latest ECHO performed and demonstrates- Results for orders placed during the hospital encounter of 12/15/23     Echo     Interpretation Summary    Left Ventricle: The left ventricle is dilated. There is eccentric hypertrophy. Regional wall motion abnormalities present. See diagram for wall motion findings. Septal motion is consistent with post-operative status. There is reduced systolic function. Biplane (2D) method of  discs ejection fraction is 15%.    Grade II diastolic dysfunction.    Right Ventricle: Normal right ventricular cavity size. Systolic function is reduced.TAPSE is 1.51 cm.    Left Atrium: Left atrium is mildly dilated. The left atrium volume index is 35.4 mL/m2.    Right Atrium: Right atrium is dilated.    Mitral Valve: There is moderate regurgitation.    Pulmonary Artery: The estimated pulmonary artery systolic pressure is 37 mmHg.    IVC/SVC: Normal venous pressure at 3 mmHg.  . Continue Beta Blocker, Furosemide, Aldactone, and ARNI and monitor clinical status closely. Monitor on telemetry. Patient is on CHF pathway.  Monitor strict Is&Os and daily weights.  Place on fluid restriction of 1 L. Cardiology has not been consulted. Continue to stress to patient importance of self efficacy and  on diet for CHF. Last BNP reviewed- and noted below       Recent Labs   Lab 04/06/24  1407   BNP 1,125*         CXR with pulmonary edema  Continue OV Lasix  Strict input/output     Coronary artery disease involving native coronary artery of native heart without angina pectoris  CAD (coronary artery disease) of artery bypass graft  ICD (implantable cardioverter-defibrillator) in place  Patient with known CAD s/p CABG, an s/p stent, which is controlled Will continue ASA, Plavix, and Statin and monitor for S/Sx of angina/ACS. Continue to monitor on telemetry.      Essential (primary) hypertension  Chronic, controlled. Latest blood pressure and vitals reviewed-      Temp:  [99.3 °F (37.4 °C)]   Pulse:  [71]   Resp:  [18]   BP: (134)/(90)   SpO2:  [96 %] .   Home meds for hypertension were reviewed and noted below.   Hypertension Medications                    furosemide (LASIX) 20 MG tablet Take 2 tablets (40 mg total) by mouth once daily.     metoprolol succinate (TOPROL-XL) 50 MG 24 hr tablet TAKE 1 TABLET(50 MG) BY MOUTH EVERY DAY     nitroGLYCERIN (NITROSTAT) 0.4 MG SL tablet ONE TABLET UNDER TONGUE AS NEEDED FOR CHEST  PAIN. TAKE EVERY 5 MINUTES AS NEEDED     sacubitriL-valsartan (ENTRESTO) 24-26 mg per tablet Take 1 tablet by mouth 2 (two) times daily.     spironolactone (ALDACTONE) 25 MG tablet TAKE 1 TABLET(25 MG) BY MOUTH EVERY DAY                While in the hospital, will manage blood pressure as follows; Continue home antihypertensive regimen     Will utilize p.r.n. blood pressure medication only if patient's blood pressure greater than 140/90 and he develops symptoms such as worsening chest pain or shortness of breath.     JERONIMO on CPAP     No longer use CPAP.   Patient stated he does not want to use it  Patient ope to try it again     Other hyperlipidemia     Lipitor     Cardiomyopathy, ischemic     GDMT as tolerated for bp and symptoms, hold bp medications currently given SBP 90 - 100s. Decreased Toprol , aldactone and stopped TID lasix in favor of po Bumex 1mg po BID.      CAD (coronary artery disease) of artery bypass graft  Patient with known CAD s/p stent placement and CABG, which is uncontrolled Will continue ASA, Plavix, and Statin and monitor for S/Sx of angina/ACS. Continue to monitor on telemetry.        We decreased dosage of medications of GDMT for HFrEF , and added Cefdinir 300 mg po BID for completion of PNA treatment      Goals of Care Treatment Preferences:  Code Status: Full Code      Consults:   Consults (From admission, onward)          Status Ordering Provider     Inpatient consult to Cardiology-[facility]  Once        Provider:  (Not yet assigned)    Completed RALPH GUERRERO N.     Inpatient consult to Social Work/Case Management  Once        Provider:  (Not yet assigned)    Completed RALPH GUERRERO N.     Inpatient consult to Registered Dietitian/Nutritionist  Once        Provider:  (Not yet assigned)    Completed RALPH GUERRERO N.            No new Assessment & Plan notes have been filed under this hospital service since the last note was generated.  Service: [facility]  Medicine    Final Active Diagnoses:    Diagnosis Date Noted POA    PRINCIPAL PROBLEM:  CHF exacerbation [I50.9] 04/08/2024 Yes    Pneumonia [J18.9] 04/06/2024 Yes    Elevated troponin [R79.89] 04/06/2024 Yes    Hx of CABG [Z95.1] 07/24/2023 Not Applicable    H/O: stroke [Z86.73] 12/02/2021 Not Applicable    Chronic combined systolic and diastolic CHF, NYHA class 2 [I50.42] 05/29/2018 Yes    ICD (implantable cardioverter-defibrillator) in place [Z95.810] 05/03/2013 Yes    Coronary artery disease involving native coronary artery of native heart without angina pectoris [I25.10] 11/12/2012 Yes    Essential (primary) hypertension [I10] 11/12/2012 Yes    GERD (gastroesophageal reflux disease) [K21.9] 06/18/2012 Yes    JERONIMO on CPAP [G47.33] 06/18/2012 Yes    CAD (coronary artery disease) of artery bypass graft [I25.810] 04/23/2012 Yes    Cardiomyopathy, ischemic [I25.5] 04/23/2012 Yes     Chronic    Other hyperlipidemia [E78.49] 04/23/2012 Yes      Problems Resolved During this Admission:       Discharged Condition: good    Disposition: Home or Self Care    Follow Up:   Follow-up Information       Lonnie Heller MD Follow up on 4/11/2024.    Specialties: Interventional Cardiology, Cardiology  Why: at 2:00pm; previously scheduled cardiology appointment  Contact information:  [address]  [address]  [place] [address]  [phone]               Zayra Grajeda MD Follow up on 4/17/2024.    Specialty: Internal Medicine  Why: at 10:00AM; hospital follow up appointment in [facility]  Contact information:  [address]  [address]  [facility] [address]  [phone]               Tramaine Adams III, MD Follow up on 4/16/2024.    Specialty: Orthopedic Surgery  Why: at 11:00 AM; previously scheduled ortho surg appointment  Contact information:  [address] [facility] [address] [facility] [address]  [place] [address]  [phone]               [facility] Follow up.     Why: call to reschedule out-patient rehab visits following discharge.  Contact information:  [address] [address] (p) [phone]                         Patient Instructions:      Diet Cardiac       Significant Diagnostic Studies: N/A    Pending Diagnostic Studies:       None           Medications:  Reconciled Home Medications:      Medication List        START taking these medications      bumetanide 1 MG tablet  Commonly known as: BUMEX  Take 1 tablet (1 mg total) by mouth once daily.  Start taking on: April 11, 2024     cefdinir 300 MG capsule  Commonly known as: OMNICEF  Take 1 capsule (300 mg total) by mouth 2 (two) times daily. for 10 days            CHANGE how you take these medications      clopidogreL 75 mg tablet  Commonly known as: PLAVIX  TAKE 1 TABLET(75 MG) BY MOUTH EVERY EVENING  What changed: when to take this     * metoprolol succinate 50 MG 24 hr tablet  Commonly known as: TOPROL-XL  TAKE 1 TABLET(50 MG) BY MOUTH EVERY DAY  What changed:   how to take this  when to take this     * metoprolol succinate 25 MG 24 hr tablet  Commonly known as: TOPROL-XL  Take 1 tablet (25 mg total) by mouth once daily.  Start taking on: April 11, 2024  What changed: You were already taking a medication with the same name, and this prescription was added. Make sure you understand how and when to take each.     nitroGLYCERIN 0.4 MG SL tablet  Commonly known as: NITROSTAT  ONE TABLET UNDER TONGUE AS NEEDED FOR CHEST PAIN. TAKE EVERY 5 MINUTES AS NEEDED  What changed:   how much to take  how to take this  when to take this  reasons to take this  additional instructions     spironolactone 25 MG tablet  Commonly known as: ALDACTONE  Take 0.5 tablets (12.5 mg total) by mouth once daily.  Start taking on: April 11, 2024  What changed:   how much to take  when to take this           * This list has 2 medication(s) that are the same as other medications prescribed for you. Read the directions  carefully, and ask your doctor or other care provider to review them with you.                CONTINUE taking these medications      amiodarone 200 MG Tab  Commonly known as: PACERONE  Take 1 tablet (200 mg total) by mouth once daily.     aspirin 81 MG EC tablet  Commonly known as: ECOTRIN  Take 81 mg by mouth once daily.     atorvastatin 80 MG tablet  Commonly known as: LIPITOR  Take 1 tablet (80 mg total) by mouth every evening.     CENTRUM ORAL  Take 1 tablet by mouth every morning.     coenzyme Q10 200 mg capsule  Take 200 mg by mouth once daily.     ENTRESTO 24-26 mg per tablet  Generic drug: sacubitriL-valsartan  Take 1 tablet by mouth 2 (two) times daily.            STOP taking these medications      furosemide 20 MG tablet  Commonly known as: LASIX              Indwelling Lines/Drains at time of discharge:   Lines/Drains/Airways       None                   Time spent on the discharge of patient: 48 minutes         Anurag Angelo MD  Department of Hospital Medicine  [facility]

## 2024-04-10 NOTE — PLAN OF CARE
04/10/24 1000   Rounds   Attendance Nurse ;Provider       1000   was informed by Dr Angelo that the pt is medically stable to discharge home today to resume OP Rehab. -64 this AM.    1110  Patient resting quietly in recliner when CM rounded. No family present. Patient in agreement with plan to discharge home today to resume OP Rehab at [facility], denied the need for assistance with transportation at time of discharge, & verbalized understanding regarding the hospital follow up appointments with Dr Heller, Dr Grajeda, & previously scheduled post-op appt with Dr Adams. CM to return to discuss pt's discharge status with spouse,Cassi, when she returns to the bedside.     1145  Patient resting quietly in recliner with spouse, Cassi Brewsterlele ([phone]), at the bedside when CM returned. CM informed Cassi that PT Toby stated that the pt will be able to resume OP Rehab following discharge & encouraged spouse to call to rescheduled OP Rehab appointments. Spouse in agreement with plan to discharge home today but stated that the appt with Dr Heller was rescheduled for 4/11/2024 at 1400. Information added to the pt's discharge paperwork.     1210  DC order noted. Message sent to nurse Joey & virtual nurse Dina informing that the pt is cleared to discharge.       Will continue to follow.

## 2024-04-10 NOTE — PLAN OF CARE
Discharge orders noted. AVS prepared with medication list, importance of medication compliance, follow up appointments, diet, home care instructions, treatment plan, self management, and when to seek medical attention. Detailed clinical reference list attached. AVS printed and handed to patient by bedside nurse. VN reviewed discharge instructions with patient using teachback method.  Allowed time for questions, all questions answered.  Patient verbalized complete understanding of discharge instructions and voices no concerns.      Discharge instructions complete.  Bedside delivery complete.  Transport wheelchair not requested, pt's wife requesting bedscale weight prior to discharge, transport not called,   Bedside nurse notified.

## 2024-04-10 NOTE — PLAN OF CARE
Problem: Fall Injury Risk  Goal: Absence of Fall and Fall-Related Injury  Outcome: Ongoing, Progressing     Problem: Adult Inpatient Plan of Care  Goal: Plan of Care Review  Outcome: Ongoing, Progressing     Problem: Heart Failure Comorbidity  Goal: Maintenance of Heart Failure Symptom Control  Outcome: Ongoing, Progressing

## 2024-04-10 NOTE — ASSESSMENT & PLAN NOTE
-CAD s/p CABG x 3 1996 followed by multiple PCIs  -last [facility] 07/2023 as below  -denies CP, palpitations, syncope/pre-syncope, PND; SOB likely 2/2 fluid overload/ CHF  -continue asa, plavix    [facility] 07/2023  Findings  Co-dominant  LM: Mild stenosis  LAD: Occluded at the ostium  Lcx: 80-90% ostial Lcx (ISR)  RCA: Patent stent proximal and mid RCA     Bypass Graft  - SVG to LAD filling prox LAD retrograde and into diagonal branch which has 2 serial 90% stenosis. Patent LAD  - LIMA and AUGUSTUS visualized and noted not utilized     LVEDP 21     Intervention  - sp IVUS guided POBA proximal Lcx with 3.5 NC and 4.0 NC balloon  - Attempted diagonal intervention via SVG graft. Able to wire into diagonal branch (via retrograde wiring in the LAD), but unable to deliver balloon

## 2024-04-11 ENCOUNTER — PATIENT OUTREACH (OUTPATIENT)
Dept: ADMINISTRATIVE | Facility: CLINIC | Age: 79
End: 2024-04-11
Payer: MEDICARE

## 2024-04-11 ENCOUNTER — OFFICE VISIT (OUTPATIENT)
Dept: CARDIOLOGY | Facility: CLINIC | Age: 79
End: 2024-04-11
Payer: MEDICARE

## 2024-04-11 VITALS
SYSTOLIC BLOOD PRESSURE: 116 MMHG | HEART RATE: 66 BPM | WEIGHT: 165 LBS | BODY MASS INDEX: 27.49 KG/M2 | HEIGHT: 65 IN | DIASTOLIC BLOOD PRESSURE: 67 MMHG

## 2024-04-11 DIAGNOSIS — Z95.1 HX OF CABG: ICD-10-CM

## 2024-04-11 DIAGNOSIS — E78.49 OTHER HYPERLIPIDEMIA: ICD-10-CM

## 2024-04-11 DIAGNOSIS — I47.20 VENTRICULAR TACHYCARDIA: ICD-10-CM

## 2024-04-11 DIAGNOSIS — I50.43 ACUTE ON CHRONIC COMBINED SYSTOLIC AND DIASTOLIC CONGESTIVE HEART FAILURE: ICD-10-CM

## 2024-04-11 DIAGNOSIS — I25.810 CORONARY ARTERY DISEASE INVOLVING CORONARY BYPASS GRAFT OF NATIVE HEART WITHOUT ANGINA PECTORIS: Primary | ICD-10-CM

## 2024-04-11 DIAGNOSIS — I50.42 CHRONIC COMBINED SYSTOLIC AND DIASTOLIC CHF, NYHA CLASS 2: ICD-10-CM

## 2024-04-11 DIAGNOSIS — I25.10 CORONARY ARTERY DISEASE INVOLVING NATIVE CORONARY ARTERY OF NATIVE HEART WITHOUT ANGINA PECTORIS: ICD-10-CM

## 2024-04-11 DIAGNOSIS — I10 ESSENTIAL (PRIMARY) HYPERTENSION: ICD-10-CM

## 2024-04-11 DIAGNOSIS — Z95.810 ICD (IMPLANTABLE CARDIOVERTER-DEFIBRILLATOR) IN PLACE: ICD-10-CM

## 2024-04-11 DIAGNOSIS — I25.5 CARDIOMYOPATHY, ISCHEMIC: Chronic | ICD-10-CM

## 2024-04-11 PROBLEM — I72.0 ANEURYSM OF CAROTID ARTERY: Status: RESOLVED | Noted: 2021-12-02 | Resolved: 2024-04-11

## 2024-04-11 PROCEDURE — 99999 PR PBB SHADOW E&M-EST. PATIENT-LVL III: CPT | Mod: PBBFAC,,, | Performed by: INTERNAL MEDICINE

## 2024-04-11 PROCEDURE — 3074F SYST BP LT 130 MM HG: CPT | Mod: CPTII,S$GLB,, | Performed by: INTERNAL MEDICINE

## 2024-04-11 PROCEDURE — 3288F FALL RISK ASSESSMENT DOCD: CPT | Mod: CPTII,S$GLB,, | Performed by: INTERNAL MEDICINE

## 2024-04-11 PROCEDURE — 1160F RVW MEDS BY RX/DR IN RCRD: CPT | Mod: CPTII,S$GLB,, | Performed by: INTERNAL MEDICINE

## 2024-04-11 PROCEDURE — 1101F PT FALLS ASSESS-DOCD LE1/YR: CPT | Mod: CPTII,S$GLB,, | Performed by: INTERNAL MEDICINE

## 2024-04-11 PROCEDURE — 1159F MED LIST DOCD IN RCRD: CPT | Mod: CPTII,S$GLB,, | Performed by: INTERNAL MEDICINE

## 2024-04-11 PROCEDURE — 1111F DSCHRG MED/CURRENT MED MERGE: CPT | Mod: CPTII,S$GLB,, | Performed by: INTERNAL MEDICINE

## 2024-04-11 PROCEDURE — 3078F DIAST BP <80 MM HG: CPT | Mod: CPTII,S$GLB,, | Performed by: INTERNAL MEDICINE

## 2024-04-11 PROCEDURE — 99215 OFFICE O/P EST HI 40 MIN: CPT | Mod: S$GLB,,, | Performed by: INTERNAL MEDICINE

## 2024-04-11 PROCEDURE — 1126F AMNT PAIN NOTED NONE PRSNT: CPT | Mod: CPTII,S$GLB,, | Performed by: INTERNAL MEDICINE

## 2024-04-11 NOTE — PROGRESS NOTES
HISTORY:    78-year-old male with a history of anterior wall myocardial infarction in 1996 status post emergent CABG x3 at that time followed by multiple PCIs, AICD placement 2010 with generator exchange in 2017, HFrEF, VT 2023, CVA 2021, hypertension, hyperlipidemia, and obstructive sleep apnea presenting for follow-up.    Discharged yesterday after admission for HFrEF. Underwent hip surgery 2 months ago and has seemingly struggled with volume overload since then. Feels better post diuresis.     No CP. No LANGE with mild activity at this time. No edema or orthopnea.    Tolerates asa 81x1, clopidogrel 75x1, amiodarone 200x1, atorvastatin 80x1, metoprolol succinate 25x1, sacubitril-valsartan 24-26x2, spironolactone 25x1, bumetadine 1x1.     Inpt chart reviewed extensively.       PHYSICAL EXAM:    Vitals:    04/11/24 1352   BP: 116/67   Pulse: 66         NAD, A+Ox3.  No jvd, no bruit.  RRR nml s1,s2. No murmurs.  CTA B no wheezes or crackles.  2+ B radial and 1+ B DP/PT. No edema.    LABS/STUDIES (imaging reviewed during clinic visit):    April 2024 hemoglobin 10.9/MCV 88.  CMP normal.  BNP 1100.  Troponin low-grade.  July 2023 CBC and CMP are unremarkable.  .  Troponin peak 17.  2022 LDL is 63 and HDL 40. Triglycerides are 63.   EKG April 2024 a paced with nonspecific intraventricular conduction delay.  July 28th 2023 a paced with a nonspecific intraventricular conduction block that is old.  Inferior ST depressions with posterolateral ST elevations post VT. Admission ecg July 28th VT. Admission ecg July 24th VT.    AICD interrogation February 2024 50% a paced with 0% V pacing.  No significant arrhythmia.   TTE December 2023 enlarged LV with eccentric hypertrophy and severely decreased systolic function.  LVEF 15-20%.  Grade 2 diastology.  Moderate MR.  CVP 3.  Nuclear stress test 2018 demonstrates no LVEF 23% with no evidence of ischemia.  There is a fixed defect in the anterior septum, anterior, apical, and  inferoapical walls.  Cardiac catheterization July '23 with a patent left main.  Flush occlusion of the ostial LAD.  70% proximal, calcified left circ disease.  Two patent OM vessels without significant stenosis.  Patent distal circ.  Patent RCA system.  Patent SVG to LAD also filling a D1 branch with significant proximal disease in that branch.  Unclear culprit.  Status post POBA of proximal left circ.  Carotid September 2021 demonstrates no significant stenosis.  CT head August 2021 demonstrates no acute abnormalities.  Chronic microvascular disease noted.  CTA Head September 2021 demonstrates remote left thalamic infarction. Right cavernous carotid aneuyrysm measuring 1.5cm. No significant carotid stenosis. 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    ASSESSMENT & PLAN:    1. Coronary artery disease involving coronary bypass graft of native heart without angina pectoris    2. Cardiomyopathy, ischemic    3. Chronic combined systolic and diastolic CHF, NYHA class 2    4. Coronary artery disease involving native coronary artery of native heart without angina pectoris    5. Essential (primary) hypertension    6. Hx of CABG    7. ICD (implantable cardioverter-defibrillator) in place    8. Ventricular tachycardia    9. Other hyperlipidemia    10. Acute on chronic combined systolic and diastolic congestive heart failure              Orders Placed This Encounter    Basic Metabolic Panel    NT-Pro Natriuretic Peptide    empagliflozin (JARDIANCE) 10 mg tablet        ICMP with HFrEF. Recent admission likely iatrogenic post hip surgery.     Feels better post admission.     Cont metoprolol succinate 25 x 1 (used to be on 50, can switch back once euvolemic), sacubitril-valsartan 24-26 x 2, and spironolactone 25 mg daily. Cont bumetadine 1x1. Can take BID for 1 week. Ran out of empagliflozin, restart 10x1.     2L fluid restriction and low salt diet. Has to get down to dry weight of 155.    H/o VT on amio. No  recent events. AICD in place.     CAD with no angina at this time. On DAPT.    LDL at goal on atorvastatin 80 mg.       Follow up in about 3 months (around 7/11/2024).      Lonnie Heller MD

## 2024-04-11 NOTE — PROGRESS NOTES
C3 nurse spoke with Tristen Lauren Neylele, patient's spouse, Cassi, for a TCC post hospital discharge follow up call. The patient has a scheduled HOSFU appointment with  Zayra Grajeda MD on 4/17/2024 @ 10 AM.

## 2024-04-11 NOTE — PLAN OF CARE
[facility] - Telemetry  Discharge Final Note    Primary Care Provider: Alan Patterson MD    Expected Discharge Date: 4/10/2024    Final Discharge Note (most recent)       Final Note - 04/11/24 0716          Final Note    Assessment Type Final Discharge Note (P)      Anticipated Discharge Disposition -- (P)    OP Rehab    Hospital Resources/Appts/Education Provided Appointments scheduled and added to AVS (P)                        Contact Info       Lonnie Heller MD   Specialty: Interventional Cardiology, Cardiology    [address]  [address]  [facility] [address]   Phone: [phone]       Next Steps: Follow up on 4/11/2024    Instructions: at 2:00pm; previously scheduled cardiology appointment    Zayra Grajeda MD   Specialty: Internal Medicine    [address]  [address]  [place] [address]   Phone: [phone]       Next Steps: Follow up on 4/17/2024    Instructions: at 10:00AM; hospital follow up appointment in [facility]    Tramaine Adams III, MD   Specialty: Orthopedic Surgery    R. Cameron Adams III, MD, [facility]  [address] [facility] [facility] [address]  [facility] [place] [address]   Phone: [phone]       Next Steps: Follow up on 4/16/2024    Instructions: at 11:00 AM; previously scheduled ortho surg appointment    [facility]    [address]  [place] [address]   (p) [phone]       Next Steps: Follow up    Instructions: call to reschedule out-patient rehab visits following discharge.          0840  Pt's discharge summary & PT/OT notes manually faxed to Dr Cameron Adams (ortho surg) f [phone].

## 2024-04-17 ENCOUNTER — OFFICE VISIT (OUTPATIENT)
Dept: PRIMARY CARE CLINIC | Facility: CLINIC | Age: 79
End: 2024-04-17
Payer: MEDICARE

## 2024-04-17 VITALS — DIASTOLIC BLOOD PRESSURE: 57 MMHG | HEART RATE: 65 BPM | SYSTOLIC BLOOD PRESSURE: 101 MMHG | OXYGEN SATURATION: 98 %

## 2024-04-17 DIAGNOSIS — J18.9 MULTIFOCAL PNEUMONIA: Primary | ICD-10-CM

## 2024-04-17 DIAGNOSIS — I50.43 ACUTE ON CHRONIC COMBINED SYSTOLIC (CONGESTIVE) AND DIASTOLIC (CONGESTIVE) HEART FAILURE: ICD-10-CM

## 2024-04-17 PROBLEM — R79.89 ELEVATED TROPONIN: Status: RESOLVED | Noted: 2024-04-06 | Resolved: 2024-04-17

## 2024-04-17 PROBLEM — I50.9 CHF EXACERBATION: Status: RESOLVED | Noted: 2024-04-08 | Resolved: 2024-04-17

## 2024-04-17 PROCEDURE — 3078F DIAST BP <80 MM HG: CPT | Mod: CPTII,S$GLB,, | Performed by: INTERNAL MEDICINE

## 2024-04-17 PROCEDURE — 1159F MED LIST DOCD IN RCRD: CPT | Mod: CPTII,S$GLB,, | Performed by: INTERNAL MEDICINE

## 2024-04-17 PROCEDURE — 1101F PT FALLS ASSESS-DOCD LE1/YR: CPT | Mod: CPTII,S$GLB,, | Performed by: INTERNAL MEDICINE

## 2024-04-17 PROCEDURE — 1160F RVW MEDS BY RX/DR IN RCRD: CPT | Mod: CPTII,S$GLB,, | Performed by: INTERNAL MEDICINE

## 2024-04-17 PROCEDURE — 1126F AMNT PAIN NOTED NONE PRSNT: CPT | Mod: CPTII,S$GLB,, | Performed by: INTERNAL MEDICINE

## 2024-04-17 PROCEDURE — 99496 TRANSJ CARE MGMT HIGH F2F 7D: CPT | Mod: S$GLB,,, | Performed by: INTERNAL MEDICINE

## 2024-04-17 PROCEDURE — 3074F SYST BP LT 130 MM HG: CPT | Mod: CPTII,S$GLB,, | Performed by: INTERNAL MEDICINE

## 2024-04-17 PROCEDURE — 99999 PR PBB SHADOW E&M-EST. PATIENT-LVL III: CPT | Mod: PBBFAC,,, | Performed by: INTERNAL MEDICINE

## 2024-04-17 PROCEDURE — 3288F FALL RISK ASSESSMENT DOCD: CPT | Mod: CPTII,S$GLB,, | Performed by: INTERNAL MEDICINE

## 2024-04-17 PROCEDURE — 1111F DSCHRG MED/CURRENT MED MERGE: CPT | Mod: CPTII,S$GLB,, | Performed by: INTERNAL MEDICINE

## 2024-04-17 NOTE — PROGRESS NOTES
[facility]   New Visit Progress Note   Recent Hospital Discharge     PRESENTING HISTORY     Chief Complaint/Reason for Admission:  Follow up Hospital Discharge   PCP: Alan Patterson MD    History of Present Illness:  Mr. Tristen Blanc is a 78 y.o. male who was recently admitted to the hospital.    [facility] Medicine  Discharge Summary        Patient Name: Tristen Blanc  MRN: [id number]  KATHRIN: [id number]  Patient Class: IP- Inpatient  Admission Date: 4/6/2024  Hospital Length of Stay: 3 days  Discharge Date and Time:  04/10/2024 12:10 PM  Attending Physician: Anurag Angelo MD   Discharging Provider: Anurag Angelo MD  Primary Care Provider: Alan Patterson MD    ___________________________________________________________________    Today:  [facility] for [facility] follow up.  Recently hospitalized for management of decompensated heart failure.   Additional concern for underlying pneumonia.  Patient admitted to [facility] Medicine service.  Diuresis initiated with good response.  PNA treated with Ceftriaxone and Azithromycin.   He responded well to above interventions and supportive care.  Discharged to home.  Home diuretic changed from Lasix to Bumex.  10 day course empiric Cefdinir prescribed for pneumonia.     Patient accompanied today by his wife.  In transport chair presently but ambulatory with rolling walker in the home setting.  Still recovering from right hip arthoplasty (1/25/24 @ [facility]).  Attends Outpatient PT with [facility] on [facility] 3 x a week.   Brought all medication bottles for review and reports compliance.     Had Cardiology follow up 4/11/24 -> notes reviewed.     Review of Systems  General ROS: negative for chills, fever or weight loss  + fatigue, decreased exercise tolerance   Psychological ROS: negative for hallucination, depression or suicidal ideation  Ophthalmic ROS: negative for blurry vision,  photophobia or eye pain  ENT ROS: negative for epistaxis, sore throat or rhinorrhea  Respiratory ROS: no cough, shortness of breath, or wheezing  Cardiovascular ROS: no chest pain or dyspnea on exertion  Gastrointestinal ROS: no abdominal pain, change in bowel habits, or black/ bloody stools  Genito-Urinary ROS: no dysuria, trouble voiding, or hematuria  Musculoskeletal ROS: negative for gait disturbance or muscular weakness  Neurological ROS: no syncope or seizures; no ataxia  Dermatological ROS: negative for pruritis, rash and jaundice      PAST HISTORY:     Past Medical History:   Diagnosis Date    Allergy     Arthritis     Cardiomyopathy     ischemic    CHF (congestive heart failure)     Coronary artery disease     s/p CABG    General anesthetics causing adverse effect in therapeutic use     Hyperlipidemia     Hypertension     ICD (implantable cardioverter-defibrillator) in place 5/3/2013    Sleep apnea with use of continuous positive airway pressure (CPAP)        Past Surgical History:   Procedure Laterality Date    CARDIAC CATHETERIZATION  2011    CARDIAC DEFIBRILLATOR PLACEMENT      COLONOSCOPY      CORONARY ANGIOPLASTY  1/3/2011    Last stent was in LMCA, Cx. Had previous stentsd    CORONARY ARTERY BYPASS GRAFT  1996    bypass of 2 vessels    INSERT / REPLACE / REMOVE PACEMAKER      AICD; generator change in 2/10/17    JOINT REPLACEMENT Right 1991    total hip replacement    LEFT HEART CATHETERIZATION Left 2023    Procedure: Left heart cath;  Surgeon: Kaycee Fermin MD;  Location: [facility] CATH LAB/EP;  Service: Cardiology;  Laterality: Left;    SINUS SURGERY         Family History   Problem Relation Name Age of Onset    Stroke Mother      Heart attack Father      Heart attack Paternal Uncle          2 uncles  of heart attcks    Heart attack Maternal Aunt          2 aunts one  from AMI asnd the other  od complications later on.    Heart attack Paternal Aunt          3 out of 4   at late age of AMI         MEDICATIONS & ALLERGIES:     Current Outpatient Medications on File Prior to Visit   Medication Sig Dispense Refill    amiodarone (PACERONE) 200 MG Tab Take 1 tablet (200 mg total) by mouth once daily. 90 tablet 3    aspirin (ECOTRIN) 81 MG EC tablet Take 81 mg by mouth once daily.        atorvastatin (LIPITOR) 80 MG tablet Take 1 tablet (80 mg total) by mouth every evening. 90 tablet 3    bumetanide (BUMEX) 1 MG tablet Take 1 tablet (1 mg total) by mouth once daily. 30 tablet 11    cefdinir (OMNICEF) 300 MG capsule Take 1 capsule (300 mg total) by mouth 2 (two) times daily. for 10 days 20 capsule 0    clopidogreL (PLAVIX) 75 mg tablet TAKE 1 TABLET(75 MG) BY MOUTH EVERY EVENING (Patient taking differently: Take 75 mg by mouth every evening.) 90 tablet 3    coenzyme Q10 200 mg capsule Take 200 mg by mouth once daily. 90 capsule 3    empagliflozin (JARDIANCE) 10 mg tablet Take 1 tablet (10 mg total) by mouth once daily. 90 tablet 3    FOLIC ACID/MV,FE,OTHER MIN (CENTRUM ORAL) Take 1 tablet by mouth every morning.       metoprolol succinate (TOPROL-XL) 25 MG 24 hr tablet Take 1 tablet (25 mg total) by mouth once daily. 90 tablet 3    nitroGLYCERIN (NITROSTAT) 0.4 MG SL tablet ONE TABLET UNDER TONGUE AS NEEDED FOR CHEST PAIN. TAKE EVERY 5 MINUTES AS NEEDED (Patient not taking: Reported on 2024) 25 tablet 6    sacubitriL-valsartan (ENTRESTO) 24-26 mg per tablet Take 1 tablet by mouth 2 (two) times daily. 60 tablet 11    spironolactone (ALDACTONE) 25 MG tablet Take 0.5 tablets (12.5 mg total) by mouth once daily. 15 tablet 11     No current facility-administered medications on file prior to visit.        Review of patient's allergies indicates:  No Known Allergies    OBJECTIVE:     Vital Signs:  BP (!) 101/57 (BP Location: Right arm, Patient Position: Sitting, BP Method: Small (Automatic))   Pulse 65   SpO2 98%   Wt Readings from Last 3 Encounters:   24 1352 74.8 kg (165 lb)    04/08/24 0745 78.5 kg (173 lb 1 oz)   04/06/24 2349 78.5 kg (173 lb 1 oz)   04/06/24 1526 74.8 kg (165 lb)   12/18/23 1520 74.4 kg (164 lb)     There is no height or weight on file to calculate BMI.        Physical Exam:  BP (!) 101/57 (BP Location: Right arm, Patient Position: Sitting, BP Method: Small (Automatic))   Pulse 65   SpO2 98%   General appearance: alert, cooperative, no distress  Constitutional:Oriented to person, place, and time  + appears well-developed and well-nourished.   HEENT: Normocephalic, atraumatic, neck symmetrical, no nasal discharge   + hearing impaired   Eyes: conjunctivae/corneas clear, PERRL, EOM's intact  Lungs: clear to auscultation bilaterally, no dullness to percussion bilaterally  Heart: regular rate and rhythm without rub; no displacement of the PMI   Abdomen: soft, non-tender; bowel sounds normoactive; no organomegaly  Extremities: extremities symmetric; no clubbing, cyanosis, or edema  Integument: Skin color, texture, turgor normal; no rashes; hair distrubution normal  Neurologic: Alert and oriented X 3, normal strength, normal coordination   Psychiatric: no pressured speech; normal affect; no evidence of impaired cognition     Laboratory  Lab Results   Component Value Date    WBC 8.34 04/10/2024    HGB 10.9 (L) 04/10/2024    HCT 34.8 (L) 04/10/2024    MCV 88 04/10/2024     04/10/2024     BMP  Lab Results   Component Value Date     04/10/2024     04/10/2024    K 4.5 04/10/2024    K 4.5 04/10/2024    CL 99 04/10/2024    CL 99 04/10/2024    CO2 28 04/10/2024    CO2 28 04/10/2024    BUN 20 04/10/2024    BUN 20 04/10/2024    CREATININE 1.0 04/10/2024    CREATININE 1.0 04/10/2024    CALCIUM 8.5 (L) 04/10/2024    CALCIUM 8.5 (L) 04/10/2024    ANIONGAP 10 04/10/2024    ANIONGAP 10 04/10/2024    EGFRNORACEVR >60 04/10/2024    EGFRNORACEVR >60 04/10/2024     Lab Results   Component Value Date    ALT 58 (H) 04/06/2024    AST 71 (H) 04/06/2024    ALKPHOS 222 (H)  04/06/2024    BILITOT 0.4 04/06/2024     Lab Results   Component Value Date    INR 1.1 01/25/2024    INR 1.1 07/24/2023    INR 1.0 02/03/2017     Lab Results   Component Value Date    HGBA1C 5.1 04/06/2024       Diagnostic Results:    Chest X Ray 4/6/24:  Patchy perihilar increased attenuation bilaterally worse on the right concerning for multifocal pneumonia or edema.     2 D echo 11/2/24:    Left Ventricle: The left ventricle is dilated. There is eccentric hypertrophy. Regional wall motion abnormalities present. See diagram for wall motion findings. Septal motion is consistent with post-operative status. There is reduced systolic function. Biplane (2D) method of discs ejection fraction is 15%.    Grade II diastolic dysfunction.    Right Ventricle: Normal right ventricular cavity size. Systolic function is reduced.TAPSE is 1.51 cm.    Left Atrium: Left atrium is mildly dilated. The left atrium volume index is 35.4 mL/m2.    Right Atrium: Right atrium is dilated.    Mitral Valve: There is moderate regurgitation.    Pulmonary Artery: The estimated pulmonary artery systolic pressure is 37 mmHg.    IVC/SVC: Normal venous pressure at 3 mmHg.      TRANSITION OF CARE:     [facility] Contact Note: 4/11/24     Family and/or Caretaker present at visit?  Yes.  Diagnostic tests reviewed/disposition: I have reviewed all completed as well as pending diagnostic tests at the time of discharge.  Disease/illness education: Yes  Home health/community services discussion/referrals: Patient does not have home health established from hospital visit.  They do not need home health.  If needed, we will set up home health for the patient.   Establishment or re-establishment of referral orders for community resources: No other necessary community resources.   Discussion with other health care providers: No discussion with other health care providers necessary.     ASSESSMENT & PLAN:       Multifocal pneumonia  - recent hospitalization  as above  - complete empiric Cefdinir as prescribed  - respiratory status stable on room air    Acute on chronic combined systolic (congestive) and diastolic (congestive) heart failure  - diuresed effectively while hospitalized  - home diuretic changed from Lasix -> Bumex  - appears euvolemic presently  - continue current medication regimen  - return to Cardiology team 7/19/24     Patient will be released from hospital follow up clinic.  He will see his PCP, Dr Patterson, 8/14/24.   Instructions for the patient:      Scheduled Follow-up :  Future Appointments   Date Time Provider Department Center   5/9/2024 11:00 AM LAB, [facility]   6/4/2024 11:40 AM Kemar Delgado MD [facility]   7/19/2024  2:40 PM Lonnie Heller MD [facility] [place]   8/14/2024 10:15 AM Alan Patterson MD [facility]       Post Visit Medication List:     Medication List            Accurate as of April 17, 2024 10:53 AM. If you have any questions, ask your nurse or doctor.                CHANGE how you take these medications      clopidogreL 75 mg tablet  Commonly known as: PLAVIX  TAKE 1 TABLET(75 MG) BY MOUTH EVERY EVENING  What changed: when to take this            CONTINUE taking these medications      amiodarone 200 MG Tab  Commonly known as: PACERONE  Take 1 tablet (200 mg total) by mouth once daily.     aspirin 81 MG EC tablet  Commonly known as: ECOTRIN     atorvastatin 80 MG tablet  Commonly known as: LIPITOR  Take 1 tablet (80 mg total) by mouth every evening.     bumetanide 1 MG tablet  Commonly known as: BUMEX  Take 1 tablet (1 mg total) by mouth once daily.     cefdinir 300 MG capsule  Commonly known as: OMNICEF  Take 1 capsule (300 mg total) by mouth 2 (two) times daily. for 10 days     CENTRUM ORAL     coenzyme Q10 200 mg capsule  Take 200 mg by mouth once daily.     empagliflozin 10 mg tablet  Commonly known as: Jardiance  Take 1 tablet (10 mg total) by mouth once daily.     ENTRESTO 24-26 mg  per tablet  Generic drug: sacubitriL-valsartan  Take 1 tablet by mouth 2 (two) times daily.     metoprolol succinate 25 MG 24 hr tablet  Commonly known as: TOPROL-XL  Take 1 tablet (25 mg total) by mouth once daily.     nitroGLYCERIN 0.4 MG SL tablet  Commonly known as: NITROSTAT  ONE TABLET UNDER TONGUE AS NEEDED FOR CHEST PAIN. TAKE EVERY 5 MINUTES AS NEEDED     spironolactone 25 MG tablet  Commonly known as: ALDACTONE  Take 0.5 tablets (12.5 mg total) by mouth once daily.              Signing Physician:  Zayra Grajeda MD

## 2024-05-09 ENCOUNTER — LAB VISIT (OUTPATIENT)
Dept: LAB | Facility: HOSPITAL | Age: 79
End: 2024-05-09
Attending: INTERNAL MEDICINE
Payer: MEDICARE

## 2024-05-09 DIAGNOSIS — I50.42 CHRONIC COMBINED SYSTOLIC AND DIASTOLIC CHF, NYHA CLASS 2: ICD-10-CM

## 2024-05-09 LAB
ANION GAP SERPL CALC-SCNC: 7 MMOL/L (ref 8–16)
BUN SERPL-MCNC: 20 MG/DL (ref 8–23)
CALCIUM SERPL-MCNC: 10.1 MG/DL (ref 8.7–10.5)
CHLORIDE SERPL-SCNC: 103 MMOL/L (ref 95–110)
CO2 SERPL-SCNC: 28 MMOL/L (ref 23–29)
CREAT SERPL-MCNC: 1.4 MG/DL (ref 0.5–1.4)
EST. GFR  (NO RACE VARIABLE): 51.8 ML/MIN/1.73 M^2
GLUCOSE SERPL-MCNC: 101 MG/DL (ref 70–110)
POTASSIUM SERPL-SCNC: 4.7 MMOL/L (ref 3.5–5.1)
SODIUM SERPL-SCNC: 138 MMOL/L (ref 136–145)

## 2024-05-09 PROCEDURE — 83880 ASSAY OF NATRIURETIC PEPTIDE: CPT | Performed by: INTERNAL MEDICINE

## 2024-05-09 PROCEDURE — 36415 COLL VENOUS BLD VENIPUNCTURE: CPT | Mod: PO | Performed by: INTERNAL MEDICINE

## 2024-05-09 PROCEDURE — 80048 BASIC METABOLIC PNL TOTAL CA: CPT | Performed by: INTERNAL MEDICINE

## 2024-05-11 LAB — NT-PROBNP SERPL IA-MCNC: 1055 PG/ML

## 2024-05-22 DIAGNOSIS — I25.10 CORONARY ARTERY DISEASE: ICD-10-CM

## 2024-05-27 RX ORDER — CLOPIDOGREL BISULFATE 75 MG/1
75 TABLET ORAL DAILY
Qty: 90 TABLET | Refills: 3 | Status: SHIPPED | OUTPATIENT
Start: 2024-05-27

## 2024-07-08 PROBLEM — J18.9 PNEUMONIA: Status: RESOLVED | Noted: 2024-04-06 | Resolved: 2024-07-08

## 2024-07-19 ENCOUNTER — OFFICE VISIT (OUTPATIENT)
Dept: CARDIOLOGY | Facility: CLINIC | Age: 79
End: 2024-07-19
Payer: MEDICARE

## 2024-07-19 VITALS
SYSTOLIC BLOOD PRESSURE: 102 MMHG | HEART RATE: 62 BPM | WEIGHT: 162.69 LBS | BODY MASS INDEX: 27.07 KG/M2 | DIASTOLIC BLOOD PRESSURE: 61 MMHG

## 2024-07-19 DIAGNOSIS — I25.5 CARDIOMYOPATHY, ISCHEMIC: Chronic | ICD-10-CM

## 2024-07-19 DIAGNOSIS — I10 ESSENTIAL (PRIMARY) HYPERTENSION: ICD-10-CM

## 2024-07-19 DIAGNOSIS — Z95.810 ICD (IMPLANTABLE CARDIOVERTER-DEFIBRILLATOR) IN PLACE: ICD-10-CM

## 2024-07-19 DIAGNOSIS — I25.812 CORONARY ARTERY DISEASE INVOLVING BYPASS GRAFT OF TRANSPLANTED HEART WITHOUT ANGINA PECTORIS: Primary | ICD-10-CM

## 2024-07-19 DIAGNOSIS — E78.49 OTHER HYPERLIPIDEMIA: ICD-10-CM

## 2024-07-19 DIAGNOSIS — I50.42 CHRONIC COMBINED SYSTOLIC AND DIASTOLIC CHF, NYHA CLASS 2: ICD-10-CM

## 2024-07-19 PROCEDURE — 3078F DIAST BP <80 MM HG: CPT | Mod: CPTII,S$GLB,, | Performed by: INTERNAL MEDICINE

## 2024-07-19 PROCEDURE — 99999 PR PBB SHADOW E&M-EST. PATIENT-LVL III: CPT | Mod: PBBFAC,,, | Performed by: INTERNAL MEDICINE

## 2024-07-19 PROCEDURE — 3288F FALL RISK ASSESSMENT DOCD: CPT | Mod: CPTII,S$GLB,, | Performed by: INTERNAL MEDICINE

## 2024-07-19 PROCEDURE — 1159F MED LIST DOCD IN RCRD: CPT | Mod: CPTII,S$GLB,, | Performed by: INTERNAL MEDICINE

## 2024-07-19 PROCEDURE — 99214 OFFICE O/P EST MOD 30 MIN: CPT | Mod: S$GLB,,, | Performed by: INTERNAL MEDICINE

## 2024-07-19 PROCEDURE — 1160F RVW MEDS BY RX/DR IN RCRD: CPT | Mod: CPTII,S$GLB,, | Performed by: INTERNAL MEDICINE

## 2024-07-19 PROCEDURE — 1101F PT FALLS ASSESS-DOCD LE1/YR: CPT | Mod: CPTII,S$GLB,, | Performed by: INTERNAL MEDICINE

## 2024-07-19 PROCEDURE — 1125F AMNT PAIN NOTED PAIN PRSNT: CPT | Mod: CPTII,S$GLB,, | Performed by: INTERNAL MEDICINE

## 2024-07-19 PROCEDURE — 3074F SYST BP LT 130 MM HG: CPT | Mod: CPTII,S$GLB,, | Performed by: INTERNAL MEDICINE

## 2024-07-19 NOTE — PROGRESS NOTES
HISTORY:    78-year-old male with a history of anterior wall myocardial infarction in 1996 status post emergent CABG x3 at that time followed by multiple PCIs, AICD placement 2010 with generator exchange in 2017, HFrEF, VT 2023, CVA 2021, hypertension, hyperlipidemia, and obstructive sleep apnea presenting for follow-up.    Admission for HFrEF in early 2024 after he underwent hip surgery 1 month prior.     No CP. No SOB/LANGE at this time. No edema or orthopnea.    Participating in PT without issue.     Tolerates asa 81x1, clopidogrel 75x1, amiodarone 200x1, atorvastatin 80x1, metoprolol succinate 25x1, sacubitril-valsartan 24-26x2, spironolactone 25x1, empagliflozin 10x1, bumetadine 1x1.       PHYSICAL EXAM:    Vitals:    07/19/24 1444   BP: 102/61   Pulse: 62       NAD, A+Ox3.  No jvd, no bruit.  RRR nml s1,s2. No murmurs.  CTA B no wheezes or crackles.  2+ B radial and 1+ B DP/PT. No edema.    LABS/STUDIES (imaging reviewed during clinic visit):    April 2024 hemoglobin 10.9/MCV 88.  May BNP normal.  NT proBNP 1000.  April BNP 1100.  Troponin low-grade.  July 2023 .  Troponin peak 17.  2022 LDL is 63 and HDL 40. Triglycerides are 63.   EKG April 2024 a paced with nonspecific intraventricular conduction delay.  July 28th 2023 a paced with a nonspecific intraventricular conduction block that is old.  Inferior ST depressions with posterolateral ST elevations post VT. Admission ecg July 28th VT. Admission ecg July 24th VT.    AICD interrogation February 2024 50% a paced with 0% V pacing.  No significant arrhythmia.   TTE December 2023 enlarged LV with eccentric hypertrophy and severely decreased systolic function.  LVEF 15-20%.  Grade 2 diastology.  Moderate MR.  CVP 3.  Nuclear stress test 2018 demonstrates no LVEF 23% with no evidence of ischemia.  There is a fixed defect in the anterior septum, anterior, apical, and inferoapical walls.  Cardiac catheterization July '23 with a patent left main.  Flush  occlusion of the ostial LAD.  70% proximal, calcified left circ disease.  Two patent OM vessels without significant stenosis.  Patent distal circ.  Patent RCA system.  Patent SVG to LAD also filling a D1 branch with significant proximal disease in that branch.  Unclear culprit.  Status post POBA of proximal left circ.  Carotid September 2021 demonstrates no significant stenosis.  CT head August 2021 demonstrates no acute abnormalities.  Chronic microvascular disease noted.  CTA Head September 2021 demonstrates remote left thalamic infarction. Right cavernous carotid aneuyrysm measuring 1.5cm. No significant carotid stenosis. 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    ASSESSMENT & PLAN:    1. Coronary artery disease involving bypass graft of transplanted heart without angina pectoris    2. Cardiomyopathy, ischemic    3. Chronic combined systolic and diastolic CHF, NYHA class 2    4. Essential (primary) hypertension    5. ICD (implantable cardioverter-defibrillator) in place    6. Other hyperlipidemia                     ICMP with HFrEF.  April 2024 admission likely iatrogenic post hip surgery. Doing well since.    Cont metoprolol succinate 25 x 1, sacubitril-valsartan 24-26 x 2, empagliflozin 10 x 1, spironolactone 25 x 1, bumetanide 1 x 1.      2L fluid restriction and low salt diet. Keep weight <160.    H/o VT on amio. No recent events. AICD in place.     CAD with no angina at this time. On DAPT.    LDL at goal on atorvastatin 80 mg.       Follow up in about 6 months (around 1/19/2025).      Lonnie Heller MD

## 2024-08-09 RX ORDER — SACUBITRIL AND VALSARTAN 24; 26 MG/1; MG/1
1 TABLET, FILM COATED ORAL 2 TIMES DAILY
Qty: 60 TABLET | Refills: 11 | Status: CANCELLED | OUTPATIENT
Start: 2024-08-09 | End: 2025-08-09

## 2024-08-11 RX ORDER — SACUBITRIL AND VALSARTAN 24; 26 MG/1; MG/1
1 TABLET, FILM COATED ORAL 2 TIMES DAILY
Qty: 60 TABLET | Refills: 11 | Status: SHIPPED | OUTPATIENT
Start: 2024-08-11 | End: 2025-08-11

## 2024-08-28 ENCOUNTER — CLINICAL SUPPORT (OUTPATIENT)
Dept: CARDIOLOGY | Facility: HOSPITAL | Age: 79
End: 2024-08-28
Payer: MEDICARE

## 2024-08-28 ENCOUNTER — CLINICAL SUPPORT (OUTPATIENT)
Dept: CARDIOLOGY | Facility: HOSPITAL | Age: 79
End: 2024-08-28
Attending: INTERNAL MEDICINE
Payer: MEDICARE

## 2024-08-28 DIAGNOSIS — Z95.810 PRESENCE OF AUTOMATIC (IMPLANTABLE) CARDIAC DEFIBRILLATOR: ICD-10-CM

## 2024-08-28 PROCEDURE — 93295 DEV INTERROG REMOTE 1/2/MLT: CPT | Mod: ,,, | Performed by: INTERNAL MEDICINE

## 2024-08-28 PROCEDURE — 93296 REM INTERROG EVL PM/IDS: CPT | Performed by: INTERNAL MEDICINE

## 2024-08-29 LAB
OHS CV AF BURDEN PERCENT: < 1
OHS CV DC REMOTE DEVICE TYPE: NORMAL
OHS CV RV PACING PERCENT: 0.34 %

## 2024-09-05 ENCOUNTER — LAB VISIT (OUTPATIENT)
Dept: LAB | Facility: HOSPITAL | Age: 79
End: 2024-09-05
Attending: INTERNAL MEDICINE
Payer: MEDICARE

## 2024-09-05 DIAGNOSIS — Z12.5 PROSTATE CANCER SCREENING: ICD-10-CM

## 2024-09-05 DIAGNOSIS — I25.708 CORONARY ARTERY DISEASE OF BYPASS GRAFT OF NATIVE HEART WITH STABLE ANGINA PECTORIS: ICD-10-CM

## 2024-09-05 DIAGNOSIS — Z91.89 AT RISK FOR AMIODARONE TOXICITY WITH LONG TERM USE: ICD-10-CM

## 2024-09-05 DIAGNOSIS — I10 ESSENTIAL (PRIMARY) HYPERTENSION: ICD-10-CM

## 2024-09-05 DIAGNOSIS — Z79.899 AT RISK FOR AMIODARONE TOXICITY WITH LONG TERM USE: ICD-10-CM

## 2024-09-05 DIAGNOSIS — I50.42 CHRONIC COMBINED SYSTOLIC AND DIASTOLIC CHF, NYHA CLASS 2: ICD-10-CM

## 2024-09-05 PROBLEM — N18.31 CHRONIC KIDNEY DISEASE, STAGE 3A: Status: ACTIVE | Noted: 2024-09-05

## 2024-09-05 PROBLEM — J44.9 CHRONIC OBSTRUCTIVE PULMONARY DISEASE, UNSPECIFIED: Status: ACTIVE | Noted: 2024-09-05

## 2024-09-05 LAB
ALBUMIN SERPL BCP-MCNC: 4.1 G/DL (ref 3.5–5.2)
ALP SERPL-CCNC: 162 U/L (ref 55–135)
ALT SERPL W/O P-5'-P-CCNC: 51 U/L (ref 10–44)
ANION GAP SERPL CALC-SCNC: 10 MMOL/L (ref 8–16)
AST SERPL-CCNC: 51 U/L (ref 10–40)
BASOPHILS # BLD AUTO: 0.06 K/UL (ref 0–0.2)
BASOPHILS NFR BLD: 1.1 % (ref 0–1.9)
BILIRUB SERPL-MCNC: 0.7 MG/DL (ref 0.1–1)
BUN SERPL-MCNC: 22 MG/DL (ref 8–23)
CALCIUM SERPL-MCNC: 10 MG/DL (ref 8.7–10.5)
CHLORIDE SERPL-SCNC: 105 MMOL/L (ref 95–110)
CHOLEST SERPL-MCNC: 138 MG/DL (ref 120–199)
CHOLEST/HDLC SERPL: 4.2 {RATIO} (ref 2–5)
CO2 SERPL-SCNC: 25 MMOL/L (ref 23–29)
COMPLEXED PSA SERPL-MCNC: 0.52 NG/ML (ref 0–4)
CREAT SERPL-MCNC: 1.4 MG/DL (ref 0.5–1.4)
DIFFERENTIAL METHOD BLD: ABNORMAL
EOSINOPHIL # BLD AUTO: 0.6 K/UL (ref 0–0.5)
EOSINOPHIL NFR BLD: 10.3 % (ref 0–8)
ERYTHROCYTE [DISTWIDTH] IN BLOOD BY AUTOMATED COUNT: 15.4 % (ref 11.5–14.5)
EST. GFR  (NO RACE VARIABLE): 52 ML/MIN/1.73 M^2
GLUCOSE SERPL-MCNC: 94 MG/DL (ref 70–110)
HCT VFR BLD AUTO: 44.3 % (ref 40–54)
HDLC SERPL-MCNC: 33 MG/DL (ref 40–75)
HDLC SERPL: 23.9 % (ref 20–50)
HGB BLD-MCNC: 14.5 G/DL (ref 14–18)
IMM GRANULOCYTES # BLD AUTO: 0.01 K/UL (ref 0–0.04)
IMM GRANULOCYTES NFR BLD AUTO: 0.2 % (ref 0–0.5)
LDLC SERPL CALC-MCNC: 77 MG/DL (ref 63–159)
LYMPHOCYTES # BLD AUTO: 1.5 K/UL (ref 1–4.8)
LYMPHOCYTES NFR BLD: 25.9 % (ref 18–48)
MCH RBC QN AUTO: 31 PG (ref 27–31)
MCHC RBC AUTO-ENTMCNC: 32.7 G/DL (ref 32–36)
MCV RBC AUTO: 95 FL (ref 82–98)
MONOCYTES # BLD AUTO: 0.7 K/UL (ref 0.3–1)
MONOCYTES NFR BLD: 13.1 % (ref 4–15)
NEUTROPHILS # BLD AUTO: 2.8 K/UL (ref 1.8–7.7)
NEUTROPHILS NFR BLD: 49.4 % (ref 38–73)
NONHDLC SERPL-MCNC: 105 MG/DL
NRBC BLD-RTO: 0 /100 WBC
PLATELET # BLD AUTO: 229 K/UL (ref 150–450)
PMV BLD AUTO: 9.3 FL (ref 9.2–12.9)
POTASSIUM SERPL-SCNC: 4.6 MMOL/L (ref 3.5–5.1)
PROT SERPL-MCNC: 7.3 G/DL (ref 6–8.4)
RBC # BLD AUTO: 4.68 M/UL (ref 4.6–6.2)
SODIUM SERPL-SCNC: 140 MMOL/L (ref 136–145)
TRIGL SERPL-MCNC: 140 MG/DL (ref 30–150)
TSH SERPL DL<=0.005 MIU/L-ACNC: 3.14 UIU/ML (ref 0.4–4)
WBC # BLD AUTO: 5.63 K/UL (ref 3.9–12.7)

## 2024-09-05 PROCEDURE — 84153 ASSAY OF PSA TOTAL: CPT | Performed by: INTERNAL MEDICINE

## 2024-09-05 PROCEDURE — 80061 LIPID PANEL: CPT | Performed by: INTERNAL MEDICINE

## 2024-09-05 PROCEDURE — 85025 COMPLETE CBC W/AUTO DIFF WBC: CPT | Performed by: INTERNAL MEDICINE

## 2024-09-05 PROCEDURE — 84443 ASSAY THYROID STIM HORMONE: CPT | Performed by: INTERNAL MEDICINE

## 2024-09-05 PROCEDURE — 36415 COLL VENOUS BLD VENIPUNCTURE: CPT | Performed by: INTERNAL MEDICINE

## 2024-09-05 PROCEDURE — 80053 COMPREHEN METABOLIC PANEL: CPT | Performed by: INTERNAL MEDICINE

## 2024-09-10 ENCOUNTER — HOSPITAL ENCOUNTER (OUTPATIENT)
Dept: RADIOLOGY | Facility: HOSPITAL | Age: 79
Discharge: HOME | End: 2024-09-10
Attending: INTERNAL MEDICINE
Payer: MEDICARE

## 2024-09-10 DIAGNOSIS — Z87.891 PERSONAL HISTORY OF NICOTINE DEPENDENCE: ICD-10-CM

## 2024-09-10 DIAGNOSIS — Z72.0 TOBACCO ABUSE: ICD-10-CM

## 2024-09-10 PROCEDURE — 71271 CT THORAX LUNG CANCER SCR C-: CPT | Mod: 26,,, | Performed by: RADIOLOGY

## 2024-09-10 PROCEDURE — 71271 CT THORAX LUNG CANCER SCR C-: CPT | Mod: TC

## 2024-09-23 NOTE — PROGRESS NOTES
Start spironolactone 25 mg once a day and stop potassium.  On this it is important we monitor your potassium level so you will need BMP 3 days after you make this change and 2 weeks after that.  I have entered the orders.    Note to schedulers and sent to patient  I don't know when [employer] will get in the meds for him but first BMP is 3 days after he starts and next one 2 weeks later.  Please call and schedule with him [facility]  
36.7

## 2024-09-26 ENCOUNTER — TELEPHONE (OUTPATIENT)
Dept: ELECTROPHYSIOLOGY | Facility: CLINIC | Age: 79
End: 2024-09-26
Payer: MEDICARE

## 2024-09-27 ENCOUNTER — OFFICE VISIT (OUTPATIENT)
Dept: CARDIOLOGY | Facility: CLINIC | Age: 79
End: 2024-09-27
Payer: MEDICARE

## 2024-09-27 VITALS
SYSTOLIC BLOOD PRESSURE: 91 MMHG | WEIGHT: 160.25 LBS | DIASTOLIC BLOOD PRESSURE: 54 MMHG | HEART RATE: 64 BPM | HEIGHT: 65 IN | BODY MASS INDEX: 26.7 KG/M2

## 2024-09-27 DIAGNOSIS — I25.2 HX OF MYOCARDIAL INFARCTION: ICD-10-CM

## 2024-09-27 DIAGNOSIS — Z95.1 HX OF CABG: ICD-10-CM

## 2024-09-27 DIAGNOSIS — I25.5 CARDIOMYOPATHY, ISCHEMIC: Primary | Chronic | ICD-10-CM

## 2024-09-27 DIAGNOSIS — N18.31 CHRONIC KIDNEY DISEASE, STAGE 3A: ICD-10-CM

## 2024-09-27 DIAGNOSIS — I10 ESSENTIAL (PRIMARY) HYPERTENSION: ICD-10-CM

## 2024-09-27 DIAGNOSIS — I47.20 VENTRICULAR TACHYCARDIA: ICD-10-CM

## 2024-09-27 DIAGNOSIS — I25.810 CORONARY ARTERY DISEASE INVOLVING CORONARY BYPASS GRAFT OF NATIVE HEART WITHOUT ANGINA PECTORIS: ICD-10-CM

## 2024-09-27 DIAGNOSIS — G47.33 OSA ON CPAP: ICD-10-CM

## 2024-09-27 DIAGNOSIS — Z95.810 ICD (IMPLANTABLE CARDIOVERTER-DEFIBRILLATOR) IN PLACE: ICD-10-CM

## 2024-09-27 PROCEDURE — 99999 PR PBB SHADOW E&M-EST. PATIENT-LVL III: CPT | Mod: PBBFAC,,, | Performed by: INTERNAL MEDICINE

## 2024-09-27 NOTE — PROGRESS NOTES
Subjective:    Patient ID:  Tristen Blanc is a 79 y.o. male who presents for evaluation of Congestive Heart Failure    Primary Cardiologist: Lonnie Heller MD    HPI  Prior Hx:  I had the pleasure of seeing Mr. Blanc in our electrophysiology clinic in follow-up for ICD care. As you are aware he is a pleasant 77 year-old man with chronic ischemic cardiomyopathy with acute MI in 1996 complicated by cardiac arrest with persistent systolic heart failure with a LVEF of 20-25% and dual chamber ICD implantation in 2010 with a generator change in 2017 by Dr. Loza. He reports no history of ICD shocks. He presented to establish care of his ICD in 7/2020.  He presented for  yearly follow-up 7/2023 and had no complaints.      8/2023: Mr. Blanc was admitted twice to [facility] in the past 2 weeks with VT storm, sometimes responsive to ATP and other times not. VT TCL was 320ms. Underwent coronary angiogram on 7/26/2023 with Dr. Fermin and was found to have a patent SVG-LAD with ISR of LCx stent that was treated with balloon angioplasty and then failed attempt to treat an LAD diagonal branch. He reports he instantly felt better (less shortness of breath). He presented with further VT after that hospitalization and was initiated on amiodarone. TCL was 320ms. In general they responded to ATP therapy however took until the 4th-6th ATP sequence to work (ramp). He presents for evaluation. VT ECG shows RB, V5 transition, mid rightward axis. ECHO showed LVEF of 15%. Discussed eventual possible ablation.    In-clinic device check notes stable lead parameters with 98% RA and 0% RV pacing and no arrhythmias. Estimated battery longevity of 2  years.    11/2023: Mr. Blanc returned for follow-up. No shocks. Feeling well. Recent remote interrogation noted no arrhythmias, 2.2 years of battery longevity.    Interim Hx:  Recent TSH normal. Recent LFTs minimally elevated. My interpretation of today's in-clinic ICD check is 68% RA  and no RV pacing with no arrhythmias and 2y battery longevity. RV sensing decreased today to 2mV (from 8). Had an isolated drop 2 months ago as well. Capture threshold and impedance stable/normal. Sensing changed to integrated bipole and sensing is 8mV.    My interpretation of today's in clinic ECG is atrial paced with an IVCD of 150ms.      Review of Systems   Constitutional: Negative for fever and malaise/fatigue.   HENT:  Negative for congestion and sore throat.    Eyes:  Negative for blurred vision and visual disturbance.   Cardiovascular:  Negative for chest pain, dyspnea on exertion, irregular heartbeat, near-syncope, palpitations and syncope.   Respiratory:  Negative for cough and shortness of breath.    Hematologic/Lymphatic: Negative for bleeding problem. Does not bruise/bleed easily.   Skin: Negative.    Musculoskeletal: Negative.    Gastrointestinal:  Negative for bloating, abdominal pain, hematochezia and melena.   Neurological:  Negative for focal weakness and weakness.   Psychiatric/Behavioral: Negative.          Objective:    Physical Exam  Vitals reviewed.   Constitutional:       General: He is not in acute distress.     Appearance: He is well-developed. He is not diaphoretic.   HENT:      Head: Normocephalic and atraumatic.   Eyes:      General:         Right eye: No discharge.         Left eye: No discharge.      Conjunctiva/sclera: Conjunctivae normal.   Cardiovascular:      Rate and Rhythm: Normal rate and regular rhythm.      Heart sounds: No murmur heard.     No friction rub. No gallop.   Pulmonary:      Effort: Pulmonary effort is normal. No respiratory distress.      Breath sounds: Normal breath sounds. No wheezing or rales.   Abdominal:      General: Bowel sounds are normal. There is no distension.      Palpations: Abdomen is soft.      Tenderness: There is no abdominal tenderness.   Musculoskeletal:      Cervical back: Neck supple.   Skin:     General: Skin is warm and dry.   Neurological:       Mental Status: He is alert and oriented to person, place, and time.   Psychiatric:         Behavior: Behavior normal.         Thought Content: Thought content normal.         Judgment: Judgment normal.           Assessment:       1. Cardiomyopathy, ischemic    2. Coronary artery disease involving coronary bypass graft of native heart without angina pectoris    3. Hx of CABG    4. Hx of myocardial infarction    5. Ventricular tachycardia    6. ICD (implantable cardioverter-defibrillator) in place    7. Essential (primary) hypertension    8. Chronic kidney disease, stage 3a    9. JERONIMO on CPAP         Plan:       In summary, Mr. Blanc is a pleasant 78 year-old man with chronic ischemic cardiomyopathy with acute MI in 1996 complicated by cardiac arrest with persistent systolic heart failure with a LVEF of 20-25% and dual chamber ICD implantation in 2010 with a generator change in 2017 by Dr. Loza now with recent MMVT consistent with a mid-lateral wall LV exit. He is on amiodarone now. Device reprogrammed to Ramp ATP only rather than Burst then Ramp as they generally only responded to fast Ramp ATP.  Continue 200mg daily of amiodarone. Unclear reason for sensing decrease with true bipole configuration. Changed to integrated bipole. All other parameters indicate an intact system.    RTC in 3 months to check lead parameters.    Thank you for allowing me to participate in the care of this patient. Please do not hesitate to call me with any questions or concerns.    Salazar Tucker MD, PhD  Cardiac Electrophysiology

## 2024-09-30 DIAGNOSIS — I48.0 PAROXYSMAL ATRIAL FIBRILLATION: Primary | ICD-10-CM

## 2024-10-09 ENCOUNTER — PATIENT MESSAGE (OUTPATIENT)
Dept: NEUROSURGERY | Facility: CLINIC | Age: 79
End: 2024-10-09
Payer: MEDICARE

## 2024-10-09 DIAGNOSIS — I67.1 CEREBRAL ANEURYSM, NONRUPTURED: Primary | ICD-10-CM

## 2024-11-11 DIAGNOSIS — I67.1 CEREBRAL ANEURYSM, NONRUPTURED: Primary | ICD-10-CM

## 2024-11-12 ENCOUNTER — OFFICE VISIT (OUTPATIENT)
Dept: NEUROSURGERY | Facility: CLINIC | Age: 79
End: 2024-11-12
Payer: MEDICARE

## 2024-11-12 ENCOUNTER — TELEPHONE (OUTPATIENT)
Dept: NEUROSURGERY | Facility: CLINIC | Age: 79
End: 2024-11-12

## 2024-11-12 ENCOUNTER — HOSPITAL ENCOUNTER (OUTPATIENT)
Dept: RADIOLOGY | Facility: HOSPITAL | Age: 79
Discharge: HOME | End: 2024-11-12
Payer: MEDICARE

## 2024-11-12 VITALS
WEIGHT: 160.25 LBS | HEART RATE: 70 BPM | DIASTOLIC BLOOD PRESSURE: 62 MMHG | SYSTOLIC BLOOD PRESSURE: 103 MMHG | HEIGHT: 65 IN | BODY MASS INDEX: 26.7 KG/M2 | RESPIRATION RATE: 18 BRPM

## 2024-11-12 DIAGNOSIS — I67.1 CEREBRAL ANEURYSM, NONRUPTURED: Primary | ICD-10-CM

## 2024-11-12 DIAGNOSIS — I67.1 CEREBRAL ANEURYSM, NONRUPTURED: ICD-10-CM

## 2024-11-12 PROCEDURE — 1126F AMNT PAIN NOTED NONE PRSNT: CPT | Mod: CPTII,S$GLB,, | Performed by: NEUROLOGICAL SURGERY

## 2024-11-12 PROCEDURE — 25500020 PHARM REV CODE 255: Mod: PO

## 2024-11-12 PROCEDURE — 70496 CT ANGIOGRAPHY HEAD: CPT | Mod: TC,PO

## 2024-11-12 PROCEDURE — 1101F PT FALLS ASSESS-DOCD LE1/YR: CPT | Mod: CPTII,S$GLB,, | Performed by: NEUROLOGICAL SURGERY

## 2024-11-12 PROCEDURE — 3288F FALL RISK ASSESSMENT DOCD: CPT | Mod: CPTII,S$GLB,, | Performed by: NEUROLOGICAL SURGERY

## 2024-11-12 PROCEDURE — 99215 OFFICE O/P EST HI 40 MIN: CPT | Mod: S$GLB,,, | Performed by: NEUROLOGICAL SURGERY

## 2024-11-12 PROCEDURE — 3074F SYST BP LT 130 MM HG: CPT | Mod: CPTII,S$GLB,, | Performed by: NEUROLOGICAL SURGERY

## 2024-11-12 PROCEDURE — 70496 CT ANGIOGRAPHY HEAD: CPT | Mod: 26,,, | Performed by: RADIOLOGY

## 2024-11-12 PROCEDURE — 1159F MED LIST DOCD IN RCRD: CPT | Mod: CPTII,S$GLB,, | Performed by: NEUROLOGICAL SURGERY

## 2024-11-12 PROCEDURE — 3078F DIAST BP <80 MM HG: CPT | Mod: CPTII,S$GLB,, | Performed by: NEUROLOGICAL SURGERY

## 2024-11-12 RX ADMIN — IOHEXOL 100 ML: 350 INJECTION, SOLUTION INTRAVENOUS at 11:11
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
## 2024-11-14 ENCOUNTER — TELEPHONE (OUTPATIENT)
Dept: NEUROLOGY | Facility: CLINIC | Age: 79
End: 2024-11-14
Payer: MEDICARE

## 2024-11-18 ENCOUNTER — TELEPHONE (OUTPATIENT)
Dept: NEUROLOGY | Facility: CLINIC | Age: 79
End: 2024-11-18
Payer: MEDICARE

## 2024-11-22 ENCOUNTER — HOSPITAL ENCOUNTER (OUTPATIENT)
Dept: RADIOLOGY | Facility: HOSPITAL | Age: 79
Discharge: HOME | End: 2024-11-22
Attending: INTERNAL MEDICINE
Payer: MEDICARE

## 2024-11-22 DIAGNOSIS — R91.8 ABNORMAL CT LUNG SCREENING: ICD-10-CM

## 2024-11-22 PROCEDURE — 25500020 PHARM REV CODE 255: Performed by: INTERNAL MEDICINE

## 2024-11-22 PROCEDURE — 71260 CT THORAX DX C+: CPT | Mod: TC

## 2024-11-22 PROCEDURE — 71260 CT THORAX DX C+: CPT | Mod: 26,,, | Performed by: RADIOLOGY

## 2024-11-22 RX ADMIN — IOHEXOL 75 ML: 350 INJECTION, SOLUTION INTRAVENOUS at 12:11

## 2024-11-27 ENCOUNTER — CLINICAL SUPPORT (OUTPATIENT)
Dept: CARDIOLOGY | Facility: HOSPITAL | Age: 79
End: 2024-11-27
Payer: MEDICARE

## 2024-11-27 ENCOUNTER — CLINICAL SUPPORT (OUTPATIENT)
Dept: CARDIOLOGY | Facility: HOSPITAL | Age: 79
End: 2024-11-27
Attending: INTERNAL MEDICINE
Payer: MEDICARE

## 2024-11-27 DIAGNOSIS — Z95.810 PRESENCE OF AUTOMATIC (IMPLANTABLE) CARDIAC DEFIBRILLATOR: ICD-10-CM

## 2024-11-27 PROCEDURE — 93296 REM INTERROG EVL PM/IDS: CPT | Performed by: INTERNAL MEDICINE

## 2024-11-27 PROCEDURE — 93295 DEV INTERROG REMOTE 1/2/MLT: CPT | Mod: ,,, | Performed by: INTERNAL MEDICINE

## 2024-12-05 ENCOUNTER — TELEPHONE (OUTPATIENT)
Dept: ELECTROPHYSIOLOGY | Facility: CLINIC | Age: 79
End: 2024-12-05
Payer: MEDICARE

## 2024-12-13 LAB
OHS CV AF BURDEN PERCENT: < 1
OHS CV DC REMOTE DEVICE TYPE: NORMAL
OHS CV RV PACING PERCENT: 0.45 %

## 2024-12-19 DIAGNOSIS — E78.5 HYPERLIPIDEMIA, UNSPECIFIED HYPERLIPIDEMIA TYPE: ICD-10-CM

## 2024-12-19 RX ORDER — ATORVASTATIN CALCIUM 80 MG/1
80 TABLET, FILM COATED ORAL NIGHTLY
Qty: 90 TABLET | Refills: 3 | Status: SHIPPED | OUTPATIENT
Start: 2024-12-19

## 2024-12-20 DIAGNOSIS — I47.20 VENTRICULAR TACHYCARDIA: ICD-10-CM

## 2024-12-20 RX ORDER — AMIODARONE HYDROCHLORIDE 200 MG/1
200 TABLET ORAL DAILY
Qty: 90 TABLET | Refills: 3 | Status: SHIPPED | OUTPATIENT
Start: 2024-12-20

## 2025-01-20 ENCOUNTER — TELEPHONE (OUTPATIENT)
Dept: OTOLARYNGOLOGY | Facility: CLINIC | Age: 80
End: 2025-01-20
Payer: MEDICARE

## 2025-01-20 ENCOUNTER — PATIENT MESSAGE (OUTPATIENT)
Dept: OTOLARYNGOLOGY | Facility: CLINIC | Age: 80
End: 2025-01-20
Payer: MEDICARE

## 2025-01-24 ENCOUNTER — OFFICE VISIT (OUTPATIENT)
Dept: CARDIOLOGY | Facility: CLINIC | Age: 80
End: 2025-01-24
Payer: MEDICARE

## 2025-01-24 VITALS
WEIGHT: 167.56 LBS | SYSTOLIC BLOOD PRESSURE: 117 MMHG | BODY MASS INDEX: 27.92 KG/M2 | HEART RATE: 61 BPM | DIASTOLIC BLOOD PRESSURE: 60 MMHG | HEIGHT: 65 IN

## 2025-01-24 DIAGNOSIS — I25.10 CORONARY ARTERY DISEASE INVOLVING NATIVE CORONARY ARTERY OF NATIVE HEART WITHOUT ANGINA PECTORIS: ICD-10-CM

## 2025-01-24 DIAGNOSIS — I47.20 VENTRICULAR TACHYCARDIA: ICD-10-CM

## 2025-01-24 DIAGNOSIS — E78.5 HYPERLIPIDEMIA, UNSPECIFIED HYPERLIPIDEMIA TYPE: ICD-10-CM

## 2025-01-24 DIAGNOSIS — I10 ESSENTIAL (PRIMARY) HYPERTENSION: ICD-10-CM

## 2025-01-24 DIAGNOSIS — Z86.73 H/O: STROKE: Primary | ICD-10-CM

## 2025-01-24 DIAGNOSIS — Z95.810 ICD (IMPLANTABLE CARDIOVERTER-DEFIBRILLATOR) IN PLACE: ICD-10-CM

## 2025-01-24 DIAGNOSIS — Z95.1 HX OF CABG: ICD-10-CM

## 2025-01-24 DIAGNOSIS — E78.49 OTHER HYPERLIPIDEMIA: ICD-10-CM

## 2025-01-24 DIAGNOSIS — I25.10 CORONARY ARTERY DISEASE: ICD-10-CM

## 2025-01-24 DIAGNOSIS — I25.812 CORONARY ARTERY DISEASE INVOLVING BYPASS GRAFT OF TRANSPLANTED HEART, UNSPECIFIED WHETHER ANGINA PRESENT: ICD-10-CM

## 2025-01-24 DIAGNOSIS — I25.5 CARDIOMYOPATHY, ISCHEMIC: Chronic | ICD-10-CM

## 2025-01-24 DIAGNOSIS — I50.42 CHRONIC COMBINED SYSTOLIC AND DIASTOLIC CHF, NYHA CLASS 2: ICD-10-CM

## 2025-01-24 PROCEDURE — 99214 OFFICE O/P EST MOD 30 MIN: CPT | Mod: S$GLB,,, | Performed by: INTERNAL MEDICINE

## 2025-01-24 PROCEDURE — 1126F AMNT PAIN NOTED NONE PRSNT: CPT | Mod: CPTII,S$GLB,, | Performed by: INTERNAL MEDICINE

## 2025-01-24 PROCEDURE — 3074F SYST BP LT 130 MM HG: CPT | Mod: CPTII,S$GLB,, | Performed by: INTERNAL MEDICINE

## 2025-01-24 PROCEDURE — 99999 PR PBB SHADOW E&M-EST. PATIENT-LVL III: CPT | Mod: PBBFAC,,, | Performed by: INTERNAL MEDICINE

## 2025-01-24 PROCEDURE — 1160F RVW MEDS BY RX/DR IN RCRD: CPT | Mod: CPTII,S$GLB,, | Performed by: INTERNAL MEDICINE

## 2025-01-24 PROCEDURE — 3078F DIAST BP <80 MM HG: CPT | Mod: CPTII,S$GLB,, | Performed by: INTERNAL MEDICINE

## 2025-01-24 PROCEDURE — 1101F PT FALLS ASSESS-DOCD LE1/YR: CPT | Mod: CPTII,S$GLB,, | Performed by: INTERNAL MEDICINE

## 2025-01-24 PROCEDURE — 3288F FALL RISK ASSESSMENT DOCD: CPT | Mod: CPTII,S$GLB,, | Performed by: INTERNAL MEDICINE

## 2025-01-24 PROCEDURE — 1159F MED LIST DOCD IN RCRD: CPT | Mod: CPTII,S$GLB,, | Performed by: INTERNAL MEDICINE

## 2025-01-24 RX ORDER — BUMETANIDE 1 MG/1
1 TABLET ORAL DAILY
Qty: 90 TABLET | Refills: 3 | Status: SHIPPED | OUTPATIENT
Start: 2025-01-24 | End: 2026-01-24

## 2025-01-24 RX ORDER — ATORVASTATIN CALCIUM 80 MG/1
80 TABLET, FILM COATED ORAL NIGHTLY
Qty: 90 TABLET | Refills: 3 | Status: SHIPPED | OUTPATIENT
Start: 2025-01-24

## 2025-01-24 RX ORDER — SACUBITRIL AND VALSARTAN 24; 26 MG/1; MG/1
1 TABLET, FILM COATED ORAL 2 TIMES DAILY
Qty: 60 TABLET | Refills: 11 | Status: ACTIVE | OUTPATIENT
Start: 2025-01-24 | End: 2026-01-24

## 2025-01-24 RX ORDER — METOPROLOL SUCCINATE 25 MG/1
25 TABLET, EXTENDED RELEASE ORAL DAILY
Qty: 90 TABLET | Refills: 3 | Status: SHIPPED | OUTPATIENT
Start: 2025-01-24 | End: 2026-01-24

## 2025-01-24 RX ORDER — SPIRONOLACTONE 25 MG/1
12.5 TABLET ORAL DAILY
Qty: 45 TABLET | Refills: 3 | Status: SHIPPED | OUTPATIENT
Start: 2025-01-24 | End: 2026-01-24

## 2025-01-24 RX ORDER — CLOPIDOGREL BISULFATE 75 MG/1
75 TABLET ORAL DAILY
Qty: 90 TABLET | Refills: 3 | Status: SHIPPED | OUTPATIENT
Start: 2025-01-24

## 2025-01-24 NOTE — PROGRESS NOTES
HISTORY:    79-year-old male with a history of anterior wall myocardial infarction in 1996 status post emergent CABG x3 at that time followed by multiple PCIs, AICD placement 2010 with generator exchange in 2017, HFrEF, VT 2023, CVA 2021, hypertension, hyperlipidemia, and obstructive sleep apnea presenting for follow-up.    Admission for HFrEF in early 2024 after he underwent hip surgery 1 month prior.     No CP. No SOB/LANGE at this time. No edema or orthopnea.    Participating in PT without issue.     Tolerates asa 81x1, clopidogrel 75x1, amiodarone 200x1, atorvastatin 80x1, metoprolol succinate 25x1, sacubitril-valsartan 24-26x2, spironolactone 12.5x1, empagliflozin 10x1, bumetadine 1x1.       PHYSICAL EXAM:    Vitals:    01/24/25 1202   BP: 117/60   Pulse: 61         NAD, A+Ox3.  No jvd, no bruit.  RRR nml s1,s2. No murmurs.  CTA B no wheezes or crackles.  No edema.    LABS/STUDIES (imaging reviewed during clinic visit):    September 2024 CBC normal.  November creatinine 1.5/GFR 47.  /HDL 33/LDL 77/.  May NT proBNP 1000.  April BNP 1100.  Troponin low-grade.  July 2023 .  Troponin peak 17.  2022 LDL is 63 and HDL 40. Triglycerides are 63.   EKG September 2024 a paced with nonspecific intraventricular conduction delay.  July 28th 2023 a paced with a nonspecific intraventricular conduction block that is old.  Inferior ST depressions with posterolateral ST elevations post VT. Admission ecg July 28th VT. Admission ecg July 24th VT.    AICD interrogatio November 2024 97% a paced with minimal RV pacing.  No arrhythmias.  1 year and 8 month battery life.   TTE December 2023 enlarged LV with eccentric hypertrophy and severely decreased systolic function.  LVEF 15-20%.  Grade 2 diastology.  Moderate MR.  CVP 3.  Nuclear stress test 2018 demonstrates no LVEF 23% with no evidence of ischemia.  There is a fixed defect in the anterior septum, anterior, apical, and inferoapical walls.  Cardiac  catheterization July '23 with a patent left main.  Flush occlusion of the ostial LAD.  70% proximal, calcified left circ disease.  Two patent OM vessels without significant stenosis.  Patent distal circ.  Patent RCA system.  Patent SVG to LAD also filling a D1 branch with significant proximal disease in that branch.  Unclear culprit.  Status post POBA of proximal left circ.  Carotid September 2021 demonstrates no significant stenosis.  CT head August 2021 demonstrates no acute abnormalities.  Chronic microvascular disease noted.  CTA Head September 2021 demonstrates remote left thalamic infarction. Right cavernous carotid aneuyrysm measuring 1.5cm. No significant carotid stenosis. 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    ASSESSMENT & PLAN:    1. H/O: stroke    2. Chronic combined systolic and diastolic CHF, NYHA class 2    3. Ventricular tachycardia    4. Coronary artery disease involving bypass graft of transplanted heart, unspecified whether angina present    5. Cardiomyopathy, ischemic    6. Coronary artery disease involving native coronary artery of native heart without angina pectoris    7. Essential (primary) hypertension    8. Hx of CABG    9. ICD (implantable cardioverter-defibrillator) in place    10. Other hyperlipidemia    11. Hyperlipidemia, unspecified hyperlipidemia type    12. Coronary artery disease                  Orders Placed This Encounter    atorvastatin (LIPITOR) 80 MG tablet    clopidogreL (PLAVIX) 75 mg tablet    empagliflozin (JARDIANCE) 10 mg tablet    metoprolol succinate (TOPROL-XL) 25 MG 24 hr tablet    bumetanide (BUMEX) 1 MG tablet    sacubitriL-valsartan (ENTRESTO) 24-26 mg per tablet    spironolactone (ALDACTONE) 25 MG tablet       ICMP with HFrEF.  April 2024 admission likely iatrogenic post hip surgery. Doing well since.    Cont metoprolol succinate 25 x 1, sacubitril-valsartan 24-26 x 2, empagliflozin 10 x 1, spironolactone 12.5 x 1, bumetanide 1 x 1 (okay to  skip doses on days out of the house).      2L fluid restriction and low salt diet. Keep weight <160.    H/o VT '23 on amio. No recent events. AICD in place.  Follows with EPS.    CAD with no angina at this time. Okay to drop asa and stay on clopidogrel monotherapy at this time.     LDL at goal on atorvastatin 80 mg.       Follow up in about 1 year (around 1/24/2026).      Lonnie Heller MD

## 2025-01-28 DIAGNOSIS — I48.0 PAROXYSMAL ATRIAL FIBRILLATION: Primary | ICD-10-CM

## 2025-01-31 ENCOUNTER — CLINICAL SUPPORT (OUTPATIENT)
Dept: CARDIOLOGY | Facility: CLINIC | Age: 80
End: 2025-01-31
Attending: INTERNAL MEDICINE
Payer: MEDICARE

## 2025-01-31 ENCOUNTER — LAB VISIT (OUTPATIENT)
Dept: LAB | Facility: HOSPITAL | Age: 80
End: 2025-01-31
Attending: INTERNAL MEDICINE
Payer: MEDICARE

## 2025-01-31 ENCOUNTER — OFFICE VISIT (OUTPATIENT)
Dept: CARDIOLOGY | Facility: CLINIC | Age: 80
End: 2025-01-31
Payer: MEDICARE

## 2025-01-31 VITALS
BODY MASS INDEX: 27.49 KG/M2 | DIASTOLIC BLOOD PRESSURE: 63 MMHG | HEART RATE: 60 BPM | WEIGHT: 165 LBS | HEIGHT: 65 IN | SYSTOLIC BLOOD PRESSURE: 101 MMHG

## 2025-01-31 DIAGNOSIS — I10 ESSENTIAL (PRIMARY) HYPERTENSION: ICD-10-CM

## 2025-01-31 DIAGNOSIS — Z95.810 ICD (IMPLANTABLE CARDIOVERTER-DEFIBRILLATOR) IN PLACE: ICD-10-CM

## 2025-01-31 DIAGNOSIS — I47.20 VENTRICULAR TACHYCARDIA: ICD-10-CM

## 2025-01-31 DIAGNOSIS — I50.42 CHRONIC COMBINED SYSTOLIC AND DIASTOLIC CHF, NYHA CLASS 2: Primary | ICD-10-CM

## 2025-01-31 DIAGNOSIS — N18.31 CHRONIC KIDNEY DISEASE, STAGE 3A: ICD-10-CM

## 2025-01-31 DIAGNOSIS — Z95.1 HX OF CABG: ICD-10-CM

## 2025-01-31 DIAGNOSIS — I50.42 CHRONIC COMBINED SYSTOLIC AND DIASTOLIC CHF, NYHA CLASS 2: ICD-10-CM

## 2025-01-31 DIAGNOSIS — I25.2 HX OF MYOCARDIAL INFARCTION: ICD-10-CM

## 2025-01-31 DIAGNOSIS — I48.0 PAROXYSMAL ATRIAL FIBRILLATION: ICD-10-CM

## 2025-01-31 DIAGNOSIS — G47.33 OSA ON CPAP: ICD-10-CM

## 2025-01-31 LAB
ALBUMIN SERPL BCP-MCNC: 4.1 G/DL (ref 3.5–5.2)
ALP SERPL-CCNC: 120 U/L (ref 40–150)
ALT SERPL W/O P-5'-P-CCNC: 33 U/L (ref 10–44)
ANION GAP SERPL CALC-SCNC: 5 MMOL/L (ref 8–16)
AST SERPL-CCNC: 34 U/L (ref 10–40)
BILIRUB SERPL-MCNC: 0.5 MG/DL (ref 0.1–1)
BUN SERPL-MCNC: 18 MG/DL (ref 8–23)
CALCIUM SERPL-MCNC: 9.3 MG/DL (ref 8.7–10.5)
CHLORIDE SERPL-SCNC: 106 MMOL/L (ref 95–110)
CO2 SERPL-SCNC: 28 MMOL/L (ref 23–29)
CREAT SERPL-MCNC: 1.4 MG/DL (ref 0.5–1.4)
EST. GFR  (NO RACE VARIABLE): 51 ML/MIN/1.73 M^2
GLUCOSE SERPL-MCNC: 86 MG/DL (ref 70–110)
POTASSIUM SERPL-SCNC: 4.2 MMOL/L (ref 3.5–5.1)
PROT SERPL-MCNC: 6.8 G/DL (ref 6–8.4)
SODIUM SERPL-SCNC: 139 MMOL/L (ref 136–145)
TSH SERPL DL<=0.005 MIU/L-ACNC: 3.88 UIU/ML (ref 0.4–4)

## 2025-01-31 PROCEDURE — 3288F FALL RISK ASSESSMENT DOCD: CPT | Mod: CPTII,S$GLB,, | Performed by: INTERNAL MEDICINE

## 2025-01-31 PROCEDURE — 80053 COMPREHEN METABOLIC PANEL: CPT | Performed by: INTERNAL MEDICINE

## 2025-01-31 PROCEDURE — 1126F AMNT PAIN NOTED NONE PRSNT: CPT | Mod: CPTII,S$GLB,, | Performed by: INTERNAL MEDICINE

## 2025-01-31 PROCEDURE — 99999 PR PBB SHADOW E&M-EST. PATIENT-LVL III: CPT | Mod: PBBFAC,,, | Performed by: INTERNAL MEDICINE

## 2025-01-31 PROCEDURE — 1160F RVW MEDS BY RX/DR IN RCRD: CPT | Mod: CPTII,S$GLB,, | Performed by: INTERNAL MEDICINE

## 2025-01-31 PROCEDURE — 1100F PTFALLS ASSESS-DOCD GE2>/YR: CPT | Mod: CPTII,S$GLB,, | Performed by: INTERNAL MEDICINE

## 2025-01-31 PROCEDURE — 1159F MED LIST DOCD IN RCRD: CPT | Mod: CPTII,S$GLB,, | Performed by: INTERNAL MEDICINE

## 2025-01-31 PROCEDURE — 93283 PRGRMG EVAL IMPLANTABLE DFB: CPT | Mod: S$GLB,,, | Performed by: INTERNAL MEDICINE

## 2025-01-31 PROCEDURE — 36415 COLL VENOUS BLD VENIPUNCTURE: CPT | Performed by: INTERNAL MEDICINE

## 2025-01-31 PROCEDURE — 84443 ASSAY THYROID STIM HORMONE: CPT | Performed by: INTERNAL MEDICINE

## 2025-01-31 PROCEDURE — 3074F SYST BP LT 130 MM HG: CPT | Mod: CPTII,S$GLB,, | Performed by: INTERNAL MEDICINE

## 2025-01-31 PROCEDURE — 99213 OFFICE O/P EST LOW 20 MIN: CPT | Mod: S$GLB,,, | Performed by: INTERNAL MEDICINE

## 2025-01-31 PROCEDURE — 3078F DIAST BP <80 MM HG: CPT | Mod: CPTII,S$GLB,, | Performed by: INTERNAL MEDICINE

## 2025-01-31 NOTE — PROGRESS NOTES
Subjective:    Patient ID:  Tristen Blanc is a 79 y.o. male who presents for evaluation of ICD check    Primary Cardiologist: Lonnie Heller MD    HPI  Prior Hx:  I had the pleasure of seeing Mr. Blanc in our electrophysiology clinic in follow-up for ICD care. As you are aware he is a pleasant 77 year-old man with chronic ischemic cardiomyopathy with acute MI in 1996 complicated by cardiac arrest with persistent systolic heart failure with a LVEF of 20-25% and dual chamber ICD implantation in 2010 with a generator change in 2017 by Dr. Loza. He reports no history of ICD shocks. He presented to establish care of his ICD in 7/2020.  He presented for  yearly follow-up 7/2023 and had no complaints.      8/2023: Mr. Blanc was admitted twice to [facility] in the past 2 weeks with VT storm, sometimes responsive to ATP and other times not. VT TCL was 320ms. Underwent coronary angiogram on 7/26/2023 with Dr. Fermin and was found to have a patent SVG-LAD with ISR of LCx stent that was treated with balloon angioplasty and then failed attempt to treat an LAD diagonal branch. He reports he instantly felt better (less shortness of breath). He presented with further VT after that hospitalization and was initiated on amiodarone. TCL was 320ms. In general they responded to ATP therapy however took until the 4th-6th ATP sequence to work (ramp). He presents for evaluation. VT ECG shows RB, V5 transition, mid rightward axis. ECHO showed LVEF of 15%. Discussed eventual possible ablation.    In-clinic device check notes stable lead parameters with 98% RA and 0% RV pacing and no arrhythmias. Estimated battery longevity of 2  years.    11/2023: Mr. Blanc returned for follow-up. No shocks. Feeling well. Recent remote interrogation noted no arrhythmias, 2.2 years of battery longevity.    9/2024: Patient returned for follow-up. Recent TSH normal. Recent LFTs minimally elevated. My interpretation of today's in-clinic ICD check  is 68% RA and no RV pacing with no arrhythmias and 2y battery longevity. RV sensing decreased today to 2mV (from 8). Had an isolated drop 2 months ago as well. Capture threshold and impedance stable/normal. Sensing changed to integrated bipole and sensing is 8mV.    Interim Hx:  Mr. Blanc returns for follow-up. No complaints.    My interpretation of today's in-clinic ICD check is stable lead parameters with 96% RA and <1% RV pacing, no arrhythmias. 19m battery longevity.    Review of Systems   Constitutional: Negative for fever and malaise/fatigue.   HENT:  Negative for congestion and sore throat.    Eyes:  Negative for blurred vision and visual disturbance.   Cardiovascular:  Negative for chest pain, dyspnea on exertion, irregular heartbeat, near-syncope, palpitations and syncope.   Respiratory:  Negative for cough and shortness of breath.    Hematologic/Lymphatic: Negative for bleeding problem. Does not bruise/bleed easily.   Skin: Negative.    Musculoskeletal: Negative.    Gastrointestinal:  Negative for bloating, abdominal pain, hematochezia and melena.   Neurological:  Negative for focal weakness and weakness.   Psychiatric/Behavioral: Negative.          Objective:    Physical Exam  Vitals reviewed.   Constitutional:       General: He is not in acute distress.     Appearance: He is well-developed. He is not diaphoretic.   HENT:      Head: Normocephalic and atraumatic.   Eyes:      General:         Right eye: No discharge.         Left eye: No discharge.      Conjunctiva/sclera: Conjunctivae normal.   Cardiovascular:      Rate and Rhythm: Normal rate and regular rhythm.      Heart sounds: No murmur heard.     No friction rub. No gallop.   Pulmonary:      Effort: Pulmonary effort is normal. No respiratory distress.      Breath sounds: Normal breath sounds. No wheezing or rales.   Abdominal:      General: Bowel sounds are normal. There is no distension.      Palpations: Abdomen is soft.      Tenderness: There is  no abdominal tenderness.   Musculoskeletal:      Cervical back: Neck supple.   Skin:     General: Skin is warm and dry.   Neurological:      Mental Status: He is alert and oriented to person, place, and time.   Psychiatric:         Behavior: Behavior normal.         Thought Content: Thought content normal.         Judgment: Judgment normal.           Assessment:       1. Chronic combined systolic and diastolic CHF, NYHA class 2    2. Hx of myocardial infarction    3. Hx of CABG    4. Essential (primary) hypertension    5. ICD (implantable cardioverter-defibrillator) in place    6. Ventricular tachycardia    7. JERONIMO on CPAP    8. Chronic kidney disease, stage 3a         Plan:       In summary, Mr. Blanc is a pleasant 78 year-old man with chronic ischemic cardiomyopathy with acute MI in 1996 complicated by cardiac arrest with persistent systolic heart failure with a LVEF of 20-25% and dual chamber ICD implantation in 2010 with a generator change in 2017 by Dr. Loza now with MMVT consistent with a mid-lateral wall LV exit. He is on amiodarone now. Device reprogrammed to Ramp ATP only rather than Burst then Ramp as they generally only responded to fast Ramp ATP.  Continue 200mg daily of amiodarone.  Needs updated labs. Will order CMP/TSH.    RTC in 6 months to check lead parameters.    Thank you for allowing me to participate in the care of this patient. Please do not hesitate to call me with any questions or concerns.    Salazar Tucker MD, PhD  Cardiac Electrophysiology

## 2025-02-05 LAB
OHS CV DC REMOTE DEVICE TYPE: NORMAL
OHS CV RV PACING PERCENT: 0.7 %

## 2025-02-26 ENCOUNTER — CLINICAL SUPPORT (OUTPATIENT)
Dept: CARDIOLOGY | Facility: HOSPITAL | Age: 80
End: 2025-02-26
Attending: INTERNAL MEDICINE
Payer: MEDICARE

## 2025-02-26 ENCOUNTER — CLINICAL SUPPORT (OUTPATIENT)
Dept: CARDIOLOGY | Facility: HOSPITAL | Age: 80
End: 2025-02-26
Payer: MEDICARE

## 2025-02-26 DIAGNOSIS — Z95.810 PRESENCE OF AUTOMATIC (IMPLANTABLE) CARDIAC DEFIBRILLATOR: ICD-10-CM

## 2025-02-26 PROCEDURE — 93295 DEV INTERROG REMOTE 1/2/MLT: CPT | Mod: ,,, | Performed by: INTERNAL MEDICINE

## 2025-02-26 PROCEDURE — 93296 REM INTERROG EVL PM/IDS: CPT | Performed by: INTERNAL MEDICINE

## 2025-03-10 LAB
OHS CV AF BURDEN PERCENT: < 1
OHS CV DC REMOTE DEVICE TYPE: NORMAL
OHS CV RV PACING PERCENT: 0.65 %

## 2025-03-11 PROBLEM — F01.50 VASCULAR DEMENTIA WITHOUT BEHAVIORAL DISTURBANCE, PSYCHOTIC DISTURBANCE, MOOD DISTURBANCE, OR ANXIETY, UNSPECIFIED DEMENTIA SEVERITY: Status: ACTIVE | Noted: 2025-03-11

## 2025-04-09 RX ORDER — METOPROLOL SUCCINATE 25 MG/1
25 TABLET, EXTENDED RELEASE ORAL DAILY
Qty: 90 TABLET | Refills: 3 | Status: SHIPPED | OUTPATIENT
Start: 2025-04-09 | End: 2026-04-09

## 2025-04-09 RX ORDER — SPIRONOLACTONE 25 MG/1
12.5 TABLET ORAL DAILY
Qty: 45 TABLET | Refills: 3 | Status: SHIPPED | OUTPATIENT
Start: 2025-04-09 | End: 2026-04-09

## 2025-05-28 ENCOUNTER — CLINICAL SUPPORT (OUTPATIENT)
Dept: CARDIOLOGY | Facility: HOSPITAL | Age: 80
End: 2025-05-28
Attending: INTERNAL MEDICINE
Payer: MEDICARE

## 2025-05-28 ENCOUNTER — CLINICAL SUPPORT (OUTPATIENT)
Dept: CARDIOLOGY | Facility: HOSPITAL | Age: 80
End: 2025-05-28
Payer: MEDICARE

## 2025-05-28 DIAGNOSIS — Z95.810 PRESENCE OF AUTOMATIC (IMPLANTABLE) CARDIAC DEFIBRILLATOR: ICD-10-CM

## 2025-05-28 PROCEDURE — 93295 DEV INTERROG REMOTE 1/2/MLT: CPT | Mod: ,,, | Performed by: INTERNAL MEDICINE

## 2025-05-28 PROCEDURE — 93296 REM INTERROG EVL PM/IDS: CPT | Performed by: INTERNAL MEDICINE

## 2025-06-18 LAB
OHS CV AF BURDEN PERCENT: < 1
OHS CV DC REMOTE DEVICE TYPE: NORMAL
OHS CV RV PACING PERCENT: 0.22 %

## 2025-06-27 ENCOUNTER — PATIENT MESSAGE (OUTPATIENT)
Dept: CARDIOLOGY | Facility: CLINIC | Age: 80
End: 2025-06-27
Payer: MEDICARE

## 2025-08-27 ENCOUNTER — CLINICAL SUPPORT (OUTPATIENT)
Dept: CARDIOLOGY | Facility: HOSPITAL | Age: 80
End: 2025-08-27
Attending: INTERNAL MEDICINE
Payer: MEDICARE

## 2025-08-27 ENCOUNTER — CLINICAL SUPPORT (OUTPATIENT)
Dept: CARDIOLOGY | Facility: HOSPITAL | Age: 80
End: 2025-08-27
Payer: MEDICARE

## 2025-08-27 DIAGNOSIS — Z95.810 PRESENCE OF AUTOMATIC (IMPLANTABLE) CARDIAC DEFIBRILLATOR: ICD-10-CM

## 2025-08-27 PROCEDURE — 93295 DEV INTERROG REMOTE 1/2/MLT: CPT | Mod: ,,, | Performed by: INTERNAL MEDICINE

## 2025-08-27 PROCEDURE — 93296 REM INTERROG EVL PM/IDS: CPT | Performed by: INTERNAL MEDICINE

## 2025-08-28 LAB
OHS CV AF BURDEN PERCENT: < 1
OHS CV DC REMOTE DEVICE TYPE: NORMAL
OHS CV RV PACING PERCENT: 0.52 %

## (undated) DEVICE — GUIDEWIRE EMERALD 150CM PTFE

## (undated) DEVICE — COVER PROBE US 5.5X58L NON LTX

## (undated) DEVICE — DRAPE ANGIO BRACH 38X44IN

## (undated) DEVICE — VISE RADIFOCUS MULTI TORQUE

## (undated) DEVICE — CATH EAGLE EYE ST .014X20X150

## (undated) DEVICE — GUIDE LAUNCHER 6FR JR 4.0

## (undated) DEVICE — CATH IMA INFINITI 4FRX100CM

## (undated) DEVICE — CATH EMERGE MR 12 X 3.50

## (undated) DEVICE — CATH NC EMERGE MR 4X8X143

## (undated) DEVICE — CONTRAST VISIPAQUE 150ML

## (undated) DEVICE — CATH IMPULSE FL4 5FR 100CM

## (undated) DEVICE — KIT LEFT HEART MANIFOLD CUSTOM

## (undated) DEVICE — Device

## (undated) DEVICE — DEVICE PERCLOSE SUT CLSR 6FR

## (undated) DEVICE — CATH IMPULSE 5FR PIGTAIL 125CM

## (undated) DEVICE — KIT INTRODUCER MICROPUNCTR 4F

## (undated) DEVICE — VALVE CONTROL COPILOT

## (undated) DEVICE — GUIDE LAUNCHER 6FR EBU 3.5

## (undated) DEVICE — CATH IMPULSE 5F 100CM FR4

## (undated) DEVICE — PAD DEFIB CADENCE ADULT R2

## (undated) DEVICE — CATH NC EMERGE MR 3.5X15MM

## (undated) DEVICE — SHEATH INTRODUCER 6FR 11CM

## (undated) DEVICE — GUIDE WIRE BMW 014 X190

## (undated) DEVICE — CATH EMERGE MR 8 X 2.00